# Patient Record
Sex: MALE | Race: WHITE | NOT HISPANIC OR LATINO
[De-identification: names, ages, dates, MRNs, and addresses within clinical notes are randomized per-mention and may not be internally consistent; named-entity substitution may affect disease eponyms.]

---

## 2017-05-11 ENCOUNTER — RX RENEWAL (OUTPATIENT)
Age: 82
End: 2017-05-11

## 2017-06-12 ENCOUNTER — APPOINTMENT (OUTPATIENT)
Dept: WOUND CARE | Facility: CLINIC | Age: 82
End: 2017-06-12

## 2017-06-12 VITALS
HEIGHT: 71 IN | HEART RATE: 71 BPM | SYSTOLIC BLOOD PRESSURE: 127 MMHG | DIASTOLIC BLOOD PRESSURE: 67 MMHG | TEMPERATURE: 97.6 F | WEIGHT: 315 LBS | BODY MASS INDEX: 44.1 KG/M2 | RESPIRATION RATE: 16 BRPM

## 2017-06-21 ENCOUNTER — APPOINTMENT (OUTPATIENT)
Dept: CARDIOLOGY | Facility: CLINIC | Age: 82
End: 2017-06-21

## 2017-06-21 ENCOUNTER — NON-APPOINTMENT (OUTPATIENT)
Age: 82
End: 2017-06-21

## 2017-06-21 VITALS
SYSTOLIC BLOOD PRESSURE: 126 MMHG | WEIGHT: 168 LBS | HEIGHT: 71 IN | BODY MASS INDEX: 23.52 KG/M2 | HEART RATE: 80 BPM | DIASTOLIC BLOOD PRESSURE: 64 MMHG

## 2017-06-21 DIAGNOSIS — Z79.01 LONG TERM (CURRENT) USE OF ANTICOAGULANTS: ICD-10-CM

## 2017-06-21 DIAGNOSIS — I47.2 VENTRICULAR TACHYCARDIA: ICD-10-CM

## 2017-06-21 DIAGNOSIS — E03.9 HYPOTHYROIDISM, UNSPECIFIED: ICD-10-CM

## 2017-06-21 RX ORDER — SEVELAMER CARBONATE 800 MG/1
800 TABLET, FILM COATED ORAL 3 TIMES DAILY
Refills: 0 | Status: ACTIVE | COMMUNITY
Start: 2017-06-21

## 2017-06-21 RX ORDER — LEVOTHYROXINE SODIUM 25 UG/1
25 TABLET ORAL DAILY
Refills: 0 | Status: ACTIVE | COMMUNITY
Start: 2017-06-21

## 2017-06-21 RX ORDER — FAMOTIDINE 10 MG/ML
10 VIAL (ML) INTRAVENOUS
Refills: 0 | Status: ACTIVE | COMMUNITY

## 2017-06-21 RX ORDER — ATORVASTATIN CALCIUM 40 MG/1
40 TABLET, FILM COATED ORAL
Refills: 0 | Status: DISCONTINUED | COMMUNITY

## 2017-06-21 RX ORDER — BACITRACIN ZINC 500 [USP'U]/G
500 OINTMENT TOPICAL
Refills: 0 | Status: ACTIVE | COMMUNITY

## 2017-06-23 ENCOUNTER — OUTPATIENT (OUTPATIENT)
Dept: OUTPATIENT SERVICES | Facility: HOSPITAL | Age: 82
LOS: 1 days | Discharge: HOME | End: 2017-06-23
Payer: MEDICARE

## 2017-06-23 DIAGNOSIS — C85.90 NON-HODGKIN LYMPHOMA, UNSPECIFIED, UNSPECIFIED SITE: ICD-10-CM

## 2017-06-26 ENCOUNTER — RESULT REVIEW (OUTPATIENT)
Age: 82
End: 2017-06-26

## 2017-06-26 LAB — NON-GYNECOLOGICAL CYTOLOGY STUDY: SIGNIFICANT CHANGE UP

## 2017-06-26 PROCEDURE — 88321 CONSLTJ&REPRT SLD PREP ELSWR: CPT

## 2017-06-29 ENCOUNTER — APPOINTMENT (OUTPATIENT)
Dept: HEMATOLOGY ONCOLOGY | Facility: CLINIC | Age: 82
End: 2017-06-29

## 2017-06-29 ENCOUNTER — APPOINTMENT (OUTPATIENT)
Dept: WOUND CARE | Facility: CLINIC | Age: 82
End: 2017-06-29

## 2017-06-29 ENCOUNTER — RESULT REVIEW (OUTPATIENT)
Age: 82
End: 2017-06-29

## 2017-06-29 VITALS
BODY MASS INDEX: 23.46 KG/M2 | HEART RATE: 69 BPM | WEIGHT: 162.04 LBS | SYSTOLIC BLOOD PRESSURE: 131 MMHG | RESPIRATION RATE: 16 BRPM | OXYGEN SATURATION: 100 % | DIASTOLIC BLOOD PRESSURE: 66 MMHG | HEIGHT: 69.49 IN | TEMPERATURE: 98 F

## 2017-06-29 VITALS
HEIGHT: 71 IN | BODY MASS INDEX: 23.52 KG/M2 | TEMPERATURE: 97.6 F | HEART RATE: 75 BPM | WEIGHT: 168 LBS | DIASTOLIC BLOOD PRESSURE: 78 MMHG | SYSTOLIC BLOOD PRESSURE: 126 MMHG

## 2017-06-29 DIAGNOSIS — Z95.0 PRESENCE OF CARDIAC PACEMAKER: ICD-10-CM

## 2017-06-29 DIAGNOSIS — I50.9 HEART FAILURE, UNSPECIFIED: ICD-10-CM

## 2017-06-29 DIAGNOSIS — Z99.2 DEPENDENCE ON RENAL DIALYSIS: ICD-10-CM

## 2017-06-29 DIAGNOSIS — Z51.81 ENCOUNTER FOR THERAPEUTIC DRUG LVL MONITORING: ICD-10-CM

## 2017-06-29 DIAGNOSIS — N18.9 CHRONIC KIDNEY DISEASE, UNSPECIFIED: ICD-10-CM

## 2017-06-29 DIAGNOSIS — C83.39 DIFFUSE LARGE B-CELL LYMPHOMA, EXTRANODAL AND SOLID ORGAN SITES: ICD-10-CM

## 2017-06-29 DIAGNOSIS — Z99.2 END STAGE RENAL DISEASE: ICD-10-CM

## 2017-06-29 DIAGNOSIS — D64.9 ANEMIA, UNSPECIFIED: ICD-10-CM

## 2017-06-29 DIAGNOSIS — Z79.899 ENCOUNTER FOR THERAPEUTIC DRUG LVL MONITORING: ICD-10-CM

## 2017-06-29 DIAGNOSIS — T14.8 OTHER INJURY OF UNSPECIFIED BODY REGION: ICD-10-CM

## 2017-06-29 DIAGNOSIS — N18.6 END STAGE RENAL DISEASE: ICD-10-CM

## 2017-06-29 DIAGNOSIS — I25.10 ATHEROSCLEROTIC HEART DISEASE OF NATIVE CORONARY ARTERY W/OUT ANGINA PECTORIS: ICD-10-CM

## 2017-06-29 LAB
BASOPHILS # BLD AUTO: 0.1 K/UL — SIGNIFICANT CHANGE UP (ref 0–0.2)
BASOPHILS NFR BLD AUTO: 0.5 % — SIGNIFICANT CHANGE UP (ref 0–2)
EOSINOPHIL # BLD AUTO: 0.3 K/UL — SIGNIFICANT CHANGE UP (ref 0–0.5)
EOSINOPHIL NFR BLD AUTO: 2.9 % — SIGNIFICANT CHANGE UP (ref 0–6)
HCT VFR BLD CALC: 33.7 % — LOW (ref 39–50)
HGB BLD-MCNC: 11.5 G/DL — LOW (ref 13–17)
LYMPHOCYTES # BLD AUTO: 1 K/UL — SIGNIFICANT CHANGE UP (ref 1–3.3)
LYMPHOCYTES # BLD AUTO: 11.2 % — LOW (ref 13–44)
MCHC RBC-ENTMCNC: 33.9 PG — SIGNIFICANT CHANGE UP (ref 27–34)
MCHC RBC-ENTMCNC: 34.2 G/DL — SIGNIFICANT CHANGE UP (ref 32–36)
MCV RBC AUTO: 98.9 FL — SIGNIFICANT CHANGE UP (ref 80–100)
MONOCYTES # BLD AUTO: 1.3 K/UL — HIGH (ref 0–0.9)
MONOCYTES NFR BLD AUTO: 14.1 % — HIGH (ref 2–14)
NEUTROPHILS # BLD AUTO: 6.7 K/UL — SIGNIFICANT CHANGE UP (ref 1.8–7.4)
NEUTROPHILS NFR BLD AUTO: 71.2 % — SIGNIFICANT CHANGE UP (ref 43–77)
PLATELET # BLD AUTO: 223 K/UL — SIGNIFICANT CHANGE UP (ref 150–400)
RBC # BLD: 3.41 M/UL — LOW (ref 4.2–5.8)
RBC # FLD: 14.8 % — HIGH (ref 10.3–14.5)
WBC # BLD: 9.4 K/UL — SIGNIFICANT CHANGE UP (ref 3.8–10.5)
WBC # FLD AUTO: 9.4 K/UL — SIGNIFICANT CHANGE UP (ref 3.8–10.5)

## 2017-07-02 PROBLEM — C83.39 DIFFUSE LARGE B-CELL LYMPHOMA OF EXTRANODAL SITE EXCLUDING SPLEEN AND OTHER SOLID ORGANS: Status: ACTIVE | Noted: 2017-07-02

## 2017-07-02 PROBLEM — Z51.81 VISIT FOR MONITORING RITUXAN THERAPY: Status: ACTIVE | Noted: 2017-07-02

## 2017-07-02 PROBLEM — Z95.0 PRESENCE OF PERMANENT CARDIAC PACEMAKER: Status: ACTIVE | Noted: 2017-07-02

## 2017-07-05 DIAGNOSIS — C83.39 DIFFUSE LARGE B-CELL LYMPHOMA, EXTRANODAL AND SOLID ORGAN SITES: ICD-10-CM

## 2017-07-11 ENCOUNTER — APPOINTMENT (OUTPATIENT)
Dept: PULMONOLOGY | Facility: CLINIC | Age: 82
End: 2017-07-11

## 2017-07-11 VITALS
RESPIRATION RATE: 16 BRPM | SYSTOLIC BLOOD PRESSURE: 125 MMHG | BODY MASS INDEX: 23.99 KG/M2 | DIASTOLIC BLOOD PRESSURE: 60 MMHG | WEIGHT: 162 LBS | HEIGHT: 69 IN | OXYGEN SATURATION: 96 % | HEART RATE: 76 BPM

## 2017-07-11 DIAGNOSIS — G47.19 OTHER HYPERSOMNIA: ICD-10-CM

## 2017-07-11 DIAGNOSIS — R05 COUGH: ICD-10-CM

## 2017-07-11 DIAGNOSIS — R06.02 SHORTNESS OF BREATH: ICD-10-CM

## 2017-07-11 DIAGNOSIS — R93.8 ABNORMAL FINDINGS ON DIAGNOSTIC IMAGING OF OTHER SPECIFIED BODY STRUCTURES: ICD-10-CM

## 2017-07-11 DIAGNOSIS — J20.9 ACUTE BRONCHITIS, UNSPECIFIED: ICD-10-CM

## 2017-07-11 DIAGNOSIS — Z87.09 PERSONAL HISTORY OF OTHER DISEASES OF THE RESPIRATORY SYSTEM: ICD-10-CM

## 2017-07-11 RX ORDER — SERTRALINE 25 MG/1
25 TABLET, FILM COATED ORAL
Qty: 30 | Refills: 0 | Status: ACTIVE | COMMUNITY
Start: 2016-12-08

## 2017-07-11 RX ORDER — OMEPRAZOLE 40 MG/1
40 CAPSULE, DELAYED RELEASE ORAL
Qty: 1 | Refills: 1 | Status: ACTIVE | COMMUNITY
Start: 2017-07-11 | End: 1900-01-01

## 2017-07-11 RX ORDER — CLARITHROMYCIN 500 MG/1
500 TABLET, FILM COATED ORAL
Qty: 20 | Refills: 0 | Status: ACTIVE | COMMUNITY
Start: 2017-07-11 | End: 1900-01-01

## 2017-07-11 RX ORDER — AMOXICILLIN 500 MG/1
500 CAPSULE ORAL
Qty: 28 | Refills: 0 | Status: COMPLETED | COMMUNITY
Start: 2017-06-22

## 2017-07-12 ENCOUNTER — FORM ENCOUNTER (OUTPATIENT)
Age: 82
End: 2017-07-12

## 2017-07-13 ENCOUNTER — APPOINTMENT (OUTPATIENT)
Dept: CT IMAGING | Facility: IMAGING CENTER | Age: 82
End: 2017-07-13

## 2017-07-13 ENCOUNTER — OUTPATIENT (OUTPATIENT)
Dept: OUTPATIENT SERVICES | Facility: HOSPITAL | Age: 82
LOS: 1 days | End: 2017-07-13
Payer: MEDICARE

## 2017-07-13 DIAGNOSIS — R93.8 ABNORMAL FINDINGS ON DIAGNOSTIC IMAGING OF OTHER SPECIFIED BODY STRUCTURES: ICD-10-CM

## 2017-07-13 PROCEDURE — 71250 CT THORAX DX C-: CPT

## 2017-07-19 ENCOUNTER — APPOINTMENT (OUTPATIENT)
Dept: THORACIC SURGERY | Facility: CLINIC | Age: 82
End: 2017-07-19

## 2017-07-20 ENCOUNTER — RESULT REVIEW (OUTPATIENT)
Age: 82
End: 2017-07-20

## 2017-07-20 ENCOUNTER — APPOINTMENT (OUTPATIENT)
Dept: INFUSION THERAPY | Facility: HOSPITAL | Age: 82
End: 2017-07-20

## 2017-07-20 ENCOUNTER — LABORATORY RESULT (OUTPATIENT)
Age: 82
End: 2017-07-20

## 2017-07-20 ENCOUNTER — OTHER (OUTPATIENT)
Age: 82
End: 2017-07-20

## 2017-07-20 LAB
BASOPHILS # BLD AUTO: 0 K/UL — SIGNIFICANT CHANGE UP (ref 0–0.2)
BASOPHILS NFR BLD AUTO: 0.3 % — SIGNIFICANT CHANGE UP (ref 0–2)
EOSINOPHIL # BLD AUTO: 0 K/UL — SIGNIFICANT CHANGE UP (ref 0–0.5)
EOSINOPHIL NFR BLD AUTO: 0 % — SIGNIFICANT CHANGE UP (ref 0–6)
HCT VFR BLD CALC: 34.4 % — LOW (ref 39–50)
HGB BLD-MCNC: 11.5 G/DL — LOW (ref 13–17)
LYMPHOCYTES # BLD AUTO: 1 K/UL — SIGNIFICANT CHANGE UP (ref 1–3.3)
LYMPHOCYTES # BLD AUTO: 10.5 % — LOW (ref 13–44)
MCHC RBC-ENTMCNC: 32.5 PG — SIGNIFICANT CHANGE UP (ref 27–34)
MCHC RBC-ENTMCNC: 33.5 G/DL — SIGNIFICANT CHANGE UP (ref 32–36)
MCV RBC AUTO: 97.1 FL — SIGNIFICANT CHANGE UP (ref 80–100)
MONOCYTES # BLD AUTO: 1.2 K/UL — HIGH (ref 0–0.9)
MONOCYTES NFR BLD AUTO: 12.5 % — SIGNIFICANT CHANGE UP (ref 2–14)
NEUTROPHILS # BLD AUTO: 7.4 K/UL — SIGNIFICANT CHANGE UP (ref 1.8–7.4)
NEUTROPHILS NFR BLD AUTO: 76.7 % — SIGNIFICANT CHANGE UP (ref 43–77)
PLATELET # BLD AUTO: 214 K/UL — SIGNIFICANT CHANGE UP (ref 150–400)
RBC # BLD: 3.54 M/UL — LOW (ref 4.2–5.8)
RBC # FLD: 15.8 % — HIGH (ref 10.3–14.5)
WBC # BLD: 9.6 K/UL — SIGNIFICANT CHANGE UP (ref 3.8–10.5)
WBC # FLD AUTO: 9.6 K/UL — SIGNIFICANT CHANGE UP (ref 3.8–10.5)

## 2017-07-21 DIAGNOSIS — R11.2 NAUSEA WITH VOMITING, UNSPECIFIED: ICD-10-CM

## 2017-07-21 DIAGNOSIS — Z51.11 ENCOUNTER FOR ANTINEOPLASTIC CHEMOTHERAPY: ICD-10-CM

## 2017-07-25 ENCOUNTER — APPOINTMENT (OUTPATIENT)
Dept: PULMONOLOGY | Facility: CLINIC | Age: 82
End: 2017-07-25

## 2017-07-25 VITALS
WEIGHT: 157 LBS | RESPIRATION RATE: 17 BRPM | BODY MASS INDEX: 21.98 KG/M2 | HEIGHT: 71 IN | OXYGEN SATURATION: 100 % | SYSTOLIC BLOOD PRESSURE: 116 MMHG | HEART RATE: 79 BPM | DIASTOLIC BLOOD PRESSURE: 70 MMHG

## 2017-07-25 DIAGNOSIS — J45.909 UNSPECIFIED ASTHMA, UNCOMPLICATED: ICD-10-CM

## 2017-07-25 DIAGNOSIS — Z72.820 SLEEP DEPRIVATION: ICD-10-CM

## 2017-07-25 DIAGNOSIS — E66.9 OBESITY, UNSPECIFIED: ICD-10-CM

## 2017-07-25 DIAGNOSIS — J98.6 DISORDERS OF DIAPHRAGM: ICD-10-CM

## 2017-07-31 ENCOUNTER — APPOINTMENT (OUTPATIENT)
Dept: THORACIC SURGERY | Facility: CLINIC | Age: 82
End: 2017-07-31
Payer: MEDICARE

## 2017-07-31 VITALS
DIASTOLIC BLOOD PRESSURE: 93 MMHG | BODY MASS INDEX: 21.98 KG/M2 | HEART RATE: 72 BPM | WEIGHT: 157 LBS | HEIGHT: 71 IN | OXYGEN SATURATION: 99 % | RESPIRATION RATE: 16 BRPM | SYSTOLIC BLOOD PRESSURE: 153 MMHG

## 2017-07-31 DIAGNOSIS — J90 PLEURAL EFFUSION, NOT ELSEWHERE CLASSIFIED: ICD-10-CM

## 2017-07-31 PROCEDURE — 99204 OFFICE O/P NEW MOD 45 MIN: CPT

## 2017-07-31 RX ORDER — QUETIAPINE 25 MG/1
25 TABLET, FILM COATED ORAL
Refills: 0 | Status: COMPLETED | COMMUNITY
End: 2017-07-31

## 2017-08-01 ENCOUNTER — OTHER (OUTPATIENT)
Age: 82
End: 2017-08-01

## 2017-08-02 ENCOUNTER — FORM ENCOUNTER (OUTPATIENT)
Age: 82
End: 2017-08-02

## 2017-08-03 ENCOUNTER — OUTPATIENT (OUTPATIENT)
Dept: OUTPATIENT SERVICES | Facility: HOSPITAL | Age: 82
LOS: 1 days | End: 2017-08-03
Payer: MEDICARE

## 2017-08-03 ENCOUNTER — APPOINTMENT (OUTPATIENT)
Dept: RADIOLOGY | Facility: HOSPITAL | Age: 82
End: 2017-08-03

## 2017-08-03 DIAGNOSIS — R05 COUGH: ICD-10-CM

## 2017-08-03 DIAGNOSIS — R93.8 ABNORMAL FINDINGS ON DIAGNOSTIC IMAGING OF OTHER SPECIFIED BODY STRUCTURES: ICD-10-CM

## 2017-08-03 DIAGNOSIS — G47.19 OTHER HYPERSOMNIA: ICD-10-CM

## 2017-08-03 DIAGNOSIS — R06.02 SHORTNESS OF BREATH: ICD-10-CM

## 2017-08-03 DIAGNOSIS — K21.9 GASTRO-ESOPHAGEAL REFLUX DISEASE WITHOUT ESOPHAGITIS: ICD-10-CM

## 2017-08-03 PROCEDURE — 76000 FLUOROSCOPY <1 HR PHYS/QHP: CPT | Mod: 26

## 2017-08-03 PROCEDURE — 76000 FLUOROSCOPY <1 HR PHYS/QHP: CPT

## 2017-08-03 PROCEDURE — 71046 X-RAY EXAM CHEST 2 VIEWS: CPT

## 2017-08-03 PROCEDURE — 71020: CPT | Mod: 26

## 2017-08-05 ENCOUNTER — RX RENEWAL (OUTPATIENT)
Age: 82
End: 2017-08-05

## 2017-08-07 ENCOUNTER — FORM ENCOUNTER (OUTPATIENT)
Age: 82
End: 2017-08-07

## 2017-08-07 ENCOUNTER — RESULT REVIEW (OUTPATIENT)
Age: 82
End: 2017-08-07

## 2017-08-08 ENCOUNTER — APPOINTMENT (OUTPATIENT)
Dept: ULTRASOUND IMAGING | Facility: IMAGING CENTER | Age: 82
End: 2017-08-08
Payer: MEDICARE

## 2017-08-08 ENCOUNTER — RESULT REVIEW (OUTPATIENT)
Age: 82
End: 2017-08-08

## 2017-08-08 ENCOUNTER — OUTPATIENT (OUTPATIENT)
Dept: OUTPATIENT SERVICES | Facility: HOSPITAL | Age: 82
LOS: 1 days | End: 2017-08-08
Payer: MEDICARE

## 2017-08-08 DIAGNOSIS — J90 PLEURAL EFFUSION, NOT ELSEWHERE CLASSIFIED: ICD-10-CM

## 2017-08-08 PROCEDURE — 88342 IMHCHEM/IMCYTCHM 1ST ANTB: CPT

## 2017-08-08 PROCEDURE — 88184 FLOWCYTOMETRY/ TC 1 MARKER: CPT

## 2017-08-08 PROCEDURE — 88341 IMHCHEM/IMCYTCHM EA ADD ANTB: CPT

## 2017-08-08 PROCEDURE — 88185 FLOWCYTOMETRY/TC ADD-ON: CPT

## 2017-08-08 PROCEDURE — 71020: CPT | Mod: 26

## 2017-08-08 PROCEDURE — 88341 IMHCHEM/IMCYTCHM EA ADD ANTB: CPT | Mod: 26

## 2017-08-08 PROCEDURE — 88112 CYTOPATH CELL ENHANCE TECH: CPT | Mod: 26

## 2017-08-08 PROCEDURE — 32555 ASPIRATE PLEURA W/ IMAGING: CPT | Mod: LT

## 2017-08-08 PROCEDURE — 88112 CYTOPATH CELL ENHANCE TECH: CPT

## 2017-08-08 PROCEDURE — 32555 ASPIRATE PLEURA W/ IMAGING: CPT

## 2017-08-08 PROCEDURE — 88189 FLOWCYTOMETRY/READ 16 & >: CPT

## 2017-08-08 PROCEDURE — 88342 IMHCHEM/IMCYTCHM 1ST ANTB: CPT | Mod: 26

## 2017-08-08 PROCEDURE — 88305 TISSUE EXAM BY PATHOLOGIST: CPT

## 2017-08-08 PROCEDURE — 88305 TISSUE EXAM BY PATHOLOGIST: CPT | Mod: 26

## 2017-08-08 PROCEDURE — 71046 X-RAY EXAM CHEST 2 VIEWS: CPT

## 2017-08-11 LAB — TM INTERPRETATION: SIGNIFICANT CHANGE UP

## 2017-08-14 LAB — NON-GYNECOLOGICAL CYTOLOGY STUDY: SIGNIFICANT CHANGE UP

## 2017-08-17 ENCOUNTER — FORM ENCOUNTER (OUTPATIENT)
Age: 82
End: 2017-08-17

## 2017-08-18 ENCOUNTER — OUTPATIENT (OUTPATIENT)
Dept: OUTPATIENT SERVICES | Facility: HOSPITAL | Age: 82
LOS: 1 days | End: 2017-08-18
Payer: MEDICARE

## 2017-08-18 ENCOUNTER — APPOINTMENT (OUTPATIENT)
Dept: ULTRASOUND IMAGING | Facility: IMAGING CENTER | Age: 82
End: 2017-08-18
Payer: MEDICARE

## 2017-08-18 DIAGNOSIS — J90 PLEURAL EFFUSION, NOT ELSEWHERE CLASSIFIED: ICD-10-CM

## 2017-08-18 DIAGNOSIS — Z00.8 ENCOUNTER FOR OTHER GENERAL EXAMINATION: ICD-10-CM

## 2017-08-18 PROCEDURE — 76604 US EXAM CHEST: CPT

## 2017-08-18 PROCEDURE — 76604 US EXAM CHEST: CPT | Mod: 26

## 2017-08-22 ENCOUNTER — APPOINTMENT (OUTPATIENT)
Dept: PULMONOLOGY | Facility: CLINIC | Age: 82
End: 2017-08-22
Payer: MEDICARE

## 2017-08-22 VITALS
WEIGHT: 157 LBS | RESPIRATION RATE: 16 BRPM | DIASTOLIC BLOOD PRESSURE: 87 MMHG | SYSTOLIC BLOOD PRESSURE: 107 MMHG | BODY MASS INDEX: 21.98 KG/M2 | HEIGHT: 71 IN | OXYGEN SATURATION: 98 % | HEART RATE: 89 BPM

## 2017-08-22 DIAGNOSIS — R26.89 OTHER ABNORMALITIES OF GAIT AND MOBILITY: ICD-10-CM

## 2017-08-22 DIAGNOSIS — G47.30 SLEEP APNEA, UNSPECIFIED: ICD-10-CM

## 2017-08-22 DIAGNOSIS — J90 PLEURAL EFFUSION, NOT ELSEWHERE CLASSIFIED: ICD-10-CM

## 2017-08-22 DIAGNOSIS — K21.9 GASTRO-ESOPHAGEAL REFLUX DISEASE W/OUT ESOPHAGITIS: ICD-10-CM

## 2017-08-22 DIAGNOSIS — R06.09 OTHER FORMS OF DYSPNEA: ICD-10-CM

## 2017-08-22 PROCEDURE — 94620 PULMONARY STRESS TESTING SIMPLE: CPT

## 2017-08-22 PROCEDURE — 99214 OFFICE O/P EST MOD 30 MIN: CPT | Mod: 25

## 2017-08-24 ENCOUNTER — CHART COPY (OUTPATIENT)
Age: 82
End: 2017-08-24

## 2017-08-29 ENCOUNTER — APPOINTMENT (OUTPATIENT)
Dept: CARDIOLOGY | Facility: CLINIC | Age: 82
End: 2017-08-29
Payer: MEDICARE

## 2017-08-29 ENCOUNTER — NON-APPOINTMENT (OUTPATIENT)
Age: 82
End: 2017-08-29

## 2017-08-29 VITALS
HEART RATE: 78 BPM | SYSTOLIC BLOOD PRESSURE: 139 MMHG | HEIGHT: 71 IN | DIASTOLIC BLOOD PRESSURE: 80 MMHG | BODY MASS INDEX: 23.1 KG/M2 | OXYGEN SATURATION: 97 % | WEIGHT: 165 LBS

## 2017-08-29 DIAGNOSIS — Z01.810 ENCOUNTER FOR PREPROCEDURAL CARDIOVASCULAR EXAMINATION: ICD-10-CM

## 2017-08-29 DIAGNOSIS — I34.0 NONRHEUMATIC MITRAL (VALVE) INSUFFICIENCY: ICD-10-CM

## 2017-08-29 DIAGNOSIS — I48.91 UNSPECIFIED ATRIAL FIBRILLATION: ICD-10-CM

## 2017-08-29 DIAGNOSIS — I95.1 ORTHOSTATIC HYPOTENSION: ICD-10-CM

## 2017-08-29 DIAGNOSIS — I25.10 ATHEROSCLEROTIC HEART DISEASE OF NATIVE CORONARY ARTERY W/OUT ANGINA PECTORIS: ICD-10-CM

## 2017-08-29 PROCEDURE — 93000 ELECTROCARDIOGRAM COMPLETE: CPT

## 2017-08-29 PROCEDURE — 99215 OFFICE O/P EST HI 40 MIN: CPT

## 2017-08-30 ENCOUNTER — OUTPATIENT (OUTPATIENT)
Dept: OUTPATIENT SERVICES | Facility: HOSPITAL | Age: 82
LOS: 1 days | End: 2017-08-30

## 2017-08-30 VITALS
SYSTOLIC BLOOD PRESSURE: 124 MMHG | RESPIRATION RATE: 16 BRPM | HEIGHT: 71 IN | WEIGHT: 160.94 LBS | DIASTOLIC BLOOD PRESSURE: 80 MMHG | OXYGEN SATURATION: 95 % | HEART RATE: 62 BPM | TEMPERATURE: 97 F

## 2017-08-30 DIAGNOSIS — I71.4 ABDOMINAL AORTIC ANEURYSM, WITHOUT RUPTURE: ICD-10-CM

## 2017-08-30 DIAGNOSIS — J90 PLEURAL EFFUSION, NOT ELSEWHERE CLASSIFIED: ICD-10-CM

## 2017-08-30 DIAGNOSIS — R06.83 SNORING: ICD-10-CM

## 2017-08-30 DIAGNOSIS — I48.91 UNSPECIFIED ATRIAL FIBRILLATION: ICD-10-CM

## 2017-08-30 DIAGNOSIS — I25.10 ATHEROSCLEROTIC HEART DISEASE OF NATIVE CORONARY ARTERY WITHOUT ANGINA PECTORIS: ICD-10-CM

## 2017-08-30 DIAGNOSIS — N18.9 CHRONIC KIDNEY DISEASE, UNSPECIFIED: ICD-10-CM

## 2017-08-30 LAB
APTT BLD: 30.8 SEC — SIGNIFICANT CHANGE UP (ref 27.5–37.4)
BLD GP AB SCN SERPL QL: NEGATIVE — SIGNIFICANT CHANGE UP
BUN SERPL-MCNC: 92 MG/DL — HIGH (ref 7–23)
CALCIUM SERPL-MCNC: 8.1 MG/DL — LOW (ref 8.4–10.5)
CHLORIDE SERPL-SCNC: 91 MMOL/L — LOW (ref 98–107)
CO2 SERPL-SCNC: 25 MMOL/L — SIGNIFICANT CHANGE UP (ref 22–31)
CREAT SERPL-MCNC: 7.01 MG/DL — HIGH (ref 0.5–1.3)
GLUCOSE SERPL-MCNC: 66 MG/DL — LOW (ref 70–99)
HCT VFR BLD CALC: 29 % — LOW (ref 39–50)
HGB BLD-MCNC: 9.5 G/DL — LOW (ref 13–17)
INR BLD: 1.22 — HIGH (ref 0.88–1.17)
MCHC RBC-ENTMCNC: 32.1 PG — SIGNIFICANT CHANGE UP (ref 27–34)
MCHC RBC-ENTMCNC: 32.8 % — SIGNIFICANT CHANGE UP (ref 32–36)
MCV RBC AUTO: 98 FL — SIGNIFICANT CHANGE UP (ref 80–100)
NRBC # FLD: 0 — SIGNIFICANT CHANGE UP
PLATELET # BLD AUTO: 186 K/UL — SIGNIFICANT CHANGE UP (ref 150–400)
PMV BLD: 11.1 FL — SIGNIFICANT CHANGE UP (ref 7–13)
POTASSIUM SERPL-MCNC: 4.7 MMOL/L — SIGNIFICANT CHANGE UP (ref 3.5–5.3)
POTASSIUM SERPL-SCNC: 4.7 MMOL/L — SIGNIFICANT CHANGE UP (ref 3.5–5.3)
PROTHROM AB SERPL-ACNC: 13.7 SEC — HIGH (ref 9.8–13.1)
RBC # BLD: 2.96 M/UL — LOW (ref 4.2–5.8)
RBC # FLD: 16.3 % — HIGH (ref 10.3–14.5)
RH IG SCN BLD-IMP: POSITIVE — SIGNIFICANT CHANGE UP
SODIUM SERPL-SCNC: 138 MMOL/L — SIGNIFICANT CHANGE UP (ref 135–145)
WBC # BLD: 8.55 K/UL — SIGNIFICANT CHANGE UP (ref 3.8–10.5)
WBC # FLD AUTO: 8.55 K/UL — SIGNIFICANT CHANGE UP (ref 3.8–10.5)

## 2017-08-30 RX ORDER — SODIUM CHLORIDE 9 MG/ML
1000 INJECTION INTRAMUSCULAR; INTRAVENOUS; SUBCUTANEOUS
Qty: 0 | Refills: 0 | Status: DISCONTINUED | OUTPATIENT
Start: 2017-09-05 | End: 2017-09-05

## 2017-08-30 NOTE — H&P PST ADULT - LYMPHATIC
anterior cervical L/supraclavicular R/posterior cervical L/posterior cervical R/supraclavicular L/anterior cervical R

## 2017-08-30 NOTE — H&P PST ADULT - PROBLEM SELECTOR PLAN 1
scheduled left VAT, drainage of effusion, pleurx catheter  placement on 9/5/2017.  preop instructions, surgical soap given  pt verbalized understanding  abo on admit

## 2017-08-30 NOTE — H&P PST ADULT - HISTORY OF PRESENT ILLNESS
88y/o male presents for preop eval for scheduled left VAT, drainage of effusion, pleurx catheter  placement on 9/5/2017.  Pt with c/o MARC, h/o recent thoracentesis on 8/8/17 drained 500cc.

## 2017-08-30 NOTE — H&P PST ADULT - CARDIOVASCULAR COMMENTS
hx afib/ CAD on medications left upper anterior chest - internal pacemaker, copy of card in chart hx afib/ CAD on medications, coronary stents, permanent internal pacemaker

## 2017-08-30 NOTE — H&P PST ADULT - PROBLEM SELECTOR PLAN 6
RAFAEL precaution recommended   OR booking notified via fax pt with 6 positives on stop   RAFAEL precaution recommended   OR booking notified via fax

## 2017-08-30 NOTE — H&P PST ADULT - SOURCE OF INFORMATION, PROFILE
patient/wife/significant other significant other/wife - Jade/patient significant other/Wife - Jade/patient

## 2017-08-30 NOTE — H&P PST ADULT - PROBLEM SELECTOR PLAN 2
pt with multiple coronary stents, on low dose aspirin  pt instructed to continue.  Instructed to take midodrine on morning of surgery

## 2017-08-30 NOTE — H&P PST ADULT - NS NEC GEN PE MLT EXAM PC
Pt requested refill of pain medication and requested pt   who is on hipaa, given to pt  AG.   Will require visit for next refill
detailed exam

## 2017-08-30 NOTE — H&P PST ADULT - PROBLEM SELECTOR PLAN 5
pending copy of most recent cardiology studies pending copy of most recent cardiology studies  copy of cardiology eval in chart

## 2017-08-30 NOTE — H&P PST ADULT - PMH
Atrial fibrillation  11/2014  CAD (Coronary Artery Disease)  8 stents placed 3/2014  Chronic kidney disease  on dialysis 3x x 6 months  Hepatitis    [place] (Hard of Hearing)    HTN (Hypertension)    Hypercholesteremia    Kidney Stones s/p ureteroscopy    Prostate cancer  s/p seeds AAA (abdominal aortic aneurysm)  per cardiology eval report  Asthma  h/o  Atrial fibrillation  11/2014  CAD (Coronary Artery Disease)  13 stents placed 3/2014  Chronic kidney disease  on dialysis MWF  Hemodialysis patient    Hepatitis    [place] (Hard of Hearing)    HTN (Hypertension)    Hypercholesteremia    Kidney Stones s/p ureteroscopy    Lymphoma, non-Hodgkin's  h/o diffuse B cell  Pleural effusion    Prostate cancer  s/p seeds  Stroke  mild AAA (abdominal aortic aneurysm)  per cardiology eval report  Asthma  h/o  Atrial fibrillation  11/2014  CAD (Coronary Artery Disease)  13 stents placed 3/2014  Chronic kidney disease  on dialysis MWF  GERD (gastroesophageal reflux disease)    Gout    Hemodialysis patient    Hepatitis    [place] (Hard of Hearing)    HTN (Hypertension)    Hypercholesteremia    Kidney Stones s/p ureteroscopy    Lymphoma, non-Hodgkin's  h/o diffuse B cell  Myocardial infarct  h/o X3  Pleural effusion    Prostate cancer  s/p seeds  Stroke  mild

## 2017-08-30 NOTE — H&P PST ADULT - NSANTHOSAYNRD_GEN_A_CORE
No. RAFAEL screening performed.  STOP BANG Legend: 0-2 = LOW Risk; 3-4 = INTERMEDIATE Risk; 5-8 = HIGH Risk

## 2017-08-30 NOTE — H&P PST ADULT - NS MD HP INPLANTS MED DEV
Artificial joint/[employer]  model # [id number] serial [id number], b/l hips "13 coronary stents"/Pacemaker/Vascular stents/Clips

## 2017-08-30 NOTE — H&P PST ADULT - PSH
Kidney Stones    S/P Cardiac Cath  8 stents placed 3/2014, per pt he thinks he had 3 stents placed 15 years prior also.  S/P Cataract Surgery    S/P Cystoscopy    S/P Hip Replacement bilat    S/P Inguinal Hernia  repair bilat    S/P Parathyroidectomy    S/P Radiation > 12 Weeks prostatic seeds placed 10 yrs ago

## 2017-09-05 ENCOUNTER — INPATIENT (INPATIENT)
Facility: HOSPITAL | Age: 82
LOS: 1 days | Discharge: HOME HEALTH | End: 2017-09-07
Attending: THORACIC SURGERY (CARDIOTHORACIC VASCULAR SURGERY) | Admitting: THORACIC SURGERY (CARDIOTHORACIC VASCULAR SURGERY)
Payer: MEDICARE

## 2017-09-05 ENCOUNTER — RESULT REVIEW (OUTPATIENT)
Age: 82
End: 2017-09-05

## 2017-09-05 ENCOUNTER — APPOINTMENT (OUTPATIENT)
Dept: THORACIC SURGERY | Facility: HOSPITAL | Age: 82
End: 2017-09-05
Payer: MEDICARE

## 2017-09-05 ENCOUNTER — TRANSCRIPTION ENCOUNTER (OUTPATIENT)
Age: 82
End: 2017-09-05

## 2017-09-05 VITALS
DIASTOLIC BLOOD PRESSURE: 79 MMHG | HEIGHT: 71 IN | OXYGEN SATURATION: 94 % | WEIGHT: 160.94 LBS | TEMPERATURE: 98 F | SYSTOLIC BLOOD PRESSURE: 137 MMHG | HEART RATE: 70 BPM | RESPIRATION RATE: 16 BRPM

## 2017-09-05 DIAGNOSIS — J90 PLEURAL EFFUSION, NOT ELSEWHERE CLASSIFIED: ICD-10-CM

## 2017-09-05 LAB
BLD GP AB SCN SERPL QL: NEGATIVE — SIGNIFICANT CHANGE UP
POTASSIUM BLDV-SCNC: 5.3 MMOL/L — HIGH (ref 3.4–4.5)
RH IG SCN BLD-IMP: POSITIVE — SIGNIFICANT CHANGE UP

## 2017-09-05 PROCEDURE — 88112 CYTOPATH CELL ENHANCE TECH: CPT | Mod: 26

## 2017-09-05 PROCEDURE — 88305 TISSUE EXAM BY PATHOLOGIST: CPT | Mod: 26

## 2017-09-05 PROCEDURE — 32609 THORACOSCOPY W/BX PLEURA: CPT

## 2017-09-05 PROCEDURE — 32550 INSERT PLEURAL CATH: CPT

## 2017-09-05 PROCEDURE — 71010: CPT | Mod: 26

## 2017-09-05 PROCEDURE — ZZZZZ: CPT

## 2017-09-05 RX ORDER — SERTRALINE 25 MG/1
25 TABLET, FILM COATED ORAL DAILY
Qty: 0 | Refills: 0 | Status: DISCONTINUED | OUTPATIENT
Start: 2017-09-05 | End: 2017-09-07

## 2017-09-05 RX ORDER — LACTOBACILLUS ACIDOPHILUS 100MM CELL
1 CAPSULE ORAL DAILY
Qty: 0 | Refills: 0 | Status: DISCONTINUED | OUTPATIENT
Start: 2017-09-05 | End: 2017-09-07

## 2017-09-05 RX ORDER — ONDANSETRON 8 MG/1
4 TABLET, FILM COATED ORAL ONCE
Qty: 0 | Refills: 0 | Status: DISCONTINUED | OUTPATIENT
Start: 2017-09-05 | End: 2017-09-05

## 2017-09-05 RX ORDER — LEVOTHYROXINE SODIUM 125 MCG
25 TABLET ORAL DAILY
Qty: 0 | Refills: 0 | Status: DISCONTINUED | OUTPATIENT
Start: 2017-09-05 | End: 2017-09-07

## 2017-09-05 RX ORDER — MIDODRINE HYDROCHLORIDE 2.5 MG/1
10 TABLET ORAL THREE TIMES A DAY
Qty: 0 | Refills: 0 | Status: DISCONTINUED | OUTPATIENT
Start: 2017-09-05 | End: 2017-09-07

## 2017-09-05 RX ORDER — ASPIRIN/CALCIUM CARB/MAGNESIUM 324 MG
81 TABLET ORAL DAILY
Qty: 0 | Refills: 0 | Status: DISCONTINUED | OUTPATIENT
Start: 2017-09-05 | End: 2017-09-07

## 2017-09-05 RX ORDER — ACETAMINOPHEN 500 MG
650 TABLET ORAL EVERY 6 HOURS
Qty: 0 | Refills: 0 | Status: DISCONTINUED | OUTPATIENT
Start: 2017-09-05 | End: 2017-09-07

## 2017-09-05 RX ORDER — ATORVASTATIN CALCIUM 80 MG/1
40 TABLET, FILM COATED ORAL AT BEDTIME
Qty: 0 | Refills: 0 | Status: DISCONTINUED | OUTPATIENT
Start: 2017-09-05 | End: 2017-09-07

## 2017-09-05 RX ORDER — ALBUTEROL 90 UG/1
2.5 AEROSOL, METERED ORAL EVERY 6 HOURS
Qty: 0 | Refills: 0 | Status: DISCONTINUED | OUTPATIENT
Start: 2017-09-05 | End: 2017-09-07

## 2017-09-05 RX ORDER — HEPARIN SODIUM 5000 [USP'U]/ML
5000 INJECTION INTRAVENOUS; SUBCUTANEOUS EVERY 8 HOURS
Qty: 0 | Refills: 0 | Status: DISCONTINUED | OUTPATIENT
Start: 2017-09-05 | End: 2017-09-07

## 2017-09-05 RX ORDER — HEPARIN SODIUM 5000 [USP'U]/ML
5000 INJECTION INTRAVENOUS; SUBCUTANEOUS ONCE
Qty: 0 | Refills: 0 | Status: COMPLETED | OUTPATIENT
Start: 2017-09-05 | End: 2017-09-05

## 2017-09-05 RX ORDER — LIDOCAINE 4 G/100G
1 CREAM TOPICAL DAILY
Qty: 0 | Refills: 0 | Status: DISCONTINUED | OUTPATIENT
Start: 2017-09-05 | End: 2017-09-07

## 2017-09-05 RX ORDER — IPRATROPIUM BROMIDE 0.2 MG/ML
500 SOLUTION, NON-ORAL INHALATION EVERY 6 HOURS
Qty: 0 | Refills: 0 | Status: DISCONTINUED | OUTPATIENT
Start: 2017-09-05 | End: 2017-09-07

## 2017-09-05 RX ORDER — ALLOPURINOL 300 MG
100 TABLET ORAL DAILY
Qty: 0 | Refills: 0 | Status: DISCONTINUED | OUTPATIENT
Start: 2017-09-05 | End: 2017-09-07

## 2017-09-05 RX ORDER — FENTANYL CITRATE 50 UG/ML
25 INJECTION INTRAVENOUS
Qty: 0 | Refills: 0 | Status: DISCONTINUED | OUTPATIENT
Start: 2017-09-05 | End: 2017-09-05

## 2017-09-05 RX ORDER — SEVELAMER CARBONATE 2400 MG/1
800 POWDER, FOR SUSPENSION ORAL
Qty: 0 | Refills: 0 | Status: DISCONTINUED | OUTPATIENT
Start: 2017-09-05 | End: 2017-09-07

## 2017-09-05 RX ADMIN — SODIUM CHLORIDE 30 MILLILITER(S): 9 INJECTION INTRAMUSCULAR; INTRAVENOUS; SUBCUTANEOUS at 17:14

## 2017-09-05 RX ADMIN — ALBUTEROL 2.5 MILLIGRAM(S): 90 AEROSOL, METERED ORAL at 21:07

## 2017-09-05 RX ADMIN — SODIUM CHLORIDE 30 MILLILITER(S): 9 INJECTION INTRAMUSCULAR; INTRAVENOUS; SUBCUTANEOUS at 09:34

## 2017-09-05 RX ADMIN — Medication 500 MICROGRAM(S): at 21:07

## 2017-09-05 RX ADMIN — HEPARIN SODIUM 5000 UNIT(S): 5000 INJECTION INTRAVENOUS; SUBCUTANEOUS at 22:00

## 2017-09-05 RX ADMIN — ATORVASTATIN CALCIUM 40 MILLIGRAM(S): 80 TABLET, FILM COATED ORAL at 22:00

## 2017-09-05 RX ADMIN — LIDOCAINE 1 PATCH: 4 CREAM TOPICAL at 21:13

## 2017-09-05 RX ADMIN — HEPARIN SODIUM 5000 UNIT(S): 5000 INJECTION INTRAVENOUS; SUBCUTANEOUS at 09:28

## 2017-09-05 RX ADMIN — SEVELAMER CARBONATE 800 MILLIGRAM(S): 2400 POWDER, FOR SUSPENSION ORAL at 18:06

## 2017-09-05 RX ADMIN — MIDODRINE HYDROCHLORIDE 10 MILLIGRAM(S): 2.5 TABLET ORAL at 22:00

## 2017-09-05 RX ADMIN — Medication 650 MILLIGRAM(S): at 23:32

## 2017-09-05 NOTE — DISCHARGE NOTE ADULT - HOME CARE AGENCY
[facility] [phone] initial visit will be day after discharge home. A nurse will call prior to the home visit.

## 2017-09-05 NOTE — DISCHARGE NOTE ADULT - MEDICATION SUMMARY - MEDICATIONS TO TAKE
I will START or STAY ON the medications listed below when I get home from the hospital:    renvella  -- 800 milligram(s) by mouth 3 times a day  -- Indication: For renal failure     Nupron 8 ozs daily  --     -- Indication: For renal failure    acetaminophen 325 mg oral tablet  -- 2 tab(s) by mouth every 6 hours, As needed, Mild Pain (1 - 3)  -- Indication: For Pain    aspirin 81 mg oral delayed release tablet  -- 1 tab(s) by mouth once a day  -- Indication: For Anti platelet    sertraline 25 mg oral tablet  -- 1 tab(s) by mouth once a day  -- Indication: For Anti depressant    allopurinol 100 mg oral tablet  -- 1 tab(s) by mouth once a day  -- Indication: For Gout    Lipitor 40 mg oral tablet  -- 1 tab(s) by mouth once a day (at bedtime)  -- Indication: For cholesterol    epoetin terry  --   intra dialysis  -- Indication: For in dialysis    Pepcid 40 mg oral tablet  -- 1 tab(s) by mouth once a day (at bedtime)  -- Indication: For Stomach    Colace 100 mg oral capsule  -- 1 cap(s) by mouth once a day (at bedtime)  -- Indication: For constipation    Zinc 50 mg Pink oral capsule  -- 1 cap(s) by mouth once a day (at bedtime)  -- Indication: For constipation    midodrine 10 mg oral tablet  -- 1 tab(s) by mouth 3 times a day  -- Indication: For blood pressure    Florastor 250 mg oral capsule  -- 1 cap(s) by mouth once a day  -- Indication: For Pro biotic    Synthroid 25 mcg (0.025 mg) oral tablet  -- 1 tab(s) by mouth once a day  -- Indication: For thyroid

## 2017-09-05 NOTE — ASU PATIENT PROFILE, ADULT - PMH
AAA (abdominal aortic aneurysm)  per cardiology eval report  Asthma  h/o  Atrial fibrillation  11/2014  CAD (Coronary Artery Disease)  13 stents placed 3/2014  Chronic kidney disease  on dialysis MWF  GERD (gastroesophageal reflux disease)    Gout    Hemodialysis patient    Hepatitis    [place] (Hard of Hearing)    HTN (Hypertension)    Hypercholesteremia    Kidney Stones s/p ureteroscopy    Lymphoma, non-Hodgkin's  h/o diffuse B cell  Myocardial infarct  h/o X3  Pleural effusion    Prostate cancer  s/p seeds  Stroke  mild

## 2017-09-05 NOTE — DISCHARGE NOTE ADULT - PATIENT PORTAL LINK FT
“You can access the Follow[facility] Patient Portal, offered by [facility], by registering with the following website: [email or web address]”

## 2017-09-05 NOTE — DISCHARGE NOTE ADULT - CARE PLAN
Principal Discharge DX:	Pleural effusion  Goal:	Visiting nurse will drain the left Pleurx catheter 3 times per week  Instructions for follow-up, activity and diet:	Follow up with Dr. Worley in 1 week Principal Discharge DX:	Pleural effusion  Goal:	s/p Pleurx drain placement. Goal is improvement in symptoms, drainage by visiting nurse.  Instructions for follow-up, activity and diet:	walk 4-5 x per day. Increase as tolerated. You may climb stairs. Use incentive spirometer.   Keep Pleurx site clean and dry. Coordinate showering with when visiting nurse comes. VNS to drain Pleurx 3x per week, up to 1 liter at a time.  See Dr. Worley in 7-10 days. Call for an apt. . [phone]. Have a chest xray prior to apt. and bring those images with you to apt.  Secondary Diagnosis:	Hemodialysis patient  Instructions for follow-up, activity and diet:	FU Dr. [facility]ic[facility], Continue dialysis as usual. Principal Discharge DX:	Pleural effusion  Goal:	s/p Pleurx drain placement. Goal is improvement in symptoms, drainage by visiting nurse.  Instructions for follow-up, activity and diet:	walk 4-5 x per day. Increase as tolerated. You may climb stairs. Use incentive spirometer.   Keep Pleurx site clean and dry. Coordinate showering with when visiting nurse comes. VNS to drain Pleurx 3x per week, up to 1 liter at a time.  See Dr. Worley in 7-10 days. Call for an apt. . [phone]. Have a chest xray prior to apt. and bring those images with you to apt.  Secondary Diagnosis:	Hemodialysis patient  Instructions for follow-up, activity and diet:	FU Dr. [facility]ic[facility], Continue dialysis as usual.

## 2017-09-05 NOTE — BRIEF OPERATIVE NOTE - PROCEDURE
Insertion of pleural drain  09/05/2017  LVATS, drainage of pleural effusion (1.3L), insertion L pleurX catheter  Active  JCASTEL

## 2017-09-05 NOTE — DISCHARGE NOTE ADULT - INSTRUCTIONS
Renal diet Follow up with MD as advised, get chest x-ray prior to appointment, take medication as advised, gradually increase activity, eat heart healthy diet, visiting RN will drain cath, monitor dressing, if wet can apply new dry gauze, call MD if any sign of infection, Follow up in 3-4 weeks with primary MD for flu shot

## 2017-09-05 NOTE — DISCHARGE NOTE ADULT - CARE PROVIDER_API CALL
Rafael Worley), Thoracic Surgery  [id number] [place] [address]  [place] [address]  Phone: [phone]  Fax: [phone]

## 2017-09-05 NOTE — DISCHARGE NOTE ADULT - HOSPITAL COURSE
9/5/17 Patient underwent a Left VATS, drainage of Left pleural effusion and insertion of left Pleurx catheter.   Postoperative course uncomplicated. 88y/o male presents for preop eval for scheduled left VAT, drainage of effusion, pleurx catheter  placement on 9/5/2017.  Pt with c/o MARC, h/o recent thoracentesis on 8/8/17 drained 500cc.  On 9/5 pt. had an awake left VATS, Pleurx placement, drainage of effusion. Post op with some exacerbated dementia. Had HD yesterday as scheduled. VNS arranged. Pt. with some restlessness, confusion. VNS arranged for discharge. Pleurx capped since 9/6. Multiple conversations had w pt's wife, wants to take him home with all services reinstated. Pleurx site c/d/i. No other complaint. Cleared for discharge by Dr. Worley.

## 2017-09-05 NOTE — DISCHARGE NOTE ADULT - PLAN OF CARE
Visiting nurse will drain the left Pleurx catheter 3 times per week Follow up with Dr. Worley in 1 week [facility], Continue dialysis as usual. s/p Pleurx drain placement. Goal is improvement in symptoms, drainage by visiting nurse. walk 4-5 x per day. Increase as tolerated. You may climb stairs. Use incentive spirometer.   Keep Pleurx site clean and dry. Coordinate showering with when visiting nurse comes. VNS to drain Pleurx 3x per week, up to 1 liter at a time.  See Dr. Worley in 7-10 days. Call for an apt. . [phone]. Have a chest xray prior to apt. and bring those images with you to apt.

## 2017-09-06 DIAGNOSIS — N18.6 END STAGE RENAL DISEASE: ICD-10-CM

## 2017-09-06 DIAGNOSIS — E83.39 OTHER DISORDERS OF PHOSPHORUS METABOLISM: ICD-10-CM

## 2017-09-06 LAB
ALBUMIN SERPL ELPH-MCNC: 3.2 G/DL — LOW (ref 3.3–5)
ALP SERPL-CCNC: 216 U/L — HIGH (ref 40–120)
ALT FLD-CCNC: 13 U/L — SIGNIFICANT CHANGE UP (ref 4–41)
AST SERPL-CCNC: 39 U/L — SIGNIFICANT CHANGE UP (ref 4–40)
BASOPHILS # BLD AUTO: 0.01 K/UL — SIGNIFICANT CHANGE UP (ref 0–0.2)
BASOPHILS NFR BLD AUTO: 0.1 % — SIGNIFICANT CHANGE UP (ref 0–2)
BILIRUB SERPL-MCNC: 0.4 MG/DL — SIGNIFICANT CHANGE UP (ref 0.2–1.2)
BUN SERPL-MCNC: 97 MG/DL — HIGH (ref 7–23)
BUN SERPL-MCNC: 98 MG/DL — HIGH (ref 7–23)
BUN SERPL-MCNC: 98 MG/DL — HIGH (ref 7–23)
CALCIUM SERPL-MCNC: 7.3 MG/DL — LOW (ref 8.4–10.5)
CALCIUM SERPL-MCNC: 7.8 MG/DL — LOW (ref 8.4–10.5)
CALCIUM SERPL-MCNC: 7.8 MG/DL — LOW (ref 8.4–10.5)
CHLORIDE SERPL-SCNC: 93 MMOL/L — LOW (ref 98–107)
CHLORIDE SERPL-SCNC: 93 MMOL/L — LOW (ref 98–107)
CHLORIDE SERPL-SCNC: 94 MMOL/L — LOW (ref 98–107)
CO2 SERPL-SCNC: 25 MMOL/L — SIGNIFICANT CHANGE UP (ref 22–31)
CREAT SERPL-MCNC: 6.96 MG/DL — HIGH (ref 0.5–1.3)
CREAT SERPL-MCNC: 6.96 MG/DL — HIGH (ref 0.5–1.3)
CREAT SERPL-MCNC: 7.24 MG/DL — HIGH (ref 0.5–1.3)
EOSINOPHIL # BLD AUTO: 0.12 K/UL — SIGNIFICANT CHANGE UP (ref 0–0.5)
EOSINOPHIL NFR BLD AUTO: 1.3 % — SIGNIFICANT CHANGE UP (ref 0–6)
GLUCOSE SERPL-MCNC: 123 MG/DL — HIGH (ref 70–99)
GLUCOSE SERPL-MCNC: 92 MG/DL — SIGNIFICANT CHANGE UP (ref 70–99)
GLUCOSE SERPL-MCNC: 92 MG/DL — SIGNIFICANT CHANGE UP (ref 70–99)
HBV SURFACE AG SER-ACNC: NEGATIVE — SIGNIFICANT CHANGE UP
HCT VFR BLD CALC: 28.9 % — LOW (ref 39–50)
HCT VFR BLD CALC: 28.9 % — LOW (ref 39–50)
HGB BLD-MCNC: 9.3 G/DL — LOW (ref 13–17)
HGB BLD-MCNC: 9.3 G/DL — LOW (ref 13–17)
IMM GRANULOCYTES # BLD AUTO: 0.15 # — SIGNIFICANT CHANGE UP
IMM GRANULOCYTES NFR BLD AUTO: 1.6 % — HIGH (ref 0–1.5)
LYMPHOCYTES # BLD AUTO: 0.78 K/UL — LOW (ref 1–3.3)
LYMPHOCYTES # BLD AUTO: 8.3 % — LOW (ref 13–44)
MAGNESIUM SERPL-MCNC: 2.4 MG/DL — SIGNIFICANT CHANGE UP (ref 1.6–2.6)
MCHC RBC-ENTMCNC: 31.5 PG — SIGNIFICANT CHANGE UP (ref 27–34)
MCHC RBC-ENTMCNC: 31.5 PG — SIGNIFICANT CHANGE UP (ref 27–34)
MCHC RBC-ENTMCNC: 32.2 % — SIGNIFICANT CHANGE UP (ref 32–36)
MCHC RBC-ENTMCNC: 32.2 % — SIGNIFICANT CHANGE UP (ref 32–36)
MCV RBC AUTO: 98 FL — SIGNIFICANT CHANGE UP (ref 80–100)
MCV RBC AUTO: 98 FL — SIGNIFICANT CHANGE UP (ref 80–100)
MONOCYTES # BLD AUTO: 1.23 K/UL — HIGH (ref 0–0.9)
MONOCYTES NFR BLD AUTO: 13.1 % — SIGNIFICANT CHANGE UP (ref 2–14)
NEUTROPHILS # BLD AUTO: 7.08 K/UL — SIGNIFICANT CHANGE UP (ref 1.8–7.4)
NEUTROPHILS NFR BLD AUTO: 75.6 % — SIGNIFICANT CHANGE UP (ref 43–77)
NRBC # FLD: 0 — SIGNIFICANT CHANGE UP
NRBC # FLD: 0 — SIGNIFICANT CHANGE UP
PHOSPHATE SERPL-MCNC: 6 MG/DL — HIGH (ref 2.5–4.5)
PLATELET # BLD AUTO: 176 K/UL — SIGNIFICANT CHANGE UP (ref 150–400)
PLATELET # BLD AUTO: 176 K/UL — SIGNIFICANT CHANGE UP (ref 150–400)
PMV BLD: 11 FL — SIGNIFICANT CHANGE UP (ref 7–13)
PMV BLD: 11 FL — SIGNIFICANT CHANGE UP (ref 7–13)
POTASSIUM SERPL-MCNC: 5.4 MMOL/L — HIGH (ref 3.5–5.3)
POTASSIUM SERPL-SCNC: 5.4 MMOL/L — HIGH (ref 3.5–5.3)
PROT SERPL-MCNC: 6.3 G/DL — SIGNIFICANT CHANGE UP (ref 6–8.3)
RBC # BLD: 2.95 M/UL — LOW (ref 4.2–5.8)
RBC # BLD: 2.95 M/UL — LOW (ref 4.2–5.8)
RBC # FLD: 16.6 % — HIGH (ref 10.3–14.5)
RBC # FLD: 16.6 % — HIGH (ref 10.3–14.5)
SODIUM SERPL-SCNC: 138 MMOL/L — SIGNIFICANT CHANGE UP (ref 135–145)
SODIUM SERPL-SCNC: 140 MMOL/L — SIGNIFICANT CHANGE UP (ref 135–145)
SODIUM SERPL-SCNC: 140 MMOL/L — SIGNIFICANT CHANGE UP (ref 135–145)
WBC # BLD: 9.37 K/UL — SIGNIFICANT CHANGE UP (ref 3.8–10.5)
WBC # BLD: 9.37 K/UL — SIGNIFICANT CHANGE UP (ref 3.8–10.5)
WBC # FLD AUTO: 9.37 K/UL — SIGNIFICANT CHANGE UP (ref 3.8–10.5)
WBC # FLD AUTO: 9.37 K/UL — SIGNIFICANT CHANGE UP (ref 3.8–10.5)

## 2017-09-06 PROCEDURE — 71010: CPT | Mod: 26

## 2017-09-06 RX ORDER — FAMOTIDINE 40 MG/1
40 TABLET, FILM COATED ORAL
Qty: 30 | Refills: 2 | Status: ACTIVE | COMMUNITY
Start: 2017-07-11 | End: 1900-01-01

## 2017-09-06 RX ADMIN — Medication 500 MICROGRAM(S): at 03:35

## 2017-09-06 RX ADMIN — MIDODRINE HYDROCHLORIDE 10 MILLIGRAM(S): 2.5 TABLET ORAL at 14:01

## 2017-09-06 RX ADMIN — ALBUTEROL 2.5 MILLIGRAM(S): 90 AEROSOL, METERED ORAL at 10:32

## 2017-09-06 RX ADMIN — LIDOCAINE 1 PATCH: 4 CREAM TOPICAL at 12:49

## 2017-09-06 RX ADMIN — ATORVASTATIN CALCIUM 40 MILLIGRAM(S): 80 TABLET, FILM COATED ORAL at 20:00

## 2017-09-06 RX ADMIN — Medication 500 MICROGRAM(S): at 10:29

## 2017-09-06 RX ADMIN — Medication 81 MILLIGRAM(S): at 12:48

## 2017-09-06 RX ADMIN — Medication 25 MICROGRAM(S): at 05:32

## 2017-09-06 RX ADMIN — Medication 1 TABLET(S): at 12:48

## 2017-09-06 RX ADMIN — SEVELAMER CARBONATE 800 MILLIGRAM(S): 2400 POWDER, FOR SUSPENSION ORAL at 19:58

## 2017-09-06 RX ADMIN — MIDODRINE HYDROCHLORIDE 10 MILLIGRAM(S): 2.5 TABLET ORAL at 20:00

## 2017-09-06 RX ADMIN — Medication 100 MILLIGRAM(S): at 12:49

## 2017-09-06 RX ADMIN — MIDODRINE HYDROCHLORIDE 10 MILLIGRAM(S): 2.5 TABLET ORAL at 05:32

## 2017-09-06 RX ADMIN — SEVELAMER CARBONATE 800 MILLIGRAM(S): 2400 POWDER, FOR SUSPENSION ORAL at 12:48

## 2017-09-06 RX ADMIN — SEVELAMER CARBONATE 800 MILLIGRAM(S): 2400 POWDER, FOR SUSPENSION ORAL at 08:24

## 2017-09-06 RX ADMIN — Medication 500 MICROGRAM(S): at 20:40

## 2017-09-06 RX ADMIN — ALBUTEROL 2.5 MILLIGRAM(S): 90 AEROSOL, METERED ORAL at 20:40

## 2017-09-06 RX ADMIN — LIDOCAINE 1 PATCH: 4 CREAM TOPICAL at 08:24

## 2017-09-06 RX ADMIN — HEPARIN SODIUM 5000 UNIT(S): 5000 INJECTION INTRAVENOUS; SUBCUTANEOUS at 14:01

## 2017-09-06 RX ADMIN — SERTRALINE 25 MILLIGRAM(S): 25 TABLET, FILM COATED ORAL at 12:48

## 2017-09-06 RX ADMIN — ALBUTEROL 2.5 MILLIGRAM(S): 90 AEROSOL, METERED ORAL at 03:35

## 2017-09-06 NOTE — CONSULT NOTE ADULT - SUBJECTIVE AND OBJECTIVE BOX
[facility] DIVISION OF KIDNEY DISEASES AND HYPERTENSION -- [phone]  -- INITIAL CONSULT NOTE  --------------------------------------------------------------------------------  HPI:        PAST HISTORY  --------------------------------------------------------------------------------  PAST MEDICAL & SURGICAL HISTORY:  Myocardial infarct: h/o X3  GERD (gastroesophageal reflux disease)  Gout  AAA (abdominal aortic aneurysm): per cardiology eval report  Hemodialysis patient  Lymphoma, non-Hodgkin's: h/o diffuse B cell  Stroke: mild  Asthma: h/o  Pleural effusion  Hepatitis  Atrial fibrillation: 11/2014  Chronic kidney disease: on dialysis MWF  Prostate cancer: s/p seeds  [place] (Hard of Hearing)  Kidney Stones s/p ureteroscopy  CAD (Coronary Artery Disease): 13 stents placed 3/2014  Hypercholesteremia  HTN (Hypertension)  S/P Radiation > 12 Weeks prostatic seeds placed 10 yrs ago  S/P Cataract Surgery  S/P Cystoscopy  S/P Inguinal Hernia  repair bilat  S/P Parathyroidectomy  S/P Hip Replacement bilat  S/P Cardiac Cath: 8 stents placed 3/2014, per pt he thinks he had 3 stents placed 15 years prior also.    FAMILY HISTORY:  No pertinent family history    PAST SOCIAL HISTORY:    ALLERGIES & MEDICATIONS  --------------------------------------------------------------------------------  Allergies    amlodipine (Rash)  Benadryl (Other)  Brilinta (Unknown)  Effient (Unknown)  Multaq (Other)    Intolerances      Standing Inpatient Medications  ALBUTerol    0.083% 2.5 milliGRAM(s) Nebulizer every 6 hours  ipratropium    for Nebulization 500 MICROGram(s) Nebulizer every 6 hours  aspirin enteric coated 81 milliGRAM(s) Oral daily  sertraline 25 milliGRAM(s) Oral daily  allopurinol 100 milliGRAM(s) Oral daily  atorvastatin 40 milliGRAM(s) Oral at bedtime  midodrine 10 milliGRAM(s) Oral three times a day  levothyroxine 25 MICROGram(s) Oral daily  sevelamer hydrochloride 800 milliGRAM(s) Oral three times a day with meals  lactobacillus acidophilus 1 Tablet(s) Oral daily  heparin  Injectable 5000 Unit(s) SubCutaneous every 8 hours  lidocaine   Patch 1 Patch Transdermal daily    PRN Inpatient Medications  acetaminophen   Tablet. 650 milliGRAM(s) Oral every 6 hours PRN      REVIEW OF SYSTEMS  --------------------------------------------------------------------------------  Gen: No fevers/chills  Skin: No rashes  Head/Eyes/Ears: Normal hearing,  Normal vision   Respiratory: No dyspnea, cough  CV: No chest pain  GI: No abdominal pain, diarrhea, constipation, nausea, vomiting  : No dysuria, hematuria  MSK: No  edema  Heme: No easy bruising or bleeding  Psych: No significant depression    All other systems were reviewed and are negative, except as noted.    VITALS/PHYSICAL EXAM  --------------------------------------------------------------------------------  T(C): 36.6 (09-06-17 @ 08:59), Max: 36.6 (09-06-17 @ 05:22)  HR: 67 (09-06-17 @ 10:32) (67 - 79)  BP: 93/48 (09-06-17 @ 08:59) (68/53 - 145/77)  RR: 18 (09-06-17 @ 08:59) (12 - 22)  SpO2: 97% (09-06-17 @ 08:59) (92% - 99%)  Wt(kg): --  Height (cm): 180.34 (09-05-17 @ 08:24)  Weight (kg): 73 (09-05-17 @ 08:24)  BMI (kg/m2): 22.4 (09-05-17 @ 08:24)  BSA (m2): 1.92 (09-05-17 @ 08:24)      09-05-17 @ 07:01  -  09-06-17 @ 07:00  --------------------------------------------------------  IN: 120 mL / OUT: 490 mL / NET: -370 mL    09-06-17 @ 07:01  -  09-06-17 @ 12:43  --------------------------------------------------------  IN: 0 mL / OUT: 100 mL / NET: -100 mL      Physical Exam:  	Gen: NAD  	HEENT: MMM  	Pulm: Decreased breath sounds B/L  	CV: S1S2  	Abd: Soft, +BS   	Ext: No LE edema B/L  	Neuro: Awake  	Skin: Warm and dry  	Vascular access: LUE Fistula +bruit +thrill skin intact     LABS/STUDIES  --------------------------------------------------------------------------------              9.3    9.37  >-----------<  176      [09-06-17 @ 05:30]              28.9     140  |  93  |  98  ----------------------------<  92      [09-06-17 @ 05:30]  5.4   |  25  |  6.96        Ca     7.8     [09-06-17 @ 05:30]      Mg     2.4     [09-06-17 @ 05:30]      Phos  6.0     [09-06-17 @ 05:30]    TPro  6.3  /  Alb  3.2  /  TBili  0.4  /  DBili  x   /  AST  39  /  ALT  13  /  AlkPhos  216  [09-06-17 @ 05:30]          Creatinine Trend:  SCr 6.96 [09-06 @ 05:30]  SCr 7.01 [08-30 @ 08:40]    Urinalysis - [11-09-15 @ 20:55]      Color Yellow / Appearance Clear / SG 1.020 / pH 6.0      Gluc Negative / Ketone Negative  / Bili Negative / Urobili Normal       Blood Small / Protein 150 / Leuk Est Moderate / Nitrite Negative      RBC 3-5 / WBC 26-50 / Hyaline 2-5 / Gran  / Sq Epi  / Non Sq Epi OCC / Bacteria Few      HbA1c 5.7      [11-10-15 @ 07:48]    HBsAb Nonreact      [08-16-15 @ 22:04]  HBsAg Nonreact      [08-16-15 @ 22:04]  HBcAb Nonreact      [08-16-15 @ 22:04]  HCV 0.09, --      [01-21-15 @ 18:41] [facility] DIVISION OF KIDNEY DISEASES AND HYPERTENSION -- [phone]  -- INITIAL CONSULT NOTE  --------------------------------------------------------------------------------  HPI:  88 y/o male admitted for  Left VATS, drainage of Left pleural effusion and insertion of left Pleurx catheter. Pt s/p procedure on 9/5/2017.  Postoperative course uncomplicated. Pt receives dialysis at [facility] , started on hd in January 2015. nephrologist is . Pt had left fistula created 1 year ago.       PAST HISTORY  --------------------------------------------------------------------------------  PAST MEDICAL & SURGICAL HISTORY:  Myocardial infarct: h/o X3  GERD (gastroesophageal reflux disease)  Gout  AAA (abdominal aortic aneurysm): per cardiology eval report  Hemodialysis patient  Lymphoma, non-Hodgkin's: h/o diffuse B cell  Stroke: mild  Asthma: h/o  Pleural effusion  Hepatitis  Atrial fibrillation: 11/2014  Chronic kidney disease: on dialysis MWF  Prostate cancer: s/p seeds  [place] (Hard of Hearing)  Kidney Stones s/p ureteroscopy  CAD (Coronary Artery Disease): 13 stents placed 3/2014  Hypercholesteremia  HTN (Hypertension)  S/P Radiation > 12 Weeks prostatic seeds placed 10 yrs ago  S/P Cataract Surgery  S/P Cystoscopy  S/P Inguinal Hernia  repair bilat  S/P Parathyroidectomy  S/P Hip Replacement bilat  S/P Cardiac Cath: 8 stents placed 3/2014, per pt he thinks he had 3 stents placed 15 years prior also.    FAMILY HISTORY:  No pertinent family history    PAST SOCIAL HISTORY:    ALLERGIES & MEDICATIONS  --------------------------------------------------------------------------------  Allergies    amlodipine (Rash)  Benadryl (Other)  Brilinta (Unknown)  Effient (Unknown)  Multaq (Other)    Intolerances      Standing Inpatient Medications  ALBUTerol    0.083% 2.5 milliGRAM(s) Nebulizer every 6 hours  ipratropium    for Nebulization 500 MICROGram(s) Nebulizer every 6 hours  aspirin enteric coated 81 milliGRAM(s) Oral daily  sertraline 25 milliGRAM(s) Oral daily  allopurinol 100 milliGRAM(s) Oral daily  atorvastatin 40 milliGRAM(s) Oral at bedtime  midodrine 10 milliGRAM(s) Oral three times a day  levothyroxine 25 MICROGram(s) Oral daily  sevelamer hydrochloride 800 milliGRAM(s) Oral three times a day with meals  lactobacillus acidophilus 1 Tablet(s) Oral daily  heparin  Injectable 5000 Unit(s) SubCutaneous every 8 hours  lidocaine   Patch 1 Patch Transdermal daily    PRN Inpatient Medications  acetaminophen   Tablet. 650 milliGRAM(s) Oral every 6 hours PRN      REVIEW OF SYSTEMS  --------------------------------------------------------------------------------  Gen: No fevers/chills  Skin: No rashes  Head/Eyes/Ears: Normal hearing,  Normal vision   Respiratory: No dyspnea, cough  CV: No chest pain  GI: No abdominal pain, diarrhea, constipation, nausea, vomiting  : No dysuria, hematuria  MSK: No  edema  Heme: No easy bruising or bleeding  Psych: No significant depression    All other systems were reviewed and are negative, except as noted.    VITALS/PHYSICAL EXAM  --------------------------------------------------------------------------------  T(C): 36.6 (09-06-17 @ 08:59), Max: 36.6 (09-06-17 @ 05:22)  HR: 67 (09-06-17 @ 10:32) (67 - 79)  BP: 93/48 (09-06-17 @ 08:59) (68/53 - 145/77)  RR: 18 (09-06-17 @ 08:59) (12 - 22)  SpO2: 97% (09-06-17 @ 08:59) (92% - 99%)  Wt(kg): --  Height (cm): 180.34 (09-05-17 @ 08:24)  Weight (kg): 73 (09-05-17 @ 08:24)  BMI (kg/m2): 22.4 (09-05-17 @ 08:24)  BSA (m2): 1.92 (09-05-17 @ 08:24)      09-05-17 @ 07:01  -  09-06-17 @ 07:00  --------------------------------------------------------  IN: 120 mL / OUT: 490 mL / NET: -370 mL    09-06-17 @ 07:01  -  09-06-17 @ 12:43  --------------------------------------------------------  IN: 0 mL / OUT: 100 mL / NET: -100 mL      Physical Exam:  	Gen: NAD  	HEENT: MMM  	Pulm: Decreased breath sounds B/L, +drain  	CV: S1S2  	Abd: Soft, +BS   	Ext: No LE edema B/L  	Neuro: Awake  	Skin: Warm and dry  	Vascular access: LUE Fistula +bruit +thrill skin intact     LABS/STUDIES  --------------------------------------------------------------------------------              9.3    9.37  >-----------<  176      [09-06-17 @ 05:30]              28.9     140  |  93  |  98  ----------------------------<  92      [09-06-17 @ 05:30]  5.4   |  25  |  6.96        Ca     7.8     [09-06-17 @ 05:30]      Mg     2.4     [09-06-17 @ 05:30]      Phos  6.0     [09-06-17 @ 05:30]    TPro  6.3  /  Alb  3.2  /  TBili  0.4  /  DBili  x   /  AST  39  /  ALT  13  /  AlkPhos  216  [09-06-17 @ 05:30]          Creatinine Trend:  SCr 6.96 [09-06 @ 05:30]  SCr 7.01 [08-30 @ 08:40]    Urinalysis - [11-09-15 @ 20:55]      Color Yellow / Appearance Clear / SG 1.020 / pH 6.0      Gluc Negative / Ketone Negative  / Bili Negative / Urobili Normal       Blood Small / Protein 150 / Leuk Est Moderate / Nitrite Negative      RBC 3-5 / WBC 26-50 / Hyaline 2-5 / Gran  / Sq Epi  / Non Sq Epi OCC / Bacteria Few      HbA1c 5.7      [11-10-15 @ 07:48]    HBsAb Nonreact      [08-16-15 @ 22:04]  HBsAg Nonreact      [08-16-15 @ 22:04]  HBcAb Nonreact      [08-16-15 @ 22:04]  HCV 0.09, --      [01-21-15 @ 18:41]

## 2017-09-06 NOTE — PHYSICAL THERAPY INITIAL EVALUATION ADULT - GAIT DISTANCE, PT EVAL
5 feet/at bedside (Distance limited 2/2 Activity Order to ambulate at bedside at less than 24 hours post-surgery)

## 2017-09-06 NOTE — PHYSICAL THERAPY INITIAL EVALUATION ADULT - DISCHARGE DISPOSITION, PT EVAL
at this point; Follow progress with PT after completion of ambulation assessment./rehabilitation facility

## 2017-09-06 NOTE — PROGRESS NOTE ADULT - SUBJECTIVE AND OBJECTIVE BOX
Subjective: "I'm wondering what's going on today, am I getting dialysis?" Pt. pleasantly confused, disoriented to place and time. Denies CP or SOB. Chair and bed alarm in place for safety.     Vital Signs:  Vital Signs Last 24 Hrs  T(C): 36.2 (09-06-17 @ 13:01), Max: 36.6 (09-06-17 @ 05:22)  T(F): 97.2 (09-06-17 @ 13:01), Max: 97.8 (09-06-17 @ 05:22)  HR: 70 (09-06-17 @ 13:01) (67 - 79)  BP: 104/60 (09-06-17 @ 13:01) (68/53 - 109/57)  RR: 18 (09-06-17 @ 13:01) (13 - 18)  SpO2: 99% (09-06-17 @ 13:01) (92% - 99%) on (O2)    Telemetry/Alarms:  General: WN/WD NAD  Neurology: Awake, nonfocal, FITZGERALD x 4  Eyes: Scleras clear, PERRLA/ EOMI, Gross vision intact  ENT:Gross hearing intact, grossly patent pharynx, no stridor  Neck: Neck supple, trachea midline, No JVD,   Respiratory: CTA B/L, decreased BS left LL, No wheezing, rales, rhonchi  CV: RRR, S1S2, no murmurs, rubs or gallops  Abdominal: Soft, NT, ND +BS, no BM  Extremities: No edema, + peripheral pulses. Left UE w fistula  Skin: No Rashes, Hematoma, Ecchymosis. Rt. UE w skin tear.   Lymphatic: No Neck, axilla, groin LAD  Psych: Oriented x 2, restless, normal affect  Incisions: left VATS C/d/i  Tubes: Left Pleurx to pleuravac, 400cc/24hrs on WS no AL  Relevant labs, radiology and Medications reviewed           CXR stable sml effusion, no obvious ptx.              9.3    9.37  )-----------( 176      ( 06 Sep 2017 05:30 )             28.9     09-06    140  |  93<L>  |  98<H>  ----------------------------<  92  5.4<H>   |  25  |  6.96<H>    Ca    7.8<L>      06 Sep 2017 05:30  Phos  6.0     09-06  Mg     2.4     09-06    TPro  6.3  /  Alb  3.2<L>  /  TBili  0.4  /  DBili  x   /  AST  39  /  ALT  13  /  AlkPhos  216<H>  09-06      MEDICATIONS  (STANDING):  ALBUTerol    0.083% 2.5 milliGRAM(s) Nebulizer every 6 hours  ipratropium    for Nebulization 500 MICROGram(s) Nebulizer every 6 hours  aspirin enteric coated 81 milliGRAM(s) Oral daily  sertraline 25 milliGRAM(s) Oral daily  allopurinol 100 milliGRAM(s) Oral daily  atorvastatin 40 milliGRAM(s) Oral at bedtime  midodrine 10 milliGRAM(s) Oral three times a day  levothyroxine 25 MICROGram(s) Oral daily  sevelamer hydrochloride 800 milliGRAM(s) Oral three times a day with meals  lactobacillus acidophilus 1 Tablet(s) Oral daily  heparin  Injectable 5000 Unit(s) SubCutaneous every 8 hours  lidocaine   Patch 1 Patch Transdermal daily    MEDICATIONS  (PRN):  acetaminophen   Tablet. 650 milliGRAM(s) Oral every 6 hours PRN Mild Pain (1 - 3)    Pertinent Physical Exam  I&O's Summary    05 Sep 2017 07:01  -  06 Sep 2017 07:00  --------------------------------------------------------  IN: 120 mL / OUT: 490 mL / NET: -370 mL    06 Sep 2017 07:01  -  06 Sep 2017 14:52  --------------------------------------------------------  IN: 0 mL / OUT: 100 mL / NET: -100 mL        Assessment  87y Male  w/ PAST MEDICAL & SURGICAL HISTORY:  Myocardial infarct: h/o X3  GERD (gastroesophageal reflux disease)  Gout  AAA (abdominal aortic aneurysm): per cardiology eval report  Hemodialysis patient  Lymphoma, non-Hodgkin's: h/o diffuse B cell  Stroke: mild  Asthma: h/o  Pleural effusion  Hepatitis  Atrial fibrillation: 11/2014  Chronic kidney disease: on dialysis MWF  Prostate cancer: s/p seeds  [place]  Kidney Stones s/p ureteroscopy  CAD (Coronary Artery Disease): 13 stents placed 3/2014  Hypercholesteremia  HTN (Hypertension)  S/P Radiation > 12 Weeks prostatic seeds placed 10 yrs ago  S/P Cataract Surgery  S/P Cystoscopy  S/P Inguinal Hernia  repair bilat  S/P Parathyroidectomy  S/P Hip Replacement bilat  S/P Cardiac Cath: 8 stents placed 3/2014, per pt he thinks he had 3 stents placed 15 years prior also.  admitted with complaints of Patient is a 87y old  Male who presents with a chief complaint of "pleural effusion" (05 Sep 2017 23:14)  .On 9/5/17 had an awake left VATS, Pleurx. Post op non complicated.   PLAN  Neuro: Pain management  Pulm: Encourage coughing, deep breathing and use of incentive spirometry. Wean off supplemental oxygen as able. Daily CXR.  Cardio: Monitor telemetry/alarms  GI: Tolerating diet. Continue stool softeners.  Renal: Will consult Renal to arrange HD for today, supplement electrolytes as needed  Vasc: Heparin SC/SCDs for DVT prophylaxis  Heme: Stable H/H. .   ID: Off antibiotics. Stable.  Therapy: OOB/ambulate, will order PT eval  Tubes: Will keep Pleurx capped.   Disposition: D/c to either home or Rehab in am  Discussed with Cardiothoracic Team at AM rounds.

## 2017-09-06 NOTE — PROGRESS NOTE ADULT - SUBJECTIVE AND OBJECTIVE BOX
ANESTHESIA POSTOP CHECK    87y Male POSTOP DAY 1 S/P VATS w. Bx & Pleurex cath insertion    Vital Signs Last 24 Hrs  T(C): 36.6 (06 Sep 2017 08:59), Max: 36.6 (06 Sep 2017 05:22)  T(F): 97.8 (06 Sep 2017 08:59), Max: 97.8 (06 Sep 2017 05:22)  HR: 71 (06 Sep 2017 08:59) (70 - 79)  BP: 93/48 (06 Sep 2017 08:59) (68/53 - 145/77)  BP(mean): --  RR: 18 (06 Sep 2017 08:59) (12 - 22)  SpO2: 97% (06 Sep 2017 08:59) (92% - 99%)  I&O's Summary    05 Sep 2017 07:01  -  06 Sep 2017 07:00  --------------------------------------------------------  IN: 120 mL / OUT: 490 mL / NET: -370 mL    06 Sep 2017 07:01  -  06 Sep 2017 10:07  --------------------------------------------------------  IN: 0 mL / OUT: 100 mL / NET: -100 mL        [x] NO APPARENT ANESTHESIA COMPLICATIONS

## 2017-09-06 NOTE — PHYSICAL THERAPY INITIAL EVALUATION ADULT - PATIENT PROFILE REVIEW, REHAB EVAL
yes/Ambulate at Bedside (12 hours post surgery); Ambulate in Hallway (24 hours post surgery) 9/5/17 Patient ok for PT Evaluation/Ambulation as Tolerated, as per RN Yvette.

## 2017-09-06 NOTE — CONSULT NOTE ADULT - ATTENDING COMMENTS
88 y/o male with history of ESRD on HD  admitted for  Left VATS, drainage of Left pleural effusion and insertion of left Pleurx catheter.    -seen on HD today, tolerating well, access functioning well.

## 2017-09-06 NOTE — CONSULT NOTE ADULT - ASSESSMENT
86 y/o male with history of ESRD on HD  admitted for  Left VATS, drainage of Left pleural effusion and insertion of left Pleurx catheter.

## 2017-09-06 NOTE — CONSULT NOTE ADULT - PROBLEM SELECTOR RECOMMENDATION 9
plan for maintenance dialysis today via left avf (MWF) with 500-1kg as tolerated. Plan for dialysis with 2K bath. Monitor serum potassium.

## 2017-09-07 VITALS
TEMPERATURE: 98 F | DIASTOLIC BLOOD PRESSURE: 73 MMHG | SYSTOLIC BLOOD PRESSURE: 118 MMHG | HEART RATE: 77 BPM | OXYGEN SATURATION: 95 % | RESPIRATION RATE: 18 BRPM

## 2017-09-07 LAB
HBV CORE AB SER-ACNC: NONREACTIVE — SIGNIFICANT CHANGE UP
HBV SURFACE AB SER-ACNC: <3 MLU/ML — LOW
NON-GYNECOLOGICAL CYTOLOGY STUDY: SIGNIFICANT CHANGE UP
SURGICAL PATHOLOGY STUDY: SIGNIFICANT CHANGE UP

## 2017-09-07 PROCEDURE — 99238 HOSP IP/OBS DSCHRG MGMT 30/<: CPT

## 2017-09-07 PROCEDURE — 71010: CPT | Mod: 26

## 2017-09-07 RX ORDER — ACETAMINOPHEN 500 MG
1 TABLET ORAL
Qty: 0 | Refills: 0 | COMMUNITY

## 2017-09-07 RX ORDER — BACITRACIN ZINC 500 UNIT/G
1 OINTMENT IN PACKET (EA) TOPICAL
Qty: 0 | Refills: 0 | Status: DISCONTINUED | OUTPATIENT
Start: 2017-09-07 | End: 2017-09-07

## 2017-09-07 RX ORDER — ASPIRIN/CALCIUM CARB/MAGNESIUM 324 MG
1 TABLET ORAL
Qty: 0 | Refills: 0 | COMMUNITY
Start: 2017-09-07

## 2017-09-07 RX ORDER — ACETAMINOPHEN 500 MG
2 TABLET ORAL
Qty: 0 | Refills: 0 | COMMUNITY
Start: 2017-09-07

## 2017-09-07 RX ADMIN — Medication 500 MICROGRAM(S): at 03:40

## 2017-09-07 RX ADMIN — HEPARIN SODIUM 5000 UNIT(S): 5000 INJECTION INTRAVENOUS; SUBCUTANEOUS at 05:13

## 2017-09-07 RX ADMIN — Medication 100 MILLIGRAM(S): at 13:29

## 2017-09-07 RX ADMIN — ALBUTEROL 2.5 MILLIGRAM(S): 90 AEROSOL, METERED ORAL at 03:40

## 2017-09-07 RX ADMIN — SEVELAMER CARBONATE 800 MILLIGRAM(S): 2400 POWDER, FOR SUSPENSION ORAL at 13:28

## 2017-09-07 RX ADMIN — Medication 1 TABLET(S): at 13:28

## 2017-09-07 RX ADMIN — Medication 81 MILLIGRAM(S): at 13:28

## 2017-09-07 RX ADMIN — SERTRALINE 25 MILLIGRAM(S): 25 TABLET, FILM COATED ORAL at 13:28

## 2017-09-07 RX ADMIN — ALBUTEROL 2.5 MILLIGRAM(S): 90 AEROSOL, METERED ORAL at 11:06

## 2017-09-07 RX ADMIN — HEPARIN SODIUM 5000 UNIT(S): 5000 INJECTION INTRAVENOUS; SUBCUTANEOUS at 13:28

## 2017-09-07 RX ADMIN — MIDODRINE HYDROCHLORIDE 10 MILLIGRAM(S): 2.5 TABLET ORAL at 13:31

## 2017-09-07 RX ADMIN — MIDODRINE HYDROCHLORIDE 10 MILLIGRAM(S): 2.5 TABLET ORAL at 05:13

## 2017-09-07 RX ADMIN — Medication 25 MICROGRAM(S): at 05:13

## 2017-09-07 RX ADMIN — Medication 500 MICROGRAM(S): at 11:06

## 2017-09-07 RX ADMIN — LIDOCAINE 1 PATCH: 4 CREAM TOPICAL at 13:28

## 2017-09-08 ENCOUNTER — OUTPATIENT (OUTPATIENT)
Dept: OUTPATIENT SERVICES | Facility: HOSPITAL | Age: 82
LOS: 1 days | Discharge: HOME | End: 2017-09-08

## 2017-09-08 DIAGNOSIS — C83.39 DIFFUSE LARGE B-CELL LYMPHOMA, EXTRANODAL AND SOLID ORGAN SITES: ICD-10-CM

## 2017-09-11 ENCOUNTER — EMERGENCY (EMERGENCY)
Facility: HOSPITAL | Age: 82
LOS: 1 days | Discharge: HOME | End: 2017-09-11
Attending: EMERGENCY MEDICINE | Admitting: EMERGENCY MEDICINE
Payer: MEDICARE

## 2017-09-11 VITALS
RESPIRATION RATE: 18 BRPM | SYSTOLIC BLOOD PRESSURE: 100 MMHG | HEART RATE: 86 BPM | OXYGEN SATURATION: 100 % | TEMPERATURE: 98 F | DIASTOLIC BLOOD PRESSURE: 57 MMHG

## 2017-09-11 LAB
ALBUMIN SERPL ELPH-MCNC: 3.1 G/DL — LOW (ref 3.3–5)
ALP SERPL-CCNC: 266 U/L — HIGH (ref 40–120)
ALT FLD-CCNC: 5 U/L RC — LOW (ref 10–45)
ANION GAP SERPL CALC-SCNC: 18 MMOL/L — HIGH (ref 5–17)
AST SERPL-CCNC: 53 U/L — HIGH (ref 10–40)
BASE EXCESS BLDV CALC-SCNC: 5.6 MMOL/L — HIGH (ref -2–2)
BILIRUB SERPL-MCNC: 0.4 MG/DL — SIGNIFICANT CHANGE UP (ref 0.2–1.2)
BUN SERPL-MCNC: 65 MG/DL — HIGH (ref 7–23)
CA-I SERPL-SCNC: 1.03 MMOL/L — LOW (ref 1.12–1.3)
CALCIUM SERPL-MCNC: 7.6 MG/DL — LOW (ref 8.4–10.5)
CHLORIDE BLDV-SCNC: 92 MMOL/L — LOW (ref 96–108)
CHLORIDE SERPL-SCNC: 92 MMOL/L — LOW (ref 96–108)
CO2 BLDV-SCNC: 33 MMOL/L — HIGH (ref 22–30)
CO2 SERPL-SCNC: 28 MMOL/L — SIGNIFICANT CHANGE UP (ref 22–31)
CREAT SERPL-MCNC: 5.33 MG/DL — HIGH (ref 0.5–1.3)
GAS PNL BLDV: 135 MMOL/L — LOW (ref 136–145)
GAS PNL BLDV: SIGNIFICANT CHANGE UP
GAS PNL BLDV: SIGNIFICANT CHANGE UP
GLUCOSE BLDV-MCNC: 130 MG/DL — HIGH (ref 70–99)
GLUCOSE SERPL-MCNC: 136 MG/DL — HIGH (ref 70–99)
HCO3 BLDV-SCNC: 32 MMOL/L — HIGH (ref 21–29)
HCT VFR BLD CALC: 28 % — LOW (ref 39–50)
HCT VFR BLDA CALC: 28 % — LOW (ref 39–50)
HGB BLD CALC-MCNC: 9.1 G/DL — LOW (ref 13–17)
HGB BLD-MCNC: 9.3 G/DL — LOW (ref 13–17)
LACTATE BLDV-MCNC: 2.4 MMOL/L — HIGH (ref 0.7–2)
MCHC RBC-ENTMCNC: 33.2 GM/DL — SIGNIFICANT CHANGE UP (ref 32–36)
MCHC RBC-ENTMCNC: 34.5 PG — HIGH (ref 27–34)
MCV RBC AUTO: 104 FL — HIGH (ref 80–100)
PCO2 BLDV: 58 MMHG — HIGH (ref 35–50)
PH BLDV: 7.36 — SIGNIFICANT CHANGE UP (ref 7.35–7.45)
PLATELET # BLD AUTO: 179 K/UL — SIGNIFICANT CHANGE UP (ref 150–400)
PO2 BLDV: 21 MMHG — LOW (ref 25–45)
POTASSIUM BLDV-SCNC: 4.6 MMOL/L — SIGNIFICANT CHANGE UP (ref 3.5–5)
POTASSIUM SERPL-MCNC: 4.8 MMOL/L — SIGNIFICANT CHANGE UP (ref 3.5–5.3)
POTASSIUM SERPL-SCNC: 4.8 MMOL/L — SIGNIFICANT CHANGE UP (ref 3.5–5.3)
PROT SERPL-MCNC: 6.5 G/DL — SIGNIFICANT CHANGE UP (ref 6–8.3)
RBC # BLD: 2.7 M/UL — LOW (ref 4.2–5.8)
RBC # FLD: 15.8 % — HIGH (ref 10.3–14.5)
SAO2 % BLDV: 22 % — LOW (ref 67–88)
SODIUM SERPL-SCNC: 138 MMOL/L — SIGNIFICANT CHANGE UP (ref 135–145)
WBC # BLD: 9.4 K/UL — SIGNIFICANT CHANGE UP (ref 3.8–10.5)
WBC # FLD AUTO: 9.4 K/UL — SIGNIFICANT CHANGE UP (ref 3.8–10.5)

## 2017-09-11 PROCEDURE — 72125 CT NECK SPINE W/O DYE: CPT

## 2017-09-11 PROCEDURE — 93010 ELECTROCARDIOGRAM REPORT: CPT

## 2017-09-11 PROCEDURE — 85014 HEMATOCRIT: CPT

## 2017-09-11 PROCEDURE — 99284 EMERGENCY DEPT VISIT MOD MDM: CPT | Mod: 25

## 2017-09-11 PROCEDURE — 71045 X-RAY EXAM CHEST 1 VIEW: CPT

## 2017-09-11 PROCEDURE — 99284 EMERGENCY DEPT VISIT MOD MDM: CPT | Mod: 25,GC

## 2017-09-11 PROCEDURE — 71010: CPT | Mod: 26

## 2017-09-11 PROCEDURE — 85027 COMPLETE CBC AUTOMATED: CPT

## 2017-09-11 PROCEDURE — 83605 ASSAY OF LACTIC ACID: CPT

## 2017-09-11 PROCEDURE — 84295 ASSAY OF SERUM SODIUM: CPT

## 2017-09-11 PROCEDURE — 70450 CT HEAD/BRAIN W/O DYE: CPT

## 2017-09-11 PROCEDURE — 70450 CT HEAD/BRAIN W/O DYE: CPT | Mod: 26

## 2017-09-11 PROCEDURE — 72170 X-RAY EXAM OF PELVIS: CPT

## 2017-09-11 PROCEDURE — 73562 X-RAY EXAM OF KNEE 3: CPT

## 2017-09-11 PROCEDURE — 82435 ASSAY OF BLOOD CHLORIDE: CPT

## 2017-09-11 PROCEDURE — 72170 X-RAY EXAM OF PELVIS: CPT | Mod: 26

## 2017-09-11 PROCEDURE — 82330 ASSAY OF CALCIUM: CPT

## 2017-09-11 PROCEDURE — 73562 X-RAY EXAM OF KNEE 3: CPT | Mod: 26,RT

## 2017-09-11 PROCEDURE — 72125 CT NECK SPINE W/O DYE: CPT | Mod: 26

## 2017-09-11 PROCEDURE — 80053 COMPREHEN METABOLIC PANEL: CPT

## 2017-09-11 PROCEDURE — 82803 BLOOD GASES ANY COMBINATION: CPT

## 2017-09-11 PROCEDURE — 84132 ASSAY OF SERUM POTASSIUM: CPT

## 2017-09-11 PROCEDURE — 93005 ELECTROCARDIOGRAM TRACING: CPT

## 2017-09-11 PROCEDURE — 82947 ASSAY GLUCOSE BLOOD QUANT: CPT

## 2017-09-11 RX ORDER — SODIUM CHLORIDE 9 MG/ML
250 INJECTION INTRAMUSCULAR; INTRAVENOUS; SUBCUTANEOUS ONCE
Qty: 0 | Refills: 0 | Status: COMPLETED | OUTPATIENT
Start: 2017-09-11 | End: 2017-09-11

## 2017-09-11 RX ADMIN — SODIUM CHLORIDE 250 MILLILITER(S): 9 INJECTION INTRAMUSCULAR; INTRAVENOUS; SUBCUTANEOUS at 13:56

## 2017-09-11 NOTE — ED PROVIDER NOTE - PSH
S/P Cardiac Cath  8 stents placed 3/2014, per pt he thinks he had 3 stents placed 15 years prior also.  S/P Cataract Surgery    S/P Cystoscopy    S/P Hip Replacement bilat    S/P Inguinal Hernia  repair bilat    S/P Parathyroidectomy    S/P Radiation > 12 Weeks prostatic seeds placed 10 yrs ago

## 2017-09-11 NOTE — ED PROVIDER NOTE - PMH
AAA (abdominal aortic aneurysm)  per cardiology eval report  Asthma  h/o  Atrial fibrillation  11/2014  CAD (Coronary Artery Disease)  13 stents placed 3/2014  Chronic kidney disease  on dialysis MWF  GERD (gastroesophageal reflux disease)    Gout    Hemodialysis patient    Hepatitis    [place] (Hard of Hearing)    HTN (Hypertension)    Hypercholesteremia    Kidney Stones s/p ureteroscopy    Lymphoma, non-Hodgkin's  h/o diffuse B cell  Myocardial infarct  h/o X3  Pleural effusion    Prostate cancer  s/p seeds  Stroke  mild AAA (abdominal aortic aneurysm)  per cardiology eval report  Asthma  h/o  Atrial fibrillation  11/2014  CAD (Coronary Artery Disease)  13 stents placed 3/2014  Chronic kidney disease  on dialysis MWF  GERD (gastroesophageal reflux disease)    Gout    Hemodialysis patient    Hepatitis    [place] (Hard of Hearing)    HTN (Hypertension)    Hypercholesteremia    Kidney Stones s/p ureteroscopy    Lymphoma, non-Hodgkin's  h/o diffuse B cell  Myocardial infarct  h/o X3  Pleural effusion    Prostate cancer  s/p seeds  Stroke  mild

## 2017-09-11 NOTE — ED ADULT NURSE REASSESSMENT NOTE - NS ED NURSE REASSESS COMMENT FT1
Pt d/c dressed Instructions for wound care . To have dialysis. Pt denies any discomfort Resp even and nonlab

## 2017-09-11 NOTE — ED PROVIDER NOTE - ATTENDING CONTRIBUTION TO CARE
pt is a 86 y/o male with h/o esrd, AAA repair, cad with 11 stents, esrd last hd sat, pt with pleurex drain placed last week with multiple drainage with fall this morning out of bed on ASA with no change in ms, non focal neuro exam. ct head and neck ordered, baseline dementia. pt for hd today. labs sent, low bp this past week, possible due to fluid restriction and multiple drainage from chest tube.

## 2017-09-11 NOTE — ED PROVIDER NOTE - SKIN NEGATIVE STATEMENT, MLM
bruising w/ skin flaps noted to forehead, right hand, left forearm. , no jaundice, no lesions, no pruritis, and no rashes.

## 2017-09-11 NOTE — ED ADULT NURSE NOTE - PMH
AAA (abdominal aortic aneurysm)  per cardiology eval report  Asthma  h/o  Atrial fibrillation  11/2014  CAD (Coronary Artery Disease)  13 stents placed 3/2014  Chronic kidney disease  on dialysis MWF  GERD (gastroesophageal reflux disease)    Gout    Hemodialysis patient    Hepatitis    [place] (Hard of Hearing)    HTN (Hypertension)    Hypercholesteremia    Kidney Stones s/p ureteroscopy    Lymphoma, non-Hodgkin's  h/o diffuse B cell  Myocardial infarct  h/o X3  Pleural effusion    Prostate cancer  s/p seeds  Stroke  mild

## 2017-09-11 NOTE — ED ADULT NURSE NOTE - OBJECTIVE STATEMENT
87 yr old male to ed s/o head injury after sliding from bed On coumadin Skin tear head Awake alert and orientedx3 Resp even and nonlab Responds appropriately to verbal and tactile stimuli Per home attendant No change from baseline behavior. Likes to sleep, but easily arousbale. Wife present as well as home attendant Unknown LOC denies chest pain denies sob Denies pain No slurred speech or facial drop Denies numbness or weakness

## 2017-09-11 NOTE — ED PROVIDER NOTE - PHYSICAL EXAMINATION
bruising with skin flaps noted to left forehead, right hand, left forearm  mental status at baseline per wife  neuro exam no focal deficits  PERRLA

## 2017-09-12 ENCOUNTER — RESULT REVIEW (OUTPATIENT)
Age: 82
End: 2017-09-12

## 2017-09-12 ENCOUNTER — LABORATORY RESULT (OUTPATIENT)
Age: 82
End: 2017-09-12

## 2017-09-12 ENCOUNTER — APPOINTMENT (OUTPATIENT)
Dept: INFUSION THERAPY | Facility: HOSPITAL | Age: 82
End: 2017-09-12

## 2017-09-12 LAB
EOSINOPHIL # BLD AUTO: 0 K/UL — SIGNIFICANT CHANGE UP (ref 0–0.5)
EOSINOPHIL NFR BLD AUTO: 3 % — SIGNIFICANT CHANGE UP (ref 0–6)
HCT VFR BLD CALC: 27.5 % — LOW (ref 39–50)
HGB BLD-MCNC: 9.8 G/DL — LOW (ref 13–17)
LYMPHOCYTES # BLD AUTO: 1.2 K/UL — SIGNIFICANT CHANGE UP (ref 1–3.3)
LYMPHOCYTES # BLD AUTO: 7 % — LOW (ref 13–44)
MCHC RBC-ENTMCNC: 35.5 G/DL — SIGNIFICANT CHANGE UP (ref 32–36)
MCHC RBC-ENTMCNC: 35.8 PG — HIGH (ref 27–34)
MCV RBC AUTO: 101 FL — HIGH (ref 80–100)
MONOCYTES # BLD AUTO: 0.9 K/UL — SIGNIFICANT CHANGE UP (ref 0–0.9)
MONOCYTES NFR BLD AUTO: 16 % — HIGH (ref 2–14)
NEUTROPHILS # BLD AUTO: 9.3 K/UL — HIGH (ref 1.8–7.4)
NEUTROPHILS NFR BLD AUTO: 74 % — SIGNIFICANT CHANGE UP (ref 43–77)
PLAT MORPH BLD: NORMAL — SIGNIFICANT CHANGE UP
PLATELET # BLD AUTO: 130 K/UL — LOW (ref 150–400)
RBC # BLD: 2.73 M/UL — LOW (ref 4.2–5.8)
RBC # FLD: 16.1 % — HIGH (ref 10.3–14.5)
RBC BLD AUTO: SIGNIFICANT CHANGE UP
WBC # BLD: 11.4 K/UL — HIGH (ref 3.8–10.5)
WBC # FLD AUTO: 11.4 K/UL — HIGH (ref 3.8–10.5)

## 2017-09-13 ENCOUNTER — APPOINTMENT (OUTPATIENT)
Dept: RADIOLOGY | Facility: IMAGING CENTER | Age: 82
End: 2017-09-13

## 2017-09-13 DIAGNOSIS — Z51.11 ENCOUNTER FOR ANTINEOPLASTIC CHEMOTHERAPY: ICD-10-CM

## 2017-09-13 DIAGNOSIS — R11.2 NAUSEA WITH VOMITING, UNSPECIFIED: ICD-10-CM

## 2017-09-14 ENCOUNTER — APPOINTMENT (OUTPATIENT)
Dept: THORACIC SURGERY | Facility: CLINIC | Age: 82
End: 2017-09-14
Payer: MEDICARE

## 2017-09-14 ENCOUNTER — TRANSCRIPTION ENCOUNTER (OUTPATIENT)
Age: 82
End: 2017-09-14

## 2017-09-14 VITALS — DIASTOLIC BLOOD PRESSURE: 50 MMHG | SYSTOLIC BLOOD PRESSURE: 100 MMHG | HEART RATE: 75 BPM | OXYGEN SATURATION: 93 %

## 2017-09-14 PROCEDURE — 99213 OFFICE O/P EST LOW 20 MIN: CPT

## 2017-09-16 ENCOUNTER — INPATIENT (INPATIENT)
Facility: HOSPITAL | Age: 82
LOS: 2 days | Discharge: HOME HEALTH | End: 2017-09-19
Attending: HOSPITALIST | Admitting: HOSPITALIST
Payer: MEDICARE

## 2017-09-16 VITALS
OXYGEN SATURATION: 100 % | SYSTOLIC BLOOD PRESSURE: 102 MMHG | DIASTOLIC BLOOD PRESSURE: 53 MMHG | RESPIRATION RATE: 17 BRPM | TEMPERATURE: 99 F | HEART RATE: 70 BPM

## 2017-09-16 DIAGNOSIS — Z97.8 PRESENCE OF OTHER SPECIFIED DEVICES: ICD-10-CM

## 2017-09-16 DIAGNOSIS — I95.9 HYPOTENSION, UNSPECIFIED: ICD-10-CM

## 2017-09-16 DIAGNOSIS — R74.8 ABNORMAL LEVELS OF OTHER SERUM ENZYMES: ICD-10-CM

## 2017-09-16 LAB
ALBUMIN SERPL ELPH-MCNC: 2.9 G/DL — LOW (ref 3.3–5)
ALP SERPL-CCNC: 252 U/L — HIGH (ref 40–120)
ALT FLD-CCNC: 19 U/L — SIGNIFICANT CHANGE UP (ref 4–41)
APTT BLD: 26.3 SEC — LOW (ref 27.5–37.4)
AST SERPL-CCNC: 75 U/L — HIGH (ref 4–40)
BASE EXCESS BLDV CALC-SCNC: 8.8 MMOL/L — SIGNIFICANT CHANGE UP
BASOPHILS # BLD AUTO: 0.03 K/UL — SIGNIFICANT CHANGE UP (ref 0–0.2)
BASOPHILS NFR BLD AUTO: 0.2 % — SIGNIFICANT CHANGE UP (ref 0–2)
BILIRUB SERPL-MCNC: 0.7 MG/DL — SIGNIFICANT CHANGE UP (ref 0.2–1.2)
BLOOD GAS VENOUS - CREATININE: 3.38 MG/DL — HIGH (ref 0.5–1.3)
BUN SERPL-MCNC: 45 MG/DL — HIGH (ref 7–23)
CALCIUM SERPL-MCNC: 7.7 MG/DL — LOW (ref 8.4–10.5)
CHLORIDE BLDV-SCNC: 94 MMOL/L — LOW (ref 96–108)
CHLORIDE SERPL-SCNC: 92 MMOL/L — LOW (ref 98–107)
CK MB BLD-MCNC: 3.66 NG/ML — SIGNIFICANT CHANGE UP (ref 1–6.6)
CK MB BLD-MCNC: 4.96 NG/ML — SIGNIFICANT CHANGE UP (ref 1–6.6)
CK MB BLD-MCNC: SIGNIFICANT CHANGE UP (ref 0–2.5)
CK MB BLD-MCNC: SIGNIFICANT CHANGE UP (ref 0–2.5)
CK SERPL-CCNC: 111 U/L — SIGNIFICANT CHANGE UP (ref 30–200)
CK SERPL-CCNC: 76 U/L — SIGNIFICANT CHANGE UP (ref 30–200)
CO2 SERPL-SCNC: 29 MMOL/L — SIGNIFICANT CHANGE UP (ref 22–31)
CREAT SERPL-MCNC: 3.4 MG/DL — HIGH (ref 0.5–1.3)
EOSINOPHIL # BLD AUTO: 0.39 K/UL — SIGNIFICANT CHANGE UP (ref 0–0.5)
EOSINOPHIL NFR BLD AUTO: 3.1 % — SIGNIFICANT CHANGE UP (ref 0–6)
GAS PNL BLDV: 133 MMOL/L — LOW (ref 136–146)
GLUCOSE BLDV-MCNC: 110 — HIGH (ref 70–99)
GLUCOSE SERPL-MCNC: 109 MG/DL — HIGH (ref 70–99)
HCO3 BLDV-SCNC: 31 MMOL/L — HIGH (ref 20–27)
HCT VFR BLD CALC: 28.5 % — LOW (ref 39–50)
HCT VFR BLDV CALC: 27.6 % — LOW (ref 39–51)
HGB BLD-MCNC: 8.8 G/DL — LOW (ref 13–17)
HGB BLDV-MCNC: 8.9 G/DL — LOW (ref 13–17)
IMM GRANULOCYTES # BLD AUTO: 0.1 # — SIGNIFICANT CHANGE UP
IMM GRANULOCYTES NFR BLD AUTO: 0.8 % — SIGNIFICANT CHANGE UP (ref 0–1.5)
INR BLD: 1.24 — HIGH (ref 0.88–1.17)
LACTATE BLDV-MCNC: 2.1 MMOL/L — HIGH (ref 0.5–2)
LYMPHOCYTES # BLD AUTO: 0.71 K/UL — LOW (ref 1–3.3)
LYMPHOCYTES # BLD AUTO: 5.7 % — LOW (ref 13–44)
MCHC RBC-ENTMCNC: 30.9 % — LOW (ref 32–36)
MCHC RBC-ENTMCNC: 32 PG — SIGNIFICANT CHANGE UP (ref 27–34)
MCV RBC AUTO: 103.6 FL — HIGH (ref 80–100)
MONOCYTES # BLD AUTO: 1.3 K/UL — HIGH (ref 0–0.9)
MONOCYTES NFR BLD AUTO: 10.4 % — SIGNIFICANT CHANGE UP (ref 2–14)
NEUTROPHILS # BLD AUTO: 10.01 K/UL — HIGH (ref 1.8–7.4)
NEUTROPHILS NFR BLD AUTO: 79.8 % — HIGH (ref 43–77)
NRBC # FLD: 0.05 — SIGNIFICANT CHANGE UP
OB PNL STL: NEGATIVE — SIGNIFICANT CHANGE UP
PCO2 BLDV: 55 MMHG — HIGH (ref 41–51)
PH BLDV: 7.41 PH — SIGNIFICANT CHANGE UP (ref 7.32–7.43)
PLATELET # BLD AUTO: 174 K/UL — SIGNIFICANT CHANGE UP (ref 150–400)
PMV BLD: 10.9 FL — SIGNIFICANT CHANGE UP (ref 7–13)
PO2 BLDV: 26 MMHG — LOW (ref 35–40)
POTASSIUM BLDV-SCNC: 4.3 MMOL/L — SIGNIFICANT CHANGE UP (ref 3.4–4.5)
POTASSIUM SERPL-MCNC: 4.5 MMOL/L — SIGNIFICANT CHANGE UP (ref 3.5–5.3)
POTASSIUM SERPL-SCNC: 4.5 MMOL/L — SIGNIFICANT CHANGE UP (ref 3.5–5.3)
PROT SERPL-MCNC: 6 G/DL — SIGNIFICANT CHANGE UP (ref 6–8.3)
PROTHROM AB SERPL-ACNC: 14 SEC — HIGH (ref 9.8–13.1)
RBC # BLD: 2.75 M/UL — LOW (ref 4.2–5.8)
RBC # FLD: 18.7 % — HIGH (ref 10.3–14.5)
SAO2 % BLDV: 40.2 % — LOW (ref 60–85)
SODIUM SERPL-SCNC: 137 MMOL/L — SIGNIFICANT CHANGE UP (ref 135–145)
TROPONIN T SERPL-MCNC: 0.31 NG/ML — HIGH (ref 0–0.06)
TROPONIN T SERPL-MCNC: 0.35 NG/ML — HIGH (ref 0–0.06)
TSH SERPL-MCNC: 4.73 UIU/ML — HIGH (ref 0.27–4.2)
WBC # BLD: 12.54 K/UL — HIGH (ref 3.8–10.5)
WBC # FLD AUTO: 12.54 K/UL — HIGH (ref 3.8–10.5)

## 2017-09-16 PROCEDURE — 99222 1ST HOSP IP/OBS MODERATE 55: CPT

## 2017-09-16 PROCEDURE — 71010: CPT | Mod: 26

## 2017-09-16 PROCEDURE — 70450 CT HEAD/BRAIN W/O DYE: CPT | Mod: 26

## 2017-09-16 RX ORDER — FAMOTIDINE 10 MG/ML
1 INJECTION INTRAVENOUS
Qty: 0 | Refills: 0 | COMMUNITY

## 2017-09-16 RX ORDER — SERTRALINE 25 MG/1
1 TABLET, FILM COATED ORAL
Qty: 0 | Refills: 0 | COMMUNITY

## 2017-09-16 RX ORDER — LEVOTHYROXINE SODIUM 125 MCG
1 TABLET ORAL
Qty: 0 | Refills: 0 | COMMUNITY

## 2017-09-16 RX ORDER — MIDODRINE HYDROCHLORIDE 2.5 MG/1
10 TABLET ORAL ONCE
Qty: 0 | Refills: 0 | Status: COMPLETED | OUTPATIENT
Start: 2017-09-16 | End: 2017-09-16

## 2017-09-16 RX ORDER — ACETAMINOPHEN 500 MG
325 TABLET ORAL ONCE
Qty: 0 | Refills: 0 | Status: COMPLETED | OUTPATIENT
Start: 2017-09-16 | End: 2017-09-16

## 2017-09-16 RX ADMIN — MIDODRINE HYDROCHLORIDE 10 MILLIGRAM(S): 2.5 TABLET ORAL at 20:57

## 2017-09-16 RX ADMIN — Medication 325 MILLIGRAM(S): at 23:05

## 2017-09-16 NOTE — ED PROVIDER NOTE - MEDICAL DECISION MAKING DETAILS
87M with hypotension, hx of hypotension, on midodrine. eval for infection, ptx, other acute etiology.

## 2017-09-16 NOTE — ED PROVIDER NOTE - NOTES
Hospitalist request ICU eval prior to accepting given hypotension Will eval patient for hypotension and elevated troponin

## 2017-09-16 NOTE — ED PROVIDER NOTE - PROGRESS NOTE DETAILS
Rejected by ICU, accepted to hospitalist. MAR textpage sent. Requesting CT abd/pelvis for r/o AAA pathology. Pt with stable hypotension, no tachycardia, no abdominal pain and ability to complain if something bothering him. Will sign out CT to MAR.

## 2017-09-16 NOTE — ED PROVIDER NOTE - PMH
AAA (abdominal aortic aneurysm)  per cardiology eval report  Asthma  h/o  Atrial fibrillation  11/2014  CAD (Coronary Artery Disease)  13 stents placed 3/2014  Chronic kidney disease  on dialysis MWF  GERD (gastroesophageal reflux disease)    Gout    Hemodialysis patient    Hepatitis    [place] (Hard of Hearing)    HTN (Hypertension)    Hypercholesteremia    Kidney Stones s/p ureteroscopy    Lymphoma, non-Hodgkin's  h/o diffuse B cell  Myocardial infarct  h/o X3  Pleural effusion    Prostate cancer  s/p seeds  Stroke  mild

## 2017-09-16 NOTE — ED ADULT NURSE NOTE - OBJECTIVE STATEMENT
Patient brought in by EMS for hypotension, patient on high dose Midodrine.  PMH: CAD, ESRD on HD M-W-F, last HD yesterday via left arm fistula,  AAA repair, MIs, with 13 cardiac, stents, HTN, recent pleurex drain placement.  Denies any CP or SOB.  Blood work drawn and sent to lab.  Urine specimen pending.  Report endorsed to primary nurse for f/u care and treatment. Caesar JOSE (facilitator)

## 2017-09-16 NOTE — CONSULT NOTE ADULT - ASSESSMENT
This is a 87yoM w/ PMHx ESRD on HD, AAA repair, CAD s/p 13 stents, HTN, pleurx catheter (s/p L vats in 09/2017) p/w fatigue and low blood pressure.  Cardiology consulted for elevated troponins 0.35

## 2017-09-16 NOTE — PROVIDER CONTACT NOTE (OTHER) - ASSESSMENT
Dr. Worley - thoracic surgery - [phone]  Dr. Katie Dickey - nephrologist - [phone]  [facility] dialysis center - [phone]  Dr. Morro Tran - pulmonologist - [address] [facility], [address] - [phone]  Dr. Frankie Gandhi - [phone] // [phone]

## 2017-09-16 NOTE — ED PROVIDER NOTE - ATTENDING CONTRIBUTION TO CARE
[place]  pt sent in by [facility] due to low blood pressure  Pt with ESRD  low BP requiring mitodrine  no reported fever vomiting  diarrhea or SOB  Pt has had decreased po intake and has also had AMS for 1-2 weeks  with visual hallucinations  Pt had normal HD yesterday  exam pt alert oriented to person and place  no distress lying flat  good airmovement b/l  sl decrease lt base  card RRR S1S2  MR murmur  abd nontender mild distension  moves both sides well   no asterixis  shunt in Lt arm  good thrill [place]  pt sent in by [facility] due to low blood pressure  Pt with ESRD  low BP requiring mitodrine  no reported fever vomiting  diarrhea or SOB  Pt has had decreased po intake and has also had AMS for 1-2 weeks  with visual hallucinations  Pt had normal HD yesterday  exam pt alert oriented to person and place  no distress lying flat  good airmovement b/l  sl decrease lt base  card RRR S1S2  MR murmur  abd nontender mild distension  moves both sides well   no asterixis  shunt in Lt arm  good thrill  EKG paced  no changes c/w prior  labs positive trop but CRI w/o prior for comp  CXR clear  Pt on mitodrin   reveiwed prior adm  LOw BP documented    but unsure what BPs had been trending as outpt  will keep for observation  and monitoring of BP

## 2017-09-16 NOTE — PROVIDER CONTACT NOTE (OTHER) - SITUATION
Dr. Worley - thoracic surgery - [phone]  Dr. Katie Dickey - nephrologist - [phone]  [facility] dialysis center - [phone]  Dr. Morro Tran -

## 2017-09-16 NOTE — CONSULT NOTE ADULT - ASSESSMENT
88 yo M w/ hx ESRD on HD (MWF, last HD Friday), AAA repair, multiple MIs, CAD s/p 13 stents, HTN, recent pleurex drain placement, BIBEMS for hypotension and increased serosanguinous driange from R pleurex drain; MICU consulted for hypotension, on midodrine and on fluid restriciton given renal disease. given evening dose of midodrine in ED (10 mg), but has room to increase if still hypotensive. Patient appears to be mentating near baseline for past few weeks, oriented x3, but hallucinating at home since discharge on 9/7/17 per wife.    #Neuro: mentating near baseline, though appears confused, ?hallcuinating, in setting of hx of dementia w/ exacerbation after discharge from hospital. Consider neurology or psyc 88 yo M w/ hx ESRD on HD (MWF, last HD Friday), AAA repair, multiple MIs, CAD s/p 13 stents, HTN, recent pleurex drain placement, BIBEMS for hypotension and increased serosanguinous driange from R pleurex drain; MICU consulted for hypotension, on midodrine and on fluid restriciton given renal disease. given evening dose of midodrine in ED (10 mg), but has room to increase if still hypotensive. Patient appears to be mentating near baseline for past few weeks, oriented x3, but hallucinating at home since discharge on 9/7/17 per wife.    #Neuro: mentating near baseline, though appears confused, ?hallcuinating, in setting of hx of dementia w/ exacerbation after discharge from hospital. Consider neurology or psychiatric consultation for hallucinations (?r/o lewy body dementia + autonomic dysfunction); check B12, TSH + T4; c/w home psych meds  #Cards: hypotensive, but has hx of being hypotensive on last discharge (SBP  on progress notes); ok to continue w/ midodrine (has room to increase as needed as HR allows, avoid in bradycardia)t; on fluid restriction; likely preload dependent given hx of severe AS, but would be careful if giving IV fluids, as already has pulmonary edema on CXR (satting well at this time). Trops elevated, but denying any symptoms of chest pain; suspect likely 2/2 ESRD + HF; CKMB w/in normal limits  #GI: no acute issues; hx of GERD, c/w home meds  #Pulm: satting well, f/u surgery recs regarding serosanguinous drainage from left pleurex  #Renal: HD per renal, no acute indications for emergent HD  #Heme: macrocytic anemia, consider checking B12, folate, TSH; fecal occult negative; check coags    Not a MICU candidate at this time; please call back with any questions or if status changes    Milo Daniel MD, PGY3  [phone]

## 2017-09-16 NOTE — ED ADULT NURSE REASSESSMENT NOTE - NS ED NURSE REASSESS COMMENT FT1
pt on bed aox3 NAD denies CP SOB palpitation SOB dizziness BP noted 98/48 HR70 paced rhythm on the monitor will monitor

## 2017-09-16 NOTE — ED ADULT NURSE NOTE - CHPI ED SYMPTOMS NEG
no chills/no nausea/no fever/no decreased eating/drinking/no weakness/no numbness/no pain/no dizziness/no vomiting/no tingling

## 2017-09-16 NOTE — ED PROVIDER NOTE - OBJECTIVE STATEMENT
87M PMH ESRD on HD (MWF, last HD yesterday), AAA repair, multiple MIs, CAD s/p 13 stents, HTN, recent pleurex drain placement BIBEMS for hypotension. Per wife at bedside, pt has been a little more agitated/confused than normal over the past few weeks and has had increased drainage from his drain (300cc daily over the past 2 days). Pt denies SOB or any acute complaints. Is on midodrine 30mg daily.

## 2017-09-17 DIAGNOSIS — N18.6 END STAGE RENAL DISEASE: ICD-10-CM

## 2017-09-17 DIAGNOSIS — I95.9 HYPOTENSION, UNSPECIFIED: ICD-10-CM

## 2017-09-17 DIAGNOSIS — K21.9 GASTRO-ESOPHAGEAL REFLUX DISEASE WITHOUT ESOPHAGITIS: ICD-10-CM

## 2017-09-17 DIAGNOSIS — I48.91 UNSPECIFIED ATRIAL FIBRILLATION: ICD-10-CM

## 2017-09-17 DIAGNOSIS — G47.33 OBSTRUCTIVE SLEEP APNEA (ADULT) (PEDIATRIC): ICD-10-CM

## 2017-09-17 DIAGNOSIS — C85.90 NON-HODGKIN LYMPHOMA, UNSPECIFIED, UNSPECIFIED SITE: ICD-10-CM

## 2017-09-17 DIAGNOSIS — Z71.89 OTHER SPECIFIED COUNSELING: ICD-10-CM

## 2017-09-17 DIAGNOSIS — I50.30 UNSPECIFIED DIASTOLIC (CONGESTIVE) HEART FAILURE: ICD-10-CM

## 2017-09-17 DIAGNOSIS — Z29.9 ENCOUNTER FOR PROPHYLACTIC MEASURES, UNSPECIFIED: ICD-10-CM

## 2017-09-17 DIAGNOSIS — M10.9 GOUT, UNSPECIFIED: ICD-10-CM

## 2017-09-17 DIAGNOSIS — J90 PLEURAL EFFUSION, NOT ELSEWHERE CLASSIFIED: ICD-10-CM

## 2017-09-17 LAB
ALBUMIN SERPL ELPH-MCNC: 2.9 G/DL — LOW (ref 3.3–5)
ALP SERPL-CCNC: 255 U/L — HIGH (ref 40–120)
ALT FLD-CCNC: 21 U/L — SIGNIFICANT CHANGE UP (ref 4–41)
AST SERPL-CCNC: 58 U/L — HIGH (ref 4–40)
BASOPHILS # BLD AUTO: 0.04 K/UL — SIGNIFICANT CHANGE UP (ref 0–0.2)
BASOPHILS NFR BLD AUTO: 0.4 % — SIGNIFICANT CHANGE UP (ref 0–2)
BILIRUB SERPL-MCNC: 0.7 MG/DL — SIGNIFICANT CHANGE UP (ref 0.2–1.2)
BUN SERPL-MCNC: 58 MG/DL — HIGH (ref 7–23)
CALCIUM SERPL-MCNC: 7.7 MG/DL — LOW (ref 8.4–10.5)
CHLORIDE SERPL-SCNC: 94 MMOL/L — LOW (ref 98–107)
CO2 SERPL-SCNC: 31 MMOL/L — SIGNIFICANT CHANGE UP (ref 22–31)
CORTIS SERPL-MCNC: 16 UG/DL — SIGNIFICANT CHANGE UP (ref 2.7–18.4)
CREAT SERPL-MCNC: 4.42 MG/DL — HIGH (ref 0.5–1.3)
EOSINOPHIL # BLD AUTO: 0.28 K/UL — SIGNIFICANT CHANGE UP (ref 0–0.5)
EOSINOPHIL NFR BLD AUTO: 2.5 % — SIGNIFICANT CHANGE UP (ref 0–6)
FERRITIN SERPL-MCNC: 1179 NG/ML — HIGH (ref 30–400)
FOLATE SERPL-MCNC: 12.4 NG/ML — SIGNIFICANT CHANGE UP (ref 4.7–20)
GLUCOSE SERPL-MCNC: 106 MG/DL — HIGH (ref 70–99)
HCT VFR BLD CALC: 28.5 % — LOW (ref 39–50)
HGB BLD-MCNC: 9.1 G/DL — LOW (ref 13–17)
IMM GRANULOCYTES # BLD AUTO: 0.15 # — SIGNIFICANT CHANGE UP
IMM GRANULOCYTES NFR BLD AUTO: 1.3 % — SIGNIFICANT CHANGE UP (ref 0–1.5)
IRON SATN MFR SERPL: 208 UG/DL — SIGNIFICANT CHANGE UP (ref 155–535)
IRON SATN MFR SERPL: 56 UG/DL — SIGNIFICANT CHANGE UP (ref 45–165)
LYMPHOCYTES # BLD AUTO: 0.68 K/UL — LOW (ref 1–3.3)
LYMPHOCYTES # BLD AUTO: 6 % — LOW (ref 13–44)
MAGNESIUM SERPL-MCNC: 1.8 MG/DL — SIGNIFICANT CHANGE UP (ref 1.6–2.6)
MCHC RBC-ENTMCNC: 31.9 % — LOW (ref 32–36)
MCHC RBC-ENTMCNC: 33.1 PG — SIGNIFICANT CHANGE UP (ref 27–34)
MCV RBC AUTO: 103.6 FL — HIGH (ref 80–100)
MONOCYTES # BLD AUTO: 0.99 K/UL — HIGH (ref 0–0.9)
MONOCYTES NFR BLD AUTO: 8.7 % — SIGNIFICANT CHANGE UP (ref 2–14)
NEUTROPHILS # BLD AUTO: 9.27 K/UL — HIGH (ref 1.8–7.4)
NEUTROPHILS NFR BLD AUTO: 81.1 % — HIGH (ref 43–77)
NRBC # FLD: 0.02 — SIGNIFICANT CHANGE UP
PHOSPHATE SERPL-MCNC: 2.1 MG/DL — LOW (ref 2.5–4.5)
PLATELET # BLD AUTO: 170 K/UL — SIGNIFICANT CHANGE UP (ref 150–400)
PMV BLD: 10.8 FL — SIGNIFICANT CHANGE UP (ref 7–13)
POTASSIUM SERPL-MCNC: 4.3 MMOL/L — SIGNIFICANT CHANGE UP (ref 3.5–5.3)
POTASSIUM SERPL-SCNC: 4.3 MMOL/L — SIGNIFICANT CHANGE UP (ref 3.5–5.3)
PROT SERPL-MCNC: 5.9 G/DL — LOW (ref 6–8.3)
RBC # BLD: 2.75 M/UL — LOW (ref 4.2–5.8)
RBC # FLD: 18.7 % — HIGH (ref 10.3–14.5)
SODIUM SERPL-SCNC: 138 MMOL/L — SIGNIFICANT CHANGE UP (ref 135–145)
SPECIMEN SOURCE: SIGNIFICANT CHANGE UP
SPECIMEN SOURCE: SIGNIFICANT CHANGE UP
UIBC SERPL-MCNC: 152 UG/DL — SIGNIFICANT CHANGE UP (ref 110–370)
VIT B12 SERPL-MCNC: 1083 PG/ML — HIGH (ref 200–900)
WBC # BLD: 11.41 K/UL — HIGH (ref 3.8–10.5)
WBC # FLD AUTO: 11.41 K/UL — HIGH (ref 3.8–10.5)

## 2017-09-17 PROCEDURE — 12345: CPT | Mod: GC,NC

## 2017-09-17 PROCEDURE — 99223 1ST HOSP IP/OBS HIGH 75: CPT | Mod: GC

## 2017-09-17 PROCEDURE — 99233 SBSQ HOSP IP/OBS HIGH 50: CPT

## 2017-09-17 PROCEDURE — 99221 1ST HOSP IP/OBS SF/LOW 40: CPT | Mod: GC

## 2017-09-17 RX ORDER — DOCUSATE SODIUM 100 MG
100 CAPSULE ORAL AT BEDTIME
Qty: 0 | Refills: 0 | Status: DISCONTINUED | OUTPATIENT
Start: 2017-09-17 | End: 2017-09-19

## 2017-09-17 RX ORDER — FAMOTIDINE 10 MG/ML
20 INJECTION INTRAVENOUS
Qty: 0 | Refills: 0 | Status: DISCONTINUED | OUTPATIENT
Start: 2017-09-17 | End: 2017-09-17

## 2017-09-17 RX ORDER — MIDODRINE HYDROCHLORIDE 2.5 MG/1
20 TABLET ORAL THREE TIMES A DAY
Qty: 0 | Refills: 0 | Status: DISCONTINUED | OUTPATIENT
Start: 2017-09-17 | End: 2017-09-18

## 2017-09-17 RX ORDER — ZINC SULFATE TAB 220 MG (50 MG ZINC EQUIVALENT) 220 (50 ZN) MG
220 TAB ORAL AT BEDTIME
Qty: 0 | Refills: 0 | Status: DISCONTINUED | OUTPATIENT
Start: 2017-09-17 | End: 2017-09-19

## 2017-09-17 RX ORDER — ATORVASTATIN CALCIUM 80 MG/1
40 TABLET, FILM COATED ORAL AT BEDTIME
Qty: 0 | Refills: 0 | Status: DISCONTINUED | OUTPATIENT
Start: 2017-09-17 | End: 2017-09-19

## 2017-09-17 RX ORDER — ERYTHROPOIETIN 10000 [IU]/ML
10000 INJECTION, SOLUTION INTRAVENOUS; SUBCUTANEOUS
Qty: 0 | Refills: 0 | Status: DISCONTINUED | OUTPATIENT
Start: 2017-09-17 | End: 2017-09-19

## 2017-09-17 RX ORDER — FAMOTIDINE 10 MG/ML
40 INJECTION INTRAVENOUS AT BEDTIME
Qty: 0 | Refills: 0 | Status: DISCONTINUED | OUTPATIENT
Start: 2017-09-17 | End: 2017-09-17

## 2017-09-17 RX ORDER — HALOPERIDOL DECANOATE 100 MG/ML
5 INJECTION INTRAMUSCULAR EVERY 6 HOURS
Qty: 0 | Refills: 0 | Status: DISCONTINUED | OUTPATIENT
Start: 2017-09-17 | End: 2017-09-17

## 2017-09-17 RX ORDER — ASPIRIN/CALCIUM CARB/MAGNESIUM 324 MG
81 TABLET ORAL DAILY
Qty: 0 | Refills: 0 | Status: DISCONTINUED | OUTPATIENT
Start: 2017-09-17 | End: 2017-09-19

## 2017-09-17 RX ORDER — SERTRALINE 25 MG/1
25 TABLET, FILM COATED ORAL DAILY
Qty: 0 | Refills: 0 | Status: DISCONTINUED | OUTPATIENT
Start: 2017-09-17 | End: 2017-09-19

## 2017-09-17 RX ORDER — FAMOTIDINE 10 MG/ML
20 INJECTION INTRAVENOUS
Qty: 0 | Refills: 0 | Status: DISCONTINUED | OUTPATIENT
Start: 2017-09-17 | End: 2017-09-19

## 2017-09-17 RX ORDER — POTASSIUM PHOSPHATE, MONOBASIC POTASSIUM PHOSPHATE, DIBASIC 236; 224 MG/ML; MG/ML
15 INJECTION, SOLUTION INTRAVENOUS ONCE
Qty: 0 | Refills: 0 | Status: COMPLETED | OUTPATIENT
Start: 2017-09-17 | End: 2017-09-17

## 2017-09-17 RX ORDER — ACETAMINOPHEN 500 MG
650 TABLET ORAL EVERY 6 HOURS
Qty: 0 | Refills: 0 | Status: DISCONTINUED | OUTPATIENT
Start: 2017-09-17 | End: 2017-09-19

## 2017-09-17 RX ORDER — MIDODRINE HYDROCHLORIDE 2.5 MG/1
10 TABLET ORAL THREE TIMES A DAY
Qty: 0 | Refills: 0 | Status: DISCONTINUED | OUTPATIENT
Start: 2017-09-17 | End: 2017-09-17

## 2017-09-17 RX ORDER — SEVELAMER CARBONATE 2400 MG/1
800 POWDER, FOR SUSPENSION ORAL
Qty: 0 | Refills: 0 | Status: DISCONTINUED | OUTPATIENT
Start: 2017-09-17 | End: 2017-09-19

## 2017-09-17 RX ORDER — BACITRACIN ZINC 500 UNIT/G
1 OINTMENT IN PACKET (EA) TOPICAL DAILY
Qty: 0 | Refills: 0 | Status: DISCONTINUED | OUTPATIENT
Start: 2017-09-17 | End: 2017-09-19

## 2017-09-17 RX ORDER — HEPARIN SODIUM 5000 [USP'U]/ML
5000 INJECTION INTRAVENOUS; SUBCUTANEOUS EVERY 8 HOURS
Qty: 0 | Refills: 0 | Status: DISCONTINUED | OUTPATIENT
Start: 2017-09-17 | End: 2017-09-19

## 2017-09-17 RX ORDER — LEVOTHYROXINE SODIUM 125 MCG
25 TABLET ORAL DAILY
Qty: 0 | Refills: 0 | Status: DISCONTINUED | OUTPATIENT
Start: 2017-09-17 | End: 2017-09-19

## 2017-09-17 RX ORDER — ALLOPURINOL 300 MG
100 TABLET ORAL DAILY
Qty: 0 | Refills: 0 | Status: DISCONTINUED | OUTPATIENT
Start: 2017-09-17 | End: 2017-09-19

## 2017-09-17 RX ORDER — ERYTHROPOIETIN 10000 [IU]/ML
10000 INJECTION, SOLUTION INTRAVENOUS; SUBCUTANEOUS
Qty: 0 | Refills: 0 | Status: DISCONTINUED | OUTPATIENT
Start: 2017-09-17 | End: 2017-09-17

## 2017-09-17 RX ADMIN — MIDODRINE HYDROCHLORIDE 10 MILLIGRAM(S): 2.5 TABLET ORAL at 05:49

## 2017-09-17 RX ADMIN — SEVELAMER CARBONATE 800 MILLIGRAM(S): 2400 POWDER, FOR SUSPENSION ORAL at 10:12

## 2017-09-17 RX ADMIN — Medication 325 MILLIGRAM(S): at 00:05

## 2017-09-17 RX ADMIN — Medication 81 MILLIGRAM(S): at 14:25

## 2017-09-17 RX ADMIN — MIDODRINE HYDROCHLORIDE 20 MILLIGRAM(S): 2.5 TABLET ORAL at 21:10

## 2017-09-17 RX ADMIN — Medication 1 APPLICATION(S): at 15:33

## 2017-09-17 RX ADMIN — HEPARIN SODIUM 5000 UNIT(S): 5000 INJECTION INTRAVENOUS; SUBCUTANEOUS at 14:25

## 2017-09-17 RX ADMIN — ATORVASTATIN CALCIUM 40 MILLIGRAM(S): 80 TABLET, FILM COATED ORAL at 21:10

## 2017-09-17 RX ADMIN — SERTRALINE 25 MILLIGRAM(S): 25 TABLET, FILM COATED ORAL at 14:25

## 2017-09-17 RX ADMIN — FAMOTIDINE 20 MILLIGRAM(S): 10 INJECTION INTRAVENOUS at 05:48

## 2017-09-17 RX ADMIN — Medication 100 MILLIGRAM(S): at 14:25

## 2017-09-17 RX ADMIN — HEPARIN SODIUM 5000 UNIT(S): 5000 INJECTION INTRAVENOUS; SUBCUTANEOUS at 21:10

## 2017-09-17 RX ADMIN — SEVELAMER CARBONATE 800 MILLIGRAM(S): 2400 POWDER, FOR SUSPENSION ORAL at 14:25

## 2017-09-17 RX ADMIN — Medication 25 MICROGRAM(S): at 05:49

## 2017-09-17 RX ADMIN — MIDODRINE HYDROCHLORIDE 10 MILLIGRAM(S): 2.5 TABLET ORAL at 14:25

## 2017-09-17 RX ADMIN — HEPARIN SODIUM 5000 UNIT(S): 5000 INJECTION INTRAVENOUS; SUBCUTANEOUS at 05:48

## 2017-09-17 RX ADMIN — POTASSIUM PHOSPHATE, MONOBASIC POTASSIUM PHOSPHATE, DIBASIC 62.5 MILLIMOLE(S): 236; 224 INJECTION, SOLUTION INTRAVENOUS at 10:20

## 2017-09-17 RX ADMIN — FAMOTIDINE 20 MILLIGRAM(S): 10 INJECTION INTRAVENOUS at 17:00

## 2017-09-17 RX ADMIN — ZINC SULFATE TAB 220 MG (50 MG ZINC EQUIVALENT) 220 MILLIGRAM(S): 220 (50 ZN) TAB at 21:10

## 2017-09-17 RX ADMIN — SEVELAMER CARBONATE 800 MILLIGRAM(S): 2400 POWDER, FOR SUSPENSION ORAL at 17:00

## 2017-09-17 NOTE — PROGRESS NOTE ADULT - PROBLEM SELECTOR PLAN 1
scheduled left VAT, drainage of effusion, pleurx catheter  placement on 9/5/2017.  preop instructions, surgical soap given  pt verbalized understanding  abo on admit pleurx catheter placement on 9/5/2017, increased drainage PTA  -daily drainage of pleurx cath TIW  -monitor strict I&Os  -no CT surgery intervention, cath appers to be working without issues  -monitor BP, on home midodrine 10mg TID and increased to 20mg TID today - stable BPs currently 100s/60s w/ large pulse pressure 2/2 severe AI, H/H stable now  - Increased drainage 300 cc/day the last 2 days PTA, especially if patient is on fluid restriction at home  -Will continue to monitor BP and H/H. No further intervention at this time per CT surgery  -FOBT neg, macrocytoic anemia (norm folate and B12)  -f/u Fe   -c/w EPO w/ dialysis (MWF)  - Source may be cardiogenic as well, despite being on a PPM. Patient with elevated cardiac enzymes now trending downward likely 2/2 demand ischemia. EKG largely unrevealing. No need to trend any more, appreciate cardiology recs. No palpitations noted. Would obtain TTE and continue to monitor on telemetry with PPM interrogation  - May be in the setting of sepsis as well, will f/u blood cultures. No obvious source of infection at this time, so will hold off on abx. Will obtain UA and urine cx.  - Will avoid giving IVFs for now in the setting of severe heart failure but will continue with Midodrine 10 TID. Will check cortisol level for any sign of adrenal insufficiency - stable BPs currently 100s/60s w/ large pulse pressure 2/2 severe AI, H/H stable now  - Increased drainage 300 cc/day the last 2 days PTA, especially if patient is on fluid restriction at home  -Will continue to monitor BP and H/H. No further intervention at this time per CT surgery  -FOBT neg, macrocytoic anemia (norm folate and B12)  -f/u Fe   -c/w EPO w/ dialysis (MWF)  - Cardiogenic source despite being on a PPM. Elevated cardiac enzymes but trending downward likely 2/2 demand ischemia. EKG wide QRS, prolonged QTc 552. No need to trend, cardio recs appreciated: NTD   -f/u TTE and continue to monitor on telemetry with PPM interrogation  -f/u BC/UC/UA no obvious source of infection at this time, so will hold off on abx.   -Avoid giving IVFs w/ severe HF increased Midodrine 10 to 20 TID  f/u cortisol level for any sign of adrenal insufficiency - stable BPs currently 100s/60s w/ large pulse pressure 2/2 severe AI, H/H stable now  - Increased drainage 300 cc/day the last 2 days PTA, especially if patient is on fluid restriction at home  -Will continue to monitor BP and H/H. No further intervention at this time per CT surgery  -FOBT neg, macrocytoic anemia (norm folate and B12)  -f/u Fe   -c/w EPO w/ dialysis (MWF)  - Cardiogenic source despite being on a PPM. Elevated cardiac enzymes but trending downward likely 2/2 demand ischemia. EKG wide QRS, prolonged QTc 552. No need to trend, cardio recs appreciated: NTD   -f/u TTE and continue to monitor on telemetry with PPM interrogation  -f/u BC/UC/UA no obvious source of infection at this time, so will hold off on abx.   -Avoid giving IVFs w/ severe HF increased Midodrine 10 to 20 TID  -f/u cortisol level (16) wnl, less likely AI

## 2017-09-17 NOTE — PROGRESS NOTE ADULT - PROBLEM SELECTOR PLAN 7
- C/w Famotidine  - HOB elevation - No acute issues presently  - C/w allopurinol - R leg pain most likely due to gout  - C/w allopurinol

## 2017-09-17 NOTE — PROGRESS NOTE ADULT - PROBLEM SELECTOR PLAN 9
- DVT PPX: SQH  - Diet: DASH/renal  - Fall precautions - Patient's wife states he has a living will that states DNR but does not have it with her at this time. She will look for it. He has been stating that "I want to live" today but does not have capacity to make medical decisions at this time. Patient's wife will find living will and bring it in but understands that as of right now, he is full code. Will have the wife re-sign the DNR/DNI paperwork unclear whether wife or son is the HCP, both are claiming to be it, DNR and DNI can't be done without it. Pt has no capacity to sign the DNR or DNI, would need the HCP to verify it  -invalid DNR order in the chart

## 2017-09-17 NOTE — H&P ADULT - HISTORY OF PRESENT ILLNESS
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 He had a pleurx catheter placed on 9/5 after having a thoracentesis on 8/8 removing 1.5 L loculated fluid. Since the catheter was placed, per the patient's wife, he has been having increasing drainage of "cherry-red fluid" and increasing from 125 cc/day initially to 300 cc/day over the past few days, requiring daily exchanges. He was seen by CT surgery on 9/14, 1 day prior to admission, where he had more fluid drained. After the catheter was exchanged again today, his blood pressure was noted to be 80/40 and the decision was made to bring him into the ED. The patient and wife deny dizziness, nausea/vomiting, abdominal pain, chest pain, cough, SOB, fevers, chills, or dysuria. Has noted loose stools the past 3 days without any mention of blood in his stool. He is on Midodrine and has been taking it three times per day.    Of note, he does use a walker to walk, and does shower with assistance. He has a 24 hour home aid. Per wife, he has been sundowning more during the past few weeks that improves somewhat on redirecting. He has also had periods where his mental status has fluctuated. At first, the wife believed it may have something to do with his poor hearing that required a hearing aid but even when the patient had his hearing aid on he was still having the same symptoms. She stated a few nights ago he was sitting on the edge of his bed and swaying, then fell forward onto the floor and hit his knee and scratched his head. He was sent to the ED, where a head CT and Xrays were negative. He has noted R knee pain since even prior to the fall. The wife believes it may be related to the time that he had the catheter drained since that was when his complaint started. His mental status changes have been occurring the past 1-2 months, even before the catheter placement. Wife also notes more agitation and aggressive behavior lately, blaming her for many of his problems.    Of note, he is receiving treatment for DLBCL, started earlier this summer. He has two oncologists, Dr. Sinha at [facility] and an Oncologist in [place] who have been communicating. He is receiving Rituximab every 2-3 months, last dose on Tuesday. He is also being evaluate for sleep apnea by pulmonology.    In the ED, he was given 325 PO Tylenol and 10 PO Midodrine. 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 He had a pleurx catheter placed on 9/5 after having a thoracentesis on 8/8 removing 1.5 L loculated fluid. Since the catheter was placed, per the patient's wife, he has been having increasing drainage of "cherry-red fluid" and increasing from 125 cc/day initially to 300 cc/day over the past few days, requiring daily exchanges. He was seen by CT surgery on 9/14, 1 day prior to admission, where he had more fluid drained. After the catheter was exchanged again today, his blood pressure was noted to be 80/40 and the decision was made to bring him into the ED. The patient and wife deny dizziness, nausea/vomiting, abdominal pain, chest pain, cough, SOB, fevers, chills, or dysuria. Has noted loose stools the past 3 days without any mention of blood in his stool. He is on Midodrine and has been taking it three times per day.    Of note, he does use a walker to walk, and does shower with assistance. He has a 24 hour home aid. Per wife, he has been sundowning more during the past few weeks that improves somewhat on redirecting. He has also had periods where his mental status has fluctuated. At first, the wife believed it may have something to do with his poor hearing that required a hearing aid but even when the patient had his hearing aid on he was still having the same symptoms. She stated a few nights ago he was sitting on the edge of his bed and swaying, then fell forward onto the floor and hit his knee and scratched his head. He was sent to the ED, where a head CT and Xrays were negative. He has noted R knee pain since even prior to the fall. The wife believes it may be related to the time that he had the catheter drained since that was when his complaint started. His mental status changes have been occurring the past 1-2 months, even before the catheter placement. Wife also notes more agitation and aggressive behavior lately, blaming her for many of his problems.    Of note, he is receiving treatment for DLBCL, started earlier this summer. He has two oncologists, Dr. Sinha at [facility] and an Oncologist in [place] who have been communicating. He is receiving Rituximab every 2-3 months, last dose on Tuesday. He is also being evaluate for sleep apnea by pulmonology.    In the ED, he was given 325 PO Tylenol and 10 PO Midodrine. He was evaluated by Cardiology, CT surgery, and MICU.  ED VS: Temp 98.6F, HR 70, /53 --> 94/45, RR 17, O2 100% on RA. 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 He had a pleurx catheter placed on 9/5 after having a thoracentesis on 8/8 removing 1.5 L loculated fluid. Since the catheter was placed, per the patient's wife, he has been having increasing drainage of "cherry-red fluid" and increasing from 125 cc/day initially to 300 cc/day over the past few days, requiring daily exchanges. He was seen by CT surgery on 9/14, 1 day prior to admission, where he had more fluid drained. After the catheter was exchanged again today, his blood pressure was noted to be 80/40 and the decision was made to bring him into the ED. The patient and wife deny dizziness, nausea/vomiting, abdominal pain, chest pain, cough, SOB, fevers, chills, or dysuria. Has noted loose stools the past 3 days without any mention of blood in his stool. He is on Midodrine and has been taking it three times per day.    Of note, he does use a walker to walk, and does shower with assistance. He has a 24 hour home aid. Per wife, he has been sundowning more during the past few weeks that improves somewhat on redirecting. He has also had periods where his mental status has fluctuated. At first, the wife believed it may have something to do with his poor hearing that required a hearing aid but even when the patient had his hearing aid on he was still having the same symptoms. She stated a few nights ago he was sitting on the edge of his bed and swaying, then fell forward onto the floor and hit his knee and scratched his head. He was sent to the ED, where a head CT and Xrays were negative. He has noted R knee pain since even prior to the fall. The wife believes it may be related to the time that he had the catheter drained since that was when his complaint started. His mental status changes have been occurring the past 1-2 months, even before the catheter placement. Wife also notes more agitation and aggressive behavior lately, blaming her for many of his problems.    Of note, he is receiving treatment for DLBCL, started earlier this summer. He has two oncologists, Dr. Sinha at [facility] and an Oncologist in [place] who have been communicating. He is receiving Rituximab every 2-3 months, last dose on Tuesday. He is also being evaluate for sleep apnea by pulmonology.    In the ED, he was given 325 PO Tylenol and 10 PO Midodrine. He was evaluated by Cardiology, CT surgery, and MICU.  ED VS: Temp 98.6F, HR 70, /53 --> 94/45, RR 17, O2 100% on RA.

## 2017-09-17 NOTE — PROGRESS NOTE ADULT - PROBLEM SELECTOR PLAN 8
- No acute issues presently  - C/w allopurinol pt with 6 positives on stop   RAFAEL precaution recommended   OR booking notified via fax pt with 6 positives on stop   RAFAEL precaution recommended

## 2017-09-17 NOTE — CONSULT NOTE ADULT - ASSESSMENT
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

## 2017-09-17 NOTE — H&P ADULT - NSHPPHYSICALEXAM_GEN_ALL_CORE
GENERAL: No acute distress, slightly confused  HEENT: PERRL, EOMI, MMM, no oropharyngeal lesions  NECK: supple, no stiffness, mild JVD, no thyromegaly  PULM: respirations non-labored. L upper lobe mild rales. LLL decreased breath sounds. RLL minimal rales. No wheezes or rhonchi.  CV: regular rate and rhythm, III/VI systolic murmur LSB and II/VI systolic mumur apical. No gallops, or rubs  GI: abdomen firm, nontender, nondistended, no masses felt, normal bowel sounds  : no genital lesions or ulcers  MSK: limited R knee flexion and extension. No TTP or swelling at that site. L leg full ROM.  LYMPH: no anterior cervical, posterior cervical, supraclavicular, or inguinal lymphadenopathy  NEURO: A&Ox1-2, no tremors  SKIN: numerous ecchymoses across LUE and some on RUE. Purpura just above umbilicus. Small head laceration.

## 2017-09-17 NOTE — PROGRESS NOTE ADULT - PROBLEM SELECTOR PLAN 2
pt with multiple coronary stents, on low dose aspirin  pt instructed to continue.  Instructed to take midodrine on morning of surgery pt with multiple coronary stents, on low dose aspirin  -elevated troponins but downtrending  -most likley due to demand ischemia, asymptomatic w/ no CP  -Cardio following, NTD for now  -f/u TTE - Exudative, but negative for malignant cells based on cytology  - No evidence of infectious process at this time  - Increased drainage to 300cc/day, from 125-150cc previously. Per CT surgery, catheter is working appropriately, will continue to monitor I/Os. Would benefit from lung ultrasound to assess effusion and/or hemothorax - Exudative, but negative for malignant cells based on cytology  - No evidence of infectious process at this time  - Increased drainage to 300cc/day, from 125-150cc previously. Per CT surgery, catheter is working appropriately, will continue to monitor strict I/Os.   -f/u Lung US to assess effusion and/or hemothorax - Exudative, but negative for malignant cells based on cytology  - No evidence of infectious process at this time  - Increased drainage to 300cc/day, from 125-150cc previously. Per CT surgery, catheter is working appropriately, will continue to monitor strict I/Os.

## 2017-09-17 NOTE — H&P ADULT - PROBLEM SELECTOR PLAN 1
- Exudative but negative for malignant cells based on cytology  - No evidence of infectious process at this time  - Increased drainage. Per CT surgery, catheter is working appropriately, will continue to monitor I/Os. Would benefit from lung ultrasound to assess effusion and/or hemothorax - Patient appears to have BPs 100s/60s based on previous admission  - With increased drainage to 300 cc/day and may be contributing. Will continue to monitor. No further intervention at this time per CT surgery  - Fecal occult negative  - Hb decreased from 9.8 to 8.8 over 4 days, will continue to follow up. No evidence of RP or abdominal bleed based on exam and no evidence of hemothorax based on CXR. However, patient may need CT C/A/P to better r/o source of bleed  - Pt does have a macrocytic anemia, will obtain folate and b12 in addition to iron studies. Pt is on EPO.  - Source may be cardiogenic as well, despite being on a PPM. Patient with elevated cardiac enzymes now trending downward likely 2/2 demand ischemia. EKG largely unrevealing. No need to trend any more, appreciate cardiology recs. No palpitations noted. Would obtain TTE and continue to monitor on telemetry with PPM interrogation  - May be in the setting of sepsis as well, will f/u blood cultures. No obvious source of infection at this time, so will hold off on abx. Will obtain UA and urine cx.  - Will avoid giving IVFs for now in the setting of severe heart failure but will continue with Midodrine 10 TID. Will check cortisol level. - Patient appears to have BPs 100s/60s based on previous admission (decreased to  SBP of 92 and DBP of 42 in the ED)  - With increased drainage to 300 cc/day (previously 125 to 150 cc/day)and may be contributing, especially if patient is on fluid restriction at home. Will continue to monitor. No further intervention at this time per CT surgery  - Fecal occult negative  - Hb decreased from 9.8 to 8.8 over 4 days, will continue to follow up. No evidence of RP or abdominal bleed based on exam and no evidence of hemothorax based on CXR. However, patient may need CT C/A/P to better r/o source of bleed  - Pt does have a macrocytic anemia, will obtain folate and b12 in addition to iron studies. Pt is on EPO.  - Source may be cardiogenic as well, despite being on a PPM. Patient with elevated cardiac enzymes now trending downward likely 2/2 demand ischemia. EKG largely unrevealing. No need to trend any more, appreciate cardiology recs. No palpitations noted. Would obtain TTE and continue to monitor on telemetry with PPM interrogation  - May be in the setting of sepsis as well, will f/u blood cultures. No obvious source of infection at this time, so will hold off on abx. Will obtain UA and urine cx.  - Will avoid giving IVFs for now in the setting of severe heart failure but will continue with Midodrine 10 TID. Will check cortisol level for any sign of adrenal insufficiency

## 2017-09-17 NOTE — H&P ADULT - PROBLEM SELECTOR PLAN 10
- Patient's wife states he has a living will that states DNR but does not have it with her at this time. She will look for it. He has been stating that "I want to live" today but does not have capacity to make medical decisions at this time. Patient's wife will find living will and bring it in but understands that as of right now, he is full code.

## 2017-09-17 NOTE — CONSULT NOTE ADULT - PROBLEM SELECTOR RECOMMENDATION 9
L PleurX catheter/ chest tube  Drainage TIW  F/U repeat labs and CXR  No thoracic surgery intervention
ESRD on HD. Last HD was friday as an outpatient. No need for urgent HD. Patient planned for regularly scheduled HD tmrw.
elevated troponins in the setting of ESRD most likley 2/2 demand ischemia; patient asymptomatic denies chest pain  -continue to monitor patient

## 2017-09-17 NOTE — PROGRESS NOTE ADULT - PROBLEM SELECTOR PLAN 10
- Patient's wife states he has a living will that states DNR but does not have it with her at this time. She will look for it. He has been stating that "I want to live" today but does not have capacity to make medical decisions at this time. Patient's wife will find living will and bring it in but understands that as of right now, he is full code. - DVT PPX: SQH  - Diet: DASH/renal  - Fall precautions, bactrim for falls/cuts - DVT PPX: SQH  - Diet: DASH/renal  - Fall precautions, bacitracin for falls/cuts and enhanced supervision - DVT PPX: SQH  - Diet: DASH/renal  - Fall precautions, bacitracin and enhanced supervision - DVT PPX: SQH  - Diet: DASH/renal  - Fall precautions, bacitracin and enhanced supervision  - Hold off Haldol or prolonging QTc drugs - DVT PPX: SQH  - Diet: DASH/renal  - Fall precautions, bacitracin and enhanced supervision  - Hold off Haldol or prolonging QTc drugs  - DNR/DNI (as per wife and patient)

## 2017-09-17 NOTE — H&P ADULT - PROBLEM SELECTOR PLAN 3
- Will call nephro consult and continue with MWF HD through LUE fistula - No acute issues presently; potassium = 4.5  - Will call nephro consult and continue with MWF HD through LUE fistula  - f/u repeat lab-work

## 2017-09-17 NOTE — H&P ADULT - PROBLEM SELECTOR PLAN 2
- Patient appears to have BPs 100s/60s based on previous admission  - Fecal occult negative  - Hb decreased from 9.8 to 8.8 over 4 days, will continue to follow up. No evidence of RP or abdominal bleed based on exam and no evidence of hemothorax based on CXR. However, patient may need CT C/A/P to better r/o source of bleed  - Source may be cardiogenic as well, despite being on a PPM. No palpitations noted. Would obtain TTE and continue to monitor on telemetry with PPM interrogation  - Will avoid giving IVFs for now in the setting of severe heart failure but will continue with Midodrine 10 TID - Patient appears to have BPs 100s/60s based on previous admission  - Fecal occult negative  - Hb decreased from 9.8 to 8.8 over 4 days, will continue to follow up. No evidence of RP or abdominal bleed based on exam and no evidence of hemothorax based on CXR. However, patient may need CT C/A/P to better r/o source of bleed  - Source may be cardiogenic as well, despite being on a PPM. Patient with elevated cardiac enzymes now trending downward likely 2/2 demand ischemia. EKG largely unrevealing. No need to trend any more, appreciate cardiology recs. No palpitations noted. Would obtain TTE and continue to monitor on telemetry with PPM interrogation  - Will avoid giving IVFs for now in the setting of severe heart failure but will continue with Midodrine 10 TID - Patient appears to have BPs 100s/60s based on previous admission  - Fecal occult negative  - Hb decreased from 9.8 to 8.8 over 4 days, will continue to follow up. No evidence of RP or abdominal bleed based on exam and no evidence of hemothorax based on CXR. However, patient may need CT C/A/P to better r/o source of bleed  - Source may be cardiogenic as well, despite being on a PPM. Patient with elevated cardiac enzymes now trending downward likely 2/2 demand ischemia. EKG largely unrevealing. No need to trend any more, appreciate cardiology recs. No palpitations noted. Would obtain TTE and continue to monitor on telemetry with PPM interrogation  - May be in the setting of sepsis as well, will f/u blood cultures. No obvious source of infection at this time, so will hold off on abx. Will obtain UA and urine cx.  - Will avoid giving IVFs for now in the setting of severe heart failure but will continue with Midodrine 10 TID - Exudative but negative for malignant cells based on cytology  - No evidence of infectious process at this time  - Increased drainage to 300cc/day, from 125-150cc previously. Per CT surgery, catheter is working appropriately, will continue to monitor I/Os. Would benefit from lung ultrasound to assess effusion and/or hemothorax - Exudative, but negative for malignant cells based on cytology  - No evidence of infectious process at this time  - Increased drainage to 300cc/day, from 125-150cc previously. Per CT surgery, catheter is working appropriately, will continue to monitor I/Os. Would benefit from lung ultrasound to assess effusion and/or hemothorax

## 2017-09-17 NOTE — PROGRESS NOTE ADULT - SUBJECTIVE AND OBJECTIVE BOX
Patient is a 87y old  Male who presents with a chief complaint of low BP (17 Sep 2017 12:09)      SUBJECTIVE / OVERNIGHT EVENTS:  Subjective: No overnight events and patient seen and examined this morning. Patient wanted to leave AMA but has no capacity, was confused on why he is here but states he has no pain, SOB, CP, dizziness, abd pain, dysuria, diarrhea, sick contacts or recent illnesses.     MEDICATIONS  (STANDING):  famotidine    Tablet 20 milliGRAM(s) Oral two times a day  atorvastatin 40 milliGRAM(s) Oral at bedtime  allopurinol 100 milliGRAM(s) Oral daily  aspirin enteric coated 81 milliGRAM(s) Oral daily  sertraline 25 milliGRAM(s) Oral daily  zinc sulfate 220 milliGRAM(s) Oral at bedtime  midodrine 10 milliGRAM(s) Oral three times a day  sevelamer hydrochloride 800 milliGRAM(s) Oral three times a day with meals  levothyroxine 25 MICROGram(s) Oral daily  heparin  Injectable 5000 Unit(s) SubCutaneous every 8 hours  epoetin terry Injectable 71475 Unit(s) IV Push <User Schedule>    MEDICATIONS  (PRN):  docusate sodium 100 milliGRAM(s) Oral at bedtime PRN Constipation      T(C): 36.3 (09-17-17 @ 10:34), Max: 37 (09-16-17 @ 17:02)  HR: 69 (09-17-17 @ 10:34) (68 - 73)  BP: 100/39 (09-17-17 @ 10:34) (92/79 - 103/50)  RR: 16 (09-17-17 @ 10:34) (15 - 22)  SpO2: 98% (09-17-17 @ 10:34) (97% - 100%)  CAPILLARY BLOOD GLUCOSE        I&O's Summary      PHYSICAL EXAM:  GENERAL: anxious, confused, AOx0-1  HEAD:  Atraumatic, Normocephalic  EYES: EOMI, PERRLA, conjunctiva and sclera clear  NECK: Supple, No JVD  CHEST/LUNG: Clear to auscultation bilaterally; No wheeze, L pleurx cath in place, not currently draining  HEART: Regular rate and rhythm; No murmurs, rubs, or gallops  ABDOMEN: Soft, Nontender, Nondistended; Bowel sounds present  EXTREMITIES:  2+ Peripheral Pulses, No clubbing, cyanosis, or edema  PSYCH: AAOx3  NEUROLOGY: non-focal  SKIN: No rashes or lesions    LABS:                        9.1    11.41 )-----------( 170      ( 17 Sep 2017 05:40 )             28.5     09-17    138  |  94<L>  |  58<H>  ----------------------------<  106<H>  4.3   |  31  |  4.42<H>    Ca    7.7<L>      17 Sep 2017 05:40  Phos  2.1     09-17  Mg     1.8     09-17    TPro  5.9<L>  /  Alb  2.9<L>  /  TBili  0.7  /  DBili  x   /  AST  58<H>  /  ALT  21  /  AlkPhos  255<H>  09-17    PT/INR - ( 16 Sep 2017 16:20 )   PT: 14.0 SEC;   INR: 1.24          PTT - ( 16 Sep 2017 16:20 )  PTT:26.3 SEC  CARDIAC MARKERS ( 16 Sep 2017 22:15 )  x     / 0.31 ng/mL / 76 u/L / 3.66 ng/mL / x      CARDIAC MARKERS ( 16 Sep 2017 16:20 )  x     / 0.35 ng/mL / 111 u/L / 4.96 ng/mL / x              RADIOLOGY & ADDITIONAL TESTS:    Imaging Personally Reviewed:    Consultant(s) Notes Reviewed:      Care Discussed with Consultants/Other Providers: Patient is a 87y old  Male who presents with a chief complaint of low BP (17 Sep 2017 12:09)      SUBJECTIVE / OVERNIGHT EVENTS:  Subjective: No overnight events and patient seen and examined this morning. Patient wanted to leave AMA but has no capacity, was confused on why he is here but states he has no pain, SOB, CP, dizziness, abd pain, dysuria, diarrhea, sick contacts or recent illnesses. He has been getting acutely agitated but due to delirium and underlying dementia and eventually calms down.     MEDICATIONS  (STANDING):  famotidine    Tablet 20 milliGRAM(s) Oral two times a day  atorvastatin 40 milliGRAM(s) Oral at bedtime  allopurinol 100 milliGRAM(s) Oral daily  aspirin enteric coated 81 milliGRAM(s) Oral daily  sertraline 25 milliGRAM(s) Oral daily  zinc sulfate 220 milliGRAM(s) Oral at bedtime  midodrine 10 milliGRAM(s) Oral three times a day  sevelamer hydrochloride 800 milliGRAM(s) Oral three times a day with meals  levothyroxine 25 MICROGram(s) Oral daily  heparin  Injectable 5000 Unit(s) SubCutaneous every 8 hours  epoetin terry Injectable 70705 Unit(s) IV Push <User Schedule>    MEDICATIONS  (PRN):  docusate sodium 100 milliGRAM(s) Oral at bedtime PRN Constipation      T(C): 36.3 (09-17-17 @ 10:34), Max: 37 (09-16-17 @ 17:02)  HR: 69 (09-17-17 @ 10:34) (68 - 73)  BP: 100/39 (09-17-17 @ 10:34) (92/79 - 103/50)  RR: 16 (09-17-17 @ 10:34) (15 - 22)  SpO2: 98% (09-17-17 @ 10:34) (97% - 100%)  CAPILLARY BLOOD GLUCOSE        I&O's Summary      PHYSICAL EXAM:  GENERAL: anxious, confused, AOx0-1  HEAD:  Atraumatic, Normocephalic  EYES: EOMI, PERRLA, conjunctiva and sclera clear  NECK: Supple, No JVD  CHEST/LUNG: Clear to auscultation bilaterally; No wheeze, L pleurx cath in place, not currently draining  HEART: Regular rate and rhythm; No murmurs, rubs, or gallops  ABDOMEN: Soft, Nontender, Nondistended; Bowel sounds present  EXTREMITIES:  2+ Peripheral Pulses, No clubbing, cyanosis, or edema  PSYCH: AAOx3  NEUROLOGY: non-focal  SKIN: No rashes or lesions    LABS:                        9.1    11.41 )-----------( 170      ( 17 Sep 2017 05:40 )             28.5     09-17    138  |  94<L>  |  58<H>  ----------------------------<  106<H>  4.3   |  31  |  4.42<H>    Ca    7.7<L>      17 Sep 2017 05:40  Phos  2.1     09-17  Mg     1.8     09-17    TPro  5.9<L>  /  Alb  2.9<L>  /  TBili  0.7  /  DBili  x   /  AST  58<H>  /  ALT  21  /  AlkPhos  255<H>  09-17    PT/INR - ( 16 Sep 2017 16:20 )   PT: 14.0 SEC;   INR: 1.24          PTT - ( 16 Sep 2017 16:20 )  PTT:26.3 SEC  CARDIAC MARKERS ( 16 Sep 2017 22:15 )  x     / 0.31 ng/mL / 76 u/L / 3.66 ng/mL / x      CARDIAC MARKERS ( 16 Sep 2017 16:20 )  x     / 0.35 ng/mL / 111 u/L / 4.96 ng/mL / x              RADIOLOGY & ADDITIONAL TESTS:    Imaging Personally Reviewed:    Consultant(s) Notes Reviewed:      Care Discussed with Consultants/Other Providers:

## 2017-09-17 NOTE — H&P ADULT - PROBLEM SELECTOR PLAN 4
- would obtain repeat TTE and place on telemetry - would obtain repeat TTE and place on telemetry  - No need to trend cardiac enzymes at this point, likely 2/2 demand ischemia. Presumed history of 13 stents - would obtain repeat TTE and place on telemetry  - Seen by cardiology, appreciate recs. No need to trend cardiac enzymes at this point, likely 2/2 demand ischemia. Presumed history of 13 stents - last TTE in system was on 11/2014 (EF = moderate concentric LVH, mild to moderate tricuspid regurg  - would obtain repeat TTE and place on telemetry  - Seen by cardiology, appreciate recs. No need to trend cardiac enzymes at this point, likely 2/2 demand ischemia. Presumed history of 13 stents

## 2017-09-17 NOTE — H&P ADULT - NSHPREVIEWOFSYSTEMS_GEN_ALL_CORE
Unable to completely assess ROS 2/2 mental status    Gen: negative for fevers, chills  Eyes: no blurred vision  ENT: + difficulty hearing  Resp: no wheezing, dyspnea, pleuritic chest pain, hemoptysis  CV: no chest pain, dyspnea on exertion, or palpitations  GI: + diarrhea. No nausea, vomiting, abdominal pain, diarrhea  : no dysuria  MSK: + arthralgias  Neuro: no focal deficits, confusion, weakness, dizziness, tremors, or seizures  Skin: no rash, lesions, or edema

## 2017-09-17 NOTE — PROGRESS NOTE ADULT - ATTENDING COMMENTS
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 Pt BP improved with midodrine, on 20 mg TID, will attempt to taper once stabilize. CT surgery consult appreciated, continue monitor Pluerx drainage. Clinically no signs of acute infection, f/u Bx, hold off Abx. ESRD, renal consulted, HD as scheduled. Pt hypotension with multiple comorbidities, overall prognosis guarded. For wife to resign DNR/DNI.

## 2017-09-17 NOTE — PROGRESS NOTE ADULT - PROBLEM SELECTOR PLAN 6
pt with 6 positives on stop   RAFAEL precaution recommended   OR booking notified via fax - C/w Famotidine  - HOB elevation - C/w Famotidine 20mg BID  - HOB elevation - Cytology of pleural effusion negative for malignancy  - Will continue to monitor for TLS and no need to call oncology, as patient just had last dose of Rituximab 5 days ago

## 2017-09-17 NOTE — PROGRESS NOTE ADULT - PROBLEM SELECTOR PLAN 3
K+ on admit ESRD w/ HD (MWD)  Cr/BUN at baseline, will be getting dialysis tomorrow (9/18)  -monitor BMP qd  -EPO 10K U w/ dialysis - No acute issues presently; potassium = 4.5  - Will call nephro consult and continue with MWF HD through LUE fistula  - f/u repeat lab-work - No acute issues presently  - C/w MWF HD through LUE fistula  - renal recs appreciated

## 2017-09-17 NOTE — H&P ADULT - PMH
AAA (abdominal aortic aneurysm)  per cardiology eval report  Asthma  h/o  Atrial fibrillation  11/2014  CAD (Coronary Artery Disease)  13 stents placed 3/2014  Chronic kidney disease  on dialysis MWF  GERD (gastroesophageal reflux disease)    Gout    Hemodialysis patient    Hepatitis    [place] ([place])    HTN (Hypertension)    Hypercholesteremia    Kidney Stones s/p ureteroscopy    Lymphoma, non-Hodgkin's  h/o diffuse B cell  Myocardial infarct  h/o X3  Pleural effusion    Prostate cancer  s/p seeds  Stroke  mild

## 2017-09-17 NOTE — PROGRESS NOTE ADULT - PROBLEM SELECTOR PLAN 5
pending copy of most recent cardiology studies  copy of cardiology eval in chart - [id number]  - Not on anticoagulation 2/2 falls  - Hold off on Metoprolol in the setting of hypotension, will monitor HR for goal < 110

## 2017-09-17 NOTE — H&P ADULT - ASSESSMENT
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 86 yo M with a PMH ESRD on HD MWF, AAA s/p repair, ?MIs in past (?13 stents placed, last 8 stents placed in 03/2014), atrial fibrillation (not on A/C 2/2 falls), HFpEF (last EF 75% in 2015) s/p PPM and ablation 2016, severe aortic stenosis, severe mitral regurgitation, gout, GERD, depression, hypothyroidism, HTN, and DLBCL currently on Rituximab presents with increasing fluid leakage from his left pleurx catheter and hypotension.

## 2017-09-17 NOTE — H&P ADULT - NSHPLABSRESULTS_GEN_ALL_CORE
Labs personally reviewed and interpreted. Notable for mild leukocytosis 12.5 with 80% segs. Hb 8.8, stable. K 4.5 with moderate hemolysis. BUN/creatinine 45/3.4 (previous creatinine 5). FOBT negative. Lactate 2.1. Troponin 0.35 --> 0.31,  --> 76, MB 4.96 --> 3.66. Albumin 2.9.    CXR personally reviewed and interpreted. Notable for pleurx catheter in place in L lung. No focal consolidations, mild interstitial pattern with minimal L pleural effusion. Unchanged from previous CXR 9/11/17.    EKG personally reviewed. Normal rate, paced. QTc 553. LBBB with LAD. Mild ST elevation isolated V3.

## 2017-09-17 NOTE — PROGRESS NOTE ADULT - PROBLEM SELECTOR PLAN 4
Pt with internal pacemaker  OR booking notified via fax -CHADVASc: 5  -Pt with internal pacemaker, on no home AC?  -monitor EKGs, on admission QTc 552, wide QRS w/ LVH - last TTE in system was on 11/2014 (EF = moderate concentric LVH, mild to moderate tricuspid regurg  - would obtain repeat TTE and place on telemetry  - Seen by cardiology, appreciate recs. No need to trend cardiac enzymes at this point, likely 2/2 demand ischemia. Presumed history of 13 stents - last TTE in system was on 11/2014 (EF = moderate concentric LVH, mild to moderate tricuspid regurg  - f/u repeat TTE and place on telemetry  - Seen by cardiology, appreciate recs. No need to trend cardiac enzymes at this point, likely 2/2 demand ischemia. Presumed history of 13 stents - last TTE in system was on 11/2014 (EF= mod concentric LVH, mild to mod TR  - f/u repeat TTE and place on telemetry  - Seen by cardiology, appreciate recs. No need to trend cardiac enzymes at this point, likely 2/2 demand ischemia. Presumed history of 13 stents 2015 TTE: Severe MR, Severe AR, Mild aortic root dilatation  (Ao: 4.1 cm at the sinuses of Valsalva), Severely dilated LA and RA LV remodeling, hyperdynamic LV, norm RV, mod pul HTN, mild-mod TR

## 2017-09-17 NOTE — H&P ADULT - PROBLEM SELECTOR PLAN 5
- Not on anticoagulation 2/2 falls  - Hold off on Metoprolol in the setting of hypotension, will monitor HR for goal < 110 - [id number]  - Not on anticoagulation 2/2 falls  - Hold off on Metoprolol in the setting of hypotension, will monitor HR for goal < 110

## 2017-09-17 NOTE — ED ADULT NURSE REASSESSMENT NOTE - NS ED NURSE REASSESS COMMENT FT1
Pt was asked to give a urine sample when he can, Pt reports "I barely make urine,[place] on Dialysis and CKD" Pt was asked to give a urine sample when he can, Pt reports "I barely make urine,[place] on Dialysis and CKD" Pt refused to be catheterized.

## 2017-09-17 NOTE — H&P ADULT - PROBLEM SELECTOR PLAN 6
- Cytology of pleural effusion negative for malignancy  - Will continue to monitor for TLS and no need to call oncology, as patient just had last dose of Rituximab 5 days ago

## 2017-09-17 NOTE — H&P ADULT - NSHPOUTPATIENTPROVIDERS_GEN_ALL_CORE
Dr. Frankie Gandhi (Cards)  Dr. Morro Tran (Pulmonology)  Dr. Jalil Sinha (Oncology)  Dr. Goins (Nephrology)

## 2017-09-18 ENCOUNTER — TRANSCRIPTION ENCOUNTER (OUTPATIENT)
Age: 82
End: 2017-09-18

## 2017-09-18 DIAGNOSIS — Z78.9 OTHER SPECIFIED HEALTH STATUS: ICD-10-CM

## 2017-09-18 DIAGNOSIS — Z73.89 OTHER PROBLEMS RELATED TO LIFE MANAGEMENT DIFFICULTY: ICD-10-CM

## 2017-09-18 DIAGNOSIS — I95.89 OTHER HYPOTENSION: ICD-10-CM

## 2017-09-18 LAB
ALBUMIN SERPL ELPH-MCNC: 3 G/DL — LOW (ref 3.3–5)
ALP SERPL-CCNC: 228 U/L — HIGH (ref 40–120)
ALT FLD-CCNC: 22 U/L — SIGNIFICANT CHANGE UP (ref 4–41)
AST SERPL-CCNC: 56 U/L — HIGH (ref 4–40)
BASOPHILS # BLD AUTO: 0.03 K/UL — SIGNIFICANT CHANGE UP (ref 0–0.2)
BASOPHILS NFR BLD AUTO: 0.2 % — SIGNIFICANT CHANGE UP (ref 0–2)
BILIRUB SERPL-MCNC: 0.8 MG/DL — SIGNIFICANT CHANGE UP (ref 0.2–1.2)
BUN SERPL-MCNC: 76 MG/DL — HIGH (ref 7–23)
CALCIUM SERPL-MCNC: 7.4 MG/DL — LOW (ref 8.4–10.5)
CHLORIDE SERPL-SCNC: 91 MMOL/L — LOW (ref 98–107)
CO2 SERPL-SCNC: 24 MMOL/L — SIGNIFICANT CHANGE UP (ref 22–31)
CREAT SERPL-MCNC: 5.89 MG/DL — HIGH (ref 0.5–1.3)
EOSINOPHIL # BLD AUTO: 0.14 K/UL — SIGNIFICANT CHANGE UP (ref 0–0.5)
EOSINOPHIL NFR BLD AUTO: 1.1 % — SIGNIFICANT CHANGE UP (ref 0–6)
GLUCOSE SERPL-MCNC: 114 MG/DL — HIGH (ref 70–99)
HCT VFR BLD CALC: 28.3 % — LOW (ref 39–50)
HGB BLD-MCNC: 9.1 G/DL — LOW (ref 13–17)
IMM GRANULOCYTES # BLD AUTO: 0.16 # — SIGNIFICANT CHANGE UP
IMM GRANULOCYTES NFR BLD AUTO: 1.3 % — SIGNIFICANT CHANGE UP (ref 0–1.5)
LYMPHOCYTES # BLD AUTO: 0.57 K/UL — LOW (ref 1–3.3)
LYMPHOCYTES # BLD AUTO: 4.6 % — LOW (ref 13–44)
MAGNESIUM SERPL-MCNC: 2.2 MG/DL — SIGNIFICANT CHANGE UP (ref 1.6–2.6)
MCHC RBC-ENTMCNC: 32.2 % — SIGNIFICANT CHANGE UP (ref 32–36)
MCHC RBC-ENTMCNC: 33.3 PG — SIGNIFICANT CHANGE UP (ref 27–34)
MCV RBC AUTO: 103.7 FL — HIGH (ref 80–100)
MONOCYTES # BLD AUTO: 0.84 K/UL — SIGNIFICANT CHANGE UP (ref 0–0.9)
MONOCYTES NFR BLD AUTO: 6.8 % — SIGNIFICANT CHANGE UP (ref 2–14)
NEUTROPHILS # BLD AUTO: 10.56 K/UL — HIGH (ref 1.8–7.4)
NEUTROPHILS NFR BLD AUTO: 86 % — HIGH (ref 43–77)
NRBC # FLD: 0.03 — SIGNIFICANT CHANGE UP
PHOSPHATE SERPL-MCNC: 4.2 MG/DL — SIGNIFICANT CHANGE UP (ref 2.5–4.5)
PLATELET # BLD AUTO: 198 K/UL — SIGNIFICANT CHANGE UP (ref 150–400)
PMV BLD: 11 FL — SIGNIFICANT CHANGE UP (ref 7–13)
POTASSIUM SERPL-MCNC: 5.1 MMOL/L — SIGNIFICANT CHANGE UP (ref 3.5–5.3)
POTASSIUM SERPL-SCNC: 5.1 MMOL/L — SIGNIFICANT CHANGE UP (ref 3.5–5.3)
PROT SERPL-MCNC: 6.3 G/DL — SIGNIFICANT CHANGE UP (ref 6–8.3)
RBC # BLD: 2.73 M/UL — LOW (ref 4.2–5.8)
RBC # FLD: 18.9 % — HIGH (ref 10.3–14.5)
SODIUM SERPL-SCNC: 136 MMOL/L — SIGNIFICANT CHANGE UP (ref 135–145)
WBC # BLD: 12.3 K/UL — HIGH (ref 3.8–10.5)
WBC # FLD AUTO: 12.3 K/UL — HIGH (ref 3.8–10.5)

## 2017-09-18 PROCEDURE — 99232 SBSQ HOSP IP/OBS MODERATE 35: CPT | Mod: GC

## 2017-09-18 PROCEDURE — 99497 ADVNCD CARE PLAN 30 MIN: CPT | Mod: GC

## 2017-09-18 PROCEDURE — 99233 SBSQ HOSP IP/OBS HIGH 50: CPT | Mod: GC

## 2017-09-18 PROCEDURE — 93280 PM DEVICE PROGR EVAL DUAL: CPT | Mod: 26

## 2017-09-18 RX ORDER — MIDODRINE HYDROCHLORIDE 2.5 MG/1
10 TABLET ORAL THREE TIMES A DAY
Qty: 0 | Refills: 0 | Status: DISCONTINUED | OUTPATIENT
Start: 2017-09-18 | End: 2017-09-19

## 2017-09-18 RX ADMIN — HEPARIN SODIUM 5000 UNIT(S): 5000 INJECTION INTRAVENOUS; SUBCUTANEOUS at 05:40

## 2017-09-18 RX ADMIN — Medication 25 MICROGRAM(S): at 05:40

## 2017-09-18 RX ADMIN — MIDODRINE HYDROCHLORIDE 10 MILLIGRAM(S): 2.5 TABLET ORAL at 23:28

## 2017-09-18 RX ADMIN — FAMOTIDINE 20 MILLIGRAM(S): 10 INJECTION INTRAVENOUS at 09:03

## 2017-09-18 RX ADMIN — MIDODRINE HYDROCHLORIDE 10 MILLIGRAM(S): 2.5 TABLET ORAL at 16:15

## 2017-09-18 RX ADMIN — ZINC SULFATE TAB 220 MG (50 MG ZINC EQUIVALENT) 220 MILLIGRAM(S): 220 (50 ZN) TAB at 23:28

## 2017-09-18 RX ADMIN — FAMOTIDINE 20 MILLIGRAM(S): 10 INJECTION INTRAVENOUS at 16:15

## 2017-09-18 RX ADMIN — Medication 81 MILLIGRAM(S): at 16:13

## 2017-09-18 RX ADMIN — SEVELAMER CARBONATE 800 MILLIGRAM(S): 2400 POWDER, FOR SUSPENSION ORAL at 09:01

## 2017-09-18 RX ADMIN — HEPARIN SODIUM 5000 UNIT(S): 5000 INJECTION INTRAVENOUS; SUBCUTANEOUS at 23:28

## 2017-09-18 RX ADMIN — Medication 100 MILLIGRAM(S): at 16:12

## 2017-09-18 RX ADMIN — SEVELAMER CARBONATE 800 MILLIGRAM(S): 2400 POWDER, FOR SUSPENSION ORAL at 18:15

## 2017-09-18 RX ADMIN — ERYTHROPOIETIN 10000 UNIT(S): 10000 INJECTION, SOLUTION INTRAVENOUS; SUBCUTANEOUS at 12:19

## 2017-09-18 RX ADMIN — SERTRALINE 25 MILLIGRAM(S): 25 TABLET, FILM COATED ORAL at 16:15

## 2017-09-18 RX ADMIN — Medication 1 APPLICATION(S): at 16:13

## 2017-09-18 RX ADMIN — ATORVASTATIN CALCIUM 40 MILLIGRAM(S): 80 TABLET, FILM COATED ORAL at 23:28

## 2017-09-18 RX ADMIN — MIDODRINE HYDROCHLORIDE 20 MILLIGRAM(S): 2.5 TABLET ORAL at 06:44

## 2017-09-18 RX ADMIN — HEPARIN SODIUM 5000 UNIT(S): 5000 INJECTION INTRAVENOUS; SUBCUTANEOUS at 16:15

## 2017-09-18 NOTE — CONSULT NOTE ADULT - CONSULT REASON
ESRD on HD
Elevated Troponin
Hypotension
To assist in the ethical dilemma of an 87 year old man who lacks capacity and has two relative claiming to be health care proxy who differ on advanced planning and goals of care.
Evaluate PleurX catheter

## 2017-09-18 NOTE — CONSULT NOTE ADULT - PROBLEM SELECTOR PROBLEM 1
Chest tube in place
Decisional conflict
ESRD (end stage renal disease) on dialysis
Elevated troponin

## 2017-09-18 NOTE — DISCHARGE NOTE ADULT - ADDITIONAL INSTRUCTIONS
Medically and with CT surgery recommendations he would need daily drainage through the L pleurx cath for symptom control Medically and with CT surgery recommendations he would need daily drainage through the L pleurx cath for symptom control  Please make a followup appointment with Dr. Gandhi (cardiologist), Dr. Sinha (hematologist/oncologist) and Dr. Worley (CT surgery) within 1 week upon discharge.

## 2017-09-18 NOTE — PROGRESS NOTE ADULT - SUBJECTIVE AND OBJECTIVE BOX
Patient is a 87y old  Male who presents with a chief complaint of low BP (17 Sep 2017 12:09)    SUBJECTIVE / OVERNIGHT EVENTS:  Subjective: No overnight events and patient seen and examined this morning    MEDICATIONS  (STANDING):  atorvastatin 40 milliGRAM(s) Oral at bedtime  allopurinol 100 milliGRAM(s) Oral daily  aspirin enteric coated 81 milliGRAM(s) Oral daily  sertraline 25 milliGRAM(s) Oral daily  zinc sulfate 220 milliGRAM(s) Oral at bedtime  sevelamer hydrochloride 800 milliGRAM(s) Oral three times a day with meals  levothyroxine 25 MICROGram(s) Oral daily  heparin  Injectable 5000 Unit(s) SubCutaneous every 8 hours  epoetin terry Injectable 70343 Unit(s) IV Push <User Schedule>  midodrine 20 milliGRAM(s) Oral three times a day  BACItracin   Ointment 1 Application(s) Topical daily  famotidine    Tablet 20 milliGRAM(s) Oral two times a day before meals    MEDICATIONS  (PRN):  docusate sodium 100 milliGRAM(s) Oral at bedtime PRN Constipation  acetaminophen   Tablet. 650 milliGRAM(s) Oral every 6 hours PRN Moderate Pain (4 - 6)  acetaminophen   Tablet 650 milliGRAM(s) Oral every 6 hours PRN For Temp greater than 38 C (100.4 F)      T(C): 36.7 (09-18-17 @ 06:05), Max: 36.8 (09-17-17 @ 15:07)  HR: 70 (09-18-17 @ 06:05) (69 - 71)  BP: 120/64 (09-18-17 @ 06:05) (100/39 - 123/49)  RR: 19 (09-18-17 @ 06:05) (16 - 19)  SpO2: 98% (09-18-17 @ 06:05) (98% - 100%)  CAPILLARY BLOOD GLUCOSE        I&O's Summary    PHYSICAL EXAM:  GENERAL: anxious, confused, AOx0-1  HEAD:  Atraumatic, Normocephalic  EYES: EOMI, PERRLA, conjunctiva and sclera clear  NECK: Supple, No JVD  CHEST/LUNG: Clear to auscultation bilaterally; No wheeze, L pleurx cath in place, not currently draining  HEART: Regular rate and rhythm; No murmurs, rubs, or gallops  ABDOMEN: Soft, Nontender, Nondistended; Bowel sounds present  EXTREMITIES:  2+ Peripheral Pulses, No clubbing, cyanosis, or edema  PSYCH: AAOx3  NEUROLOGY: non-focal  SKIN: No rashes or lesions      LABS:                        9.1    11.41 )-----------( 170      ( 17 Sep 2017 05:40 )             28.5     09-17    138  |  94<L>  |  58<H>  ----------------------------<  106<H>  4.3   |  31  |  4.42<H>    Ca    7.7<L>      17 Sep 2017 05:40  Phos  2.1     09-17  Mg     1.8     09-17    TPro  5.9<L>  /  Alb  2.9<L>  /  TBili  0.7  /  DBili  x   /  AST  58<H>  /  ALT  21  /  AlkPhos  255<H>  09-17    PT/INR - ( 16 Sep 2017 16:20 )   PT: 14.0 SEC;   INR: 1.24          PTT - ( 16 Sep 2017 16:20 )  PTT:26.3 SEC  CARDIAC MARKERS ( 16 Sep 2017 22:15 )  x     / 0.31 ng/mL / 76 u/L / 3.66 ng/mL / x      CARDIAC MARKERS ( 16 Sep 2017 16:20 )  x     / 0.35 ng/mL / 111 u/L / 4.96 ng/mL / x              RADIOLOGY & ADDITIONAL TESTS:    Imaging Personally Reviewed:    Consultant(s) Notes Reviewed:      Care Discussed with Consultants/Other Providers: Patient is a 87y old  Male who presents with a chief complaint of low BP (17 Sep 2017 12:09)    SUBJECTIVE / OVERNIGHT EVENTS:  Subjective: No overnight events and patient seen and examined this morning. No complaints of pain and no nausea, vomiting, diarrhea. Overnight tele was with ventricular pacing at 75 BPM and no other events.     MEDICATIONS  (STANDING):  atorvastatin 40 milliGRAM(s) Oral at bedtime  allopurinol 100 milliGRAM(s) Oral daily  aspirin enteric coated 81 milliGRAM(s) Oral daily  sertraline 25 milliGRAM(s) Oral daily  zinc sulfate 220 milliGRAM(s) Oral at bedtime  sevelamer hydrochloride 800 milliGRAM(s) Oral three times a day with meals  levothyroxine 25 MICROGram(s) Oral daily  heparin  Injectable 5000 Unit(s) SubCutaneous every 8 hours  epoetin terry Injectable 62493 Unit(s) IV Push <User Schedule>  midodrine 20 milliGRAM(s) Oral three times a day  BACItracin   Ointment 1 Application(s) Topical daily  famotidine    Tablet 20 milliGRAM(s) Oral two times a day before meals    MEDICATIONS  (PRN):  docusate sodium 100 milliGRAM(s) Oral at bedtime PRN Constipation  acetaminophen   Tablet. 650 milliGRAM(s) Oral every 6 hours PRN Moderate Pain (4 - 6)  acetaminophen   Tablet 650 milliGRAM(s) Oral every 6 hours PRN For Temp greater than 38 C (100.4 F)      T(C): 36.7 (09-18-17 @ 06:05), Max: 36.8 (09-17-17 @ 15:07)  HR: 70 (09-18-17 @ 06:05) (69 - 71)  BP: 120/64 (09-18-17 @ 06:05) (100/39 - 123/49)  RR: 19 (09-18-17 @ 06:05) (16 - 19)  SpO2: 98% (09-18-17 @ 06:05) (98% - 100%)  CAPILLARY BLOOD GLUCOSE        I&O's Summary    PHYSICAL EXAM:  GENERAL: anxious, confused, AOx0-1  HEAD:  Atraumatic, Normocephalic  EYES: EOMI, PERRLA, conjunctiva and sclera clear  NECK: Supple, No JVD  CHEST/LUNG: Clear to auscultation bilaterally; No wheeze, L pleurx cath in place, not currently draining  HEART: Regular rate and rhythm; No murmurs, rubs, or gallops  ABDOMEN: Soft, Nontender, Nondistended; Bowel sounds present  EXTREMITIES:  2+ Peripheral Pulses, No clubbing, cyanosis, or edema  PSYCH: AAOx3  NEUROLOGY: non-focal  SKIN: No rashes or lesions      LABS:                        9.1    11.41 )-----------( 170      ( 17 Sep 2017 05:40 )             28.5     09-17    138  |  94<L>  |  58<H>  ----------------------------<  106<H>  4.3   |  31  |  4.42<H>    Ca    7.7<L>      17 Sep 2017 05:40  Phos  2.1     09-17  Mg     1.8     09-17    TPro  5.9<L>  /  Alb  2.9<L>  /  TBili  0.7  /  DBili  x   /  AST  58<H>  /  ALT  21  /  AlkPhos  255<H>  09-17    PT/INR - ( 16 Sep 2017 16:20 )   PT: 14.0 SEC;   INR: 1.24          PTT - ( 16 Sep 2017 16:20 )  PTT:26.3 SEC  CARDIAC MARKERS ( 16 Sep 2017 22:15 )  x     / 0.31 ng/mL / 76 u/L / 3.66 ng/mL / x      CARDIAC MARKERS ( 16 Sep 2017 16:20 )  x     / 0.35 ng/mL / 111 u/L / 4.96 ng/mL / x              RADIOLOGY & ADDITIONAL TESTS:    Imaging Personally Reviewed:    Consultant(s) Notes Reviewed:      Care Discussed with Consultants/Other Providers:

## 2017-09-18 NOTE — PROGRESS NOTE ADULT - SUBJECTIVE AND OBJECTIVE BOX
ELECTROPHYSIOLOGY  Device Interrogation Performed                                  Date/Time: 17 4 pm  : Sourcebits dual chamber PM                        Model:   Accolade MRI L311              Mode:     VVIR 70 bpm                        Rate:                                                                                                                                                  Total V pacin %, AP 41 %                                                                                                                                          Atrial Lead:  P wave amplitude:  N/A                        mv          Impedence:      719             Ohms      Threshold: N/A     V@        ms      Ventricular Lead(s):  RV Lead: R wave amplitude:  N/A                        mv          Impedence: 698                  Ohms      Threshold:  0.7  V@    0.40         ms   LV Lead:  R wave amplitude:                          mv          Impedence:                   Ohms      Threshold:                V@             ms     Battery Status:         X            Good                NICHOLAS                     EOL    Underlying Rhythm:   Atrial flutter with VR 30s    Events/Observation: No events in the last 6 months     Impression/Plan:  Normal PPM  function.   Normal sensing and pacing via iterative testing. Good battery status. Excellent threshold capture.  No reprogramming. No events corresponding to this admission ELECTROPHYSIOLOGY  Device Interrogation Performed                                  Date/Time: 17 4 pm  : RemitPro dual chamber PM                        Model:   Accolade MRI L311              Mode:     VVIR 70 bpm                        Rate:                                                                                               Total V pacin %, AP 41 %; AT/AF: 10%                                                                                                                                          Atrial Lead:  P wave amplitude:  N/A                        mv          Impedence:      719             Ohms      Threshold: N/A     V@        ms      Ventricular Lead(s):  RV Lead: R wave amplitude:  N/A                        mv          Impedence: 698                  Ohms      Threshold:  0.7  V@    0.40         ms   LV Lead:  R wave amplitude:                          mv          Impedence:                   Ohms      Threshold:                V@             ms     Battery Status:         X            Good                NICHOLAS                     EOL    Underlying Rhythm:   Atrial flutter with VR 30s    Events/Observation: No events in the last 6 months     Impression/Plan:  Normal PPM  function.   Normal sensing and pacing via iterative testing. Good battery status. Excellent threshold capture.  No reprogramming. No events corresponding to this admission

## 2017-09-18 NOTE — CONSULT NOTE ADULT - ATTENDING COMMENTS
patient with hx esrd on hd presents s/p d/c with hypotension  pt on midodrine. No source of infection presents to the ed  with noted sbp 80/40 by vns. pt alert and oriented x 3.   bp 96/40. nad, lungs clear, heart s1s2 no abd tenderness  likely from orthostatic hypotension . Pt does not require   icu level care. please reconsult if condition changes.
Patient with last tte showing severe AS and MR with preserved LV function. Elevated troponins may be in the setting of demand given severe valvular disease  -repeat tte  -continue to monitor
I have reviewed the case

## 2017-09-18 NOTE — DISCHARGE NOTE ADULT - PLAN OF CARE
Continue with the home care nurse and the daily drainage of the pleurx cath Please follow up with Dr. Worley and monitor the output from it. If the leakage from the pleurx cath gets to the point where the patient can't tolerate it and that the output is decreasing since it is leaking out then contact first Dr. Worley and the CT surgery clinic. Continue with the midodrine 10mg three times a day Continue with the midodrine 10mg three times a day  Monitor blood pressure if you feel he gets more confused or dizziness or lethargic  Inform the home care nurse, your primary care doctor and if keeps on going low then please come to the ED Followup with Dr. Sinha No malignancy was noted in the pleural effusion tapped  Please followup with Dr. 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 Canales and continue with F dialysis Followup with Dr. Canales and continue with MWF dialysis  Continue to take Renvella 800mg three times a day and Epoetin Baltazar 10,000 U with dialysis Please followup with Dr. Gandhi your cardiologist Continue with the midodrine 10mg for the blood pressure, would need to followup with him in regard of adding any more medications   Continue taking the atorvastatin 40mg at bedtime and Aspirin 81mg once a day Continue with the  Aspirin 81mg once a day  On no further anticoagulation as per the primary doctor Continue with home medications Continue with Pepcid 40mg twice a day

## 2017-09-18 NOTE — DISCHARGE NOTE ADULT - MEDICATION SUMMARY - MEDICATIONS TO TAKE
I will START or STAY ON the medications listed below when I get home from the hospital:    Nupron 8 ozs daily  --     -- Indication: For Prophylactic measure    acetaminophen 325 mg oral tablet  -- 2 tab(s) by mouth every 6 hours, As needed, Mild Pain (1 - 3)  -- Indication: For Pain    aspirin 81 mg oral delayed release tablet  -- 1 tab(s) by mouth once a day  -- Indication: For Prophylactic measure    sertraline 25 mg oral tablet  -- 1 tab(s) by mouth once a day  -- Indication: For Prophylactic measure    allopurinol 100 mg oral tablet  -- 1 tab(s) by mouth once a day  -- Indication: For Gout    Lipitor 40 mg oral tablet  -- 1 tab(s) by mouth once a day (at bedtime)  -- Indication: For HLD    bacitracin 500 units/g topical ointment  -- 1 application on skin once a day  -- Indication: For Skin tears    epoetin terry  --   intra dialysis  -- Indication: For CKD and Anemia    famotidine 20 mg oral tablet  -- 1 tab(s) by mouth 2 times a day (before meals)  -- Indication: For GERD (gastroesophageal reflux disease)    Colace 100 mg oral capsule  -- 1 cap(s) by mouth once a day (at bedtime), As Needed  -- Indication: For Constipation    Zinc 50 mg Pink oral capsule  -- 1 cap(s) by mouth once a day (at bedtime)  -- Indication: For Prophylactic measure    midodrine 10 mg oral tablet  -- 1 tab(s) by mouth 3 times a day  -- Indication: For Heart failure with preserved ejection fraction    Renvela 800 mg oral tablet  -- 1 tab(s) by mouth 3 times a day (with meals)  -- Indication: For Dialysis    Florastor 250 mg oral capsule  -- 1 cap(s) by mouth once a day (in the morning)  -- Indication: For Probiotic    Synthroid 25 mcg (0.025 mg) oral tablet  -- 1 tab(s) by mouth once a day  (in the morning 20 minutes before breakfast)  -- Indication: For Hypothyroidism

## 2017-09-18 NOTE — PROGRESS NOTE ADULT - PROBLEM SELECTOR PLAN 5
- [id number]  - Not on anticoagulation 2/2 falls  - Hold off on Metoprolol in the setting of hypotension, will monitor HR for goal < 110

## 2017-09-18 NOTE — DISCHARGE NOTE ADULT - INSTRUCTIONS
Renal diet, restriction of high potassium foods and high salt content foods. Follow up with all doctor appointments as explained above. If you are having feelings of chest pain or shortness of breath, call [phone] immediately.  Continue to keep tear site clean and dry.

## 2017-09-18 NOTE — DISCHARGE NOTE ADULT - SECONDARY DIAGNOSIS.
Hypotension Lymphoma, non-Hodgkin's ESRD (end stage renal disease) on dialysis Heart failure with preserved ejection fraction Atrial fibrillation GERD (gastroesophageal reflux disease)

## 2017-09-18 NOTE — DISCHARGE NOTE ADULT - HOME CARE AGENCY
[facility] [phone] initial visit will be day after discharge home. A nurse will call prior to the home visit. Resumption of Home care services.

## 2017-09-18 NOTE — PROGRESS NOTE ADULT - PROBLEM SELECTOR PLAN 2
- Exudative, but negative for malignant cells based on cytology  - No evidence of infectious process at this time  - Increased drainage to 300cc/day, from 125-150cc previously. Per CT surgery, catheter is working appropriately, will continue to monitor strict I/Os.

## 2017-09-18 NOTE — CONSULT NOTE ADULT - PROBLEM SELECTOR PROBLEM 2
Patient incapable of making informed decisions
Anemia in chronic kidney disease, on chronic dialysis

## 2017-09-18 NOTE — PROGRESS NOTE ADULT - PROBLEM SELECTOR PLAN 1
- stable BPs currently 100s/60s w/ large pulse pressure 2/2 severe AI, H/H stable now  - Increased drainage 300 cc/day the last 2 days PTA, especially if patient is on fluid restriction at home  -Will continue to monitor BP and H/H. No further intervention at this time per CT surgery  -FOBT neg, macrocytoic anemia (norm folate and B12)  -f/u Fe   -c/w EPO w/ dialysis (MWF)  - Cardiogenic source despite being on a PPM. Elevated cardiac enzymes but trending downward likely 2/2 demand ischemia. EKG wide QRS, prolonged QTc 552. No need to trend, cardio recs appreciated: NTD   -f/u TTE and continue to monitor on telemetry with PPM interrogation  -f/u BC/UC/UA no obvious source of infection at this time, so will hold off on abx.   -Avoid giving IVFs w/ severe HF increased Midodrine 10 to 20 TID  -f/u cortisol level (16) wnl, less likely AI - stable BPs currently 100s/60s w/ large pulse pressure 2/2 severe AI, H/H stable now  - Increased drainage 300 cc/day the last 2 days PTA, especially if patient is on fluid restriction at home  -Will continue to monitor BP and H/H. No further intervention at this time per CT surgery  -FOBT neg, macrocytoic anemia (norm folate and B12)  -f/u Fe   -c/w EPO w/ dialysis (MWF)  - Cardiogenic source despite being on a PPM. Elevated cardiac enzymes but trending downward likely 2/2 demand ischemia. EKG wide QRS, prolonged QTc 552. No need to trend, cardio recs appreciated: NTD   -f/u TTE and continue to monitor on telemetry with PPM interrogation  -f/u BC/UC/UA no obvious source of infection at this time, so will hold off on abx.   -Avoid giving IVFs w/ severe HF attempting to taper Midodrine back to 10 TID as tolerated  -f/u cortisol level (16) wnl, less likely AI

## 2017-09-18 NOTE — DISCHARGE NOTE ADULT - CARE PROVIDERS DIRECT ADDRESSES
,[email or web address]

## 2017-09-18 NOTE — CONSULT NOTE ADULT - SUBJECTIVE AND OBJECTIVE BOX
87 year old male s/p L VATS, drainage of effusion, and placement of a PleurX catheter on 95/17 was sent to the ER by the visiting nurse because of hypotension and "cherry colored" drainage from the PleurX today.  The pt has haf the PleurX drained regularly since discharge home on 9/7/17.  Drainage has been recorded as: 9/9 175 mL serosanguinous, 9/10 125 mL serosanguinous, 9/11 150 mL serosanguinous, 9/12 200 mL serosanguinous, 9/13 150 mL serosanguinous, 9/15 300 mL serosanguinous, 9/16 300 mL "cherry-colored".  On 9/12, the pt was seen at the thoracic surgery office and a stitch was placed for drainage around the tube.  The pt's wife states that there is still drainage from around the tube.      On exam:  /50 HR 70 RR 17  O2 100% on RA  Pt is agitated  Chest: Left Pleurx in situ.  Serous drainage noted on the dressing.  The insertion site is clean and dry.  The tube was drained and initially about 5 mL of serosanguinous fluid drained and then 45 mL of serous fluid drained out, for a total of 50 mL of serosanguinous drainage.  A new dressing was placed.  Heart: S1S2  Abdomen: Soft  Ext: Motion x 4 extremities    CXR: trace L pleural effusion, mild pulmonary edema    CT head: White matter ischemia.  No change since 9/11/17    Labs 9/12/17 ->  9/14/17  WBC: 11.4 -> 12.54  Hgb: 9.8-> 8.8  Hct: 27.5-> 28.5  -> 174  BMP consistent with ESRD on HD  Lact 2.1
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 Patient's last outpatient HD was on Friday at [facility]. Patient's outpatient nephrologist is Dr. Dickey. Patient currently seen on medical floor. Patient complaints of LE pain. Patient denies CP, SOB and LE edema.         PAST HISTORY  --------------------------------------------------------------------------------  PAST MEDICAL & SURGICAL HISTORY:  Myocardial infarct: h/o X3  GERD (gastroesophageal reflux disease)  Gout  AAA (abdominal aortic aneurysm): per cardiology eval report  Hemodialysis patient  Lymphoma, non-Hodgkin's: h/o diffuse B cell  Stroke: mild  Asthma: h/o  Pleural effusion  Hepatitis  Atrial fibrillation: 11/2014  Chronic kidney disease: on dialysis MWF  Prostate cancer: s/p seeds  [place] (Hard of Hearing)  Kidney Stones s/p ureteroscopy  CAD (Coronary Artery Disease): 13 stents placed 3/2014  Hypercholesteremia  HTN (Hypertension)  S/P Radiation > 12 Weeks prostatic seeds placed 10 yrs ago  S/P Cataract Surgery  S/P Cystoscopy  S/P Inguinal Hernia  repair bilat  S/P Parathyroidectomy  S/P Hip Replacement bilat  S/P Cardiac Cath: 8 stents placed 3/2014, per pt he thinks he had 3 stents placed 15 years prior also.    FAMILY HISTORY:  No pertinent family history    PAST SOCIAL HISTORY:    ALLERGIES & MEDICATIONS  --------------------------------------------------------------------------------  Allergies    amlodipine (Rash)  Benadryl (Other)  Brilinta (Unknown)  Effient (Unknown)  Multaq (Other)    Intolerances      Standing Inpatient Medications  famotidine    Tablet 20 milliGRAM(s) Oral two times a day  atorvastatin 40 milliGRAM(s) Oral at bedtime  allopurinol 100 milliGRAM(s) Oral daily  aspirin enteric coated 81 milliGRAM(s) Oral daily  sertraline 25 milliGRAM(s) Oral daily  zinc sulfate 220 milliGRAM(s) Oral at bedtime  midodrine 10 milliGRAM(s) Oral three times a day  sevelamer hydrochloride 800 milliGRAM(s) Oral three times a day with meals  levothyroxine 25 MICROGram(s) Oral daily  heparin  Injectable 5000 Unit(s) SubCutaneous every 8 hours  epoetin terry Injectable 19731 Unit(s) IV Push <User Schedule>    PRN Inpatient Medications  docusate sodium 100 milliGRAM(s) Oral at bedtime PRN      REVIEW OF SYSTEMS  --------------------------------------------------------------------------------  Gen: No weakness  Skin: No rashes  Head/Eyes/Ears/Mouth: No headache  Respiratory: No dyspnea  CV: No chest pain  GI: No abdominal pain  : No increased frequency  MSK: No edema  Neuro: No dizziness/lightheadedness  Heme: No bleeding    All other systems were reviewed and are negative, except as noted.    VITALS/PHYSICAL EXAM  --------------------------------------------------------------------------------  T(C): 36.3 (09-17-17 @ 10:34), Max: 37 (09-16-17 @ 17:02)  HR: 69 (09-17-17 @ 10:34) (68 - 73)  BP: 100/39 (09-17-17 @ 10:34) (92/79 - 103/50)  RR: 16 (09-17-17 @ 10:34) (15 - 22)  SpO2: 98% (09-17-17 @ 10:34) (97% - 100%)  Wt(kg): --        Physical Exam:  	Gen: NAD, well-appearing  	HEENT: NCAT supple neck,   	Pulm: CTA B/L  	CV: RRR, S1S2  	Abd: +BS, soft, nontender/nondistended  	UE: Warm  	LE: Warm, trace edema  	Neuro: Awake, alert  	Psych: Normal affect and mood  	Skin: Warm, without rashes  	Vascular access: LUE AVF, Skin intact + thrill    LABS/STUDIES  --------------------------------------------------------------------------------              9.1    11.41 >-----------<  170      [09-17-17 @ 05:40]              28.5     138  |  94  |  58  ----------------------------<  106      [09-17-17 @ 05:40]  4.3   |  31  |  4.42        Ca     7.7     [09-17-17 @ 05:40]      Mg     1.8     [09-17-17 @ 05:40]      Phos  2.1     [09-17-17 @ 05:40]    TPro  5.9  /  Alb  2.9  /  TBili  0.7  /  DBili  x   /  AST  58  /  ALT  21  /  AlkPhos  255  [09-17-17 @ 05:40]    PT/INR: PT 14.0 , INR 1.24       [09-16-17 @ 16:20]  PTT: 26.3       [09-16-17 @ 16:20]    Troponin 0.31      [09-16-17 @ 22:15]  CK 76      [09-16-17 @ 22:15]    Creatinine Trend:  SCr 4.42 [09-17 @ 05:40]  SCr 3.40 [09-16 @ 16:20]  SCr 5.33 [09-11 @ 10:51]  SCr 7.24 [09-06 @ 16:18]  SCr 6.96 [09-06 @ 05:30]    Urinalysis - [11-09-15 @ 20:55]      Color Yellow / Appearance Clear / SG 1.020 / pH 6.0      Gluc Negative / Ketone Negative  / Bili Negative / Urobili Normal       Blood Small / Protein 150 / Leuk Est Moderate / Nitrite Negative      RBC 3-5 / WBC 26-50 / Hyaline 2-5 / Gran  / Sq Epi  / Non Sq Epi OCC / Bacteria Few      Iron 56, TIBC 208, %sat --      [09-17-17 @ 05:40]  Ferritin 1179      [09-17-17 @ 05:40]  HbA1c 5.7      [11-10-15 @ 07:48]  TSH 4.73      [09-16-17 @ 16:20]    HBsAb <3.0      [09-06-17 @ 16:18]  HBsAb Nonreact      [08-16-15 @ 22:04]  HBsAg NEGATIVE      [09-06-17 @ 16:18]  HBcAb Nonreact      [08-16-15 @ 22:04]  HCV 0.09, --      [01-21-15 @ 18:41]
CHIEF COMPLAINT: hypotension    HPI: 88 yo M w/ hx ESRD on HD (MWF, last HD Friday), AAA repair, multiple MIs, CAD s/p 13 stents, HTN, recent pleurex drain placement, BIBEMS for hypotension. Per wife (at beside) patient was at usual state of health when his home visiting nurse noticed he was hypotensive (SBP to 80s), noted to have serosanguinous "cherry red" liquid coming from his R pleurex drain (300 cc's daily over past 2 days). Patient denies any acute complaints except for right leg cramp in right hamstring that first began after most recent surgery. Patient denies any SOB, chest pain, dizziness; per wife, patient has been hallucinating, "seeing hairs on the palms of his hands and picking at them." Patient takes 10 mg midodrine three times per day, took his doses today. Denies any hematochezia or melena; denies any fevers or chills. given 10 mg PO midodrine int he ED.     Most recently discharged from [facility] on [facility]/7/17. Briefly, had been admitted for pre-op evaluation for scheduled left VAT, drainage of effusion, pleurex catheter placement on 9/5/17; initial complaint of MARC, h/o recent thoracentesis on 8/8/17, drained 500 cc. On 9/5, had an awake left VATS, pleurex placement, drainage of effusion; post-op w/ some exacerbated dementia; per wife, also had difficulty w/ right hamstring tightness. Services were reinstated and discharged home w/ family.    PAST MEDICAL & SURGICAL HISTORY:  Myocardial infarct: h/o X3  GERD (gastroesophageal reflux disease), on Pepcid  Gout, on allopurinol  AAA (abdominal aortic aneurysm): per cardiology eval report  ESRD on Hemodialysis (MWF, last HD on 9/15; HD center is [facility] [address] [facility])  Lymphoma, non-Hodgkin's: h/o diffuse B cell  Stroke: mild  Asthma: h/o  Pleural effusion as above  Hepatitis  Atrial fibrillation: 11/2014  Prostate cancer: S/P Radiation > 12 Weeks prostatic seeds placed 10 yrs ago  [place] (Hard of Hearing), has hearing aid  Kidney Stones s/p ureteroscopy  CAD (Coronary Artery Disease): 13 stents placed 3/2014  Hypercholesteremia  HTN (Hypertension), no w/ hypotension requiring midodrine 10 mg TID  Dementia, exacerbated post op on 9/5/17  On TTE from 2015: severe mitral regurgitation, calcified AV valve w/ severe aortic stenosis, mild aortic root dilatation, concentric left ventricular remodeling, hyperdynamic left ventricle, , normal TV, mild moderate tricuspid regurgitation, moderate pericardial effusion    S/P Cataract Surgery  S/P Cystoscopy  S/P Inguinal Hernia  repair bilat  S/P Parathyroidectomy  S/P Hip Replacement bilat  S/P Cardiac Cath: 8 stents placed 3/2014, per pt he thinks he had 3 stents placed 15 years prior also.  S/P drainage of effusion and pleurex catheter placement on 9/5/17; had had MARC w/ recent thoracentesis on 8/8/17  S/P left VATS on 9/5/17 drainage of effusion and pleurex placement    FAMILY HISTORY:  No pertinent family history    Allergies  amlodipine (Rash)  Benadryl (Other)  Brilinta (Unknown)  Effient (Unknown)  Multaq (Other)    HOME MEDICATIONS:  Allopurinol 100 mg QD  Aspirin 81 mg QD  Renvella 800 mg, 2 tablets daily  Florastore 250 mg QD  Lipitor 40 mg QD  Midodrine 10 mg TID  Synthroid 25 mg QAM  Sertraline 25 mg QD  Zing 50 mg QD  Colace 100 mg QHS PRN  Tylenol 325 mg PRN  Nupro 8 oz daily, supplement    Fluid restriction to 32 oz Daily    REVIEW OF SYSTEMS:  Constitutional: denies pain, fevers, chills, fatigue  Eyes: denies any vision changes  ENT: denies any dry mouth  CV: denies any chest pain  Resp: denies any shortness of breath; pleurex drainage as above in HPI  GI: denies any diarrhea, constipation, abdominal pain  : deferred  MSK: Right hamstring "cramping tightness", now has difficulty walking, gettting PT; no joint stiffness  Integumentary: denies any rashes, hairloss; per wife, hallucinating that he has hair on his palms  Neurological: confusion, dementia, hallucinations; hearing loss  Psychiatric: confusion, dementia, hallucinations  Endocrine: denies weight changes  Hematologic/Lymphatic: bleeding from pleurex catheter, "cherry colored fluid"; easy bruising, no mucosal bleeding  Allergic/Immunologic: allergies as above  [ ] All other systems negative  [X] Limited ability to assess ROS, patient alert, but has tangential thought process    OBJECTIVE:  ICU Vital Signs Last 24 Hrs  T(C): 37 (16 Sep 2017 17:02), Max: 37 (16 Sep 2017 17:02)  T(F): 98.6 (16 Sep 2017 17:02), Max: 98.6 (16 Sep 2017 17:02)  HR: 70 (16 Sep 2017 19:49) (70 - 73)  BP: 98/48 (16 Sep 2017 19:49) (98/48 - 103/50)  RR: 20 (16 Sep 2017 19:49) (17 - 22)  SpO2: 100% (16 Sep 2017 19:49) (100% - 100%)        CAPILLARY BLOOD GLUCOSE          PHYSICAL EXAM:  General: NAD, sleeping on the side; well groomed but with multiple scabs on his hands, healing, +bruising on his upper extremities; able to attain upright position without assistance, though appeared a bit labored when doing so  HEENT: MMM, EOMI, PERRL; has hearing aid on left ea r  Lymph Nodes: no lymphadenopathy appreciated  Neck: supple neck  Respiratory: left lower crackles; otherwise grossly clear  Cardiovascular: FELIPE III/VI,     Abdomen:   Extremities:   Skin:   Neurological:  Psychiatry:    HOSPITAL MEDICATIONS:  MEDICATIONS  (STANDING):    MEDICATIONS  (PRN):      LABS:                        8.8    12.54 )-----------( 174      ( 16 Sep 2017 16:20 )             28.5     09-16    137  |  92<L>  |  45<H>  ----------------------------<  109<H>  4.5   |  29  |  3.40<H>    Ca    7.7<L>      16 Sep 2017 16:20    TPro  6.0  /  Alb  2.9<L>  /  TBili  0.7  /  DBili  x   /  AST  75<H>  /  ALT  19  /  AlkPhos  252<H>  09-16    PT/INR - ( 16 Sep 2017 16:20 )   PT: 14.0 SEC;   INR: 1.24          PTT - ( 16 Sep 2017 16:20 )  PTT:26.3 SEC      Venous Blood Gas:  09-16 @ 16:20  7.41/55/26/31/40.2  VBG Lactate: 2.1      MICROBIOLOGY:     RADIOLOGY:  [X] Reviewed and interpreted by me. Bilateral pulmonary edema at the bases w/ trace pleural effusion
CHIEF COMPLAINT: low blood pressure     HISTORY OF PRESENT ILLNESS:  This is a 87yoM w/ PMHx ESRD on HD, AAA repair, CAD s/p 13 stents, HTN, pleurx catheter (s/p L vats in 09/2017) p/w fatigue and low blood pressure.  Patient, a poor historian and pleasantly confused, denies chest pain, SOB, lightheadedness, dizziness.  As per wife, patient has been lethargic x 2 days before admission.  This past week patient was leaking heavily from pleurx catheter site and went to thoracic surgeon who placed a stitch in Monday.  It was noted today after HD that there was more leaking around the pleurx site and the drainage was "dark cherry red." Home aide took the blood pressure and found the SBP 80's and did not improve with oral fluids.  In ED, given 10mg PO midodrine.    On examination, patient alert, awake and followed commands.      Cardiology consulted for elevated troponins 0.35    Allergies  amlodipine (Rash)  Benadryl (Other)  Brilinta (Unknown)  Effient (Unknown)  Multaq (Other)    MEDICATIONS: copied from allscripts  CurrentMeds_4_twCiteListControlStart Allopurinol 100 MG Oral Tablet; Take 1 tablet daily  Aspirin 81 MG TABS; TAKE 1 TABLET DAILY  Atorvastatin Calcium 40 MG Oral Tablet; TAKE 1 TABLET EVERY DAY  Bacitracin Zinc 500 UNIT/GM External Ointment  Clarithromycin 500 MG Oral Tablet; TAKE 1 TABLET Every twelve hours Hold statin for  duration of antibiotic  Famotidine 40 MG Oral Tablet; take 1 tab at bedtime  Florastor 250 MG Oral Capsule; TAKE 1 CAPSULE Daily  Lidocaine-Prilocaine 2.5-2.5 % External Cream; APPLY 1 HOUR BEFORE PROCEDURE  AS DIRECTED  Metoprolol Succinate ER 25 MG Oral Tablet Extended Release 24 Hour; TAKE 1 TABLET  BY MOUTH EVERY 12 HOURS  Midodrine HCl - 10 MG Oral Tablet; take 1 tablet three times a day  Omeprazole 40 MG Oral Capsule Delayed Release; TAKE 1 CAPSULE DAILY 30  MINUTES BEFORE BREAKFAST  Pepcid 10 MG/ML SOLN  Renvela 800 MG Oral Tablet; TAKE 1 TABLET 3 times daily  Sertraline HCl - 25 MG Oral Tablet  Synthroid 25 MCG Oral Tablet; TAKE 1 TABLET DAILY    PAST MEDICAL & SURGICAL HISTORY:  Myocardial infarct: h/o X3  GERD (gastroesophageal reflux disease)  Gout  AAA (abdominal aortic aneurysm): per cardiology eval report  Hemodialysis patient  Lymphoma, non-Hodgkin's: h/o diffuse B cell  Stroke: mild  Asthma: h/o  Pleural effusion  Hepatitis  Atrial fibrillation: 11/2014  Chronic kidney disease: on dialysis MWF  Prostate cancer: s/p seeds  [place] (Hard of Hearing)  Kidney Stones s/p ureteroscopy  CAD (Coronary Artery Disease): 13 stents placed 3/2014  Hypercholesteremia  HTN (Hypertension)  S/P Radiation > 12 Weeks prostatic seeds placed 10 yrs ago  S/P Cataract Surgery  S/P Cystoscopy  S/P Inguinal Hernia  repair bilat  S/P Parathyroidectomy  S/P Hip Replacement bilat  S/P Cardiac Cath: 8 stents placed 3/2014, per pt he thinks he had 3 stents placed 15 years prior also.    FAMILY HISTORY:  No pertinent family history    SOCIAL HISTORY:    Denies alcohol, cigarette or illicit drug use    REVIEW OF SYSTEMS:  See HPI. Otherwise, 10 point ROS done and otherwise negative.    PHYSICAL EXAM:  T(C): 37 (09-16-17 @ 17:02), Max: 37 (09-16-17 @ 17:02)  HR: 70 (09-16-17 @ 22:18) (70 - 73)  BP: 94/45 (09-16-17 @ 22:18) (94/45 - 103/50)  RR: 17 (09-16-17 @ 22:18) (17 - 22)  SpO2: 97% (09-16-17 @ 22:18) (97% - 100%)  Wt(kg): --  I&O's Summary    Appearance: Normal	  HEENT:   Normal oral mucosa, PERRL, EOMI	  Lymphatic: No lymphadenopathy  Cardiovascular: Normal S1 S2, (+) JVD, No murmurs, trace LE edema  Respiratory: diminished at bases, crackles throughout  Psychiatry: A & O x 3, Mood & affect appropriate  Gastrointestinal:  Soft, Non-tender, + BS	  Skin: No rashes, No ecchymoses, No cyanosis	  Neurologic: Non-focal  Extremities: Normal range of motion, No clubbing, cyanosis  Vascular: Peripheral pulses palpable 2+ bilaterally    LABS:	 	  CBC Full  -  ( 16 Sep 2017 16:20 )  WBC Count : 12.54 K/uL  Hemoglobin : 8.8 g/dL  Hematocrit : 28.5 %  Platelet Count - Automated : 174 K/uL  Mean Cell Volume : 103.6 fL  Mean Cell Hemoglobin : 32.0 pg  Mean Cell Hemoglobin Concentration : 30.9 %  Auto Neutrophil # : 10.01 K/uL  Auto Lymphocyte # : 0.71 K/uL  Auto Monocyte # : 1.30 K/uL  Auto Eosinophil # : 0.39 K/uL  Auto Basophil # : 0.03 K/uL  Auto Neutrophil % : 79.8 %  Auto Lymphocyte % : 5.7 %  Auto Monocyte % : 10.4 %  Auto Eosinophil % : 3.1 %  Auto Basophil % : 0.2 %    09-16    137  |  92<L>  |  45<H>  ----------------------------<  109<H>  4.5   |  29  |  3.40<H>    Ca    7.7<L>      16 Sep 2017 16:20    TPro  6.0  /  Alb  2.9<L>  /  TBili  0.7  /  DBili  x   /  AST  75<H>  /  ALT  19  /  AlkPhos  252<H>  09-16    EKG: V paced; does not meet sgarbossa criteria     TTE: 2015  Conclusions:  1. Mitral annular calcification and calcified mitral  leaflets  Severe mitral regurgitation.  2. Calcified aortic valve with decreased opening. Peak  transaortic valve gradient equals 57 mm Hg, mean  transaortic valve gradient equals 30 mm Hg, estimated  aortic valve area equals 0.8 sqcm (by continuity equation),  aortic valve velocity time integral equals 78 cm,  consistent with severe aortic stenosis.  3. Mild aortic root dilatation  (Ao: 4.1 cm at the sinuses  of Valsalva).  4. Severely dilated left atrium.  LA volume index = 56  cc/m2.  5. Increased relative wall thickness with normal left  ventricular mass index, consistent with concentric left  ventricular remodeling.  6. Hyperdynamic left ventricle.  7. Severe right atrial enlargement.  8. Normal right ventricular size and function.  9. Estimated right ventricular systolic pressure equals 56  mm Hg, assuming right atrial pressure equals 12 mm Hg,  consistent with moderate pulmonary hypertension.  10. Normal tricuspid valve.  Mild-moderate tricuspid  regurgitation.  11. Moderate pericardial effusion adjacent to right and  left atria.
Consult requested by: MARY Simmons MD              Role: Resident             Service: Medicine                   Contact#(s):[phone]                                     Attending: SYLVIA Vang MD   Consultant: SYLVIA Stacy MD Ethics Fellow &  RAS Mulligan Ethics Attending                      Contact #s: [phone]   							  Clinical summary: Patient is an 87 year old male recently admitted with September 5, 2017  left video assisted thorascopic procedure (VAT), drainage of effusion, pleurx catheter  placement with a past medical history of end stage renal disease on hemodialysis, coronary artery disease with 13 stents March 2014, atrial fibrillation, AAA repair, cardiac ablation followed by pacemaker placement, severe aortic stenosis, severe mitral regurgitation, congestive heart failure, past myocardial infractions, one past stroke, prostate cancer in remission, bilateral hip replacements, gout, GERD, depression, hypothyroidism, hypertension, and Diffuse Large B-Cell, 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 Patient was seen by CT surgery on 9/14, 1 day prior to admission, where he had more fluid drained. After the catheter was exchanged, his blood pressure was noted to be 80/40 and the decision was made to bring him into the ED. Patient was found to have elevated cardiac enzymes, likely due to demand ischemia, as well as a pleural effusion and was admitted to the hospital. Pleural effusion was found to be negative for malignant cells, and is currently being drained by a catheter. Patient was already on midodrine prior to hospitalization and midodrine is at its maximum dosage. CT Scan of the head this admission revealed no acute changes. Patient’s wife and son are both stating that they are health care proxy, yet neither have brought in paper work to prove this status. Currently, son and wife differ on matters such a cardiopulmonary resuscitation and other goals of care. Ethics consult called in setting of unclear health care proxy status.    Prognosis Estimate (survival in days, wks, mos, yrs):	days-months							  Patient Decision-Making Capacity:  Has capacity    Lacks capacity because					   Patient Aware of:  Diagnosis:   Yes    No   Unknown    Prognosis:   Yes    No   Unknown         Name of medical decision-maker should patient lack capacity:  Jade Guzman                   Relationship: Wife  		     Role:   Health Care Agent      Legal Surrogate   Contact #(s):[phone]              				     Other ‘stakeholders’: Trey Zarate, [phone] and [phone]							    Other Decision-Maker (i.e., HCA or Surrogate) Aware of:  Diagnosis: Yes   No      Prognosis:   Yes     No   Evidence of Patient’s Preference of Life-Sustaining Treatment (Written or Oral): None				                  Resuscitation status:   DNR:  Yes   No      DNI:  Yes   No      Discussion: 9/18/2017: 11:30 am Ethics Fellow called trey Zarate no answer. Wife Jade at hospital by bedside.  9/18/2017: 1400- 1415 Attending RAS Cody visited patient. Patient seen was lethargic, able to state his name and his wife’s name but was not orientated to place and time.  9/18/2017- Chart review  [id number]  DUTCH Mcdonald Director of Ethics. Chart review. Wife Jade Guzman decision maker from prior admissions. No current health care proxy form found in outrpatient record. Patient last seen in outpatient 9/14/2017  Bioethics analysis: The ethical issue at hand is that of autonomy. The ability to protect one’s autonomy is dependent on the capacity to make informed decisions. Capacity can be evaluated utilizing the pneumonic (C-U-A-R): Communication- Qrhpnxcjcasdp-Zklgvmutwnpy-Rsnpkthej. Patient lethargic and not orientated to place and time.     Despite lethargy, Mr. Guzman has moral status and thus maintains an autonomous right to self-determination and bodily integrity which is best respected by an appropriate surrogate. If Mr. Guzman had elected a Health Care Proxy and signed legal documentation stating one, whoever he chose would be an agent to represent him. In this case no current health care proxy form is present.    When Health Care Proxy documentation is lacking the 2010 [facility] Family Health Care Decision Act (CDA) addresses the situation of a sick individual who lacks capacity, but does not have a health care proxy. Under the FHCDA, the surrogate has the exclusive right to make decisions about whether to use or forgo life sustaining treatments (LSTs) when it appears that the patient will die without them.i The ethical standard the surrogate is supposed to apply to such decision-making is first, "substituted judgment" – based on the patient’s prior articulation of preferences for such a situation or second, "best interests". The FHCDA establishes a hierarchy of surrogacy, in order of priority decision maker for the incapable patient:   – an L Article 81 court-appointed guardian;  – the spouse or domestic partner as defined in the [facility] who is either ([facility] § 2994-a);   formally registered as a domestic partner in any state, city, or foreign jurisdiction, or by either partner's domestic employer, or a beneficiary of or covered by the other person’s health insurance or employee benefits, or dependent or mutually interdependent on the other person for support, as evidenced on the totality of circumstances indicating a mutual intent to be domestic partners, including jointly owned or leased property, shared income or expenses, children in common, signs of intent to  or become domestic partners, or the length of the two parties’ relationship.  – an adult child;   – a parent;   – a brother or sister;   – a close friend	  Given that [facility] lists spouse above adult child, the patient’s spouse takes precedence over his son to make decisions regarding his care unless documentation can be brought in showing that the son is the legally documented Health Care Proxy. 												  Conclusion: Until a health care proxy is produced, the surrogate is determined by the [facility]. Presently his wife is his hierarchal decision maker. As to the secondary issue, cardiopulmonary resuscitation and advanced care planning should be addressed by the patient’s wife given the decline in mental and functional status. Ethics will initiate discussion with wife regarding advanced care planning and MOLST discussions										   Case discussed with Medical Ethicist Heather Cody	  More than 50% of the time of this consultation was spent in coordination of Care of Patient

## 2017-09-18 NOTE — CONSULT NOTE ADULT - ASSESSMENT
Patient is without health care proxy. Wife is appropriate surrogate for MOLST and advanced care planning as patient with decline in cognitive and functional abilities secondary to nonHodgkins Lymphoma nad metastatic disease

## 2017-09-18 NOTE — PROGRESS NOTE ADULT - SUBJECTIVE AND OBJECTIVE BOX
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 admitted due to low BP at home       PAST HISTORY  --------------------------------------------------------------------------------  No significant changes to PMH, PSH, FHx, SHx, unless otherwise noted    ALLERGIES & MEDICATIONS  --------------------------------------------------------------------------------  Allergies    amlodipine (Rash)  Benadryl (Other)  Brilinta (Unknown)  Effient (Unknown)  Multaq (Other)    Intolerances      Standing Inpatient Medications  atorvastatin 40 milliGRAM(s) Oral at bedtime  allopurinol 100 milliGRAM(s) Oral daily  aspirin enteric coated 81 milliGRAM(s) Oral daily  sertraline 25 milliGRAM(s) Oral daily  zinc sulfate 220 milliGRAM(s) Oral at bedtime  sevelamer hydrochloride 800 milliGRAM(s) Oral three times a day with meals  levothyroxine 25 MICROGram(s) Oral daily  heparin  Injectable 5000 Unit(s) SubCutaneous every 8 hours  epoetin terry Injectable 64618 Unit(s) IV Push <User Schedule>  BACItracin   Ointment 1 Application(s) Topical daily  famotidine    Tablet 20 milliGRAM(s) Oral two times a day before meals  midodrine 10 milliGRAM(s) Oral three times a day    PRN Inpatient Medications  docusate sodium 100 milliGRAM(s) Oral at bedtime PRN  acetaminophen   Tablet. 650 milliGRAM(s) Oral every 6 hours PRN  acetaminophen   Tablet 650 milliGRAM(s) Oral every 6 hours PRN      REVIEW OF SYSTEMS  --------------------------------------------------------------------------------  General: no fever  CVS: no chest pain  RESP: no sob, no cough  ABD: no abdominal pain  MSK: no edema     VITALS/PHYSICAL EXAM  --------------------------------------------------------------------------------  T(C): 36.4 (09-18-17 @ 08:08), Max: 36.8 (09-17-17 @ 15:07)  HR: 70 (09-18-17 @ 08:08) (69 - 71)  BP: 116/63 (09-18-17 @ 08:08) (109/54 - 123/49)  RR: 20 (09-18-17 @ 08:08) (18 - 20)  SpO2: 95% (09-18-17 @ 08:08) (95% - 100%)  Wt(kg): --        Physical Exam:  	Gen: NAD  	HEENT: MMM  	Pulm: CTA B/L  	CV: S1S2  	Abd: Soft, +BS  	Ext: No LE edema B/L                      Neuro: Awake   	Skin: Warm and Dry   	Vascular access: LUE AVF +thrill, +bruit, skin intact    LABS/STUDIES  --------------------------------------------------------------------------------              9.1    12.30 >-----------<  198      [09-18-17 @ 09:44]              28.3     136  |  91  |  76  ----------------------------<  114      [09-18-17 @ 09:44]  5.1   |  24  |  5.89        Ca     7.4     [09-18-17 @ 09:44]      Mg     2.2     [09-18-17 @ 09:44]      Phos  4.2     [09-18-17 @ 09:44]    TPro  6.3  /  Alb  3.0  /  TBili  0.8  /  DBili  x   /  AST  56  /  ALT  22  /  AlkPhos  228  [09-18-17 @ 09:44]    PT/INR: PT 14.0 , INR 1.24       [09-16-17 @ 16:20]  PTT: 26.3       [09-16-17 @ 16:20]    Troponin 0.31      [09-16-17 @ 22:15]  CK 76      [09-16-17 @ 22:15]    Creatinine Trend:  SCr 5.89 [09-18 @ 09:44]  SCr 4.42 [09-17 @ 05:40]  SCr 3.40 [09-16 @ 16:20]  SCr 5.33 [09-11 @ 10:51]  SCr 7.24 [09-06 @ 16:18]        Iron 56, TIBC 208, %sat --      [09-17-17 @ 05:40]  Ferritin 1179      [09-17-17 @ 05:40]  HbA1c 5.7      [11-10-15 @ 07:48]  TSH 4.73      [09-16-17 @ 16:20]    HBsAb <3.0      [09-06-17 @ 16:18]  HBsAg NEGATIVE      [09-06-17 @ 16:18]

## 2017-09-18 NOTE — PROGRESS NOTE ADULT - PROBLEM SELECTOR PLAN 4
- last TTE in system was on 11/2014 (EF= mod concentric LVH, mild to mod TR  - f/u repeat TTE and place on telemetry  - Seen by cardiology, appreciate recs. No need to trend cardiac enzymes at this point, likely 2/2 demand ischemia. Presumed history of 13 stents 2015 TTE: Severe MR, Severe AR, Mild aortic root dilatation  (Ao: 4.1 cm at the sinuses of Valsalva), Severely dilated LA and RA LV remodeling, hyperdynamic LV, norm RV, mod pul HTN, mild-mod TR

## 2017-09-18 NOTE — DISCHARGE NOTE ADULT - NSFTFHOME1RD_GEN_ALL_CORE
Shortness of breath with minimal ambulation/Requires supervison due to deteriorating mental status/Fall risk

## 2017-09-18 NOTE — DISCHARGE NOTE ADULT - CARE PROVIDER_API CALL
Frankie Gandhi (MD), Cardiovascular Disease; Internal Medicine  [address] [address]  Phone: [phone]  Fax: [phone] Frankie Gandhi (MD), Cardiovascular Disease; Internal Medicine  [address] [address]  Phone: [phone]  Fax: [phone]    Rafael Worley), Thoracic Surgery  [address] [place] [address]  [place] [address]  Phone: [phone]  Fax: [phone] Frankie Gandhi), Cardiovascular Disease; Internal Medicine  [address] [facility] [address]  [place] [address]  Phone: [phone]  Fax: [phone]    Rafael Worley), Thoracic Surgery  [address] [place] [address]  [place] [address]  Phone: [phone]  Fax: [phone]    Jalil Sinha), Hematology; Internal Medicine; Medical Oncology  [address] [place] [address]  Phone: [phone]  Fax: [phone]    Morro Tran), Internal Medicine; Pulmonary Disease  [address] [facility] [address]  [place] [address]  Phone: [phone]  Fax: [phone]

## 2017-09-18 NOTE — DISCHARGE NOTE ADULT - HOSPITAL COURSE
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 He had a pleurx catheter placed on 9/5 after having a thoracentesis on 8/8 removing 1.5 L loculated fluid. Since the catheter was placed, per the patient's wife, he has been having increasing drainage of "cherry-red fluid" and increasing from 125 cc/day initially to 300 cc/day over the past few days, requiring daily exchanges. He was seen by CT surgery on 9/14, 1 day prior to admission, where he had more fluid drained. After the catheter was exchanged again today, his blood pressure was noted to be 80/40 and the decision was made to bring him into the ED. The patient and wife deny dizziness, nausea/vomiting, abdominal pain, chest pain, cough, SOB, fevers, chills, or dysuria. Has noted loose stools the past 3 days without any mention of blood in his stool. He is on Midodrine and has been taking it three times per day. Of note, he does use a walker to walk, and does shower with assistance. He has a 24 hour home aid. Per wife, he has been sundowning more during the past few weeks that improves somewhat on redirecting. He has also had periods where his mental status has fluctuated. At first, the wife believed it may have something to do with his poor hearing that required a hearing aid but even when the patient had his hearing aid on he was still having the same symptoms. She stated a few nights ago he was sitting on the edge of his bed and swaying, then fell forward onto the floor and hit his knee and scratched his head. He was sent to the ED, where a head CT and Xrays were negative. He has noted R knee pain since even prior to the fall. The wife believes it may be related to the time that he had the catheter drained since that was when his complaint started. His mental status changes have been occurring the past 1-2 months, even before the catheter placement. Wife also notes more agitation and aggressive behavior lately, blaming her for many of his problems. Of note, he is receiving treatment for DLBCL, started earlier this summer. He has two oncologists, Dr. Sinha at [facility] and an Oncologist in [place] who have been communicating. He is receiving Rituximab every 2-3 months, last dose on Tuesday. He is also being evaluate for sleep apnea by pulmonology. In the ED, he was given 325 PO Tylenol and 10 PO Midodrine. He was evaluated by Cardiology, CT surgery, and MICU. ED VS: Temp 98.6F, HR 70, /53 --> 94/45, RR 17, O2 100% on RA. 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 He had a pleurx catheter placed on 9/5 after having a thoracentesis on 8/8 removing 1.5 L loculated fluid. Since the catheter was placed, per the patient's wife, he has been having increasing drainage of "cherry-red fluid" and increasing from 125 cc/day initially to 300 cc/day over the past few days, requiring daily exchanges. He was seen by CT surgery on 9/14, 1 day prior to admission, where he had more fluid drained. After the catheter was exchanged again today, his blood pressure was noted to be 80/40 and the decision was made to bring him into the ED. The patient and wife deny dizziness, nausea/vomiting, abdominal pain, chest pain, cough, SOB, fevers, chills, or dysuria. Has noted loose stools the past 3 days without any mention of blood in his stool. He is on Midodrine and has been taking it three times per day. Of note, he does use a walker to walk, and does shower with assistance. He has a 24 hour home aid. Per wife, he has been sundowning more during the past few weeks that improves somewhat on redirecting. He has also had periods where his mental status has fluctuated. At first, the wife believed it may have something to do with his poor hearing that required a hearing aid but even when the patient had his hearing aid on he was still having the same symptoms. She stated a few nights ago he was sitting on the edge of his bed and swaying, then fell forward onto the floor and hit his knee and scratched his head. He was sent to the ED, where a head CT and Xrays were negative (small Left pleural effusion on CXR). He has noted R knee pain since even prior to the fall. The wife believes it may be related to the time that he had the catheter drained since that was when his complaint started. His mental status changes have been occurring the past 1-2 months, even before the catheter placement. Wife also notes more agitation and aggressive behavior lately, blaming her for many of his problems. Of note, he is receiving treatment for DLBCL, started earlier this summer. He has two oncologists, Dr. Sinha at [facility] and an Oncologist in [place] who have been communicating. He is receiving Rituximab every 2-3 months, last dose on Tuesday. He is also being evaluate for sleep apnea by pulmonology. In the ED, he was given 325 PO Tylenol and 10 PO Midodrine. He was evaluated by Cardiology, CT surgery, and MICU. ED VS: Temp 98.6F, HR 70, /53 --> 94/45, RR 17, O2 100% on RA. On 9/17, patient is admitted to Medicine for increased pleurx cath drainage and drainage around the pleurx cath site. Midirone was increased from his home dose of 10mg TID to 20mg TID, blood pressure improve to 110s/40-50 and persistently had a wide pulse pressure from the severe Aortic insufficiency noted. 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 However, the son on the same day claimed to be the health care proxy and there was an issue of conflict of opinions on whether to keep the patient DNR/DNI and without the documentations from either side, Ethics was called in to sort out the issues. It was seen by Ethics that the wife is the health care proxy for now  and that all decisions should be related to what she would want. The next day though, the DNR/DNI was rescinded as per both the wife and son's decision and the patient is again full code but consented for the blood transfusion and his daily MWF dialysis. His pacemaker was interrogated and noted to be working well, CT surgery    9/17: admitted for increased pleurx cath drainage and drainage around the pleurx cath around the site, hypotension controlled w/ midirone increased from home dose 10mg TID to 20mg TID, wide pulse pressure from severe AI, patient has limited capacity due to dementia and wife claimed to be the HCP and to be able to consent the DNR/DNI but the son claimed he is the HCP, need to reclarify. CTH and CXR neg (small L pleural effusion), no CT surgery interventions but they are following, having PPM interrogated, monitoring BP, renal consult to resume dialysis tomorrow  9/18: Pt's getting dialysis today, VSS, midirone decreased to 10mg TID his home dose to see how he tolerates it. 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 He had a pleurx catheter placed on 9/5 after having a thoracentesis on 8/8 removing 1.5 L loculated fluid. Since the catheter was placed, per the patient's wife, he has been having increasing drainage of "cherry-red fluid" and increasing from 125 cc/day initially to 300 cc/day over the past few days, requiring daily exchanges. He was seen by CT surgery on 9/14, 1 day prior to admission, where he had more fluid drained. After the catheter was exchanged again today, his blood pressure was noted to be 80/40 and the decision was made to bring him into the ED. The patient and wife deny dizziness, nausea/vomiting, abdominal pain, chest pain, cough, SOB, fevers, chills, or dysuria. Has noted loose stools the past 3 days without any mention of blood in his stool. He is on Midodrine and has been taking it three times per day. Of note, he does use a walker to walk, and does shower with assistance. He has a 24 hour home aid. Per wife, he has been sundowning more during the past few weeks that improves somewhat on redirecting. He has also had periods where his mental status has fluctuated. At first, the wife believed it may have something to do with his poor hearing that required a hearing aid but even when the patient had his hearing aid on he was still having the same symptoms. She stated a few nights ago he was sitting on the edge of his bed and swaying, then fell forward onto the floor and hit his knee and scratched his head. He was sent to the ED, where a head CT and Xrays were negative (small Left pleural effusion on CXR). He has noted R knee pain since even prior to the fall. The wife believes it may be related to the time that he had the catheter drained since that was when his complaint started. His mental status changes have been occurring the past 1-2 months, even before the catheter placement. Wife also notes more agitation and aggressive behavior lately, blaming her for many of his problems. Of note, he is receiving treatment for DLBCL, started earlier this summer. He has two oncologists, Dr. Sinha at [facility] and an Oncologist in [place] who have been communicating. He is receiving Rituximab every 2-3 months, last dose on Tuesday. He is also being evaluate for sleep apnea by pulmonology. In the ED, he was given 325 PO Tylenol and 10 PO Midodrine. He was evaluated by Cardiology, CT surgery, and MICU. ED VS: Temp 98.6F, HR 70, /53 --> 94/45, RR 17, O2 100% on RA. On 9/17, patient is admitted to Medicine for increased pleurx cath drainage and drainage around the pleurx cath site. Midirone was increased from his home dose of 10mg TID to 20mg TID, blood pressure improve to 110s/40-50 and persistently had a wide pulse pressure from the severe Aortic insufficiency noted. 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 However, the son on the same day claimed to be the health care proxy and there was an issue of conflict of opinions on whether to keep the patient DNR/DNI and without the documentations from either side, Ethics was called in to sort out the issues. It was seen by Ethics that the wife is the health care proxy for now  and that all decisions should be related to what she would want. The next day though, the DNR/DNI was rescinded as per both the wife and son's decision and the patient is again full code but consented for the blood transfusion and his daily MWF dialysis. His pacemaker was interrogated and noted to be working well, CT surgery with Dr. Lewis evaluated the pleurx cath at the ER and during his hospital course and said it is working and draining and no need for any surgical interventions. It was noted that the drainage from the pleurx cath was daily around 200-300cc since admission. Dialysis done on 9/18 at bedside was tolerated well by the patients and his blood pressure held to 100s sBP and upon discharge on 9/19 the patient is stable with his vital signs (/58, HR82, 98 F, RR19 94%, no fever, cough, diarrhea, shortness of breathe still had discomfort though moving around and still pleurx cath draining around 300 cc. 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 He had a pleurx catheter placed on 9/5 after having a thoracentesis on 8/8 removing 1.5 L loculated fluid. Since the catheter was placed, per the patient's wife, he has been having increasing drainage of "cherry-red fluid" and increasing from 125 cc/day initially to 300 cc/day over the past few days, requiring daily exchanges. He was seen by CT surgery on 9/14, 1 day prior to admission, where he had more fluid drained. After the catheter was exchanged again today, his blood pressure was noted to be 80/40 and the decision was made to bring him into the ED. The patient and wife deny dizziness, nausea/vomiting, abdominal pain, chest pain, cough, SOB, fevers, chills, or dysuria. Has noted loose stools the past 3 days without any mention of blood in his stool. He is on Midodrine and has been taking it three times per day. Of note, he does use a walker to walk, and does shower with assistance. He has a 24 hour home aid. Per wife, he has been sundowning more during the past few weeks that improves somewhat on redirecting. He has also had periods where his mental status has fluctuated. At first, the wife believed it may have something to do with his poor hearing that required a hearing aid but even when the patient had his hearing aid on he was still having the same symptoms. She stated a few nights ago he was sitting on the edge of his bed and swaying, then fell forward onto the floor and hit his knee and scratched his head. He was sent to the ED, where a head CT and Xrays were negative (small Left pleural effusion on CXR). He has noted R knee pain since even prior to the fall. The wife believes it may be related to the time that he had the catheter drained since that was when his complaint started. His mental status changes have been occurring the past 1-2 months, even before the catheter placement. Wife also notes more agitation and aggressive behavior lately, blaming her for many of his problems. Of note, he is receiving treatment for DLBCL, started earlier this summer. He has two oncologists, Dr. Sinha at [facility] and an Oncologist in [place] who have been communicating. He is receiving Rituximab every 2-3 months, last dose on Tuesday. He is also being evaluate for sleep apnea by pulmonology. In the ED, he was given 325 PO Tylenol and 10 PO Midodrine. He was evaluated by Cardiology, CT surgery, and MICU. ED VS: Temp 98.6F, HR 70, /53 --> 94/45, RR 17, O2 100% on RA. On 9/17, patient is admitted to Medicine for increased pleurx cath drainage and drainage around the pleurx cath site. Midirone was increased from his home dose of 10mg TID to 20mg TID, blood pressure improve to 110s/40-50 and persistently had a wide pulse pressure from the severe Aortic insufficiency noted. 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 However, the son on the same day claimed to be the health care proxy and there was an issue of conflict of opinions on whether to keep the patient DNR/DNI and without the documentations from either side, Ethics was called in to sort out the issues. It was seen by Ethics that the wife is the health care proxy for now  and that all decisions should be related to what she would want. The next day though, the DNR/DNI was rescinded as per both the wife and son's decision and the patient is again full code but consented for the blood transfusion and his daily MWF dialysis. His pacemaker was interrogated and noted to be working well, CT surgery with Dr. Lewis evaluated the pleurx cath at the ER and during his hospital course and said it is working and draining and no need for any surgical interventions. It was noted that the drainage from the pleurx cath was daily around 200-300cc since admission. Dialysis done on 9/18 at bedside was tolerated well by the patients and his blood pressure held to 100s sBP and upon discharge on 9/19 the patient is stable with his vital signs (/58, HR82, 98 F, RR19 94%, no fever, cough, diarrhea, shortness of breathe still had discomfort though moving around and still pleurx cath draining around 300 cc. Upon discharge, able to arrange for home care nurse to do daily drainage of the pleurx cath for the patient. 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 He had a pleurx catheter placed on 9/5 after having a thoracentesis on 8/8 removing 1.5 L loculated fluid. Since the catheter was placed, per the patient's wife, he has been having increasing drainage of "cherry-red fluid" and increasing from 125 cc/day initially to 300 cc/day over the past few days, requiring daily exchanges. He was seen by CT surgery on 9/14, 1 day prior to admission, where he had more fluid drained. After the catheter was exchanged again today, his blood pressure was noted to be 80/40 and the decision was made to bring him into the ED. The patient and wife deny dizziness, nausea/vomiting, abdominal pain, chest pain, cough, SOB, fevers, chills, or dysuria. Has noted loose stools the past 3 days without any mention of blood in his stool. He is on Midodrine and has been taking it three times per day. Of note, he does use a walker to walk, and does shower with assistance. He has a 24 hour home aid. Per wife, he has been sundowning more during the past few weeks that improves somewhat on redirecting. He has also had periods where his mental status has fluctuated. At first, the wife believed it may have something to do with his poor hearing that required a hearing aid but even when the patient had his hearing aid on he was still having the same symptoms. She stated a few nights ago he was sitting on the edge of his bed and swaying, then fell forward onto the floor and hit his knee and scratched his head. He was sent to the ED, where a head CT and Xrays were negative (small Left pleural effusion on CXR). He has noted R knee pain since even prior to the fall. The wife believes it may be related to the time that he had the catheter drained since that was when his complaint started. His mental status changes have been occurring the past 1-2 months, even before the catheter placement. Wife also notes more agitation and aggressive behavior lately, blaming her for many of his problems. Of note, he is receiving treatment for DLBCL, started earlier this summer. He has two oncologists, Dr. Sinha at [facility] and an Oncologist in [place] who have been communicating. He is receiving Rituximab every 2-3 months, last dose on Tuesday. He is also being evaluate for sleep apnea by pulmonology. In the ED, he was given 325 PO Tylenol and 10 PO Midodrine. He was evaluated by Cardiology, CT surgery, and MICU. ED VS: Temp 98.6F, HR 70, /53 --> 94/45, RR 17, O2 100% on RA. On 9/17, patient is admitted to Medicine for increased pleurx cath drainage and drainage around the pleurx cath site. Midirone was increased from his home dose of 10mg TID to 20mg TID, blood pressure improve to 110s/40-50 and persistently had a wide pulse pressure from the severe Aortic insufficiency noted. 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 However, the son on the same day claimed to be the health care proxy and there was an issue of conflict of opinions on whether to keep the patient DNR/DNI and without the documentations from either side, Ethics was called in to sort out the issues. It was seen by Ethics that the wife is the health care proxy for now  and that all decisions should be related to what she would want. The next day though, the DNR/DNI was rescinded as per both the wife and son's decision and the patient is again full code but consented for the blood transfusion and his daily MWF dialysis. His pacemaker was interrogated and noted to be working well, CT surgery with Dr. Lewis evaluated the pleurx cath at the ER and during his hospital course and said it is working and draining and no need for any surgical interventions. It was noted that the drainage from the pleurx cath was daily around 200-300cc since admission. Dialysis done on 9/18 at bedside was tolerated well by the patients and his blood pressure held to 100s sBP and upon discharge on 9/19 the patient is stable with his vital signs (/58, HR82, 98 F, RR19 94%, no fever, cough, diarrhea, shortness of breathe still had discomfort though moving around and still pleurx cath draining around 300 cc. Upon discharge, able to arrange for home care nurse to do daily drainage of the pleurx cath for the patient. PT recommended Home with PT.

## 2017-09-18 NOTE — DISCHARGE NOTE ADULT - CARE PLAN
Principal Discharge DX:	Pleural effusion, left Principal Discharge DX:	Pleural effusion, left  Goal:	Continue with the home care nurse and the daily drainage of the pleurx cath  Instructions for follow-up, activity and diet:	Please follow up with Dr. Worley and monitor the output from it. If the leakage from the pleurx cath gets to the point where the patient can't tolerate it and that the output is decreasing since it is leaking out then contact first Dr. Worley and the CT surgery clinic.  Secondary Diagnosis:	Hypotension  Goal:	Continue with the midodrine 10mg three times a day  Instructions for follow-up, activity and diet:	Continue with the midodrine 10mg three times a day  Monitor blood pressure if you feel he gets more confused or dizziness or lethargic  Inform the home care nurse, your primary care doctor and if keeps on going low then please come to the ED  Secondary Diagnosis:	Lymphoma, non-Hodgkin's  Goal:	Followup with Dr. Sinha  Instructions for follow-up, activity and diet:	No malignancy was noted in the pleural effusion tapped  Please followup with Dr. Bustillo for management of the diffuse B cell lymphoma in terms of Rituximab  Continue with the allopurinol 100mg once  aday  Secondary Diagnosis:	ESRD (end stage renal disease) on dialysis  Goal:	Followup with Dr. Canales and continue with MWF dialysis  Instructions for follow-up, activity and diet:	Followup with Dr. Canales and continue with MWF dialysis  Continue to take Renvella 800mg three times a day and Epoetin Baltazar 10,000 U with dialysis  Secondary Diagnosis:	Heart failure with preserved ejection fraction  Goal:	Please followup with Dr. Gandhi your cardiologist  Instructions for follow-up, activity and diet:	Continue with the midodrine 10mg for the blood pressure, would need to followup with him in regard of adding any more medications   Continue taking the atorvastatin 40mg at bedtime and Aspirin 81mg once a day  Secondary Diagnosis:	Atrial fibrillation  Goal:	Please followup with Dr. Gandhi your cardiologist  Instructions for follow-up, activity and diet:	Continue with the  Aspirin 81mg once a day  On no further anticoagulation as per the primary doctor  Secondary Diagnosis:	GERD (gastroesophageal reflux disease)  Goal:	Continue with home medications  Instructions for follow-up, activity and diet:	Continue with Pepcid 40mg twice a day

## 2017-09-18 NOTE — DISCHARGE NOTE ADULT - PATIENT PORTAL LINK FT
“You can access the [facility] Patient Portal, offered by [facility], by registering with the following website: [email or web address]”

## 2017-09-18 NOTE — DISCHARGE NOTE ADULT - MEDICATION SUMMARY - MEDICATIONS TO CHANGE
I will SWITCH the dose or number of times a day I take the medications listed below when I get home from the hospital:    Pepcid 20 mg oral tablet  -- 1 tab(s) (20mg) by mouth with breakfast, lunch, and dinner; and then 2 tab(s) (40mg) at bedtime

## 2017-09-18 NOTE — DISCHARGE NOTE ADULT - OTHER SIGNIFICANT FINDINGS
< from: CT Head No Cont (09.16.17 @ 17:03) >  FINDINGS:    Motion limited examination.    No definite intracranial hemorrhage or mass effect. Extensive chronic   white matter microvascular ischemic changes. Age-related cerebral volume   loss. No hydrocephalus.     Extensive calcification of the [place] vasculature, unchanged.   Visualized paranasal sinuses and mastoid air cells are clear. No   depressed calvarial fracture. Visualized soft tissues are unremarkable.    IMPRESSION:   Motion limited examination. No definite intracranial hemorrhage or mass   effect. Extensive chronic white matter microvascular ischemic changes. No   significant change since 9/11/2017.

## 2017-09-18 NOTE — DISCHARGE NOTE ADULT - PROVIDER TOKENS
TOKEN:'[id number]' TOKEN:'[id number]',TOKEN:'[id number]' TOKEN:'[id number]',TOKEN:'[id number]',TOKEN:'[id number]',TOKEN:'[id number]'

## 2017-09-18 NOTE — PROGRESS NOTE ADULT - PROBLEM SELECTOR PLAN 10
- DVT PPX: SQH  - Diet: DASH/renal  - Fall precautions, bacitracin and enhanced supervision  - Hold off Haldol or prolonging QTc drugs  - DNR/DNI (as per wife and patient) - DVT PPX: SQH  - Diet: DASH/renal  - Fall precautions, bacitracin and enhanced supervision  - Hold off Haldol or prolonging QTc drugs  -Dispo: PT: home with home PT    Quincy Simmons, PGY-1  Medicine Team 3  [phone]

## 2017-09-18 NOTE — PROGRESS NOTE ADULT - PROBLEM SELECTOR PLAN 9
unclear whether wife or son is the HCP, both are claiming to be it, DNR and DNI can't be done without it. Pt has no capacity to sign the DNR or DNI, would need the HCP to verify it  -invalid DNR order in the chart -GOC discussion lead to son and wife agreeing for now to not have the DNR/DNI and to undergo any blood transfusion and dialysis intervention also  -Wife is the HCP as per the ethics

## 2017-09-18 NOTE — PROGRESS NOTE ADULT - PROBLEM SELECTOR PLAN 3
BP remains WNL today. Continue patient on midodrine. Monitor BP closely. If patient drops BP during HD, will decrease temp and hold UF

## 2017-09-19 VITALS
OXYGEN SATURATION: 93 % | TEMPERATURE: 98 F | DIASTOLIC BLOOD PRESSURE: 44 MMHG | SYSTOLIC BLOOD PRESSURE: 103 MMHG | HEART RATE: 73 BPM | RESPIRATION RATE: 19 BRPM

## 2017-09-19 PROCEDURE — 93306 TTE W/DOPPLER COMPLETE: CPT | Mod: 26

## 2017-09-19 PROCEDURE — 99239 HOSP IP/OBS DSCHRG MGMT >30: CPT

## 2017-09-19 RX ORDER — FAMOTIDINE 10 MG/ML
1 INJECTION INTRAVENOUS
Qty: 0 | Refills: 0 | COMMUNITY

## 2017-09-19 RX ORDER — FAMOTIDINE 10 MG/ML
1 INJECTION INTRAVENOUS
Qty: 0 | Refills: 0 | COMMUNITY
Start: 2017-09-19

## 2017-09-19 RX ORDER — BACITRACIN ZINC 500 UNIT/G
1 OINTMENT IN PACKET (EA) TOPICAL
Qty: 2 | Refills: 2 | OUTPATIENT
Start: 2017-09-19 | End: 2018-03-17

## 2017-09-19 RX ADMIN — MIDODRINE HYDROCHLORIDE 10 MILLIGRAM(S): 2.5 TABLET ORAL at 13:00

## 2017-09-19 RX ADMIN — SERTRALINE 25 MILLIGRAM(S): 25 TABLET, FILM COATED ORAL at 12:38

## 2017-09-19 RX ADMIN — SEVELAMER CARBONATE 800 MILLIGRAM(S): 2400 POWDER, FOR SUSPENSION ORAL at 10:46

## 2017-09-19 RX ADMIN — Medication 25 MICROGRAM(S): at 05:28

## 2017-09-19 RX ADMIN — HEPARIN SODIUM 5000 UNIT(S): 5000 INJECTION INTRAVENOUS; SUBCUTANEOUS at 13:00

## 2017-09-19 RX ADMIN — Medication 100 MILLIGRAM(S): at 12:39

## 2017-09-19 RX ADMIN — SEVELAMER CARBONATE 800 MILLIGRAM(S): 2400 POWDER, FOR SUSPENSION ORAL at 12:38

## 2017-09-19 RX ADMIN — Medication 1 APPLICATION(S): at 12:39

## 2017-09-19 RX ADMIN — HEPARIN SODIUM 5000 UNIT(S): 5000 INJECTION INTRAVENOUS; SUBCUTANEOUS at 05:28

## 2017-09-19 RX ADMIN — Medication 81 MILLIGRAM(S): at 12:38

## 2017-09-19 RX ADMIN — FAMOTIDINE 20 MILLIGRAM(S): 10 INJECTION INTRAVENOUS at 05:28

## 2017-09-19 RX ADMIN — MIDODRINE HYDROCHLORIDE 10 MILLIGRAM(S): 2.5 TABLET ORAL at 05:29

## 2017-09-19 NOTE — PROGRESS NOTE ADULT - ATTENDING COMMENTS
Pt was seen and examed at bed side with resident  86 yo M PMH ESRD on HD MWF, AAA s/p repair, MI, atrial fibrillation (not on A/C 2/2 falls), HFpEF (last EF 75% in 2015) s/p PPM and ablation 2016, severe aortic stenosis, severe mitral regurgitation, gout, GERD, depression, hypothyroidism, HTN, and DLBCL currently on Rituximab p/w increasing fluid leakage from his left pleurx catheter and hypotension. Pt BP improved with midodrine, on 20 mg TID, tapered to 10mg TID, Pt tolerated. Echo done, pt known to have severe aotic stenosis, PPM integration done, hold off metoprolol. CT surgery consult appreciated, continue monitor Pluerx drainage. Clinically no signs of acute infection, Bx no growth, hold off Abx. ESRD, renal consulted, HD as scheduled.  Pt hypotension with multiple comorbidities, overall prognosis guarded. After extensive face to face discussion with both son and wife, Pt currently full code. reviewed previous admission, HCP paper in sunrise with wife name on.    D/c planning home with continue out patient HD and pluerx care resume. D/c with SW. Family were informed above plan. High risk of readmission. Wife expressed understanding above situation.

## 2017-09-19 NOTE — PROGRESS NOTE ADULT - PROBLEM SELECTOR PLAN 5
- [id number] 5  - Not on anticoagulation 2/2 falls  - Hold off on Metoprolol in the setting of hypotension, will monitor HR for goal < 110

## 2017-09-19 NOTE — CHART NOTE - NSCHARTNOTEFT_GEN_A_CORE
Identified with attending past admission Health Care Proxy forms listing wife, Jade, as Health Care Proxy.

## 2017-09-19 NOTE — PROGRESS NOTE ADULT - PROBLEM SELECTOR PLAN 9
-GOC discussion lead to son and wife agreeing for now to not have the DNR/DNI and to undergo any blood transfusion and dialysis intervention also  -Wife is the HCP as per the ethics

## 2017-09-19 NOTE — DIETITIAN INITIAL EVALUATION ADULT. - PROBLEM SELECTOR PLAN 5
- [id number] 5  - Not on anticoagulation 2/2 falls  - Hold off on Metoprolol in the setting of hypotension, will monitor HR for goal < 110

## 2017-09-19 NOTE — PROGRESS NOTE ADULT - ASSESSMENT
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

## 2017-09-19 NOTE — DIETITIAN INITIAL EVALUATION ADULT. - PROBLEM SELECTOR PLAN 2
- Exudative, but negative for malignant cells based on cytology  - No evidence of infectious process at this time  - Increased drainage to 300cc/day, from 125-150cc previously. Per CT surgery, catheter is working appropriately, will continue to monitor I/Os. Would benefit from lung ultrasound to assess effusion and/or hemothorax

## 2017-09-19 NOTE — DIETITIAN INITIAL EVALUATION ADULT. - PROBLEM SELECTOR PLAN 1
- Patient appears to have BPs 100s/60s based on previous admission (decreased to  SBP of 92 and DBP of 42 in the ED)  - With increased drainage to 300 cc/day (previously 125 to 150 cc/day)and may be contributing, especially if patient is on fluid restriction at home. Will continue to monitor. No further intervention at this time per CT surgery  - Fecal occult negative  - Hb decreased from 9.8 to 8.8 over 4 days, will continue to follow up. No evidence of RP or abdominal bleed based on exam and no evidence of hemothorax based on CXR. However, patient may need CT C/A/P to better r/o source of bleed  - Pt does have a macrocytic anemia, will obtain folate and b12 in addition to iron studies. Pt is on EPO.  - Source may be cardiogenic as well, despite being on a PPM. Patient with elevated cardiac enzymes now trending downward likely 2/2 demand ischemia. EKG largely unrevealing. No need to trend any more, appreciate cardiology recs. No palpitations noted. Would obtain TTE and continue to monitor on telemetry with PPM interrogation  - May be in the setting of sepsis as well, will f/u blood cultures. No obvious source of infection at this time, so will hold off on abx. Will obtain UA and urine cx.  - Will avoid giving IVFs for now in the setting of severe heart failure but will continue with Midodrine 10 TID. Will check cortisol level for any sign of adrenal insufficiency

## 2017-09-19 NOTE — PROGRESS NOTE ADULT - PROBLEM SELECTOR PLAN 1
- stable BPs currently 100s/60s w/ large pulse pressure 2/2 severe AI, H/H stable now  - Increased drainage 300 cc/day the last 2 days PTA, especially if patient is on fluid restriction at home  -Will continue to monitor BP and H/H. No further intervention at this time per CT surgery  -FOBT neg, macrocytoic anemia (norm folate and B12)  -f/u Fe   -c/w EPO w/ dialysis (MWF)  - Cardiogenic source despite being on a PPM. Elevated cardiac enzymes but trending downward likely 2/2 demand ischemia. EKG wide QRS, prolonged QTc 552. No need to trend, cardio recs appreciated: NTD   -f/u TTE and continue to monitor on telemetry with PPM interrogation  -f/u BC/UC/UA no obvious source of infection at this time, so will hold off on abx.   -Avoid giving IVFs w/ severe HF attempting to taper Midodrine back to 10 TID as tolerated  -f/u cortisol level (16) wnl, less likely AI - stable BPs currently 100s/60s w/ large pulse pressure 2/2 severe AI, H/H stable now and stable BP after dialysis  - Increased drainage 300 cc/day the last 2 days PTA, especially if patient is on fluid restriction at home  -Will continue to monitor BP and H/H. No further intervention at this time per CT surgery  -FOBT neg, macrocytoic anemia (norm folate and B12)  -f/u Fe   -c/w EPO w/ dialysis (MWF)  - Cardiogenic source despite being on a PPM. Elevated cardiac enzymes but trending downward likely 2/2 demand ischemia. EKG wide QRS, prolonged QTc 552. No need to trend, cardio recs appreciated: NTD   -f/u TTE and continue to monitor on telemetry with PPM interrogation  -f/u BC/UC/UA no obvious source of infection at this time, so will hold off on abx.   -Avoid giving IVFs w/ severe HF attempting to taper Midodrine back to 10 TID as tolerated  -f/u cortisol level (16) wnl, less likely AI

## 2017-09-19 NOTE — PROGRESS NOTE ADULT - SUBJECTIVE AND OBJECTIVE BOX
Patient is a 87y old  Male who presents with a chief complaint of low BP (18 Sep 2017 15:43)      SUBJECTIVE / OVERNIGHT EVENTS:  Subjective: No overnight events and patient seen and examined this morning    MEDICATIONS  (STANDING):  atorvastatin 40 milliGRAM(s) Oral at bedtime  allopurinol 100 milliGRAM(s) Oral daily  aspirin enteric coated 81 milliGRAM(s) Oral daily  sertraline 25 milliGRAM(s) Oral daily  zinc sulfate 220 milliGRAM(s) Oral at bedtime  sevelamer hydrochloride 800 milliGRAM(s) Oral three times a day with meals  levothyroxine 25 MICROGram(s) Oral daily  heparin  Injectable 5000 Unit(s) SubCutaneous every 8 hours  epoetin terry Injectable 34132 Unit(s) IV Push <User Schedule>  BACItracin   Ointment 1 Application(s) Topical daily  famotidine    Tablet 20 milliGRAM(s) Oral two times a day before meals  midodrine 10 milliGRAM(s) Oral three times a day    MEDICATIONS  (PRN):  docusate sodium 100 milliGRAM(s) Oral at bedtime PRN Constipation  acetaminophen   Tablet. 650 milliGRAM(s) Oral every 6 hours PRN Moderate Pain (4 - 6)  acetaminophen   Tablet 650 milliGRAM(s) Oral every 6 hours PRN For Temp greater than 38 C (100.4 F)      T(C): 36.7 (09-19-17 @ 04:54), Max: 36.7 (09-18-17 @ 22:49)  HR: 71 (09-19-17 @ 04:54) (69 - 75)  BP: 111/58 (09-19-17 @ 04:54) (100/45 - 116/63)  RR: 19 (09-19-17 @ 04:54) (18 - 20)  SpO2: 100% (09-19-17 @ 04:54) (95% - 100%)  CAPILLARY BLOOD GLUCOSE        I&O's Summary    18 Sep 2017 07:01  -  19 Sep 2017 06:28  --------------------------------------------------------  IN: 400 mL / OUT: 1775 mL / NET: -1375 mL    PHYSICAL EXAM:  GENERAL: anxious, confused, AOx0-1  HEAD:  Atraumatic, Normocephalic  EYES: EOMI, PERRLA, conjunctiva and sclera clear  NECK: Supple, No JVD  CHEST/LUNG: Clear to auscultation bilaterally; No wheeze, L pleurx cath in place, not currently draining  HEART: Regular rate and rhythm; No murmurs, rubs, or gallops  ABDOMEN: Soft, Nontender, Nondistended; Bowel sounds present  EXTREMITIES:  2+ Peripheral Pulses, No clubbing, cyanosis, or edema  PSYCH: AAOx3  NEUROLOGY: non-focal  SKIN: No rashes or lesions        LABS:                        9.1    12.30 )-----------( 198      ( 18 Sep 2017 09:44 )             28.3     09-18    136  |  91<L>  |  76<H>  ----------------------------<  114<H>  5.1   |  24  |  5.89<H>    Ca    7.4<L>      18 Sep 2017 09:44  Phos  4.2     09-18  Mg     2.2     09-18    TPro  6.3  /  Alb  3.0<L>  /  TBili  0.8  /  DBili  x   /  AST  56<H>  /  ALT  22  /  AlkPhos  228<H>  09-18              RADIOLOGY & ADDITIONAL TESTS:    Imaging Personally Reviewed:    Consultant(s) Notes Reviewed:      Care Discussed with Consultants/Other Providers: Patient is a 87y old  Male who presents with a chief complaint of low BP (18 Sep 2017 15:43)      SUBJECTIVE / OVERNIGHT EVENTS:  Subjective: No overnight events and patient seen and examined this morning. Pt responsive to name but not answering questions. He indicated no pain anywhere but did seem tired and sleepy. No fevers, diarrhea as per nursing staff and no agitation or anxiety.     MEDICATIONS  (STANDING):  atorvastatin 40 milliGRAM(s) Oral at bedtime  allopurinol 100 milliGRAM(s) Oral daily  aspirin enteric coated 81 milliGRAM(s) Oral daily  sertraline 25 milliGRAM(s) Oral daily  zinc sulfate 220 milliGRAM(s) Oral at bedtime  sevelamer hydrochloride 800 milliGRAM(s) Oral three times a day with meals  levothyroxine 25 MICROGram(s) Oral daily  heparin  Injectable 5000 Unit(s) SubCutaneous every 8 hours  epoetin terry Injectable 84588 Unit(s) IV Push <User Schedule>  BACItracin   Ointment 1 Application(s) Topical daily  famotidine    Tablet 20 milliGRAM(s) Oral two times a day before meals  midodrine 10 milliGRAM(s) Oral three times a day    MEDICATIONS  (PRN):  docusate sodium 100 milliGRAM(s) Oral at bedtime PRN Constipation  acetaminophen   Tablet. 650 milliGRAM(s) Oral every 6 hours PRN Moderate Pain (4 - 6)  acetaminophen   Tablet 650 milliGRAM(s) Oral every 6 hours PRN For Temp greater than 38 C (100.4 F)      T(C): 36.7 (09-19-17 @ 04:54), Max: 36.7 (09-18-17 @ 22:49)  HR: 71 (09-19-17 @ 04:54) (69 - 75)  BP: 111/58 (09-19-17 @ 04:54) (100/45 - 116/63)  RR: 19 (09-19-17 @ 04:54) (18 - 20)  SpO2: 100% (09-19-17 @ 04:54) (95% - 100%)  CAPILLARY BLOOD GLUCOSE        I&O's Summary    18 Sep 2017 07:01  -  19 Sep 2017 06:28  --------------------------------------------------------  IN: 400 mL / OUT: 1775 mL / NET: -1375 mL    PHYSICAL EXAM:  GENERAL: anxious, confused, AOx0-1  HEAD:  Atraumatic, Normocephalic  EYES: EOMI, PERRLA, conjunctiva and sclera clear  NECK: Supple, No JVD  CHEST/LUNG: Clear to auscultation bilaterally; No wheeze, L pleurx cath in place, not currently draining  HEART: Regular rate and rhythm; No murmurs, rubs, or gallops  ABDOMEN: Soft, Nontender, Nondistended; Bowel sounds present  EXTREMITIES:  2+ Peripheral Pulses, No clubbing, cyanosis, or edema  PSYCH: AAOx3  NEUROLOGY: non-focal  SKIN: No rashes or lesions        LABS:                        9.1    12.30 )-----------( 198      ( 18 Sep 2017 09:44 )             28.3     09-18    136  |  91<L>  |  76<H>  ----------------------------<  114<H>  5.1   |  24  |  5.89<H>    Ca    7.4<L>      18 Sep 2017 09:44  Phos  4.2     09-18  Mg     2.2     09-18    TPro  6.3  /  Alb  3.0<L>  /  TBili  0.8  /  DBili  x   /  AST  56<H>  /  ALT  22  /  AlkPhos  228<H>  09-18              RADIOLOGY & ADDITIONAL TESTS:    Imaging Personally Reviewed:    Consultant(s) Notes Reviewed:      Care Discussed with Consultants/Other Providers: Patient is a 87y old  Male who presents with a chief complaint of low BP (18 Sep 2017 15:43)      SUBJECTIVE / OVERNIGHT EVENTS:  Subjective: No overnight events and patient seen and examined this morning. Pt responsive to name but not answering questions. He indicated no pain anywhere but did seem tired and sleepy. No fevers, diarrhea as per nursing staff and no agitation or anxiety. Pleurx cath output 375cc yesterday and able to tolerate the dialysis yesterday and stable BPs.     MEDICATIONS  (STANDING):  atorvastatin 40 milliGRAM(s) Oral at bedtime  allopurinol 100 milliGRAM(s) Oral daily  aspirin enteric coated 81 milliGRAM(s) Oral daily  sertraline 25 milliGRAM(s) Oral daily  zinc sulfate 220 milliGRAM(s) Oral at bedtime  sevelamer hydrochloride 800 milliGRAM(s) Oral three times a day with meals  levothyroxine 25 MICROGram(s) Oral daily  heparin  Injectable 5000 Unit(s) SubCutaneous every 8 hours  epoetin terry Injectable 91275 Unit(s) IV Push <User Schedule>  BACItracin   Ointment 1 Application(s) Topical daily  famotidine    Tablet 20 milliGRAM(s) Oral two times a day before meals  midodrine 10 milliGRAM(s) Oral three times a day    MEDICATIONS  (PRN):  docusate sodium 100 milliGRAM(s) Oral at bedtime PRN Constipation  acetaminophen   Tablet. 650 milliGRAM(s) Oral every 6 hours PRN Moderate Pain (4 - 6)  acetaminophen   Tablet 650 milliGRAM(s) Oral every 6 hours PRN For Temp greater than 38 C (100.4 F)      T(C): 36.7 (09-19-17 @ 04:54), Max: 36.7 (09-18-17 @ 22:49)  HR: 71 (09-19-17 @ 04:54) (69 - 75)  BP: 111/58 (09-19-17 @ 04:54) (100/45 - 116/63)  RR: 19 (09-19-17 @ 04:54) (18 - 20)  SpO2: 100% (09-19-17 @ 04:54) (95% - 100%)  CAPILLARY BLOOD GLUCOSE        I&O's Summary    18 Sep 2017 07:01  -  19 Sep 2017 06:28  --------------------------------------------------------  IN: 400 mL / OUT: 1775 mL / NET: -1375 mL    PHYSICAL EXAM:  GENERAL: anxious, confused, AOx0-1  HEAD:  Atraumatic, Normocephalic  EYES: EOMI, PERRLA, conjunctiva and sclera clear  NECK: Supple, No JVD  CHEST/LUNG: Clear to auscultation bilaterally; No wheeze, L pleurx cath in place, not currently draining  HEART: Regular rate and rhythm; No murmurs, rubs, or gallops  ABDOMEN: Soft, Nontender, Nondistended; Bowel sounds present  EXTREMITIES:  2+ Peripheral Pulses, No clubbing, cyanosis, or edema  PSYCH: AAOx3  NEUROLOGY: non-focal  SKIN: No rashes or lesions        LABS:                        9.1    12.30 )-----------( 198      ( 18 Sep 2017 09:44 )             28.3     09-18    136  |  91<L>  |  76<H>  ----------------------------<  114<H>  5.1   |  24  |  5.89<H>    Ca    7.4<L>      18 Sep 2017 09:44  Phos  4.2     09-18  Mg     2.2     09-18    TPro  6.3  /  Alb  3.0<L>  /  TBili  0.8  /  DBili  x   /  AST  56<H>  /  ALT  22  /  AlkPhos  228<H>  09-18              RADIOLOGY & ADDITIONAL TESTS:    Imaging Personally Reviewed:    Consultant(s) Notes Reviewed:      Care Discussed with Consultants/Other Providers:

## 2017-09-19 NOTE — PROGRESS NOTE ADULT - PROBLEM SELECTOR PLAN 10
- DVT PPX: SQH  - Diet: DASH/renal  - Fall precautions, bacitracin and enhanced supervision  - Hold off Haldol or prolonging QTc drugs  -Dispo: PT: home with home PT    Quincy Simmons, PGY-1  Medicine Team 3  [phone]

## 2017-09-19 NOTE — DIETITIAN INITIAL EVALUATION ADULT. - PROBLEM SELECTOR PLAN 4
- last TTE in system was on 11/2014 (EF = moderate concentric LVH, mild to moderate tricuspid regurg  - would obtain repeat TTE and place on telemetry  - Seen by cardiology, appreciate recs. No need to trend cardiac enzymes at this point, likely 2/2 demand ischemia. Presumed history of 13 stents

## 2017-09-19 NOTE — DIETITIAN INITIAL EVALUATION ADULT. - OTHER INFO
Pt was unable to provide much information. Spoke with Pt's wife briefly. Wife was very upset with the diet the Pt was receiving since the original diet order was incorrect. Arranged to meet with Pt's wife to discuss diet and provide menus for her to fill out. Pt was unable to provide much information. Spoke with Pt's wife by phone. Wife was very upset with the diet the Pt was receiving since the original diet order was incorrect. Arranged to meet with Pt's wife to discuss diet and provide menus for her to fill out. Pt's wife was unable to meet but again spoke with her by phone. Discussed diet and food choices and tolerances with wife. Menus left for wife to fill out.

## 2017-09-19 NOTE — DIETITIAN INITIAL EVALUATION ADULT. - PROBLEM SELECTOR PLAN 3
- No acute issues presently; potassium = 4.5  - Will call nephro consult and continue with MWF HD through LUE fistula  - f/u repeat lab-work

## 2017-09-21 LAB
BACTERIA BLD CULT: SIGNIFICANT CHANGE UP
BACTERIA BLD CULT: SIGNIFICANT CHANGE UP

## 2017-09-25 ENCOUNTER — INPATIENT (INPATIENT)
Facility: HOSPITAL | Age: 82
LOS: 21 days | DRG: 919 | End: 2017-10-17
Attending: HOSPITALIST | Admitting: INTERNAL MEDICINE
Payer: MEDICARE

## 2017-09-25 VITALS
OXYGEN SATURATION: 96 % | SYSTOLIC BLOOD PRESSURE: 80 MMHG | RESPIRATION RATE: 24 BRPM | TEMPERATURE: 98 F | DIASTOLIC BLOOD PRESSURE: 49 MMHG | HEART RATE: 99 BPM

## 2017-09-25 DIAGNOSIS — C85.90 NON-HODGKIN LYMPHOMA, UNSPECIFIED, UNSPECIFIED SITE: ICD-10-CM

## 2017-09-25 DIAGNOSIS — A41.9 SEPSIS, UNSPECIFIED ORGANISM: ICD-10-CM

## 2017-09-25 DIAGNOSIS — N25.0 RENAL OSTEODYSTROPHY: ICD-10-CM

## 2017-09-25 DIAGNOSIS — I35.0 NONRHEUMATIC AORTIC (VALVE) STENOSIS: ICD-10-CM

## 2017-09-25 DIAGNOSIS — N18.9 CHRONIC KIDNEY DISEASE, UNSPECIFIED: ICD-10-CM

## 2017-09-25 DIAGNOSIS — I95.9 HYPOTENSION, UNSPECIFIED: ICD-10-CM

## 2017-09-25 DIAGNOSIS — J90 PLEURAL EFFUSION, NOT ELSEWHERE CLASSIFIED: ICD-10-CM

## 2017-09-25 DIAGNOSIS — G93.41 METABOLIC ENCEPHALOPATHY: ICD-10-CM

## 2017-09-25 DIAGNOSIS — I48.91 UNSPECIFIED ATRIAL FIBRILLATION: ICD-10-CM

## 2017-09-25 DIAGNOSIS — Z29.9 ENCOUNTER FOR PROPHYLACTIC MEASURES, UNSPECIFIED: ICD-10-CM

## 2017-09-25 DIAGNOSIS — N18.6 END STAGE RENAL DISEASE: ICD-10-CM

## 2017-09-25 DIAGNOSIS — J45.909 UNSPECIFIED ASTHMA, UNCOMPLICATED: ICD-10-CM

## 2017-09-25 LAB
ALBUMIN SERPL ELPH-MCNC: 2.5 G/DL — LOW (ref 3.3–5)
ALP SERPL-CCNC: 222 U/L — HIGH (ref 40–120)
ALT FLD-CCNC: 25 U/L RC — SIGNIFICANT CHANGE UP (ref 10–45)
ANION GAP SERPL CALC-SCNC: 19 MMOL/L — HIGH (ref 5–17)
ANION GAP SERPL CALC-SCNC: 23 MMOL/L — HIGH (ref 5–17)
APTT BLD: 30.2 SEC — SIGNIFICANT CHANGE UP (ref 27.5–37.4)
APTT BLD: 32.5 SEC — SIGNIFICANT CHANGE UP (ref 27.5–37.4)
AST SERPL-CCNC: 53 U/L — HIGH (ref 10–40)
BASE EXCESS BLDV CALC-SCNC: 2.4 MMOL/L — HIGH (ref -2–2)
BASE EXCESS BLDV CALC-SCNC: 2.4 MMOL/L — HIGH (ref -2–2)
BASE EXCESS BLDV CALC-SCNC: 3.3 MMOL/L — HIGH (ref -2–2)
BASOPHILS # BLD AUTO: 0 K/UL — SIGNIFICANT CHANGE UP (ref 0–0.2)
BILIRUB SERPL-MCNC: 1 MG/DL — SIGNIFICANT CHANGE UP (ref 0.2–1.2)
BLD GP AB SCN SERPL QL: NEGATIVE — SIGNIFICANT CHANGE UP
BUN SERPL-MCNC: 82 MG/DL — HIGH (ref 7–23)
BUN SERPL-MCNC: 86 MG/DL — HIGH (ref 7–23)
CA-I SERPL-SCNC: 0.99 MMOL/L — LOW (ref 1.12–1.3)
CA-I SERPL-SCNC: 1.01 MMOL/L — LOW (ref 1.12–1.3)
CA-I SERPL-SCNC: 1.02 MMOL/L — LOW (ref 1.12–1.3)
CALCIUM SERPL-MCNC: 7.9 MG/DL — LOW (ref 8.4–10.5)
CALCIUM SERPL-MCNC: 8.1 MG/DL — LOW (ref 8.4–10.5)
CHLORIDE BLDV-SCNC: 91 MMOL/L — LOW (ref 96–108)
CHLORIDE BLDV-SCNC: 93 MMOL/L — LOW (ref 96–108)
CHLORIDE BLDV-SCNC: 94 MMOL/L — LOW (ref 96–108)
CHLORIDE SERPL-SCNC: 89 MMOL/L — LOW (ref 96–108)
CHLORIDE SERPL-SCNC: 91 MMOL/L — LOW (ref 96–108)
CK MB CFR SERPL CALC: 12.2 NG/ML — HIGH (ref 0–6.7)
CK SERPL-CCNC: 189 U/L — SIGNIFICANT CHANGE UP (ref 30–200)
CK SERPL-CCNC: 209 U/L — HIGH (ref 30–200)
CO2 BLDV-SCNC: 28 MMOL/L — SIGNIFICANT CHANGE UP (ref 22–30)
CO2 BLDV-SCNC: 28 MMOL/L — SIGNIFICANT CHANGE UP (ref 22–30)
CO2 BLDV-SCNC: 29 MMOL/L — SIGNIFICANT CHANGE UP (ref 22–30)
CO2 SERPL-SCNC: 24 MMOL/L — SIGNIFICANT CHANGE UP (ref 22–31)
CO2 SERPL-SCNC: 25 MMOL/L — SIGNIFICANT CHANGE UP (ref 22–31)
CREAT SERPL-MCNC: 5.72 MG/DL — HIGH (ref 0.5–1.3)
CREAT SERPL-MCNC: 5.78 MG/DL — HIGH (ref 0.5–1.3)
ELLIPTOCYTES BLD QL SMEAR: SLIGHT — SIGNIFICANT CHANGE UP
EOSINOPHIL # BLD AUTO: 0 K/UL — SIGNIFICANT CHANGE UP (ref 0–0.5)
EOSINOPHIL NFR BLD AUTO: 1 % — SIGNIFICANT CHANGE UP (ref 0–6)
GAS PNL BLDV: 130 MMOL/L — LOW (ref 136–145)
GAS PNL BLDV: 131 MMOL/L — LOW (ref 136–145)
GAS PNL BLDV: 132 MMOL/L — LOW (ref 136–145)
GAS PNL BLDV: SIGNIFICANT CHANGE UP
GLUCOSE BLDV-MCNC: 85 MG/DL — SIGNIFICANT CHANGE UP (ref 70–99)
GLUCOSE BLDV-MCNC: 90 MG/DL — SIGNIFICANT CHANGE UP (ref 70–99)
GLUCOSE BLDV-MCNC: 91 MG/DL — SIGNIFICANT CHANGE UP (ref 70–99)
GLUCOSE SERPL-MCNC: 85 MG/DL — SIGNIFICANT CHANGE UP (ref 70–99)
GLUCOSE SERPL-MCNC: 91 MG/DL — SIGNIFICANT CHANGE UP (ref 70–99)
HCO3 BLDV-SCNC: 26 MMOL/L — SIGNIFICANT CHANGE UP (ref 21–29)
HCO3 BLDV-SCNC: 27 MMOL/L — SIGNIFICANT CHANGE UP (ref 21–29)
HCO3 BLDV-SCNC: 28 MMOL/L — SIGNIFICANT CHANGE UP (ref 21–29)
HCT VFR BLD CALC: 28.4 % — LOW (ref 39–50)
HCT VFR BLD CALC: 30.2 % — LOW (ref 39–50)
HCT VFR BLDA CALC: 28 % — LOW (ref 39–50)
HCT VFR BLDA CALC: 28 % — LOW (ref 39–50)
HCT VFR BLDA CALC: 30 % — LOW (ref 39–50)
HGB BLD CALC-MCNC: 8.9 G/DL — LOW (ref 13–17)
HGB BLD CALC-MCNC: 9 G/DL — LOW (ref 13–17)
HGB BLD CALC-MCNC: 9.6 G/DL — LOW (ref 13–17)
HGB BLD-MCNC: 9.4 G/DL — LOW (ref 13–17)
HGB BLD-MCNC: 9.6 G/DL — LOW (ref 13–17)
INR BLD: 1.44 RATIO — HIGH (ref 0.88–1.16)
INR BLD: 1.68 RATIO — HIGH (ref 0.88–1.16)
LACTATE BLDV-MCNC: 1.6 MMOL/L — SIGNIFICANT CHANGE UP (ref 0.7–2)
LACTATE BLDV-MCNC: 3.6 MMOL/L — HIGH (ref 0.7–2)
LACTATE BLDV-MCNC: 4.9 MMOL/L — CRITICAL HIGH (ref 0.7–2)
LDH SERPL L TO P-CCNC: 458 U/L — HIGH (ref 50–242)
LYMPHOCYTES # BLD AUTO: 0.3 K/UL — LOW (ref 1–3.3)
MAGNESIUM SERPL-MCNC: 2.2 MG/DL — SIGNIFICANT CHANGE UP (ref 1.6–2.6)
MCHC RBC-ENTMCNC: 31.8 GM/DL — LOW (ref 32–36)
MCHC RBC-ENTMCNC: 33 GM/DL — SIGNIFICANT CHANGE UP (ref 32–36)
MCHC RBC-ENTMCNC: 33.8 PG — SIGNIFICANT CHANGE UP (ref 27–34)
MCHC RBC-ENTMCNC: 35.2 PG — HIGH (ref 27–34)
MCV RBC AUTO: 106 FL — HIGH (ref 80–100)
MCV RBC AUTO: 107 FL — HIGH (ref 80–100)
MONOCYTES # BLD AUTO: 1 K/UL — HIGH (ref 0–0.9)
MONOCYTES NFR BLD AUTO: 11 % — SIGNIFICANT CHANGE UP (ref 2–14)
NEUTROPHILS # BLD AUTO: 12.8 K/UL — HIGH (ref 1.8–7.4)
NEUTROPHILS NFR BLD AUTO: 70 % — SIGNIFICANT CHANGE UP (ref 43–77)
NEUTS BAND # BLD: 16 % — HIGH (ref 0–8)
OTHER CELLS CSF MANUAL: 9 ML/DL — LOW (ref 18–22)
PCO2 BLDV: 40 MMHG — SIGNIFICANT CHANGE UP (ref 35–50)
PCO2 BLDV: 44 MMHG — SIGNIFICANT CHANGE UP (ref 35–50)
PCO2 BLDV: 44 MMHG — SIGNIFICANT CHANGE UP (ref 35–50)
PH BLDV: 7.4 — SIGNIFICANT CHANGE UP (ref 7.35–7.45)
PH BLDV: 7.41 — SIGNIFICANT CHANGE UP (ref 7.35–7.45)
PH BLDV: 7.43 — SIGNIFICANT CHANGE UP (ref 7.35–7.45)
PHOSPHATE SERPL-MCNC: 4.2 MG/DL — SIGNIFICANT CHANGE UP (ref 2.5–4.5)
PLAT MORPH BLD: NORMAL — SIGNIFICANT CHANGE UP
PLATELET # BLD AUTO: 128 K/UL — LOW (ref 150–400)
PLATELET # BLD AUTO: 145 K/UL — LOW (ref 150–400)
PO2 BLDV: 26 MMHG — SIGNIFICANT CHANGE UP (ref 25–45)
PO2 BLDV: 28 MMHG — SIGNIFICANT CHANGE UP (ref 25–45)
PO2 BLDV: 44 MMHG — SIGNIFICANT CHANGE UP (ref 25–45)
POIKILOCYTOSIS BLD QL AUTO: SLIGHT — SIGNIFICANT CHANGE UP
POLYCHROMASIA BLD QL SMEAR: SLIGHT — SIGNIFICANT CHANGE UP
POTASSIUM BLDV-SCNC: 4.6 MMOL/L — SIGNIFICANT CHANGE UP (ref 3.5–5)
POTASSIUM BLDV-SCNC: 4.7 MMOL/L — SIGNIFICANT CHANGE UP (ref 3.5–5)
POTASSIUM BLDV-SCNC: 4.9 MMOL/L — SIGNIFICANT CHANGE UP (ref 3.5–5)
POTASSIUM SERPL-MCNC: 5 MMOL/L — SIGNIFICANT CHANGE UP (ref 3.5–5.3)
POTASSIUM SERPL-MCNC: 5.3 MMOL/L — SIGNIFICANT CHANGE UP (ref 3.5–5.3)
POTASSIUM SERPL-SCNC: 5 MMOL/L — SIGNIFICANT CHANGE UP (ref 3.5–5.3)
POTASSIUM SERPL-SCNC: 5.3 MMOL/L — SIGNIFICANT CHANGE UP (ref 3.5–5.3)
PROT SERPL-MCNC: 5.8 G/DL — LOW (ref 6–8.3)
PROTHROM AB SERPL-ACNC: 15.7 SEC — HIGH (ref 9.8–12.7)
PROTHROM AB SERPL-ACNC: 18.3 SEC — HIGH (ref 9.8–12.7)
RAPID RVP RESULT: SIGNIFICANT CHANGE UP
RBC # BLD: 2.66 M/UL — LOW (ref 4.2–5.8)
RBC # BLD: 2.84 M/UL — LOW (ref 4.2–5.8)
RBC # FLD: 16.9 % — HIGH (ref 10.3–14.5)
RBC # FLD: 17.1 % — HIGH (ref 10.3–14.5)
RBC BLD AUTO: ABNORMAL
RH IG SCN BLD-IMP: POSITIVE — SIGNIFICANT CHANGE UP
SAO2 % BLDV: 38 % — LOW (ref 67–88)
SAO2 % BLDV: 43 % — LOW (ref 67–88)
SAO2 % BLDV: 74 % — SIGNIFICANT CHANGE UP (ref 67–88)
SODIUM SERPL-SCNC: 134 MMOL/L — LOW (ref 135–145)
SODIUM SERPL-SCNC: 137 MMOL/L — SIGNIFICANT CHANGE UP (ref 135–145)
TROPONIN T SERPL-MCNC: 0.12 NG/ML — HIGH (ref 0–0.06)
TROPONIN T SERPL-MCNC: 0.15 NG/ML — HIGH (ref 0–0.06)
VARIANT LYMPHS # BLD: 2 % — SIGNIFICANT CHANGE UP (ref 0–6)
WBC # BLD: 10.6 K/UL — HIGH (ref 3.8–10.5)
WBC # BLD: 14.1 K/UL — HIGH (ref 3.8–10.5)
WBC # FLD AUTO: 10.6 K/UL — HIGH (ref 3.8–10.5)
WBC # FLD AUTO: 14.1 K/UL — HIGH (ref 3.8–10.5)

## 2017-09-25 PROCEDURE — 71250 CT THORAX DX C-: CPT | Mod: 26

## 2017-09-25 PROCEDURE — 99291 CRITICAL CARE FIRST HOUR: CPT

## 2017-09-25 PROCEDURE — 99223 1ST HOSP IP/OBS HIGH 75: CPT

## 2017-09-25 PROCEDURE — 93308 TTE F-UP OR LMTD: CPT | Mod: 26

## 2017-09-25 PROCEDURE — 72125 CT NECK SPINE W/O DYE: CPT | Mod: 26

## 2017-09-25 PROCEDURE — 99291 CRITICAL CARE FIRST HOUR: CPT | Mod: GC

## 2017-09-25 PROCEDURE — 71010: CPT | Mod: 26

## 2017-09-25 PROCEDURE — 70450 CT HEAD/BRAIN W/O DYE: CPT | Mod: 26

## 2017-09-25 PROCEDURE — 99223 1ST HOSP IP/OBS HIGH 75: CPT | Mod: GC

## 2017-09-25 RX ORDER — LEVOTHYROXINE SODIUM 125 MCG
1 TABLET ORAL
Qty: 0 | Refills: 0 | COMMUNITY

## 2017-09-25 RX ORDER — MIDODRINE HYDROCHLORIDE 2.5 MG/1
10 TABLET ORAL THREE TIMES A DAY
Qty: 0 | Refills: 0 | Status: DISCONTINUED | OUTPATIENT
Start: 2017-09-25 | End: 2017-09-27

## 2017-09-25 RX ORDER — LEVOTHYROXINE SODIUM 125 MCG
12.5 TABLET ORAL DAILY
Qty: 0 | Refills: 0 | Status: DISCONTINUED | OUTPATIENT
Start: 2017-09-25 | End: 2017-10-10

## 2017-09-25 RX ORDER — VANCOMYCIN HCL 1 G
1000 VIAL (EA) INTRAVENOUS ONCE
Qty: 0 | Refills: 0 | Status: COMPLETED | OUTPATIENT
Start: 2017-09-25 | End: 2017-09-25

## 2017-09-25 RX ORDER — PIPERACILLIN AND TAZOBACTAM 4; .5 G/20ML; G/20ML
3.38 INJECTION, POWDER, LYOPHILIZED, FOR SOLUTION INTRAVENOUS ONCE
Qty: 0 | Refills: 0 | Status: COMPLETED | OUTPATIENT
Start: 2017-09-25 | End: 2017-09-25

## 2017-09-25 RX ORDER — ZINC SULFATE TAB 220 MG (50 MG ZINC EQUIVALENT) 220 (50 ZN) MG
1 TAB ORAL
Qty: 0 | Refills: 0 | COMMUNITY

## 2017-09-25 RX ORDER — ACETAMINOPHEN 500 MG
1000 TABLET ORAL ONCE
Qty: 0 | Refills: 0 | Status: COMPLETED | OUTPATIENT
Start: 2017-09-25 | End: 2017-09-25

## 2017-09-25 RX ORDER — SERTRALINE 25 MG/1
25 TABLET, FILM COATED ORAL DAILY
Qty: 0 | Refills: 0 | Status: DISCONTINUED | OUTPATIENT
Start: 2017-09-25 | End: 2017-09-25

## 2017-09-25 RX ORDER — TETANUS TOXOID, REDUCED DIPHTHERIA TOXOID AND ACELLULAR PERTUSSIS VACCINE, ADSORBED 5; 2.5; 8; 8; 2.5 [IU]/.5ML; [IU]/.5ML; UG/.5ML; UG/.5ML; UG/.5ML
0.5 SUSPENSION INTRAMUSCULAR ONCE
Qty: 0 | Refills: 0 | Status: COMPLETED | OUTPATIENT
Start: 2017-09-25 | End: 2017-09-25

## 2017-09-25 RX ORDER — SODIUM CHLORIDE 9 MG/ML
3 INJECTION INTRAMUSCULAR; INTRAVENOUS; SUBCUTANEOUS ONCE
Qty: 0 | Refills: 0 | Status: COMPLETED | OUTPATIENT
Start: 2017-09-25 | End: 2017-09-25

## 2017-09-25 RX ORDER — CALCIUM GLUCONATE 100 MG/ML
2 VIAL (ML) INTRAVENOUS ONCE
Qty: 0 | Refills: 0 | Status: COMPLETED | OUTPATIENT
Start: 2017-09-25 | End: 2017-09-25

## 2017-09-25 RX ORDER — LACTOBACILLUS ACIDOPHILUS 100MM CELL
1 CAPSULE ORAL DAILY
Qty: 0 | Refills: 0 | Status: DISCONTINUED | OUTPATIENT
Start: 2017-09-25 | End: 2017-10-17

## 2017-09-25 RX ORDER — PIPERACILLIN AND TAZOBACTAM 4; .5 G/20ML; G/20ML
3.38 INJECTION, POWDER, LYOPHILIZED, FOR SOLUTION INTRAVENOUS EVERY 12 HOURS
Qty: 0 | Refills: 0 | Status: DISCONTINUED | OUTPATIENT
Start: 2017-09-25 | End: 2017-10-01

## 2017-09-25 RX ORDER — ASPIRIN/CALCIUM CARB/MAGNESIUM 324 MG
81 TABLET ORAL DAILY
Qty: 0 | Refills: 0 | Status: DISCONTINUED | OUTPATIENT
Start: 2017-09-25 | End: 2017-10-17

## 2017-09-25 RX ORDER — HEPARIN SODIUM 5000 [USP'U]/ML
5000 INJECTION INTRAVENOUS; SUBCUTANEOUS EVERY 8 HOURS
Qty: 0 | Refills: 0 | Status: DISCONTINUED | OUTPATIENT
Start: 2017-09-25 | End: 2017-09-29

## 2017-09-25 RX ORDER — SACCHAROMYCES BOULARDII 250 MG
1 POWDER IN PACKET (EA) ORAL
Qty: 0 | Refills: 0 | COMMUNITY

## 2017-09-25 RX ORDER — MIDODRINE HYDROCHLORIDE 2.5 MG/1
10 TABLET ORAL ONCE
Qty: 0 | Refills: 0 | Status: COMPLETED | OUTPATIENT
Start: 2017-09-25 | End: 2017-09-25

## 2017-09-25 RX ORDER — SEVELAMER CARBONATE 2400 MG/1
1 POWDER, FOR SUSPENSION ORAL
Qty: 0 | Refills: 0 | COMMUNITY

## 2017-09-25 RX ORDER — NOREPINEPHRINE BITARTRATE/D5W 8 MG/250ML
0.01 PLASTIC BAG, INJECTION (ML) INTRAVENOUS
Qty: 8 | Refills: 0 | Status: DISCONTINUED | OUTPATIENT
Start: 2017-09-25 | End: 2017-09-25

## 2017-09-25 RX ORDER — DOCUSATE SODIUM 100 MG
1 CAPSULE ORAL
Qty: 0 | Refills: 0 | COMMUNITY

## 2017-09-25 RX ORDER — SODIUM CHLORIDE 9 MG/ML
500 INJECTION INTRAMUSCULAR; INTRAVENOUS; SUBCUTANEOUS ONCE
Qty: 0 | Refills: 0 | Status: COMPLETED | OUTPATIENT
Start: 2017-09-25 | End: 2017-09-25

## 2017-09-25 RX ORDER — SODIUM CHLORIDE 9 MG/ML
250 INJECTION INTRAMUSCULAR; INTRAVENOUS; SUBCUTANEOUS ONCE
Qty: 0 | Refills: 0 | Status: COMPLETED | OUTPATIENT
Start: 2017-09-25 | End: 2017-09-25

## 2017-09-25 RX ORDER — SERTRALINE 25 MG/1
1 TABLET, FILM COATED ORAL
Qty: 0 | Refills: 0 | COMMUNITY

## 2017-09-25 RX ORDER — NOREPINEPHRINE BITARTRATE/D5W 8 MG/250ML
0.01 PLASTIC BAG, INJECTION (ML) INTRAVENOUS
Qty: 16 | Refills: 0 | Status: DISCONTINUED | OUTPATIENT
Start: 2017-09-25 | End: 2017-09-28

## 2017-09-25 RX ADMIN — TETANUS TOXOID, REDUCED DIPHTHERIA TOXOID AND ACELLULAR PERTUSSIS VACCINE, ADSORBED 0.5 MILLILITER(S): 5; 2.5; 8; 8; 2.5 SUSPENSION INTRAMUSCULAR at 11:29

## 2017-09-25 RX ADMIN — PIPERACILLIN AND TAZOBACTAM 200 GRAM(S): 4; .5 INJECTION, POWDER, LYOPHILIZED, FOR SOLUTION INTRAVENOUS at 12:08

## 2017-09-25 RX ADMIN — SODIUM CHLORIDE 8.33 MILLILITER(S): 9 INJECTION INTRAMUSCULAR; INTRAVENOUS; SUBCUTANEOUS at 11:28

## 2017-09-25 RX ADMIN — Medication 400 GRAM(S): at 21:41

## 2017-09-25 RX ADMIN — SODIUM CHLORIDE 3 MILLILITER(S): 9 INJECTION INTRAMUSCULAR; INTRAVENOUS; SUBCUTANEOUS at 11:27

## 2017-09-25 RX ADMIN — Medication 400 MILLIGRAM(S): at 12:28

## 2017-09-25 RX ADMIN — Medication 1.41 MICROGRAM(S)/KG/MIN: at 20:01

## 2017-09-25 RX ADMIN — Medication 0.7 MICROGRAM(S)/KG/MIN: at 22:53

## 2017-09-25 RX ADMIN — SODIUM CHLORIDE 1000 MILLILITER(S): 9 INJECTION INTRAMUSCULAR; INTRAVENOUS; SUBCUTANEOUS at 12:29

## 2017-09-25 RX ADMIN — Medication 1000 MILLIGRAM(S): at 12:42

## 2017-09-25 RX ADMIN — PIPERACILLIN AND TAZOBACTAM 25 GRAM(S): 4; .5 INJECTION, POWDER, LYOPHILIZED, FOR SOLUTION INTRAVENOUS at 23:07

## 2017-09-25 RX ADMIN — HEPARIN SODIUM 5000 UNIT(S): 5000 INJECTION INTRAVENOUS; SUBCUTANEOUS at 21:43

## 2017-09-25 RX ADMIN — Medication 250 MILLIGRAM(S): at 12:33

## 2017-09-25 RX ADMIN — Medication 200 GRAM(S): at 20:21

## 2017-09-25 NOTE — CONSULT NOTE ADULT - SUBJECTIVE AND OBJECTIVE BOX
[facility] DIVISION OF KIDNEY DISEASES AND HYPERTENSION -- INITIAL CONSULT NOTE  --------------------------------------------------------------------------------  HPI:  Patient is a 88 y/o M w/ ESRD on HD M[facility], managed by Dr. 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  Patient is nonverbal at this time.  History provided by wife at bedside.  Wife reports that mental status is waxing and waning and has been worse over the past 2 days.  Usually wife drains fluid from patients pleurax three times weekly.  Today, pleurax fluid drained purulent, foul smelling fluid and was brought to the hospital.          PAST HISTORY  --------------------------------------------------------------------------------  PAST MEDICAL & SURGICAL HISTORY:  Myocardial infarct: h/o X3  GERD (gastroesophageal reflux disease)  Gout  AAA (abdominal aortic aneurysm): per cardiology eval report  Hemodialysis patient  Lymphoma, non-Hodgkin's: h/o diffuse B cell  Stroke: mild  Asthma: h/o  Pleural effusion  Hepatitis  Atrial fibrillation: 11/2014  Chronic kidney disease: on dialysis [facility]  Prostate cancer: s/p seeds  [place] ([place])  Kidney Stones s/p ureteroscopy  CAD (Coronary Artery Disease): 13 stents placed 3/2014  Hypercholesteremia  HTN (Hypertension)  S/P Radiation > 12 Weeks prostatic seeds placed 10 yrs ago  S/P Cataract Surgery  S/P Cystoscopy  S/P Inguinal Hernia  repair bilat  S/P Parathyroidectomy  S/P Hip Replacement bilat  S/P Cardiac Cath: 8 stents placed 3/2014, per pt he thinks he had 3 stents placed 15 years prior also.    FAMILY HISTORY:  No pertinent family history    PAST SOCIAL HISTORY:    ALLERGIES & MEDICATIONS  --------------------------------------------------------------------------------  Allergies    amlodipine (Rash)  Benadryl (Other)  Brilinta (Unknown)  Effient (Unknown)  Multaq (Other)    Intolerances      Standing Inpatient Medications    PRN Inpatient Medications      REVIEW OF SYSTEMS  --------------------------------------------------------------------------------  Patient is nonverbal and unable to provide history    All other systems were reviewed and are negative, except as noted.    VITALS/PHYSICAL EXAM  --------------------------------------------------------------------------------  T(C): 36.4 (09-25-17 @ 10:52), Max: 36.4 (09-25-17 @ 10:49)  HR: 70 (09-25-17 @ 15:15) (70 - 99)  BP: 93/53 (09-25-17 @ 15:15) (78/47 - 99/45)  RR: 24 (09-25-17 @ 15:15) (24 - 26)  SpO2: 100% (09-25-17 @ 15:15) (95% - 100%)  Wt(kg): --        Physical Exam:  	Gen: NAD  	HEENT: dry MM  	Pulm: decreased breath sounds at bases  	CV: RRR, S1S2; no rub  	Back: No spinal or CVA tenderness; no sacral edema  	Abd: +BS, soft, nontender/nondistended  	: No suprapubic tenderness  	UE: Warm, no edema  	LE: Warm, mild edema  	Neuro: nonverbal  	Skin: Warm, without rashes    LABS/STUDIES  --------------------------------------------------------------------------------              9.6    14.1  >-----------<  145      [09-25-17 @ 11:37]              30.2     137  |  89  |  82  ----------------------------<  85      [09-25-17 @ 11:37]  5.3   |  25  |  5.78        Ca     8.1     [09-25-17 @ 11:37]    TPro  5.8  /  Alb  2.5  /  TBili  1.0  /  DBili  x   /  AST  53  /  ALT  25  /  AlkPhos  222  [09-25-17 @ 11:37]    PT/INR: PT 15.7 , INR 1.44       [09-25-17 @ 11:37]  PTT: 30.2       [09-25-17 @ 11:37]    Troponin 0.15      [09-25-17 @ 11:37]        [09-25-17 @ 11:37]    Creatinine Trend:  SCr 5.78 [09-25 @ 11:37]  SCr 5.89 [09-18 @ 09:44]  SCr 4.42 [09-17 @ 05:40]  SCr 3.40 [09-16 @ 16:20]  SCr 5.33 [09-11 @ 10:51]

## 2017-09-25 NOTE — ED ADULT NURSE REASSESSMENT NOTE - NS ED NURSE REASSESS COMMENT FT1
patient is waiting for micu decision. md is aware of vs. patient continues to be aggravated. will continue to monitor.

## 2017-09-25 NOTE — CONSULT NOTE ADULT - SUBJECTIVE AND OBJECTIVE BOX
CHIEF COMPLAINT: fall, syncope     HPI:  88 yo M with a PMH ESRD on HD (MWF), AAA s/p repair, CAd s/p stent  (last 8 stents placed in 03/2014), Afib (not on A/C 2/2 falls), HFpEF (last EF 75% in 2015) s/p PPM and ablation 2016, severe aortic stenosis, severe mitral regurgitation, hypothyroidism, HTN, and DLBCL currently on Rituximab, recent hospitalization for increased leakage from pleurex presents for fall/?syncope at home.     PAST MEDICAL & SURGICAL HISTORY:  Myocardial infarct: h/o X3  GERD (gastroesophageal reflux disease)  Gout  AAA (abdominal aortic aneurysm): per cardiology eval report  Hemodialysis patient  Lymphoma, non-Hodgkin's: h/o diffuse B cell  Stroke: mild  Asthma: h/o  Pleural effusion  Hepatitis  Atrial fibrillation: 11/2014  Chronic kidney disease: on dialysis [facility]  Prostate cancer: s/p seeds  [place] (Hard of Hearing)  Kidney Stones s/p ureteroscopy  CAD (Coronary Artery Disease): 13 stents placed 3/2014  Hypercholesteremia  HTN (Hypertension)  S/P Radiation > 12 Weeks prostatic seeds placed 10 yrs ago  S/P Cataract Surgery  S/P Cystoscopy  S/P Inguinal Hernia  repair bilat  S/P Parathyroidectomy  S/P Hip Replacement bilat  S/P Cardiac Cath: 8 stents placed 3/2014, per pt he thinks he had 3 stents placed 15 years prior also.      FAMILY HISTORY:  No pertinent family history      SOCIAL HISTORY:  Smoking: [ ] Never Smoked [ ] Former Smoker (__ packs x ___ years) [ ] Current Smoker  (__ packs x ___ years)  Substance Use: [ ] Never Used [ ] Used ____  EtOH Use:  Marital Status: [ ] Single [ ]  [ ]  [ ]   Sexual History:   Occupation:  Recent Travel:  Country of Birth:  Advance Directives:    Allergies  amlodipine (Rash)  Benadryl (Other)  Brilinta (Unknown)  Effient (Unknown)  Multaq (Other)    HOME MEDICATIONS:    REVIEW OF SYSTEMS:  Constitutional: [ ] negative [ ] fevers [ ] chills [ ] weight loss [ ] weight gain  HEENT: [ ] negative [ ] dry eyes [ ] eye irritation [ ] postnasal drip [ ] nasal congestion  CV: [ ] negative  [ ] chest pain [ ] orthopnea [ ] palpitations [ ] murmur  Resp: [ ] negative [ ] cough [ ] shortness of breath [ ] dyspnea [ ] wheezing [ ] sputum [ ] hemoptysis  GI: [ ] negative [ ] nausea [ ] vomiting [ ] diarrhea [ ] constipation [ ] abd pain [ ] dysphagia   : [ ] negative [ ] dysuria [ ] nocturia [ ] hematuria [ ] increased urinary frequency  Musculoskeletal: [ ] negative [ ] back pain [ ] myalgias [ ] arthralgias [ ] fracture  Skin: [ ] negative [ ] rash [ ] itch  Neurological: [ ] negative [ ] headache [ ] dizziness [ ] syncope [ ] weakness [ ] numbness  Psychiatric: [ ] negative [ ] anxiety [ ] depression  Endocrine: [ ] negative [ ] diabetes [ ] thyroid problem  Hematologic/Lymphatic: [ ] negative [ ] anemia [ ] bleeding problem  Allergic/Immunologic: [ ] negative [ ] itchy eyes [ ] nasal discharge [ ] hives [ ] angioedema  [ ] All other systems negative  [ ] Unable to assess ROS because ________    OBJECTIVE:  ICU Vital Signs Last 24 Hrs  T(C): 36.4 (25 Sep 2017 10:52), Max: 36.4 (25 Sep 2017 10:49)  T(F): 97.6 (25 Sep 2017 10:52), Max: 97.6 (25 Sep 2017 10:52)  HR: 71 (25 Sep 2017 13:20) (70 - 99)  BP: 83/68 (25 Sep 2017 13:20) (78/47 - 99/45)  BP(mean): --  ABP: --  ABP(mean): --  RR: 26 (25 Sep 2017 13:20) (24 - 26)  SpO2: 97% (25 Sep 2017 13:20) (95% - 100%)      PHYSICAL EXAM:  General:   HEENT:   Lymph Nodes:  Neck:   Respiratory:   Cardiovascular:   Abdomen:   Extremities:   Skin:   Neurological:  Psychiatry:    LINES:     HOSPITAL MEDICATIONS:  Standing Meds:  midodrine 10 milliGRAM(s) Oral Once        LABS:                        9.6    14.1  )-----------( 145      ( 25 Sep 2017 11:37 )             30.2     Hgb Trend: 9.6<--  09-25    137  |  89<L>  |  82<H>  ----------------------------<  85  5.3   |  25  |  5.78<H>    Ca    8.1<L>      25 Sep 2017 11:37    TPro  5.8<L>  /  Alb  2.5<L>  /  TBili  1.0  /  DBili  x   /  AST  53<H>  /  ALT  25  /  AlkPhos  222<H>  09-25    Creatinine Trend: 5.78<--, 5.89<--, 4.42<--, 3.40<--, 5.33<--, 7.24<--  PT/INR - ( 25 Sep 2017 11:37 )   PT: 15.7 sec;   INR: 1.44 ratio         PTT - ( 25 Sep 2017 11:37 )  PTT:30.2 sec      Venous Blood Gas:  09-25 @ 11:37  7.41/44/26/28/38  VBG Lactate: 4.9      MICROBIOLOGY:     RADIOLOGY:  [ ] Reviewed and interpreted by me    EKG: CHIEF COMPLAINT: fall, syncope     HPI:  86 yo M with a PMH ESRD on HD (MWF), AAA s/p repair, CAd s/p stent  (last 8 stents placed in 03/2014), Afib (not on A/C 2/2 falls), HFpEF (last EF 75% in 2015) s/p PPM and ablation 2016, severe aortic stenosis, severe mitral regurgitation, hypothyroidism, HTN, and DLBCL currently on Rituximab, recent hospitalization for increased leakage from pleurex presents for fall/?syncope at home.   History obtained from pt's wife and son at bedside; pt unable to contribute to his history. Per wife, pt was sitting at table and then slumped over - unclear per wife, if he fell asleep or lost consciousness; he then fell off the table onto his side, with skin tears and bruises. The VNS nurse helped patient up and she took a BP, which was 70/40 per report. VNS also drained pleurex today, and per wife, stated that it was cloudy in appearance and malodorous. Per wife, noted decreased PO intake the past 2 days, has been a little more confused/delirious than usual, and also endorsing diarrhea/soft stool, up to 6 time per day with some blood on wiping/cleaning (known hemorrhoids) but without active bleeding. Per wife, did not notice any fevers, chills, no cough, vomiting; has not complained about chest pain, SOB, headache.   Per wife, pt is baseline ambulatory, washes/uses toilet by himself and able to fed himself. Baseline has waxing/waning mental status, but currently much more altered than usual.         PAST MEDICAL & SURGICAL HISTORY:  Myocardial infarct: h/o X3  GERD (gastroesophageal reflux disease)  Gout  AAA (abdominal aortic aneurysm): per cardiology eval report  Hemodialysis patient  Lymphoma, non-Hodgkin's: h/o diffuse B cell  Stroke: mild  Asthma: h/o  Pleural effusion  Hepatitis  Atrial fibrillation: 11/2014  Chronic kidney disease: on dialysis MWF  Prostate cancer: s/p seeds  [place] ([place])  Kidney Stones s/p ureteroscopy  CAD (Coronary Artery Disease): 13 stents placed 3/2014  Hypercholesteremia  HTN (Hypertension)  S/P Radiation > 12 Weeks prostatic seeds placed 10 yrs ago  S/P Cataract Surgery  S/P Cystoscopy  S/P Inguinal Hernia  repair bilat  S/P Parathyroidectomy  S/P Hip Replacement bilat  S/P Cardiac Cath: 8 stents placed 3/2014, per pt he thinks he had 3 stents placed 15 years prior also.      FAMILY HISTORY:  No pertinent family history      SOCIAL HISTORY:  Smoking: [ X Never Smoked [ ] Former Smoker (__ packs x ___ years) [ ] Current Smoker  (__ packs x ___ years)  Substance Use: [ ] Never Used [ ] Used ____  EtOH Use: no eoth use   Marital Status: [ ] Single [X]  [ ]  [ ]   Advance Directives: wife his HCP    Allergies  amlodipine (Rash)  Benadryl (Other)  Brilinta (Unknown)  Effient (Unknown)  Multaq (Other)    HOME MEDICATIONS:    REVIEW OF SYSTEMS:  Constitutional: [ ] negative [ ] fevers [ ] chills [ ] weight loss [ ] weight gain  HEENT: [ ] negative [ ] dry eyes [ ] eye irritation [ ] postnasal drip [ ] nasal congestion  CV: [ ] negative  [ ] chest pain [ ] orthopnea [ ] palpitations [ ] murmur  Resp: [ ] negative [ ] cough [ ] shortness of breath [ ] dyspnea [ ] wheezing [ ] sputum [ ] hemoptysis  GI: [ ] negative [ ] nausea [ ] vomiting [ ] diarrhea [ ] constipation [ ] abd pain [ ] dysphagia   : [ ] negative [ ] dysuria [ ] nocturia [ ] hematuria [ ] increased urinary frequency  Musculoskeletal: [ ] negative [ ] back pain [ ] myalgias [ ] arthralgias [ ] fracture  Skin: [ ] negative [ ] rash [ ] itch  Neurological: [ ] negative [ ] headache [ ] dizziness [ ] syncope [ ] weakness [ ] numbness  Psychiatric: [ ] negative [ ] anxiety [ ] depression  Endocrine: [ ] negative [ ] diabetes [ ] thyroid problem  Hematologic/Lymphatic: [ ] negative [ ] anemia [ ] bleeding problem  Allergic/Immunologic: [ ] negative [ ] itchy eyes [ ] nasal discharge [ ] hives [ ] angioedema  [ ] All other systems negative  [X] Unable to assess ROS because pt altered     OBJECTIVE:  ICU Vital Signs Last 24 Hrs  T(C): 36.4 (25 Sep 2017 10:52), Max: 36.4 (25 Sep 2017 10:49)  T(F): 97.6 (25 Sep 2017 10:52), Max: 97.6 (25 Sep 2017 10:52)  HR: 71 (25 Sep 2017 13:20) (70 - 99)  BP: 83/68 (25 Sep 2017 13:20) (78/47 - 99/45)  BP(mean): --  ABP: --  ABP(mean): --  RR: 26 (25 Sep 2017 13:20) (24 - 26)  SpO2: 97% (25 Sep 2017 13:20) (95% - 100%)      PHYSICAL EXAM:  General: somnolent, but able to be aroused   HEENT: AARON, does not follow commands   Neck: supple, no JVD  Respiratory:   Cardiovascular: S1 S2 RRR +holosystolic murmur    Abdomen: soft, NT, ND +BS  Extremities: trace edema   Skin: pale, scattered ecchymoses   Neurological: alerts to name, does not answer questions or follow commands   Psychiatry: unable to assess     LINES:     HOSPITAL MEDICATIONS:  Standing Meds:  midodrine 10 milliGRAM(s) Oral Once        LABS:                        9.6    14.1  )-----------( 145      ( 25 Sep 2017 11:37 )             30.2     Hgb Trend: 9.6<--  09-25    137  |  89<L>  |  82<H>  ----------------------------<  85  5.3   |  25  |  5.78<H>    Ca    8.1<L>      25 Sep 2017 11:37    TPro  5.8<L>  /  Alb  2.5<L>  /  TBili  1.0  /  DBili  x   /  AST  53<H>  /  ALT  25  /  AlkPhos  222<H>  09-25    Creatinine Trend: 5.78<--, 5.89<--, 4.42<--, 3.40<--, 5.33<--, 7.24<--  PT/INR - ( 25 Sep 2017 11:37 )   PT: 15.7 sec;   INR: 1.44 ratio         PTT - ( 25 Sep 2017 11:37 )  PTT:30.2 sec      Venous Blood Gas:  09-25 @ 11:37  7.41/44/26/28/38  VBG Lactate: 4.9      MICROBIOLOGY:   Blood cultures, pleural cultures pending     RADIOLOGY:  [x] Reviewed and interpreted by me    EKG: paced

## 2017-09-25 NOTE — H&P ADULT - NSHPLABSRESULTS_GEN_ALL_CORE
Complete Blood Count + Automated Diff (09.25.17 @ 11:37)    WBC Count: 14.1 K/uL    RBC Count: 2.84 M/uL    Hemoglobin: 9.6 g/dL    Hematocrit: 30.2 %    Mean Cell Volume: 106.0 fl    Mean Cell Hemoglobin: 33.8 pg    Mean Cell Hemoglobin Conc: 31.8 gm/dL    Red Cell Distrib Width: 16.9 %    Platelet Count - Automated: 145 K/uL    Auto Neutrophil #: 12.8 K/uL    Auto Lymphocyte #: 0.3 K/uL    Auto Monocyte #: 1.0 K/uL    Auto Eosinophil #: 0.0 K/uL    Auto Basophil #: 0.0 K/uL    Auto Neutrophil %: 70.0: Differential percentages must be correlated with absolute numbers for  clinical significance. %    Auto Monocyte %: 11.0 %    Auto Eosinophil %: 1.0 % Complete Blood Count + Automated Diff (09.25.17 @ 11:37)    WBC Count: 14.1 K/uL    RBC Count: 2.84 M/uL    Hemoglobin: 9.6 g/dL    Hematocrit: 30.2 %    Mean Cell Volume: 106.0 fl    Mean Cell Hemoglobin: 33.8 pg    Mean Cell Hemoglobin Conc: 31.8 gm/dL    Red Cell Distrib Width: 16.9 %    Platelet Count - Automated: 145 K/uL    Auto Neutrophil #: 12.8 K/uL    Auto Lymphocyte #: 0.3 K/uL    Auto Monocyte #: 1.0 K/uL    Auto Eosinophil #: 0.0 K/uL    Auto Basophil #: 0.0 K/uL    Auto Neutrophil %: 70.0: Differential percentages must be correlated with absolute numbers for  clinical significance. %    Auto Monocyte %: 11.0 %    Auto Eosinophil %: 1.0 %    09-25    137  |  89<L>  |  82<H>  ----------------------------<  85  5.3   |  25  |  5.78<H>    Ca    8.1<L>      25 Sep 2017 11:37    TPro  5.8<L>  /  Alb  2.5<L>  /  TBili  1.0  /  DBili  x   /  AST  53<H>  /  ALT  25  /  AlkPhos  222<H>  09-25    CARDIAC MARKERS ( 25 Sep 2017 11:37 )  x     / 0.15 ng/mL / 189 U/L / x     / x

## 2017-09-25 NOTE — H&P ADULT - PROBLEM SELECTOR PLAN 1
pt with tachycardia, elevated WBC count with bandemia of 16% and tachypnea with hypotension to 70/40s (with baseline BP ~95/45); given turbid pleural fluid, concern for possible empyema, infected pleural effusion  - will admit to MICU for shock  - start pressors (levophed) if MAP<65  - will c/w vanco by level and zosyn (renally dosed) for now   - blood cultures and pleural cultures pending   - will US pt at bedside when arrives on unit   - f/u CT chest pt with tachycardia, elevated WBC count with bandemia of 16% and tachypnea with hypotension to 70/40s (with baseline BP ~95/45); given turbid pleural fluid, concern for possible empyema, infected pleural effusion  - will admit to MICU for shock  - start pressors (levophed) if MAP<65  - will c/w vanco by level and zosyn (renally dosed) for now   - blood cultures and pleural cultures pending   - will US pt at bedside when arrives on unit   - f/u CT chest  - remove pleurex catheter   - thoracici surgery following  - UA and urine cultures

## 2017-09-25 NOTE — CONSULT NOTE ADULT - ASSESSMENT
88 yo M with multiple medical problems including DLBCL on Rituxan, malignant pleural effusion s/p left pleurx, ESRD on HD, chronic hypotension on Midodrine presents with lethargy, hypotension and cloudy pleural fluid drainage. History as per chart and family.   Received broad spectrum antibiotics and 2L NS for presumed sepsis 2/2 empyema. Evaluated by MICU and admitted for septic shock.     A/P: Septic shock - likely from empyema in the setting of DLBCL on Rituxan, indwelling pleural catheter and cloudy, malodorous pleural fluid. CT chest with moderate sized pleural effusion and small loculated left pleural effusion.  Agree with MICU team - broad spectrum antibiotics, awaiting cultures from pleural fluid and blood. Would send UA as well.  BP support with pressors.   Thoracic surgery service following - if pleural space is infected, will need to remove pleurx.  DVT prophylaxis.    Care as per MICU team.  Dr. Tran to follow up. 88 yo M with multiple medical problems including DLBCL on Rituxan, malignant pleural effusion s/p left pleurx, ESRD on HD, chronic hypotension on Midodrine presents with lethargy, hypotension and cloudy pleural fluid drainage. History as per chart and family.   Received broad spectrum antibiotics and 2L NS for presumed sepsis 2/2 empyema. Evaluated by MICU and admitted for severe sepsis.     A/P: Septic shock - likely from empyema in the setting of DLBCL on Rituxan, indwelling pleural catheter and cloudy, malodorous pleural fluid. CT chest with moderate sized pleural effusion and small loculated left pleural effusion.  Agree with MICU team - broad spectrum antibiotics, awaiting cultures from pleural fluid and blood. Would send UA as well.  BP support - stabilized s/p IVFs and antibiotics.  Thoracic surgery service following - if pleural space is infected, will need to remove pleurx.  DVT prophylaxis.    Care as per MICU team.  Dr. Tran to follow up.

## 2017-09-25 NOTE — ED ADULT NURSE REASSESSMENT NOTE - NS ED NURSE REASSESS COMMENT FT1
patient was seen by nephrology team. patient remains on cm. md is updated on vs. family is at the bedside. waiting for icu consult. will continue to monitor.

## 2017-09-25 NOTE — ED PROVIDER NOTE - PHYSICAL EXAMINATION
Arleen: A & O x 1, NAD, HEENT with poor dentition and dry tongue and no facial asymmetry; lungs with decreased sounds at left base CTAB, heart with reg rhythm; abdomen soft NTND; extremities with no edema; skin with no rashes, neuro exam non focal with no motor or sensory deficits

## 2017-09-25 NOTE — ED ADULT NURSE NOTE - OBJECTIVE STATEMENT
87 y m came to the ed by ems after an unwitnessed fall. ems reports patient fell from a chair. patient states he did not hit his head or lose consciousness. son states patient is more agitated than usual and has recently been more confused than usual. ems reports home nurse took 350cc of fluid from patients pleura vac located on the patients left chest. patient is a/ox2 aware of his name and location. patient denies any pain. abdomen is soft and nontender. patient is able to move all extremities. patient follows some commands. skin is cool and dry. patient has ecchymosis on lower back. patient has excoriation to his buttocks and scrotum.

## 2017-09-25 NOTE — CONSULT NOTE ADULT - SUBJECTIVE AND OBJECTIVE BOX
CHIEF COMPLAINT: Lethargy, cloudy pleural fluid drainage from left pleurx    HPI: 88 yo M with multiple medical problems including DLBCL on Rituxan, malignant pleural effusion s/p left pleurx, ESRD on HD, chronic hypotension on Midodrine presents with lethargy, hypotension and cloudy pleural fluid drainage. History as per chart and family.   Received broad spectrum antibiotics and 2L NS for presumed sepsis 2/2 empyema. Evaluated by MICU and admitted for septic shock.     PAST MEDICAL & SURGICAL HISTORY:  Myocardial infarct: h/o X3  GERD (gastroesophageal reflux disease)  Gout  AAA (abdominal aortic aneurysm): per cardiology eval report  Hemodialysis patient  Lymphoma, non-Hodgkin's: h/o diffuse B cell  Stroke: mild  Asthma: h/o  Pleural effusion  Hepatitis  Atrial fibrillation: 11/2014  Chronic kidney disease: on dialysis MWF  Prostate cancer: s/p seeds  [place] ([place]  Kidney Stones s/p ureteroscopy  CAD (Coronary Artery Disease): 13 stents placed 3/2014  Hypercholesteremia  HTN (Hypertension)  S/P Radiation > 12 Weeks prostatic seeds placed 10 yrs ago  S/P Cataract Surgery  S/P Cystoscopy  S/P Inguinal Hernia  repair bilat  S/P Parathyroidectomy  S/P Hip Replacement bilat  S/P Cardiac Cath: 8 stents placed 3/2014, per pt he thinks he had 3 stents placed 15 years prior also.      FAMILY HISTORY:  No pertinent family history      SOCIAL HISTORY:  Smoking: [ ] Never Smoked [ ] Former Smoker (__ packs x ___ years) [ ] Current Smoker  (__ packs x ___ years)  Substance Use: [ ] Never Used [ ] Used ____  EtOH Use:  Marital Status: [ ] Single [x ]  [ ]  [ ]   Occupation:  Recent Travel:  Country of Birth:  Advance Directives:    Allergies    amlodipine (Rash)  Benadryl (Other)  Brilinta (Unknown)  Effient (Unknown)  Multaq (Other)    Intolerances        HOME MEDICATIONS: reviewed in H&P    REVIEW OF SYSTEMS:  Constitutional: [ ] fevers [ ] chills [ ] weight loss [ ] weight gain  HEENT: [ ] dry eyes [ ] eye irritation [ ] postnasal drip [ ] nasal congestion  CV: [ ] chest pain [ ] orthopnea [ ] palpitations [ ] murmur  Resp: [ ] cough [ ] shortness of breath [ ] wheezing [ ] sputum [ ] hemoptysis  GI: [ ] nausea [ ] vomiting [ ] diarrhea [ ] constipation [ ] abd pain [ ] dysphagia   : [ ] dysuria [ ] nocturia [ ] hematuria [ ] increased urinary frequency  Musculoskeletal: [ ] myalgias [ ] arthralgias   Skin: [ ] rash [ ] itch  Neurological: [ ] headache [ ] dizziness [ ] syncope [ ] weakness [ ] numbness  Psychiatric: [ ] anxiety [ ] depression  Endocrine: [ ] diabetes [ ] thyroid problem  Hematologic/Lymphatic: [ ] anemia [ ] bleeding problem  Allergic/Immunologic: [ ] itchy eyes [ ] nasal discharge [ ] hives [ ] angioedema  [ ] All other systems negative  [ x] Unable to assess ROS because ____AMS____    OBJECTIVE:  ICU Vital Signs Last 24 Hrs  T(C): 35.8 (25 Sep 2017 17:23), Max: 36.4 (25 Sep 2017 10:49)  T(F): 96.4 (25 Sep 2017 17:23), Max: 97.6 (25 Sep 2017 10:52)  HR: 70 (25 Sep 2017 17:46) (70 - 99)  BP: 101/50 (25 Sep 2017 17:46) (78/47 - 101/50)  BP(mean): 69 (25 Sep 2017 17:46) (69 - 72)  ABP: --  ABP(mean): --  RR: 35 (25 Sep 2017 17:46) (24 - 41)  SpO2: 97% (25 Sep 2017 16:46) (95% - 100%)        CAPILLARY BLOOD GLUCOSE          PHYSICAL EXAM:  General:  lethargic but arousable to tactile stimulus  HEENT:  NC/AT  Lymph Nodes: No cervical or supraclavicular lymphadenopathy   Neck: Supple  Respiratory:  CTA B/L, no wheezes, crackles or rhonchi  Cardiovascular:  RRR, no m/r/g  Abdomen: soft, NT/ND, +BS  Extremities: no clubbing, cyanosis or edema, warm  Skin: no rash  Neurological:  lethargic but arousable to tactile stimulus    HOSPITAL MEDICATIONS:  MEDICATIONS  (STANDING):  heparin  Injectable 5000 Unit(s) SubCutaneous every 8 hours  piperacillin/tazobactam IVPB. 3.375 Gram(s) IV Intermittent every 12 hours  levothyroxine Injectable 12.5 MICROGram(s) IV Push daily  midodrine 10 milliGRAM(s) Oral three times a day  lactobacillus acidophilus 1 Tablet(s) Oral daily  sertraline 25 milliGRAM(s) Oral daily  aspirin enteric coated 81 milliGRAM(s) Oral daily    MEDICATIONS  (PRN):      LABS:                        9.6    14.1  )-----------( 145      ( 25 Sep 2017 11:37 )             30.2     Hgb Trend: 9.6<--  09-25    137  |  89<L>  |  82<H>  ----------------------------<  85  5.3   |  25  |  5.78<H>    Ca    8.1<L>      25 Sep 2017 11:37    TPro  5.8<L>  /  Alb  2.5<L>  /  TBili  1.0  /  DBili  x   /  AST  53<H>  /  ALT  25  /  AlkPhos  222<H>  09-25    Creatinine Trend: 5.78<--, 5.89<--, 4.42<--, 3.40<--, 5.33<--, 7.24<--  PT/INR - ( 25 Sep 2017 11:37 )   PT: 15.7 sec;   INR: 1.44 ratio         PTT - ( 25 Sep 2017 11:37 )  PTT:30.2 sec      Venous Blood Gas:  09-25 @ 14:53  7.43/40/28/26/43  VBG Lactate: 3.6  Venous Blood Gas:  09-25 @ 11:37  7.41/44/26/28/38  VBG Lactate: 4.9      MICROBIOLOGY:     RADIOLOGY:  [x ] Reviewed and interpreted by me  < from: CT Chest No Cont (09.25.17 @ 16:31) >  EXAM:  CT CHEST                            PROCEDURE DATE:  09/25/2017            INTERPRETATION:  CLINICAL INFORMATION: Hypotension with leukocytosis.   History of non-Hodgkin's lymphoma.    COMPARISON: Chest CT on 7/13/2017.    PROCEDURE:   CT ofthe Chest was performed without intravenous contrast.  Sagittal and coronal reformats were performed.      FINDINGS:    CHEST:     LUNGS AND LARGE AIRWAYS: Patent central airways. Bilateral interlobular   septal thickening with ground glass opacities.  mostly in the right upper   lobe. 6 mm right lower lobe nodule stable since 2014.  PLEURA: Interval placement of a left Pleurx catheter with decrease in   size of the effusion, now loculated. Moderate right pleural effusion, new.  VESSELS: Extensive atherosclerotic calcifications of the thoracic aorta.  HEART: Heart size is enlarged with left atrial enlargement. No   pericardial effusion. Coronary artery calcifications/stents. Aortic   valvular calcification.  MEDIASTINUM AND LUIS: A 2.0 cm lowerparatracheal lymph node with   multiple additional subcentimeter mediastinal adenopathy, likely   reactive. Multiple nonenlarged supraclavicular lymph nodes.  CHEST WALL AND LOWER NECK: Left chest wall pacemaker. Asymmetric   gynecomastia, more   pronounced on the left than on the right, similar to the prior studies   dating back to 2014.   VISUALIZED UPPER ABDOMEN: Status post repair of the abdominal aorta,   partially imaged. Atrophic kidneys with multiple calcifications and   incompletely evaluated hypodensities. 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   Hepatic hypodensities, similar to the prior study, likely cysts.   BONES: Degenerative changes of the spine. Renal osteodystrophy.       IMPRESSION: Pulmonary edema with bilateral pleural effusions, moderate on   right and small but loculated on the left.    < end of copied text >      Please call [phone] between 8am-pm weekdays, [phone] after hours and weekends. CHIEF COMPLAINT: Lethargy, cloudy pleural fluid drainage from left pleurx    HPI: 88 yo M with multiple medical problems including DLBCL on Rituxan, malignant pleural effusion s/p left pleurx, ESRD on HD, chronic hypotension on Midodrine presents with lethargy, hypotension and cloudy pleural fluid drainage. History as per chart and family.   Received broad spectrum antibiotics and 2L NS for presumed sepsis 2/2 empyema. Evaluated by MICU and admitted for severe sepsis.     PAST MEDICAL & SURGICAL HISTORY:  Myocardial infarct: h/o X3  GERD (gastroesophageal reflux disease)  Gout  AAA (abdominal aortic aneurysm): per cardiology eval report  Hemodialysis patient  Lymphoma, non-Hodgkin's: h/o diffuse B cell  Stroke: mild  Asthma: h/o  Pleural effusion  Hepatitis  Atrial fibrillation: 11/2014  Chronic kidney disease: on dialysis MWF  Prostate cancer: s/p seeds  [place] (Hard of Hearing)  Kidney Stones s/p ureteroscopy  CAD (Coronary Artery Disease): 13 stents placed 3/2014  Hypercholesteremia  HTN (Hypertension)  S/P Radiation > 12 Weeks prostatic seeds placed 10 yrs ago  S/P Cataract Surgery  S/P Cystoscopy  S/P Inguinal Hernia  repair bilat  S/P Parathyroidectomy  S/P Hip Replacement bilat  S/P Cardiac Cath: 8 stents placed 3/2014, per pt he thinks he had 3 stents placed 15 years prior also.      FAMILY HISTORY:  No pertinent family history      SOCIAL HISTORY:  Smoking: [ ] Never Smoked [ ] Former Smoker (__ packs x ___ years) [ ] Current Smoker  (__ packs x ___ years)  Substance Use: [ ] Never Used [ ] Used ____  EtOH Use:  Marital Status: [ ] Single [x ]  [ ]  [ ]   Occupation:  Recent Travel:  Country of Birth:  Advance Directives:    Allergies    amlodipine (Rash)  Benadryl (Other)  Brilinta (Unknown)  Effient (Unknown)  Multaq (Other)    Intolerances        HOME MEDICATIONS: reviewed in H&P    REVIEW OF SYSTEMS:  Constitutional: [ ] fevers [ ] chills [ ] weight loss [ ] weight gain  HEENT: [ ] dry eyes [ ] eye irritation [ ] postnasal drip [ ] nasal congestion  CV: [ ] chest pain [ ] orthopnea [ ] palpitations [ ] murmur  Resp: [ ] cough [ ] shortness of breath [ ] wheezing [ ] sputum [ ] hemoptysis  GI: [ ] nausea [ ] vomiting [ ] diarrhea [ ] constipation [ ] abd pain [ ] dysphagia   : [ ] dysuria [ ] nocturia [ ] hematuria [ ] increased urinary frequency  Musculoskeletal: [ ] myalgias [ ] arthralgias   Skin: [ ] rash [ ] itch  Neurological: [ ] headache [ ] dizziness [ ] syncope [ ] weakness [ ] numbness  Psychiatric: [ ] anxiety [ ] depression  Endocrine: [ ] diabetes [ ] thyroid problem  Hematologic/Lymphatic: [ ] anemia [ ] bleeding problem  Allergic/Immunologic: [ ] itchy eyes [ ] nasal discharge [ ] hives [ ] angioedema  [ ] All other systems negative  [ x] Unable to assess ROS because ____AMS____    OBJECTIVE:  ICU Vital Signs Last 24 Hrs  T(C): 35.8 (25 Sep 2017 17:23), Max: 36.4 (25 Sep 2017 10:49)  T(F): 96.4 (25 Sep 2017 17:23), Max: 97.6 (25 Sep 2017 10:52)  HR: 70 (25 Sep 2017 17:46) (70 - 99)  BP: 101/50 (25 Sep 2017 17:46) (78/47 - 101/50)  BP(mean): 69 (25 Sep 2017 17:46) (69 - 72)  ABP: --  ABP(mean): --  RR: 35 (25 Sep 2017 17:46) (24 - 41)  SpO2: 97% (25 Sep 2017 16:46) (95% - 100%)        CAPILLARY BLOOD GLUCOSE          PHYSICAL EXAM:  General:  lethargic but arousable to tactile stimulus  HEENT:  NC/AT  Lymph Nodes: No cervical or supraclavicular lymphadenopathy   Neck: Supple  Respiratory:  CTA B/L, no wheezes, crackles or rhonchi  Cardiovascular:  RRR, no m/r/g  Abdomen: soft, NT/ND, +BS  Extremities: no clubbing, cyanosis or edema, warm  Skin: no rash  Neurological:  lethargic but arousable to tactile stimulus    HOSPITAL MEDICATIONS:  MEDICATIONS  (STANDING):  heparin  Injectable 5000 Unit(s) SubCutaneous every 8 hours  piperacillin/tazobactam IVPB. 3.375 Gram(s) IV Intermittent every 12 hours  levothyroxine Injectable 12.5 MICROGram(s) IV Push daily  midodrine 10 milliGRAM(s) Oral three times a day  lactobacillus acidophilus 1 Tablet(s) Oral daily  sertraline 25 milliGRAM(s) Oral daily  aspirin enteric coated 81 milliGRAM(s) Oral daily    MEDICATIONS  (PRN):      LABS:                        9.6    14.1  )-----------( 145      ( 25 Sep 2017 11:37 )             30.2     Hgb Trend: 9.6<--  09-25    137  |  89<L>  |  82<H>  ----------------------------<  85  5.3   |  25  |  5.78<H>    Ca    8.1<L>      25 Sep 2017 11:37    TPro  5.8<L>  /  Alb  2.5<L>  /  TBili  1.0  /  DBili  x   /  AST  53<H>  /  ALT  25  /  AlkPhos  222<H>  09-25    Creatinine Trend: 5.78<--, 5.89<--, 4.42<--, 3.40<--, 5.33<--, 7.24<--  PT/INR - ( 25 Sep 2017 11:37 )   PT: 15.7 sec;   INR: 1.44 ratio         PTT - ( 25 Sep 2017 11:37 )  PTT:30.2 sec      Venous Blood Gas:  09-25 @ 14:53  7.43/40/28/26/43  VBG Lactate: 3.6  Venous Blood Gas:  09-25 @ 11:37  7.41/44/26/28/38  VBG Lactate: 4.9      MICROBIOLOGY:     RADIOLOGY:  [x ] Reviewed and interpreted by me  < from: CT Chest No Cont (09.25.17 @ 16:31) >  EXAM:  CT CHEST                            PROCEDURE DATE:  09/25/2017            INTERPRETATION:  CLINICAL INFORMATION: Hypotension with leukocytosis.   History of non-Hodgkin's lymphoma.    COMPARISON: Chest CT on 7/13/2017.    PROCEDURE:   CT ofthe Chest was performed without intravenous contrast.  Sagittal and coronal reformats were performed.      FINDINGS:    CHEST:     LUNGS AND LARGE AIRWAYS: Patent central airways. Bilateral interlobular   septal thickening with ground glass opacities.  mostly in the right upper   lobe. 6 mm right lower lobe nodule stable since 2014.  PLEURA: Interval placement of a left Pleurx catheter with decrease in   size of the effusion, now loculated. Moderate right pleural effusion, new.  VESSELS: Extensive atherosclerotic calcifications of the thoracic aorta.  HEART: Heart size is enlarged with left atrial enlargement. No   pericardial effusion. Coronary artery calcifications/stents. Aortic   valvular calcification.  MEDIASTINUM AND LUIS: A 2.0 cm lowerparatracheal lymph node with   multiple additional subcentimeter mediastinal adenopathy, likely   reactive. Multiple nonenlarged supraclavicular lymph nodes.  CHEST WALL AND LOWER NECK: Left chest wall pacemaker. Asymmetric   gynecomastia, more   pronounced on the left than on the right, similar to the prior studies   dating back to 2014.   VISUALIZED UPPER ABDOMEN: Status post repair of the abdominal aorta,   partially imaged. Atrophic kidneys with multiple calcifications and   incompletely evaluated hypodensities. 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   Hepatic hypodensities, similar to the prior study, likely cysts.   BONES: Degenerative changes of the spine. Renal osteodystrophy.       IMPRESSION: Pulmonary edema with bilateral pleural effusions, moderate on   right and small but loculated on the left.    < end of copied text >      Please call [phone] between 8am-pm weekdays, [phone] after hours and weekends.

## 2017-09-25 NOTE — H&P ADULT - PROBLEM SELECTOR PLAN 4
persistent pleural effusion nephrology following  holding HD today (due MWF); may dialyze tomorrow   if tolerating PO, midodrine prior to HD

## 2017-09-25 NOTE — H&P ADULT - PMH
AAA (abdominal aortic aneurysm)  per cardiology eval report  Asthma  h/o  Atrial fibrillation  11/2014  CAD (Coronary Artery Disease)  13 stents placed 3/2014  Chronic kidney disease  on dialysis MWF  GERD (gastroesophageal reflux disease)    Gout    Hemodialysis patient    Hepatitis    [place] ([place])    HTN (Hypertension)    Hypercholesteremia    Kidney Stones s/p ureteroscopy    Lymphoma, non-Hodgkin's  h/o diffuse B cell  Myocardial infarct  h/o X3  Pleural effusion    Prostate cancer  s/p seeds  Stroke  mild

## 2017-09-25 NOTE — H&P ADULT - PROBLEM SELECTOR PLAN 2
pt with AMS, not following commands and only intermittently answering questions   likely septic encephalopathy, possibly uremic but pt on HD; no reason to suspect hepatic encephalopathy   - treat underlying infection  - monitor neuro status

## 2017-09-25 NOTE — H&P ADULT - HISTORY OF PRESENT ILLNESS
88 yo M with a PMH ESRD on HD MWF, AAA s/p repair, ?MIs in past (?13 stents placed, last 8 stents placed in 03/2014), atrial fibrillation (not on A/C 2/2 falls), HFpEF (last EF 75% in 2015) s/p PPM and ablation 2016, severe aortic stenosis, severe mitral regurgitation, gout, GERD, depression, hypothyroidism, HTN, and DLBCL currently on Rituximab, recently admitted for increased fluid leakage from pleurx catheter p/w witnessed mechanical fall by home VNS nurse. Per Pt's family patient had become increasingly lethargic x 2 days. This morning, family stated that he was slumped over the table at breakfast. Family reported that he fell of table. Pt family reported that  the fall was witness by the VNS nurse whom subsequently measure a BP of 70/40. Pt family reported that the VNS nurse proceeded to drain cloudy, foul smelling fluid from pleurx catheter prompting them to being them into the hospital.     In the ED:     T(C): 36.4 (25 Sep 2017 10:52), Max: 36.4 (25 Sep 2017 10:49)  T(F): 97.6 (25 Sep 2017 10:52), Max: 97.6 (25 Sep 2017 10:52)  HR: 70 (25 Sep 2017 15:15) (70 - 99)  BP: 93/53 (25 Sep 2017 15:15) (78/47 - 99/45)  BP(mean): --  RR: 24 (25 Sep 2017 15:15) (24 - 26)  SpO2: 100% (25 Sep 2017 15:15) (95% - 100%)    Pt was given acetaminopehn IVPG 1g , Zosyn 3.375g in dextrose 5% IV, Vanc 1000mg IVPG, Adacel 0.5mg IM, 2 boluses of normal saline totaling 750 mL.

## 2017-09-25 NOTE — CONSULT NOTE ADULT - ASSESSMENT
87 year old male pmh of ESRD on HD (MWF) from LUE AVF AAA s/p repair, CAD s/p multiple stents in the past, atrial fibrillation (not on A/C 2/2 falls), HFpEF (last EF 75% in 2015) s/p PPM and ablation 2016, severe aortic stenosis, severe mitral regurgitation, gout, GERD, depression, hypothyroidism, HTN, and DLBCL currently on Rituximab admitted for purulent drainage from pleurax catheter with concern for empyema.

## 2017-09-25 NOTE — ED PROVIDER NOTE - PROGRESS NOTE DETAILS
MICU paged at around 12:15pm for consult. Spoke with Neprhology for evaluation for HD and further recommendations given ESRD.

## 2017-09-25 NOTE — H&P ADULT - ATTENDING COMMENTS
88 yo M with a PMH ESRD on HD MWF, AAA s/p repair, CAD (?13 stents placed, last 8 stents placed in 03/2014), atrial fibrillation (not on A/C 2/2 falls), HFpEF (last EF 75% in 2015) s/p PPM and ablation 2016, severe aortic stenosis, severe mitral regurgitation and DLBCL currently on Rituximab,  admitted with hypotension and AMS. Appears to be in severe sepsis with likely source pleural infection. Already had fluid drained from pleurex by CTS. F?u with CTS regarding removal. Empiric broad spectrum abx. PLan for HD tomorrow. Fluid resuscitaiton but cautiously given HF, severe AS and ESRD.

## 2017-09-25 NOTE — ED ADULT NURSE REASSESSMENT NOTE - NS ED NURSE REASSESS COMMENT FT1
patient had ej IV placed by md. patient is agitated but is receiving IV medications. patient remains on cm. will continue to monitor. md is aware of vs.

## 2017-09-25 NOTE — H&P ADULT - PROBLEM SELECTOR PLAN 5
persistent pleural effusion with pleurex in place  if infected, will need removal of plurex catheter   thoracic surgery to c/t follow

## 2017-09-25 NOTE — ED PROVIDER NOTE - CRITICAL CARE PROVIDED
direct patient care (not related to procedure)/consult w/ pt's family directly relating to pts condition/documentation/additional history taking/interpretation of diagnostic studies/consultation with other physicians

## 2017-09-25 NOTE — ED PROVIDER NOTE - MEDICAL DECISION MAKING DETAILS
LIV Roman MD: Pt is an 86 y/o male with ESRD on HD (M/W/F), last dialysis Friday (due for dialysis today), L pleural effusion s/p L sided pleurex who presents s/p unwitnessed fall at home while seated at breakfast today. unwitnessed but was helped up quickly by aid. Nurse today drained 350cc of cloudy fluid from pleurex today. Son states patient mental status is less than normal today. LIV Roman MD: Pt is an 86 y/o male with ESRD on HD (M/W/F), last dialysis Friday (due for dialysis today), L pleural effusion s/p L sided pleurex who presents s/p unwitnessed fall at home while seated at breakfast today. unwitnessed but was helped up quickly by aid. Nurse today drained 350cc of cloudy fluid from pleurex today. Son states patient mental status is less than normal today. Pt due for dialysis today.  DDx: infectious process, metabolic process, electrolyte abnormality  Plan: basic labs, bcx, ucx, pleurex fluid cx, cxr, gentle IV hydration, empiric abx and admit for further eval and mgt. Will contact nephrology for pt to get dialyzed today.

## 2017-09-25 NOTE — ED PROVIDER NOTE - OBJECTIVE STATEMENT
87 year old, esrd due for dialysis with pleurex catheter today. presenting after syncope or fall at home while seated at breakfast today. unwitnessed but was helped up quickly by aid. Nurse today drained 350cc of cloudy fluid from pleurex today. Son states patient mental status is less than normal today.     renal: [facility]  PMD: manasa somers  Cardiology:anita Rothr: [place]

## 2017-09-25 NOTE — H&P ADULT - NSHPPHYSICALEXAM_GEN_ALL_CORE
PHYSICAL EXAM:  GENERAL: Laying in bed, mildly agitated, no acute distress  HEAD:  3 cm abrasion over L forehead; Normocephalic  EYES: Limited exam due to AMS; PERRL, Left eye sluggish on constriction.   NECK: Supple, No JVD, IV placed on R sided  CHEST/LUNG: Clear to auscultation bilaterally; No wheeze  HEART: Regular rate and rhythm; normal S1 and S2, II/VI systolic blowing murmur. No rubs or gallops  ABDOMEN: Soft, Nontender, Nondistended; Bowel sounds present  EXTREMITIES: Fistula present over LUE. 2+ PT Pulses, Trace edema over R ankle. No clubbing, cyanosis  PSYCH: AAOx1  NEUROLOGY: non-focal  SKIN: Diffuse ecchymotic lesions over upper and lower extremities. PHYSICAL EXAM:  GENERAL: Laying in bed, mildly agitated, no acute distress  HEAD:  3 cm abrasion over L forehead; Normocephalic  EYES: Limited exam due to AMS; PERRL, Left eye sluggish on constriction.   NECK: Supple, No JVD, IV placed on R sided  CHEST/LUNG: Clear to auscultation bilaterally; No wheeze, L sided Pleurx catheter present, no erythema or fluid leakage.  HEART: Regular rate and rhythm; normal S1 and S2, II/VI systolic blowing murmur. No rubs or gallops  ABDOMEN: Soft, Nontender, Nondistended; Bowel sounds present  EXTREMITIES: Fistula present over LUE. 2+ PT Pulses, Trace edema over R ankle. No clubbing, cyanosis  PSYCH: AAOx1  NEUROLOGY: non-focal  SKIN: Diffuse ecchymotic lesions over upper and lower extremities.

## 2017-09-25 NOTE — ED PROVIDER NOTE - PMH
AAA (abdominal aortic aneurysm)  per cardiology eval report  Asthma  h/o  Atrial fibrillation  11/2014  CAD (Coronary Artery Disease)  13 stents placed 3/2014  Chronic kidney disease  on dialysis MWF  GERD (gastroesophageal reflux disease)    Gout    Hemodialysis patient    Hepatitis    [place] (Hard of Hearing)    HTN (Hypertension)    Hypercholesteremia    Kidney Stones s/p ureteroscopy    Lymphoma, non-Hodgkin's  h/o diffuse B cell  Myocardial infarct  h/o X3  Pleural effusion    Prostate cancer  s/p seeds  Stroke  mild

## 2017-09-25 NOTE — H&P ADULT - ASSESSMENT
86 yo M with a PMH ESRD on HD MWF, AAA s/p repair, ?MIs in past (?13 stents placed, last 8 stents placed in 03/2014), atrial fibrillation (not on A/C 2/2 falls), HFpEF (last EF 75% in 2015) s/p PPM and ablation 2016, severe aortic stenosis, severe mitral regurgitation, gout, GERD, depression, hypothyroidism, HTN, and DLBCL currently on Rituximab, recently admitted (9/17) for increased fluid leakage from pleurx catheter p/w witnessed mechanical fall by home VNS nurse in the setting septic shock. 88 yo M with a PMH ESRD on HD (MWF), AAA s/p repair, CAD s/p stents (lastplaced in 03/2014), atrial fibrillation (not on A/C 2/2 falls), HFpEF (last EF 68%) s/p PPM and ablation 2016, severe aortic stenosis, severe mitral regurgitation, depression, hypothyroidism, HTN, and DLBCL currently on Rituximab, recently admitted (9/17) for increased fluid leakage from pleurx catheter p/w fall/?syncope at home with hypotension, a/w likely septic shock 2/2 empyema.

## 2017-09-25 NOTE — H&P ADULT - PROBLEM SELECTOR PLAN 3
nephrology following  holding HD today (due MWF); may dialyze tomorrow   if tolerating PO, midodrine prior to HD severe AS and MR on previous TTE with severe pulm HTN, which is likely cause of pt's low baseline BP   will need to be gentle with IVF to prevent pulmonary edema   pressors as needed to maintain MAP>65

## 2017-09-26 DIAGNOSIS — Z29.9 ENCOUNTER FOR PROPHYLACTIC MEASURES, UNSPECIFIED: ICD-10-CM

## 2017-09-26 DIAGNOSIS — K21.9 GASTRO-ESOPHAGEAL REFLUX DISEASE WITHOUT ESOPHAGITIS: ICD-10-CM

## 2017-09-26 DIAGNOSIS — J86.9 PYOTHORAX WITHOUT FISTULA: ICD-10-CM

## 2017-09-26 DIAGNOSIS — R06.02 SHORTNESS OF BREATH: ICD-10-CM

## 2017-09-26 LAB
ALBUMIN SERPL ELPH-MCNC: 2.2 G/DL — LOW (ref 3.3–5)
ALP SERPL-CCNC: 199 U/L — HIGH (ref 40–120)
ALT FLD-CCNC: 23 U/L RC — SIGNIFICANT CHANGE UP (ref 10–45)
ANION GAP SERPL CALC-SCNC: 19 MMOL/L — HIGH (ref 5–17)
AST SERPL-CCNC: 58 U/L — HIGH (ref 10–40)
BILIRUB SERPL-MCNC: 0.9 MG/DL — SIGNIFICANT CHANGE UP (ref 0.2–1.2)
BUN SERPL-MCNC: 88 MG/DL — HIGH (ref 7–23)
CALCIUM SERPL-MCNC: 8.1 MG/DL — LOW (ref 8.4–10.5)
CHLORIDE SERPL-SCNC: 91 MMOL/L — LOW (ref 96–108)
CO2 SERPL-SCNC: 24 MMOL/L — SIGNIFICANT CHANGE UP (ref 22–31)
CREAT SERPL-MCNC: 6.06 MG/DL — HIGH (ref 0.5–1.3)
GLUCOSE SERPL-MCNC: 85 MG/DL — SIGNIFICANT CHANGE UP (ref 70–99)
GRAM STN FLD: SIGNIFICANT CHANGE UP
HCT VFR BLD CALC: 30.3 % — LOW (ref 39–50)
HGB BLD-MCNC: 9.8 G/DL — LOW (ref 13–17)
MAGNESIUM SERPL-MCNC: 2.3 MG/DL — SIGNIFICANT CHANGE UP (ref 1.6–2.6)
MCHC RBC-ENTMCNC: 32.2 GM/DL — SIGNIFICANT CHANGE UP (ref 32–36)
MCHC RBC-ENTMCNC: 34.2 PG — HIGH (ref 27–34)
MCV RBC AUTO: 106 FL — HIGH (ref 80–100)
METHOD TYPE: SIGNIFICANT CHANGE UP
MRSA SPEC QL CULT: SIGNIFICANT CHANGE UP
PHOSPHATE SERPL-MCNC: 4.6 MG/DL — HIGH (ref 2.5–4.5)
PLATELET # BLD AUTO: 142 K/UL — LOW (ref 150–400)
POTASSIUM SERPL-MCNC: 5.3 MMOL/L — SIGNIFICANT CHANGE UP (ref 3.5–5.3)
POTASSIUM SERPL-SCNC: 5.3 MMOL/L — SIGNIFICANT CHANGE UP (ref 3.5–5.3)
PROT SERPL-MCNC: 5.2 G/DL — LOW (ref 6–8.3)
RBC # BLD: 2.85 M/UL — LOW (ref 4.2–5.8)
RBC # FLD: 16.8 % — HIGH (ref 10.3–14.5)
SODIUM SERPL-SCNC: 134 MMOL/L — LOW (ref 135–145)
SPECIMEN SOURCE: SIGNIFICANT CHANGE UP
VANCOMYCIN FLD-MCNC: 6.9 UG/ML — SIGNIFICANT CHANGE UP
VANCOMYCIN TROUGH SERPL-MCNC: 12.1 UG/ML — SIGNIFICANT CHANGE UP (ref 10–20)
WBC # BLD: 11.1 K/UL — HIGH (ref 3.8–10.5)
WBC # FLD AUTO: 11.1 K/UL — HIGH (ref 3.8–10.5)

## 2017-09-26 PROCEDURE — 99233 SBSQ HOSP IP/OBS HIGH 50: CPT

## 2017-09-26 PROCEDURE — 99233 SBSQ HOSP IP/OBS HIGH 50: CPT | Mod: GC

## 2017-09-26 PROCEDURE — 99291 CRITICAL CARE FIRST HOUR: CPT

## 2017-09-26 PROCEDURE — 71010: CPT | Mod: 26

## 2017-09-26 PROCEDURE — 73070 X-RAY EXAM OF ELBOW: CPT | Mod: 26,RT

## 2017-09-26 RX ORDER — NYSTATIN CREAM 100000 [USP'U]/G
1 CREAM TOPICAL EVERY 8 HOURS
Qty: 0 | Refills: 0 | Status: DISCONTINUED | OUTPATIENT
Start: 2017-09-26 | End: 2017-10-17

## 2017-09-26 RX ORDER — VANCOMYCIN HCL 1 G
1250 VIAL (EA) INTRAVENOUS ONCE
Qty: 0 | Refills: 0 | Status: COMPLETED | OUTPATIENT
Start: 2017-09-27 | End: 2017-09-27

## 2017-09-26 RX ORDER — BUDESONIDE AND FORMOTEROL FUMARATE DIHYDRATE 160; 4.5 UG/1; UG/1
2 AEROSOL RESPIRATORY (INHALATION)
Qty: 0 | Refills: 0 | Status: DISCONTINUED | OUTPATIENT
Start: 2017-09-26 | End: 2017-09-28

## 2017-09-26 RX ORDER — VANCOMYCIN HCL 1 G
1000 VIAL (EA) INTRAVENOUS ONCE
Qty: 0 | Refills: 0 | Status: COMPLETED | OUTPATIENT
Start: 2017-09-26 | End: 2017-09-26

## 2017-09-26 RX ORDER — HYDROCORTISONE 1 %
1 OINTMENT (GRAM) TOPICAL ONCE
Qty: 0 | Refills: 0 | Status: COMPLETED | OUTPATIENT
Start: 2017-09-26 | End: 2017-09-26

## 2017-09-26 RX ORDER — TIOTROPIUM BROMIDE 18 UG/1
1 CAPSULE ORAL; RESPIRATORY (INHALATION) DAILY
Qty: 0 | Refills: 0 | Status: DISCONTINUED | OUTPATIENT
Start: 2017-09-26 | End: 2017-09-28

## 2017-09-26 RX ADMIN — NYSTATIN CREAM 1 APPLICATION(S): 100000 CREAM TOPICAL at 13:24

## 2017-09-26 RX ADMIN — MIDODRINE HYDROCHLORIDE 10 MILLIGRAM(S): 2.5 TABLET ORAL at 05:07

## 2017-09-26 RX ADMIN — NYSTATIN CREAM 1 APPLICATION(S): 100000 CREAM TOPICAL at 05:07

## 2017-09-26 RX ADMIN — HEPARIN SODIUM 5000 UNIT(S): 5000 INJECTION INTRAVENOUS; SUBCUTANEOUS at 13:24

## 2017-09-26 RX ADMIN — HEPARIN SODIUM 5000 UNIT(S): 5000 INJECTION INTRAVENOUS; SUBCUTANEOUS at 05:06

## 2017-09-26 RX ADMIN — TIOTROPIUM BROMIDE 1 CAPSULE(S): 18 CAPSULE ORAL; RESPIRATORY (INHALATION) at 13:39

## 2017-09-26 RX ADMIN — Medication 1 TABLET(S): at 13:24

## 2017-09-26 RX ADMIN — Medication 250 MILLIGRAM(S): at 18:02

## 2017-09-26 RX ADMIN — PIPERACILLIN AND TAZOBACTAM 25 GRAM(S): 4; .5 INJECTION, POWDER, LYOPHILIZED, FOR SOLUTION INTRAVENOUS at 13:24

## 2017-09-26 RX ADMIN — Medication 12.5 MICROGRAM(S): at 05:06

## 2017-09-26 RX ADMIN — Medication 81 MILLIGRAM(S): at 13:24

## 2017-09-26 RX ADMIN — MIDODRINE HYDROCHLORIDE 10 MILLIGRAM(S): 2.5 TABLET ORAL at 13:24

## 2017-09-26 RX ADMIN — Medication 1 SUPPOSITORY(S): at 18:37

## 2017-09-26 RX ADMIN — NYSTATIN CREAM 1 APPLICATION(S): 100000 CREAM TOPICAL at 21:56

## 2017-09-26 RX ADMIN — HEPARIN SODIUM 5000 UNIT(S): 5000 INJECTION INTRAVENOUS; SUBCUTANEOUS at 21:56

## 2017-09-26 RX ADMIN — MIDODRINE HYDROCHLORIDE 10 MILLIGRAM(S): 2.5 TABLET ORAL at 21:55

## 2017-09-26 NOTE — PROGRESS NOTE ADULT - SUBJECTIVE AND OBJECTIVE BOX
CHIEF COMPLAINT:    Interval Events:    REVIEW OF SYSTEMS:  Constitutional: No fevers or chills. No weight loss. No fatigue or generalized malaise.  Eyes: No itching or discharge from the eyes  ENT: No ear pain. No ear discharge. No nasal congestion. No post nasal drip. No epistaxis. No throat pain. No sore throat. No difficulty swallowing.   CV: No chest pain. No palpitations. No lightheadedness or dizziness.   Resp: No dyspnea at rest. No dyspnea on exertion. No orthopnea. No wheezing. No cough. No stridor. No sputum production. No chest pain with respiration.  GI: No nausea. No vomiting. No diarrhea.  MSK: No joint pain or pain in any extremities  Integumentary: No skin lesions. No pedal edema.  Neurological: No gross motor weakness. No sensory changes.  [ ] All other systems negative  [ ] Unable to assess ROS because ________    OBJECTIVE:  ICU Vital Signs Last 24 Hrs  T(C): 36.5 (26 Sep 2017 00:00), Max: 36.5 (26 Sep 2017 00:00)  T(F): 97.7 (26 Sep 2017 00:00), Max: 97.7 (26 Sep 2017 00:00)  HR: 70 (26 Sep 2017 04:45) (70 - 99)  BP: 99/50 (26 Sep 2017 04:45) (77/39 - 118/51)  BP(mean): 71 (26 Sep 2017 04:45) (52 - 79)  ABP: --  ABP(mean): --  RR: 16 (26 Sep 2017 04:45) (16 - 41)  SpO2: 100% (26 Sep 2017 04:45) (94% - 100%)        09-25 @ 07:01  -  09-26 @ 05:11  --------------------------------------------------------  IN: 399.6 mL / OUT: 0 mL / NET: 399.6 mL      CAPILLARY BLOOD GLUCOSE  85 (26 Sep 2017 02:00)          PHYSICAL EXAM:  General: Awake, alert, oriented X 3.   HEENT: Atraumatic, normocephalic.                 Mallampatti Grade                 No nasal congestion.                No tonsillar or pharyngeal exudates.  Lymph Nodes: No palpable lymphadenopathy  Neck: No JVD. No carotid bruit.   Respiratory: Normal chest expansion                         Normal percussion                         Normal and equal air entry                         No wheeze, rhonchi or rales.  Cardiovascular: S1 S2 normal. No murmurs, rubs or gallops.   Abdomen: Soft, non-tender, non-distended. No organomegaly.  Extremities: Warm to touch. Peripheral pulse palpable. No pedal edema.   Skin: No rashes or skin lesions  Neurological: Motor and sensory examination equal and normal in all four extremities.  Psychiatry: Appropriate mood and affect.    HOSPITAL MEDICATIONS:  MEDICATIONS  (STANDING):  heparin  Injectable 5000 Unit(s) SubCutaneous every 8 hours  piperacillin/tazobactam IVPB. 3.375 Gram(s) IV Intermittent every 12 hours  levothyroxine Injectable 12.5 MICROGram(s) IV Push daily  midodrine 10 milliGRAM(s) Oral three times a day  lactobacillus acidophilus 1 Tablet(s) Oral daily  aspirin enteric coated 81 milliGRAM(s) Oral daily  norepinephrine Infusion 0.01 MICROgram(s)/kG/Min (0.705 mL/Hr) IV Continuous <Continuous>  nystatin Powder 1 Application(s) Topical every 8 hours    MEDICATIONS  (PRN):      LABS:                        9.8    11.1  )-----------( 142      ( 26 Sep 2017 02:14 )             30.3     09-26    134<L>  |  91<L>  |  88<H>  ----------------------------<  85  5.3   |  24  |  6.06<H>    Ca    8.1<L>      26 Sep 2017 02:14  Phos  4.6     09-26  Mg     2.3     09-26    TPro  5.2<L>  /  Alb  2.2<L>  /  TBili  0.9  /  DBili  x   /  AST  58<H>  /  ALT  23  /  AlkPhos  199<H>  09-26    PT/INR - ( 25 Sep 2017 21:31 )   PT: 18.3 sec;   INR: 1.68 ratio         PTT - ( 25 Sep 2017 21:31 )  PTT:32.5 sec      Venous Blood Gas:  09-25 @ 21:17  7.40/44/44/27/74  VBG Lactate: 1.6  Venous Blood Gas:  09-25 @ 14:53  7.43/40/28/26/43  VBG Lactate: 3.6  Venous Blood Gas:  09-25 @ 11:37  7.41/44/26/28/38  VBG Lactate: 4.9      MICROBIOLOGY:     RADIOLOGY:  [ ] Reviewed and interpreted by me    Point of Care Ultrasound Findings:    PFT:    EKG: CHIEF COMPLAINT:no pain or sob    Interval Events:pleurx to suction    REVIEW OF SYSTEMS:  Constitutional: No fevers or chills. No weight loss. No fatigue or generalized malaise.  Eyes: No itching or discharge from the eyes  ENT: No ear pain. No ear discharge. No nasal congestion. No post nasal drip. No epistaxis. No throat pain. No sore throat. No difficulty swallowing.   CV: No chest pain. No palpitations. No lightheadedness or dizziness.   Resp: No dyspnea at rest. No dyspnea on exertion. No orthopnea. No wheezing. No cough. No stridor. No sputum production. No chest pain with respiration.  GI: No nausea. No vomiting. No diarrhea.  MSK: No joint pain or pain in any extremities  Integumentary: No skin lesions. No pedal edema.  Neurological: No gross motor weakness. No sensory changes.  [+ ] All other systems negative  [ ] Unable to assess ROS because ________    OBJECTIVE:  ICU Vital Signs Last 24 Hrs  T(C): 36.5 (26 Sep 2017 00:00), Max: 36.5 (26 Sep 2017 00:00)  T(F): 97.7 (26 Sep 2017 00:00), Max: 97.7 (26 Sep 2017 00:00)  HR: 70 (26 Sep 2017 04:45) (70 - 99)  BP: 99/50 (26 Sep 2017 04:45) (77/39 - 118/51)  BP(mean): 71 (26 Sep 2017 04:45) (52 - 79)  ABP: --  ABP(mean): --  RR: 16 (26 Sep 2017 04:45) (16 - 41)  SpO2: 100% (26 Sep 2017 04:45) (94% - 100%)        09-25 @ 07:01  -  09-26 @ 05:11  --------------------------------------------------------  IN: 399.6 mL / OUT: 0 mL / NET: 399.6 mL      CAPILLARY BLOOD GLUCOSE  85 (26 Sep 2017 02:00)          PHYSICAL EXAM:NAD on NC  General: Awake, alert, oriented X 3.   HEENT: Atraumatic, normocephalic.                 Mallampatti Grade 2                No nasal congestion.                No tonsillar or pharyngeal exudates.  Lymph Nodes: No palpable lymphadenopathy  Neck: No JVD. No carotid bruit.   Respiratory: Normal chest expansion                         Normal percussion                         Normal and equal air entry                         No wheeze, rhonchi but rales at left base  Cardiovascular: S1 S2 normal. No murmurs, rubs or gallops.   Abdomen: Soft, non-tender, non-distended. No organomegaly.  Extremities: Warm to touch. Peripheral pulse palpable. No pedal edema.   Skin: No rashes or skin lesions  Neurological: Motor and sensory examination equal and normal in all four extremities.  Psychiatry: Appropriate mood and affect.    HOSPITAL MEDICATIONS:  MEDICATIONS  (STANDING):  heparin  Injectable 5000 Unit(s) SubCutaneous every 8 hours  piperacillin/tazobactam IVPB. 3.375 Gram(s) IV Intermittent every 12 hours  levothyroxine Injectable 12.5 MICROGram(s) IV Push daily  midodrine 10 milliGRAM(s) Oral three times a day  lactobacillus acidophilus 1 Tablet(s) Oral daily  aspirin enteric coated 81 milliGRAM(s) Oral daily  norepinephrine Infusion 0.01 MICROgram(s)/kG/Min (0.705 mL/Hr) IV Continuous <Continuous>  nystatin Powder 1 Application(s) Topical every 8 hours    MEDICATIONS  (PRN):      LABS:                        9.8    11.1  )-----------( 142      ( 26 Sep 2017 02:14 )             30.3     09-26    134<L>  |  91<L>  |  88<H>  ----------------------------<  85  5.3   |  24  |  6.06<H>    Ca    8.1<L>      26 Sep 2017 02:14  Phos  4.6     09-26  Mg     2.3     09-26    TPro  5.2<L>  /  Alb  2.2<L>  /  TBili  0.9  /  DBili  x   /  AST  58<H>  /  ALT  23  /  AlkPhos  199<H>  09-26    PT/INR - ( 25 Sep 2017 21:31 )   PT: 18.3 sec;   INR: 1.68 ratio         PTT - ( 25 Sep 2017 21:31 )  PTT:32.5 sec      Venous Blood Gas:  09-25 @ 21:17  7.40/44/44/27/74  VBG Lactate: 1.6  Venous Blood Gas:  09-25 @ 14:53  7.43/40/28/26/43  VBG Lactate: 3.6  Venous Blood Gas:  09-25 @ 11:37  7.41/44/26/28/38  VBG Lactate: 4.9      MICROBIOLOGY:     RADIOLOGY:  [ ] Reviewed and interpreted by me    Point of Care Ultrasound Findings:    PFT:    EKG:

## 2017-09-26 NOTE — PROGRESS NOTE ADULT - SUBJECTIVE AND OBJECTIVE BOX
CHIEF COMPLAINT:    Interval Events: No acute overnight events.     REVIEW OF SYSTEMS:  Constitutional: [ ] negative [ ] fevers [ ] chills [ ] weight loss [ ] weight gain  HEENT: [ ] negative [ ] dry eyes [ ] eye irritation [ ] postnasal drip [ ] nasal congestion  CV: [ ] negative  [ ] chest pain [ ] orthopnea [ ] palpitations [ ] murmur  Resp: [ ] negative [ ] cough [ ] shortness of breath [ ] dyspnea [ ] wheezing [ ] sputum [ ] hemoptysis  GI: [ ] negative [ ] nausea [ ] vomiting [ ] diarrhea [ ] constipation [ ] abd pain [ ] dysphagia   : [ ] negative [ ] dysuria [ ] nocturia [ ] hematuria [ ] increased urinary frequency  Musculoskeletal: [ ] negative [ ] back pain [ ] myalgias [ ] arthralgias [ ] fracture  Skin: [ ] negative [ ] rash [ ] itch  Neurological: [ ] negative [ ] headache [ ] dizziness [ ] syncope [ ] weakness [ ] numbness  Psychiatric: [ ] negative [ ] anxiety [ ] depression  Endocrine: [ ] negative [ ] diabetes [ ] thyroid problem  Hematologic/Lymphatic: [ ] negative [ ] anemia [ ] bleeding problem  Allergic/Immunologic: [ ] negative [ ] itchy eyes [ ] nasal discharge [ ] hives [ ] angioedema  [ ] All other systems negative  [ ] Unable to assess ROS because ________    OBJECTIVE:  ICU Vital Signs Last 24 Hrs  T(C): 36.6 (26 Sep 2017 04:00), Max: 36.6 (26 Sep 2017 04:00)  T(F): 97.9 (26 Sep 2017 04:00), Max: 97.9 (26 Sep 2017 04:00)  HR: 70 (26 Sep 2017 06:45) (70 - 99)  BP: 96/55 (26 Sep 2017 06:45) (77/39 - 118/51)  BP(mean): 72 (26 Sep 2017 06:45) (52 - 79)  ABP: --  ABP(mean): --  RR: 19 (26 Sep 2017 06:45) (16 - 41)  SpO2: 100% (26 Sep 2017 06:45) (94% - 100%)        09-25 @ 07:01  -  09-26 @ 07:00  --------------------------------------------------------  IN: 442.2 mL / OUT: 112 mL / NET: 330.2 mL      CAPILLARY BLOOD GLUCOSE  87 (26 Sep 2017 05:00)          PHYSICAL EXAM:  General:   HEENT:  3 cm abrasion over L forehead; Normocephalic;  Limited exam due to AMS; PERRL, Left eye sluggish on constriction.  Lymph Nodes:  Neck:  Supple, No JVD, IV placed on R sided  Respiratory:  Clear to auscultation bilaterally; No wheeze, L sided Pleurx catheter present, no erythema or fluid leakage.  Cardiovascular: Regular rate and rhythm; normal S1 and S2, II/VI systolic blowing murmur. No rubs or gallops  Abdomen: Soft, Nontender, Nondistended; Bowel sounds present  Extremities:  Fistula present over LUE. 2+ PT Pulses, Trace edema over R ankle. No clubbing, cyanosis  Skin: Diffuse ecchymotic lesions over upper and lower extremities.  Neurological: non-focal  Psychiatry:  AAOx1    LINES:    HOSPITAL MEDICATIONS:  Standing Meds:  heparin  Injectable 5000 Unit(s) SubCutaneous every 8 hours  piperacillin/tazobactam IVPB. 3.375 Gram(s) IV Intermittent every 12 hours  levothyroxine Injectable 12.5 MICROGram(s) IV Push daily  midodrine 10 milliGRAM(s) Oral three times a day  lactobacillus acidophilus 1 Tablet(s) Oral daily  aspirin enteric coated 81 milliGRAM(s) Oral daily  norepinephrine Infusion 0.01 MICROgram(s)/kG/Min IV Continuous <Continuous>  nystatin Powder 1 Application(s) Topical every 8 hours      PRN Meds:      LABS:                        9.8    11.1  )-----------( 142      ( 26 Sep 2017 02:14 )             30.3     Hgb Trend: 9.8<--, 9.4<--, 9.6<--  09-26    134<L>  |  91<L>  |  88<H>  ----------------------------<  85  5.3   |  24  |  6.06<H>    Ca    8.1<L>      26 Sep 2017 02:14  Phos  4.6     09-26  Mg     2.3     09-26    TPro  5.2<L>  /  Alb  2.2<L>  /  TBili  0.9  /  DBili  x   /  AST  58<H>  /  ALT  23  /  AlkPhos  199<H>  09-26    Creatinine Trend: 6.06<--, 5.72<--, 5.78<--, 5.89<--, 4.42<--, 3.40<--  PT/INR - ( 25 Sep 2017 21:31 )   PT: 18.3 sec;   INR: 1.68 ratio         PTT - ( 25 Sep 2017 21:31 )  PTT:32.5 sec      Venous Blood Gas:  09-25 @ 21:17  7.40/44/44/27/74  VBG Lactate: 1.6  Venous Blood Gas:  09-25 @ 14:53  7.43/40/28/26/43  VBG Lactate: 3.6  Venous Blood Gas:  09-25 @ 11:37  7.41/44/26/28/38  VBG Lactate: 4.9      MICROBIOLOGY:  Culture - Blood (09.25.17 @ 15:08)    Gram Stain:   Growth in aerobic bottle: Gram Positive Cocci in Clusters    Specimen Source: .Blood Blood    Culture Results:   Growth in aerobic bottle: Gram Positive Cocci in Clusters         RADIOLOGY:   < from: CT Chest No Cont (09.25.17 @ 16:31) >  IMPRESSION: Pulmonary edema with bilateral pleural effusions, moderate on   right and small but loculated on the left.    < end of copied text >    < from: CT Cervical Spine No Cont (09.25.17 @ 16:30) >  CT BRAIN: Limited by streak artifact from metallic wire overlying the   patient'sforehead, motion, and out of field artifact.    No mass effect, gross evidence for intracranial hemorrhage, or evidence   for acute territorial infarct.    Extensive white matter microvascular ischemic disease    No displaced calvarial fracture.    < end of copied text >    < from: US TTE 2D F/U, Limited w/o Contrast (ED) (09.25.17 @ 12:37) >  INTERPRETATION:  A focused transthoracic cardiac ultrasound examination   was performed.   No pericardial effusion was present.  No globalwall motion abnormality was identified  The inferior vena cava was partially collapsable.   The ejection fraction was greater than 40%.    < end of copied text >        EKG: CHIEF COMPLAINT:    Interval Events: No acute overnight events. Pt remains somnolent upon examination, yet still arousable when via verbal stimulation.     REVIEW OF SYSTEMS:  Constitutional: [ ] negative [ ] fevers [ ] chills [ ] weight loss [ ] weight gain  HEENT: [ ] negative [ ] dry eyes [ ] eye irritation [ ] postnasal drip [ ] nasal congestion  CV: [ ] negative  [ ] chest pain [ ] orthopnea [ ] palpitations [ ] murmur  Resp: [ ] negative [ ] cough [ ] shortness of breath [ ] dyspnea [ ] wheezing [ ] sputum [ ] hemoptysis  GI: [ ] negative [ ] nausea [ ] vomiting [ ] diarrhea [ ] constipation [ ] abd pain [ ] dysphagia   : [ ] negative [ ] dysuria [ ] nocturia [ ] hematuria [ ] increased urinary frequency  Musculoskeletal: [ ] negative [ ] back pain [ ] myalgias [ ] arthralgias [ ] fracture  Skin: [ ] negative [ ] rash [ ] itch  Neurological: [ ] negative [ ] headache [ ] dizziness [ ] syncope [ ] weakness [ ] numbness  Psychiatric: [ ] negative [ ] anxiety [ ] depression  Endocrine: [ ] negative [ ] diabetes [ ] thyroid problem  Hematologic/Lymphatic: [ ] negative [ ] anemia [ ] bleeding problem  Allergic/Immunologic: [ ] negative [ ] itchy eyes [ ] nasal discharge [ ] hives [ ] angioedema  [ ] All other systems negative  [x] Unable to assess ROS because of AMS    OBJECTIVE:  ICU Vital Signs Last 24 Hrs  T(C): 36.6 (26 Sep 2017 04:00), Max: 36.6 (26 Sep 2017 04:00)  T(F): 97.9 (26 Sep 2017 04:00), Max: 97.9 (26 Sep 2017 04:00)  HR: 70 (26 Sep 2017 06:45) (70 - 99)  BP: 96/55 (26 Sep 2017 06:45) (77/39 - 118/51)  BP(mean): 72 (26 Sep 2017 06:45) (52 - 79)  ABP: --  ABP(mean): --  RR: 19 (26 Sep 2017 06:45) (16 - 41)  SpO2: 100% (26 Sep 2017 06:45) (94% - 100%)        09-25 @ 07:01  -  09-26 @ 07:00  --------------------------------------------------------  IN: 442.2 mL / OUT: 112 mL / NET: 330.2 mL      CAPILLARY BLOOD GLUCOSE  87 (26 Sep 2017 05:00)    PHYSICAL EXAM:  General: Somnolent, No acute distress   HEENT:  3 cm abrasion over L forehead; Normocephalic;  Limited exam due to AMS; PERRL  Lymph Nodes: no lymphadenopathy  Neck:  Supple, No JVD, IV placed on R sided  Respiratory:  Mild crackles over left lower lobe; No wheeze, L sided Pleurx catheter present, no erythema or fluid leakage.  Cardiovascular: Regular rate and rhythm; normal S1 and S2, II/VI systolic blowing murmur. No rubs or gallops  Abdomen: Soft, Nontender, Nondistended; Bowel sounds present  Extremities:  Fistula present over LUE. 2+ PT Pulses, Trace edema over R ankle. No clubbing, cyanosis  Skin: Diffuse ecchymotic lesions over upper and lower extremities.  Neurological: non-focal  Psychiatry:  AAOx0.    LINES:    HOSPITAL MEDICATIONS:  Standing Meds:  heparin  Injectable 5000 Unit(s) SubCutaneous every 8 hours  piperacillin/tazobactam IVPB. 3.375 Gram(s) IV Intermittent every 12 hours  levothyroxine Injectable 12.5 MICROGram(s) IV Push daily  midodrine 10 milliGRAM(s) Oral three times a day  lactobacillus acidophilus 1 Tablet(s) Oral daily  aspirin enteric coated 81 milliGRAM(s) Oral daily  norepinephrine Infusion 0.01 MICROgram(s)/kG/Min IV Continuous <Continuous>  nystatin Powder 1 Application(s) Topical every 8 hours      PRN Meds:      LABS:                        9.8    11.1  )-----------( 142      ( 26 Sep 2017 02:14 )             30.3     Hgb Trend: 9.8<--, 9.4<--, 9.6<--  09-26    134<L>  |  91<L>  |  88<H>  ----------------------------<  85  5.3   |  24  |  6.06<H>    Ca    8.1<L>      26 Sep 2017 02:14  Phos  4.6     09-26  Mg     2.3     09-26    TPro  5.2<L>  /  Alb  2.2<L>  /  TBili  0.9  /  DBili  x   /  AST  58<H>  /  ALT  23  /  AlkPhos  199<H>  09-26    Creatinine Trend: 6.06<--, 5.72<--, 5.78<--, 5.89<--, 4.42<--, 3.40<--  PT/INR - ( 25 Sep 2017 21:31 )   PT: 18.3 sec;   INR: 1.68 ratio         PTT - ( 25 Sep 2017 21:31 )  PTT:32.5 sec      Venous Blood Gas:  09-25 @ 21:17  7.40/44/44/27/74  VBG Lactate: 1.6  Venous Blood Gas:  09-25 @ 14:53  7.43/40/28/26/43  VBG Lactate: 3.6  Venous Blood Gas:  09-25 @ 11:37  7.41/44/26/28/38  VBG Lactate: 4.9      MICROBIOLOGY:  Culture - Blood (09.25.17 @ 15:08)    Gram Stain:   Growth in aerobic bottle: Gram Positive Cocci in Clusters    Specimen Source: .Blood Blood    Culture Results:   Growth in aerobic bottle: Gram Positive Cocci in Clusters         RADIOLOGY:   < from: CT Chest No Cont (09.25.17 @ 16:31) >  IMPRESSION: Pulmonary edema with bilateral pleural effusions, moderate on   right and small but loculated on the left.    < end of copied text >    < from: CT Cervical Spine No Cont (09.25.17 @ 16:30) >  CT BRAIN: Limited by streak artifact from metallic wire overlying the   patient'sforehead, motion, and out of field artifact.    No mass effect, gross evidence for intracranial hemorrhage, or evidence   for acute territorial infarct.    Extensive white matter microvascular ischemic disease    No displaced calvarial fracture.    < end of copied text >    < from: US TTE 2D F/U, Limited w/o Contrast (ED) (09.25.17 @ 12:37) >  INTERPRETATION:  A focused transthoracic cardiac ultrasound examination   was performed.   No pericardial effusion was present.  No globalwall motion abnormality was identified  The inferior vena cava was partially collapsable.   The ejection fraction was greater than 40%.    < end of copied text >        EKG: CHIEF COMPLAINT:    Interval Events: No acute overnight events. Pt remains somnolent upon examination, yet still arousable via verbal stimulation.     REVIEW OF SYSTEMS:  Constitutional: [ ] negative [ ] fevers [ ] chills [ ] weight loss [ ] weight gain  HEENT: [ ] negative [ ] dry eyes [ ] eye irritation [ ] postnasal drip [ ] nasal congestion  CV: [ ] negative  [ ] chest pain [ ] orthopnea [ ] palpitations [ ] murmur  Resp: [ ] negative [ ] cough [ ] shortness of breath [ ] dyspnea [ ] wheezing [ ] sputum [ ] hemoptysis  GI: [ ] negative [ ] nausea [ ] vomiting [ ] diarrhea [ ] constipation [ ] abd pain [ ] dysphagia   : [ ] negative [ ] dysuria [ ] nocturia [ ] hematuria [ ] increased urinary frequency  Musculoskeletal: [ ] negative [ ] back pain [ ] myalgias [ ] arthralgias [ ] fracture  Skin: [ ] negative [ ] rash [ ] itch  Neurological: [ ] negative [ ] headache [ ] dizziness [ ] syncope [ ] weakness [ ] numbness  Psychiatric: [ ] negative [ ] anxiety [ ] depression  Endocrine: [ ] negative [ ] diabetes [ ] thyroid problem  Hematologic/Lymphatic: [ ] negative [ ] anemia [ ] bleeding problem  Allergic/Immunologic: [ ] negative [ ] itchy eyes [ ] nasal discharge [ ] hives [ ] angioedema  [ ] All other systems negative  [x] Unable to assess ROS because of AMS    OBJECTIVE:  ICU Vital Signs Last 24 Hrs  T(C): 36.6 (26 Sep 2017 04:00), Max: 36.6 (26 Sep 2017 04:00)  T(F): 97.9 (26 Sep 2017 04:00), Max: 97.9 (26 Sep 2017 04:00)  HR: 70 (26 Sep 2017 06:45) (70 - 99)  BP: 96/55 (26 Sep 2017 06:45) (77/39 - 118/51)  BP(mean): 72 (26 Sep 2017 06:45) (52 - 79)  ABP: --  ABP(mean): --  RR: 19 (26 Sep 2017 06:45) (16 - 41)  SpO2: 100% (26 Sep 2017 06:45) (94% - 100%)        09-25 @ 07:01  -  09-26 @ 07:00  --------------------------------------------------------  IN: 442.2 mL / OUT: 112 mL / NET: 330.2 mL      CAPILLARY BLOOD GLUCOSE  87 (26 Sep 2017 05:00)    PHYSICAL EXAM:  General: Somnolent, No acute distress   HEENT:  3 cm abrasion over L forehead; Normocephalic;  Limited exam due to AMS; PERRL  Lymph Nodes: no lymphadenopathy  Neck:  Supple, No JVD, IV placed on R sided  Respiratory:  Mild crackles over left lower lobe; No wheeze, L sided Pleurx catheter present, no erythema or fluid leakage.  Cardiovascular: Regular rate and rhythm; normal S1 and S2, II/VI systolic blowing murmur. No rubs or gallops  Abdomen: Soft, Nontender, Nondistended; Bowel sounds present  Extremities:  Fistula present over LUE. 2+ PT Pulses, Trace edema over R ankle. No clubbing, cyanosis  Genitourinary: Erythematous dermatitis  Skin: Diffuse ecchymotic lesions over upper and lower extremities.  Neurological: non-focal  Psychiatry:  AAOx0.    HOSPITAL MEDICATIONS:  Standing Meds:  heparin  Injectable 5000 Unit(s) SubCutaneous every 8 hours  piperacillin/tazobactam IVPB. 3.375 Gram(s) IV Intermittent every 12 hours  levothyroxine Injectable 12.5 MICROGram(s) IV Push daily  midodrine 10 milliGRAM(s) Oral three times a day  lactobacillus acidophilus 1 Tablet(s) Oral daily  aspirin enteric coated 81 milliGRAM(s) Oral daily  norepinephrine Infusion 0.04 MICROgram(s)/kG/Min IV Continuous <Continuous>  nystatin Powder 1 Application(s) Topical every 8 hours      PRN Meds:      LABS:                        9.8    11.1  )-----------( 142      ( 26 Sep 2017 02:14 )             30.3     Hgb Trend: 9.8<--, 9.4<--, 9.6<--  09-26    134<L>  |  91<L>  |  88<H>  ----------------------------<  85  5.3   |  24  |  6.06<H>    Ca    8.1<L>      26 Sep 2017 02:14  Phos  4.6     09-26  Mg     2.3     09-26    TPro  5.2<L>  /  Alb  2.2<L>  /  TBili  0.9  /  DBili  x   /  AST  58<H>  /  ALT  23  /  AlkPhos  199<H>  09-26    Creatinine Trend: 6.06<--, 5.72<--, 5.78<--, 5.89<--, 4.42<--, 3.40<--  PT/INR - ( 25 Sep 2017 21:31 )   PT: 18.3 sec;   INR: 1.68 ratio         PTT - ( 25 Sep 2017 21:31 )  PTT:32.5 sec      Venous Blood Gas:  09-25 @ 21:17  7.40/44/44/27/74  VBG Lactate: 1.6  Venous Blood Gas:  09-25 @ 14:53  7.43/40/28/26/43  VBG Lactate: 3.6  Venous Blood Gas:  09-25 @ 11:37  7.41/44/26/28/38  VBG Lactate: 4.9      MICROBIOLOGY:  Culture - Blood (09.25.17 @ 15:08)    Gram Stain:   Growth in aerobic bottle: Gram Positive Cocci in Clusters    Specimen Source: .Blood Blood    Culture Results:   Growth in aerobic bottle: Gram Positive Cocci in Clusters         RADIOLOGY:   < from: CT Chest No Cont (09.25.17 @ 16:31) >  IMPRESSION: Pulmonary edema with bilateral pleural effusions, moderate on   right and small but loculated on the left.    < end of copied text >    < from: CT Cervical Spine No Cont (09.25.17 @ 16:30) >  CT BRAIN: Limited by streak artifact from metallic wire overlying the   patient'sforehead, motion, and out of field artifact.    No mass effect, gross evidence for intracranial hemorrhage, or evidence   for acute territorial infarct.    Extensive white matter microvascular ischemic disease    No displaced calvarial fracture.    < end of copied text >    < from: US TTE 2D F/U, Limited w/o Contrast (ED) (09.25.17 @ 12:37) >  INTERPRETATION:  A focused transthoracic cardiac ultrasound examination   was performed.   No pericardial effusion was present.  No globalwall motion abnormality was identified  The inferior vena cava was partially collapsable.   The ejection fraction was greater than 40%.    < end of copied text >        EKG: CHIEF COMPLAINT:    Interval Events: No acute overnight events. Pt remains somnolent upon examination, yet still arousable via verbal stimulation.     REVIEW OF SYSTEMS:  Constitutional: [ ] negative [ ] fevers [ ] chills [ ] weight loss [ ] weight gain  HEENT: [ ] negative [ ] dry eyes [ ] eye irritation [ ] postnasal drip [ ] nasal congestion  CV: [ ] negative  [ ] chest pain [ ] orthopnea [ ] palpitations [ ] murmur  Resp: [ ] negative [ ] cough [ ] shortness of breath [ ] dyspnea [ ] wheezing [ ] sputum [ ] hemoptysis  GI: [ ] negative [ ] nausea [ ] vomiting [ ] diarrhea [ ] constipation [ ] abd pain [ ] dysphagia   : [ ] negative [ ] dysuria [ ] nocturia [ ] hematuria [ ] increased urinary frequency  Musculoskeletal: [ ] negative [ ] back pain [ ] myalgias [ ] arthralgias [ ] fracture  Skin: [ ] negative [ ] rash [ ] itch  Neurological: [ ] negative [ ] headache [ ] dizziness [ ] syncope [ ] weakness [ ] numbness  Psychiatric: [ ] negative [ ] anxiety [ ] depression  Endocrine: [ ] negative [ ] diabetes [ ] thyroid problem  Hematologic/Lymphatic: [ ] negative [ ] anemia [ ] bleeding problem  Allergic/Immunologic: [ ] negative [ ] itchy eyes [ ] nasal discharge [ ] hives [ ] angioedema  [ ] All other systems negative  [x] Unable to assess ROS because of AMS    OBJECTIVE:  ICU Vital Signs Last 24 Hrs  T(C): 36.6 (26 Sep 2017 04:00), Max: 36.6 (26 Sep 2017 04:00)  T(F): 97.9 (26 Sep 2017 04:00), Max: 97.9 (26 Sep 2017 04:00)  HR: 70 (26 Sep 2017 06:45) (70 - 99)  BP: 96/55 (26 Sep 2017 06:45) (77/39 - 118/51)  BP(mean): 72 (26 Sep 2017 06:45) (52 - 79)  ABP: --  ABP(mean): --  RR: 19 (26 Sep 2017 06:45) (16 - 41)  SpO2: 100% (26 Sep 2017 06:45) (94% - 100%)        09-25 @ 07:01  -  09-26 @ 07:00  --------------------------------------------------------  IN: 442.2 mL / OUT: 112 mL / NET: 330.2 mL      CAPILLARY BLOOD GLUCOSE  87 (26 Sep 2017 05:00)    PHYSICAL EXAM:  General: Somnolent, No acute distress   HEENT:  3 cm abrasion over L forehead; Normocephalic;  Limited exam due to AMS; PERRL  Lymph Nodes: no lymphadenopathy  Neck:  Supple, No JVD, IV placed on R sided  Respiratory:  Mild crackles over left lower lobe; No wheeze, L sided Pleurx catheter present, no erythema or fluid leakage.  Cardiovascular: Regular rate and rhythm; normal S1 and S2, II/VI systolic blowing murmur. No rubs or gallops  Abdomen: Soft, Nontender, Nondistended; Bowel sounds present  Extremities:  Fistula present over LUE. 2+ PT Pulses, Trace edema over R ankle. No clubbing, cyanosis  Genitourinary: Erythematous dermatitis  Skin: Diffuse ecchymotic lesions over upper and lower extremities.  Neurological: non-focal  Psychiatry:  AAOx0.    HOSPITAL MEDICATIONS:  Standing Meds:  heparin  Injectable 5000 Unit(s) SubCutaneous every 8 hours  piperacillin/tazobactam IVPB. 3.375 Gram(s) IV Intermittent every 12 hours  levothyroxine Injectable 12.5 MICROGram(s) IV Push daily  midodrine 10 milliGRAM(s) Oral three times a day  lactobacillus acidophilus 1 Tablet(s) Oral daily  aspirin enteric coated 81 milliGRAM(s) Oral daily  norepinephrine Infusion 0.04 MICROgram(s)/kG/Min IV Continuous <Continuous>  nystatin Powder 1 Application(s) Topical every 8 hours    LABS:                        9.8    11.1  )-----------( 142      ( 26 Sep 2017 02:14 )             30.3     Hgb Trend: 9.8<--, 9.4<--, 9.6<--  09-26    134<L>  |  91<L>  |  88<H>  ----------------------------<  85  5.3   |  24  |  6.06<H>    Ca    8.1<L>      26 Sep 2017 02:14  Phos  4.6     09-26  Mg     2.3     09-26    TPro  5.2<L>  /  Alb  2.2<L>  /  TBili  0.9  /  DBili  x   /  AST  58<H>  /  ALT  23  /  AlkPhos  199<H>  09-26    Creatinine Trend: 6.06<--, 5.72<--, 5.78<--, 5.89<--, 4.42<--, 3.40<--  PT/INR - ( 25 Sep 2017 21:31 )   PT: 18.3 sec;   INR: 1.68 ratio         PTT - ( 25 Sep 2017 21:31 )  PTT:32.5 sec      Venous Blood Gas:  09-25 @ 21:17  7.40/44/44/27/74  VBG Lactate: 1.6  Venous Blood Gas:  09-25 @ 14:53  7.43/40/28/26/43  VBG Lactate: 3.6  Venous Blood Gas:  09-25 @ 11:37  7.41/44/26/28/38  VBG Lactate: 4.9      MICROBIOLOGY:  Culture - Blood (09.25.17 @ 15:08)    Gram Stain:   Growth in aerobic bottle: Gram Positive Cocci in Clusters    Specimen Source: .Blood Blood    Culture Results:   Growth in aerobic bottle: Gram Positive Cocci in Clusters       RADIOLOGY:   < from: CT Chest No Cont (09.25.17 @ 16:31) >  IMPRESSION: Pulmonary edema with bilateral pleural effusions, moderate on   right and small but loculated on the left.    < end of copied text >    < from: CT Cervical Spine No Cont (09.25.17 @ 16:30) >  CT BRAIN: Limited by streak artifact from metallic wire overlying the   patient'sforehead, motion, and out of field artifact.    No mass effect, gross evidence for intracranial hemorrhage, or evidence   for acute territorial infarct.    Extensive white matter microvascular ischemic disease    No displaced calvarial fracture.    < end of copied text >    < from: US TTE 2D F/U, Limited w/o Contrast (ED) (09.25.17 @ 12:37) >  INTERPRETATION:  A focused transthoracic cardiac ultrasound examination   was performed.   No pericardial effusion was present.  No globalwall motion abnormality was identified  The inferior vena cava was partially collapsable.   The ejection fraction was greater than 40%.    < end of copied text >        EKG: CHIEF COMPLAINT:    Interval Events: No acute overnight events. Pt was given 1L IVNS overnight. He is alert and awake this AM and conversant, however he is hard of hearing. He complains of skin irritation at the groin    REVIEW OF SYSTEMS:  Constitutional: [x ] negative [ ] fevers [ ] chills [ ] weight loss [ ] weight gain  HEENT: [ x] negative [ ] dry eyes [ ] eye irritation [ ] postnasal drip [ ] nasal congestion  CV: [x ] negative  [ ] chest pain [ ] orthopnea [ ] palpitations [ ] murmur  Resp: [x ] negative [ ] cough [ ] shortness of breath [ ] dyspnea [ ] wheezing [ ] sputum [ ] hemoptysis  GI: [x ] negative [ ] nausea [ ] vomiting [ ] diarrhea [ ] constipation [ ] abd pain [ ] dysphagia   : [x ] negative [ ] dysuria [ ] nocturia [ ] hematuria [ ] increased urinary frequency  Musculoskeletal: [x ] negative [ ] back pain [ ] myalgias [ ] arthralgias [ ] fracture  Skin: [ ] negative [x ] rash [ ] itch  Neurological: [ ] negative [ ] headache [ ] dizziness [ ] syncope [ ] weakness [ ] numbness  Psychiatric: [ ] negative [ ] anxiety [ ] depression  Endocrine: [ ] negative [ ] diabetes [ ] thyroid problem  Hematologic/Lymphatic: [ ] negative [ ] anemia [ ] bleeding problem  Allergic/Immunologic: [ ] negative [ ] itchy eyes [ ] nasal discharge [ ] hives [ ] angioedema  [ ] All other systems negative      OBJECTIVE:  ICU Vital Signs Last 24 Hrs  T(C): 36.6 (26 Sep 2017 04:00), Max: 36.6 (26 Sep 2017 04:00)  T(F): 97.9 (26 Sep 2017 04:00), Max: 97.9 (26 Sep 2017 04:00)  HR: 70 (26 Sep 2017 06:45) (70 - 99)  BP: 96/55 (26 Sep 2017 06:45) (77/39 - 118/51)  BP(mean): 72 (26 Sep 2017 06:45) (52 - 79)  ABP: --  ABP(mean): --  RR: 19 (26 Sep 2017 06:45) (16 - 41)  SpO2: 100% (26 Sep 2017 06:45) (94% - 100%)        09-25 @ 07:01 - 09-26 @ 07:00  --------------------------------------------------------  IN: 442.2 mL / OUT: 112 mL / NET: 330.2 mL      CAPILLARY BLOOD GLUCOSE  87 (26 Sep 2017 05:00)    PHYSICAL EXAM:  General: Somnolent, No acute distress   HEENT:  3 cm abrasion over L forehead; Normocephalic; PERRL EOMI  Lymph Nodes: no lymphadenopathy  Neck:  Supple, No JVD, IV placed on R sided  Respiratory:  Mild crackles over left lower lobe; No wheeze, L sided Pleurx catheter present draining white exudate, no erythema or fluid leakage.  Cardiovascular: Regular rate and rhythm; normal S1 and S2, II/VI systolic blowing murmur. No rubs or gallops  Abdomen: Soft, Nontender, Nondistended; Bowel sounds present  Extremities:  Fistula present over LUE. 2+ PT Pulses, Trace edema over R ankle. No clubbing, cyanosis  Genitourinary: Erythematous dermatitis  Skin: Diffuse ecchymotic lesions over upper and lower extremities.  Neurological: non-focal  Psychiatry:  AAOx0.    HOSPITAL MEDICATIONS:  Standing Meds:  heparin  Injectable 5000 Unit(s) SubCutaneous every 8 hours  piperacillin/tazobactam IVPB. 3.375 Gram(s) IV Intermittent every 12 hours  levothyroxine Injectable 12.5 MICROGram(s) IV Push daily  midodrine 10 milliGRAM(s) Oral three times a day  lactobacillus acidophilus 1 Tablet(s) Oral daily  aspirin enteric coated 81 milliGRAM(s) Oral daily  norepinephrine Infusion 0.04 MICROgram(s)/kG/Min IV Continuous <Continuous>  nystatin Powder 1 Application(s) Topical every 8 hours    LABS:                        9.8    11.1  )-----------( 142      ( 26 Sep 2017 02:14 )             30.3     Hgb Trend: 9.8<--, 9.4<--, 9.6<--  09-26    134<L>  |  91<L>  |  88<H>  ----------------------------<  85  5.3   |  24  |  6.06<H>    Ca    8.1<L>      26 Sep 2017 02:14  Phos  4.6     09-26  Mg     2.3     09-26    TPro  5.2<L>  /  Alb  2.2<L>  /  TBili  0.9  /  DBili  x   /  AST  58<H>  /  ALT  23  /  AlkPhos  199<H>  09-26    Creatinine Trend: 6.06<--, 5.72<--, 5.78<--, 5.89<--, 4.42<--, 3.40<--  PT/INR - ( 25 Sep 2017 21:31 )   PT: 18.3 sec;   INR: 1.68 ratio         PTT - ( 25 Sep 2017 21:31 )  PTT:32.5 sec      Venous Blood Gas:  09-25 @ 21:17  7.40/44/44/27/74  VBG Lactate: 1.6  Venous Blood Gas:  09-25 @ 14:53  7.43/40/28/26/43  VBG Lactate: 3.6  Venous Blood Gas:  09-25 @ 11:37  7.41/44/26/28/38  VBG Lactate: 4.9      MICROBIOLOGY:  Culture - Blood (09.25.17 @ 15:08)    Gram Stain:   Growth in aerobic bottle: Gram Positive Cocci in Clusters    Specimen Source: .Blood Blood    Culture Results:   Growth in aerobic bottle: Gram Positive Cocci in Clusters       RADIOLOGY:   < from: CT Chest No Cont (09.25.17 @ 16:31) >  IMPRESSION: Pulmonary edema with bilateral pleural effusions, moderate on   right and small but loculated on the left.    < end of copied text >    < from: CT Cervical Spine No Cont (09.25.17 @ 16:30) >  CT BRAIN: Limited by streak artifact from metallic wire overlying the   patient'sforehead, motion, and out of field artifact.    No mass effect, gross evidence for intracranial hemorrhage, or evidence   for acute territorial infarct.    Extensive white matter microvascular ischemic disease    No displaced calvarial fracture.    < end of copied text >    < from: US TTE 2D F/U, Limited w/o Contrast (ED) (09.25.17 @ 12:37) >  INTERPRETATION:  A focused transthoracic cardiac ultrasound examination   was performed.   No pericardial effusion was present.  No globalwall motion abnormality was identified  The inferior vena cava was partially collapsable.   The ejection fraction was greater than 40%.    < end of copied text >        EKG:

## 2017-09-26 NOTE — PROGRESS NOTE ADULT - SUBJECTIVE AND OBJECTIVE BOX
[facility] DIVISION OF KIDNEY DISEASES AND HYPERTENSION -- FOLLOW UP NOTE  --------------------------------------------------------------------------------  Chief Complaint:  ESRD on HD    24 hour events/subjective:  patient is verbal today however is perseverating about heaviness of left arm.  Follows instructions appropriately.        PAST HISTORY  --------------------------------------------------------------------------------  No significant changes to PMH, PSH, FHx, SHx, unless otherwise noted    ALLERGIES & MEDICATIONS  --------------------------------------------------------------------------------  Allergies    amlodipine (Rash)  Benadryl (Other)  Brilinta (Unknown)  Effient (Unknown)  Multaq (Other)    Intolerances      Standing Inpatient Medications  heparin  Injectable 5000 Unit(s) SubCutaneous every 8 hours  piperacillin/tazobactam IVPB. 3.375 Gram(s) IV Intermittent every 12 hours  levothyroxine Injectable 12.5 MICROGram(s) IV Push daily  midodrine 10 milliGRAM(s) Oral three times a day  lactobacillus acidophilus 1 Tablet(s) Oral daily  aspirin enteric coated 81 milliGRAM(s) Oral daily  norepinephrine Infusion 0.01 MICROgram(s)/kG/Min IV Continuous <Continuous>  nystatin Powder 1 Application(s) Topical every 8 hours    PRN Inpatient Medications      REVIEW OF SYSTEMS  --------------------------------------------------------------------------------  Patient unable to provide any history other than heaviness of left arm.    VITALS/PHYSICAL EXAM  --------------------------------------------------------------------------------  T(C): 36.6 (09-26-17 @ 04:00), Max: 36.6 (09-26-17 @ 04:00)  HR: 70 (09-26-17 @ 09:15) (70 - 99)  BP: 100/58 (09-26-17 @ 09:15) (77/39 - 118/51)  RR: 24 (09-26-17 @ 09:15) (16 - 41)  SpO2: 100% (09-26-17 @ 09:15) (94% - 100%)  Wt(kg): --  Height (cm): 180.34 (09-25-17 @ 17:23)  Weight (kg): 75.2 (09-25-17 @ 17:23)  BMI (kg/m2): 23.1 (09-25-17 @ 17:23)  BSA (m2): 1.95 (09-25-17 @ 17:23)      09-25-17 @ 07:01  -  09-26-17 @ 07:00  --------------------------------------------------------  IN: 442.2 mL / OUT: 112 mL / NET: 330.2 mL    09-26-17 @ 07:01  -  09-26-17 @ 09:27  --------------------------------------------------------  IN: 7 mL / OUT: 0 mL / NET: 7 mL      Physical Exam:  	Gen: NAD  	HEENT: MMM  	Pulm: +left lung rhonchi diffusely, no rales in right lower lung fields  	CV: RRR, S1S2; no rub  	Abd: +BS, soft, nontender/nondistended  	: No suprapubic tenderness  	UE:  no edema  	LE:  no edema  	Neuro:  limited in verbal capacity  	Skin: Warm  	Vascular access:  LUE AVF + thrill and bruit    LABS/STUDIES  --------------------------------------------------------------------------------              9.8    11.1  >-----------<  142      [09-26-17 @ 02:14]              30.3     134  |  91  |  88  ----------------------------<  85      [09-26-17 @ 02:14]  5.3   |  24  |  6.06        Ca     8.1     [09-26-17 @ 02:14]      Mg     2.3     [09-26-17 @ 02:14]      Phos  4.6     [09-26-17 @ 02:14]    TPro  5.2  /  Alb  2.2  /  TBili  0.9  /  DBili  x   /  AST  58  /  ALT  23  /  AlkPhos  199  [09-26-17 @ 02:14]    PT/INR: PT 18.3 , INR 1.68       [09-25-17 @ 21:31]  PTT: 32.5       [09-25-17 @ 21:31]    Troponin 0.12      [09-25-17 @ 21:31]        [09-25-17 @ 21:31]        [09-25-17 @ 11:37]    Creatinine Trend:  SCr 6.06 [09-26 @ 02:14]  SCr 5.72 [09-25 @ 21:31]  SCr 5.78 [09-25 @ 11:37]  SCr 5.89 [09-18 @ 09:44]  SCr 4.42 [09-17 @ 05:40]

## 2017-09-26 NOTE — PROGRESS NOTE ADULT - ATTENDING COMMENTS
as above--CTS follow up-likely removal of pleurx and decortication of left pleural space as per Tai et al.  ABX to be directed by pleural and Blood samples--zosyn   MICU for BP support    Morro Tran MD-Pulmonary   [phone]

## 2017-09-26 NOTE — PROGRESS NOTE ADULT - ATTENDING COMMENTS
Pt with septic shock from MRSA bacteremia with likely source pleural infection. Pleurex catheter draining fluid. Seen by CTS and plan for catheter removal. Cont pressors. Cautious fluid management given severe AS and MR and ESRD status. Cont GOC d/w family.

## 2017-09-26 NOTE — PROGRESS NOTE ADULT - ASSESSMENT
88 yo M with multiple medical problems including DLBCL on Rituxan, malignant pleural effusion s/p left pleurx, ESRD on HD, chronic hypotension on Midodrine presents with lethargy, hypotension and cloudy pleural fluid drainage. History as per chart and family.   Received broad spectrum antibiotics and 2L NS for presumed sepsis 2/2 empyema. Evaluated by MICU and admitted for severe sepsis.     A/P: Septic shock - likely from empyema in the setting of DLBCL on Rituxan, indwelling pleural catheter and cloudy, malodorous pleural fluid. CT chest with moderate sized pleural effusion and small loculated left pleural effusion.  Agree with MICU team - broad spectrum antibiotics, awaiting cultures from pleural fluid and blood. Would send UA as well.  BP support - stabilized s/p IVFs and antibiotics.  Thoracic surgery service following - if pleural space is infected, will need to remove pleurx.  DVT prophylaxis.    Care as per MICU team.  Dr. Tran to follow up. 88 yo M with multiple medical problems including DLBCL on Rituxan, malignant pleural effusion s/p left pleurx, ESRD on HD, chronic hypotension on Midodrine presents with lethargy, hypotension and cloudy pleural fluid drainage. History as per chart and family.   Received broad spectrum antibiotics and 2L NS for presumed sepsis 2/2 empyema. Evaluated by MICU and admitted for severe sepsis.     A/P: Septic shock - likely from empyema in the setting of DLBCL on Rituxan, indwelling pleural catheter and cloudy, malodorous pleural fluid. CT chest with moderate sized pleural effusion and small loculated left pleural effusion.  Agree with MICU team - broad spectrum antibiotics, awaiting cultures from pleural fluid and blood. Would send UA as well.  BP support - stabilized s/p IVFs and antibiotics.  Thoracic surgery service following - if pleural space is infected, will need to remove pleurx.  DVT prophylaxis.    Care as per MICU team

## 2017-09-26 NOTE — PROGRESS NOTE ADULT - ASSESSMENT
86 yo M with a PMH ESRD on HD (MWF), AAA s/p repair, CAD s/p stents (lastplaced in 03/2014), atrial fibrillation (not on A/C 2/2 falls), HFpEF (last EF 68%) s/p PPM and ablation 2016, severe aortic stenosis, severe mitral regurgitation, depression, hypothyroidism, HTN, and DLBCL currently on Rituximab, recently admitted (9/17) for increased fluid leakage from pleurx catheter p/w fall/?syncope at home with hypotension, a/w likely septic shock 2/2 empyema. 88 yo M with a PMH ESRD on HD (MWF), AAA s/p repair, CAD s/p stents (lastplaced in 03/2014), atrial fibrillation (not on A/C 2/2 falls), HFpEF (last EF 68%) s/p PPM and ablation 2016, severe aortic stenosis, severe mitral regurgitation, depression, hypothyroidism, HTN, and DLBCL currently on Rituximab, recently admitted (9/17) for increased fluid leakage from pleurx catheter p/w fall/?syncope at home with hypotension, a/w likely septic shock 2/2 empyema. c/b gram positive bacteremia.     Neuro: AMS 2/2 sepsis/metabolic derangement  - treat underlying infection w/ Vanc and Zosyn (renally dosed)  - Monitor neuro status    CV: Currently runnining levo 0.4 mcg/kg/min. Pt's pressure soft on admission but currently improved to baseline  Continue levo 0.1mcg/kg/min, goal MAP <65    Resp: Currently on 2L NC. O2 sat: 100%; Persistent pleural effusions w/ pleurex in place.  If pleural cultures +, remove pleurex  F/u w/ thoracic surgery    GI:     Renal: ESRD on HD  - HD today    ID: Blood Cx + gram + cocci. Pleural , Urine cx pending. On Zosyn IVPB day 2.   F/u culture results    Psych: A&O 0-1  - continue to monitor mental status.     DVT: Hep Sq 86 yo M with a PMH ESRD on HD (MWF), AAA s/p repair, CAD s/p stents (lastplaced in 03/2014), atrial fibrillation (not on A/C 2/2 falls), HFpEF (last EF 68%) s/p PPM and ablation 2016, severe aortic stenosis, severe mitral regurgitation, depression, hypothyroidism, HTN, and DLBCL currently on Rituximab, recently admitted (9/17) for increased fluid leakage from pleurx catheter p/w fall/?syncope at home with hypotension, a/w likely septic shock 2/2 empyema. c/b gram positive bacteremia.     Neuro: AMS 2/2 sepsis/metabolic derangement  - treat underlying infection w/ Vanc and Zosyn (renally dosed)  - Monitor neuro status    CV:  Aortic valve stenosis, unspecified etiology. Plan: severe AS and MR on previous TTE with severe pulm HTN, which is likely cause of pt's low baseline BP   will need to be gentle with IVF to prevent pulmonary edema pressors as needed to maintain MAP>65. Currently running levo 0.4 mcg/kg/min. Hx of Afib s/p PPM (controlled not on AC due to falls)  - Continue levo 0.1mcg/kg/min, goal MAP <65    Resp: Currently on 2L NC. O2 sat: 100%; Persistent pleural effusions w/ pleurex in place.  If pleural cultures +, remove pleurex  F/u w/ thoracic surgery    GI:     Renal: ESRD on HD  - HD today  - Vanc dosed after HD    ID: Pt with tachycardia, elevated WBC count with bandemia of 16% and tachypnea with hypotension to 70/40s (with baseline BP ~95/45); given turbid pleural fluid, concern for possible empyema, infected pleural effusion. Blood Cx + gram + cocci. Pleural , Urine cx pending. On Zosyn IVPB day 2.   - c/w Vanc/zosyn  - F/u culture results    Heme/Onc: hx of DLBL followed by Dr. Sinha at [facility]. Currently not neutropenic.  - F/u heme/onc as outpatient    Psych: A&O 0-1  - continue to monitor mental status.     DVT: Hep Sq 88 yo M with a PMH ESRD on HD (MWF), AAA s/p repair, CAD s/p stents (lastplaced in 03/2014), atrial fibrillation (not on A/C 2/2 falls), HFpEF (last EF 68%) s/p PPM and ablation 2016, severe aortic stenosis, severe mitral regurgitation, depression, hypothyroidism, HTN, and DLBCL currently on Rituximab, recently admitted (9/17) for increased fluid leakage from pleurx catheter p/w fall/?syncope at home with hypotension, a/w likely septic shock 2/2 empyema. c/b gram positive bacteremia.     Neuro: AMS 2/2 sepsis/metabolic derangement  - treat underlying infection w/ Vanc and Zosyn (renally dosed)  - Monitor neuro status    Resp: Currently on 2L NC. O2 sat: 100%; Persistent pleural effusions w/ pleurex in place.  If pleural cultures +, remove pleurex  - F/u w/ thoracic surgery  - Per pulm recs: Symbicort 180 2 puffs BID, Spiriva 1 puff qd, incentive spirometry, Chest PT, keep O2 sat 90%    CV:  Aortic valve stenosis, unspecified etiology. Plan: severe AS and MR on previous TTE with severe pulm HTN, which is likely cause of pt's low baseline BP   will need to be gentle with IVF to prevent pulmonary edema pressors as needed to maintain MAP>65. Currently running levo 0.4 mcg/kg/min. Hx of Afib s/p PPM (controlled not on AC due to falls)  - Continue levo 0.1mcg/kg/min, goal MAP <65      GI: Pt passed bedside swallow this AM. Concern for aspiration due to waxing and waning mental status.   - Continue w/ ice chips  - Consider advancing diet once Thoracic surgery set plans for further intervention    Renal: ESRD on HD  - HD today  - Vanc dosed after HD    ID: Pt with tachycardia, elevated WBC count with bandemia of 16% and tachypnea with hypotension to 70/40s (with baseline BP ~95/45); given turbid pleural fluid, concern for possible empyema, infected pleural effusion. Blood Cx + gram + cocci. Pleural , Urine cx pending. On Zosyn IVPB day 2.   - c/w Vanc/zosyn  - dc Zosyn tomorrow  - F/u culture results    Heme/Onc: hx of DLBL followed by Dr. Sinha at [facility]. Currently not neutropenic.  - F/u heme/onc as outpatient    Psych: A&O 0-2  - continue to monitor mental status.     DVT: Hep Sq 86 yo M with a PMH ESRD on HD (MWF), AAA s/p repair, CAD s/p stents (lastplaced in 03/2014), atrial fibrillation (not on A/C 2/2 falls), HFpEF (last EF 68%) s/p PPM and ablation 2016, severe aortic stenosis, severe mitral regurgitation, depression, hypothyroidism, HTN, and DLBCL currently on Rituximab, recently admitted (9/17) for increased fluid leakage from pleurx catheter p/w fall/?syncope at home with hypotension, a/w likely septic shock 2/2 empyema. c/b gram positive bacteremia.     Neuro: AMS 2/2 sepsis/metabolic derangement  - treat underlying infection w/ Vanc and Zosyn (renally dosed)  - Monitor neuro status    Resp: Currently on 2L NC. O2 sat: 100%; Persistent pleural effusions w/ pleurex in place.  If pleural cultures +, remove pleurex  - F/u w/ thoracic surgery  - Per pulm recs: Symbicort 180 2 puffs BID, Spiriva 1 puff qd, incentive spirometry, Chest PT, keep O2 sat 90%    CV:  Aortic valve stenosis, unspecified etiology. Plan: severe AS and MR on previous TTE with severe pulm HTN, which is likely cause of pt's low baseline BP   will need to be gentle with IVF to prevent pulmonary edema pressors as needed to maintain MAP>65. Currently running levo 0.4 mcg/kg/min. Hx of Afib s/p PPM (controlled not on AC due to falls)  - Continue levo 0.1mcg/kg/min, goal MAP <65    GI: Pt passed bedside swallow this AM. Concern for aspiration due to waxing and waning mental status.   - Continue w/ ice chips  - Consider advancing diet once Thoracic surgery set plans for further intervention    Renal: ESRD on HD  - HD today  - Per renal recs: continue epogen 4000 units TIW w/ HD tomorrow  - Vanc dosed after HD    ID: Pt with tachycardia, elevated WBC count with bandemia of 16% and tachypnea with hypotension to 70/40s (with baseline BP ~95/45); given turbid pleural fluid, concern for possible empyema, infected pleural effusion. Blood Cx: MRSA +. Pleural, Urine cx pending. On Zosyn IVPB day 2.   - c/w Vanc/zosyn  - dc Zosyn tomorrow  - F/u culture results    Heme/Onc: hx of DLBL followed by Dr. Sinha at [facility]. Currently not neutropenic.  - F/u heme/onc as outpatient    Psych: A&O 0-2  - continue to monitor mental status.     DVT: Hep Sq 88 yo M with a PMH ESRD on HD (MWF), AAA s/p repair, CAD s/p stents (lastplaced in 03/2014), atrial fibrillation (not on A/C 2/2 falls), HFpEF (last EF 68%) s/p PPM and ablation 2016, severe aortic stenosis, severe mitral regurgitation, depression, hypothyroidism, HTN, and DLBCL currently on Rituximab, recently admitted (9/17) for increased fluid leakage from pleurx catheter p/w fall/?syncope at home with hypotension, a/w likely septic shock 2/2 empyema. c/b gram positive bacteremia.     Neuro: AMS 2/2 sepsis/metabolic derangement  - treat underlying infection w/ Vanc and Zosyn (renally dosed)  - Monitor neuro status    Resp: Currently on 2L NC. O2 sat: 100%; Persistent pleural effusions w/ pleurex in place.  If pleural cultures +, remove pleurex  - F/u w/ thoracic surgery  - Per pulm recs: Symbicort 180 2 puffs BID, Spiriva 1 puff qd, incentive spirometry, Chest PT, keep O2 sat 90%    CV:  Aortic valve stenosis, unspecified etiology. Plan: severe AS and MR on previous TTE with severe pulm HTN, which is likely cause of pt's low baseline BP   will need to be gentle with IVF to prevent pulmonary edema pressors as needed to maintain MAP>65. Currently running levo 0.4 mcg/kg/min. Hx of Afib s/p PPM (controlled not on AC due to falls)  - Continue levo 0.1mcg/kg/min, goal MAP >65    GI: Pt passed bedside swallow this AM. Concern for aspiration due to waxing and waning mental status.   - Continue w/ ice chips  - Consider advancing diet once Thoracic surgery set plans for further intervention    Renal: ESRD on HD  - HD today  - Per renal recs: continue epogen 4000 units TIW w/ HD tomorrow  - Vanc dosed after HD    ID: Pt with tachycardia, elevated WBC count with bandemia of 16% and tachypnea with hypotension to 70/40s (with baseline BP ~95/45); given turbid pleural fluid, concern for possible empyema, infected pleural effusion. Blood Cx: MRSA +. Pleural, Urine cx pending. On Zosyn IVPB day 2.   - c/w Vanc/zosyn  - dc Zosyn tomorrow  - F/u culture results    Heme/Onc: hx of DLBL followed by Dr. Sinha at [facility]. Currently not neutropenic.  - F/u heme/onc as outpatient    Psych: A&O 0-2  - continue to monitor mental status.     DVT: Hep Sq 88 yo M with a PMH ESRD on HD (MWF), AAA s/p repair, CAD s/p stents (lastplaced in 03/2014), atrial fibrillation (not on A/C 2/2 falls), HFpEF (last EF 68%) s/p PPM and ablation 2016, severe aortic stenosis, severe mitral regurgitation, depression, hypothyroidism, HTN, and DLBCL currently on Rituximab, recently admitted (9/17) for increased fluid leakage from pleurx catheter p/w fall/?syncope at home with hypotension, a/w likely septic shock 2/2 empyema, c/b gram positive bacteremia.     Neuro: AMS 2/2 sepsis/metabolic derangement  - treat underlying infection w/ Vanc and Zosyn (renally dosed)  - Monitor neuro status  - avoid sedating agents    Resp: Currently on 2L NC. O2 sat: 100%; Persistent pleural effusions w/ pleurex in place.  If pleural cultures +, remove pleurex  - F/u w/ thoracic surgery  - Per pulm recs: Symbicort 180 2 puffs BID, Spiriva 1 puff qd, incentive spirometry, Chest PT, keep O2 sat 90%    CV:  Aortic valve stenosis, unspecified etiology. Plan: severe AS and MR on previous TTE with severe pulm HTN, which is likely cause of pt's low baseline BP   - will need to be gentle with IVF to prevent pulmonary edema, pressors as needed to maintain MAP>65. Currently running levo 0.4 mcg/kg/min. Hx of Afib s/p PPM (controlled not on AC due to falls)  - Continue levo 0.4mcg/kg/min, and titrate to goal MAP >65    GI: Pt passed bedside swallow this AM. Concern for aspiration due to waxing and waning mental status.   - Continue w/ ice chips  - Consider advancing diet once Thoracic surgery set plans for further intervention    Renal: ESRD on HD  - HD today  - Per renal recs: continue epogen 4000 units TIW w/ HD tomorrow  - Vanc dosed after HD    ID: Pt with tachycardia, elevated WBC count with bandemia of 16% and tachypnea with hypotension to 70/40s (with baseline BP ~95/45); given turbid pleural fluid, concern for possible empyema, infected pleural effusion. Blood Cx: MRSA +. Pleural, Urine cx pending. On Zosyn IVPB day 2.   - c/w Vanc/zosyn  - dc Zosyn tomorrow  - F/u culture results    Heme/Onc: hx of DLBL followed by Dr. Sinha at [facility]. Currently not neutropenic.  - F/u heme/onc as outpatient    Psych: A&O 0-2  - continue to monitor mental status.     DVT: Hep Sq 88 yo M with a PMH ESRD on HD (MWF), AAA s/p repair, CAD s/p stents (lastplaced in 03/2014), atrial fibrillation (not on A/C 2/2 falls), HFpEF (last EF 68%) s/p PPM and ablation 2016, severe aortic stenosis, severe mitral regurgitation, depression, hypothyroidism, HTN, and DLBCL currently on Rituximab, recently admitted (9/17) for increased fluid leakage from pleurx catheter p/w fall/?syncope at home with hypotension, a/w likely septic shock 2/2 empyema, c/b gram positive bacteremia.     Neuro: AMS 2/2 sepsis/metabolic derangement  - treat underlying infection w/ Vanc and Zosyn (renally dosed)  - Monitor neuro status  - avoid sedating agents    Resp: Currently on 2L NC. O2 sat: 100%; Persistent pleural effusions w/ pleurex in place.  If pleural cultures +, remove pleurex  - F/u w/ thoracic surgery  - Per pulm recs: Symbicort 180 2 puffs BID, Spiriva 1 puff qd, incentive spirometry, Chest PT, keep O2 sat 90%    CV:  Aortic valve stenosis, unspecified etiology. Plan: severe AS and MR on previous TTE with severe pulm HTN, which is likely cause of pt's low baseline BP   - will need to be gentle with IVF to prevent pulmonary edema, pressors as needed to maintain MAP>65. Currently running levo 0.4 mcg/kg/min. Hx of Afib s/p PPM (controlled not on AC due to falls)  - Continue levo 0.4mcg/kg/min, and titrate to goal MAP >65  - check official TTE to r/o vegetations    GI: Pt passed bedside swallow this AM. Concern for aspiration due to waxing and waning mental status.   - Continue w/ ice chips  - Consider advancing diet once Thoracic surgery set plans for further intervention    Renal: ESRD on HD  - HD today  - Per renal recs: continue epogen 4000 units TIW w/ HD tomorrow  - Vanc dosed after HD    ID: Pt with tachycardia, elevated WBC count with bandemia of 16% and tachypnea with hypotension to 70/40s (with baseline BP ~95/45); given turbid pleural fluid, concern for possible empyema, infected pleural effusion. Blood Cx: MRSA +. Pleural, Urine cx pending. On Zosyn IVPB day 2.   - c/w Vanc/zosyn  - dc Zosyn tomorrow  - F/u culture results, repeat blood cultures in AM    Heme/Onc: hx of DLBL followed by Dr. Sinha at [facility]. Currently not neutropenic.  - F/u heme/onc as outpatient    Psych: A&O 0-2  - continue to monitor mental status.     DVT: Hep Sq

## 2017-09-26 NOTE — PROGRESS NOTE ADULT - SUBJECTIVE AND OBJECTIVE BOX
VITAL SIGNS      Vital Signs Last 24 Hrs  T(C): 36.4 (17 @ 08:00), Max: 36.6 (17 @ 04:00)  T(F): 97.6 (17 @ 08:00), Max: 97.9 (17 @ 04:00)  HR: 70 (17 @ 10:30) (70 - 76)  BP: 124/56 (17 @ 10:15) (77/39 - 124/56)  RR: 19 (17 @ 10:30) (16 - 41)  SpO2: 100% (17 @ 10:30) (94% - 100%)                   Daily Height in cm: 180.34 (25 Sep 2017 17:23)    Daily Weight in k.3 (26 Sep 2017 01:00)      Bilirubin Total, Serum: 0.9 mg/dL ( @ 02:14)    CAPILLARY BLOOD GLUCOSE  87 (26 Sep 2017 05:00)  85 (26 Sep 2017 02:00)  90 (25 Sep 2017 21:00)              Drains:              L Pleural  [ x ] plx      to pleurvac           PHYSICAL EXAM    Neurology: alert and oriented x 2-3  CV :  RRR  Lungs:   lt sidediiminshed, lt plx draining to pleurvac  Abdomen: soft, nontender, nondistended, positive bowel sounds  Extremities:     no edema

## 2017-09-26 NOTE — PROGRESS NOTE ADULT - PROBLEM SELECTOR PLAN 4
add symbicort 180 2 puffs bid  spiriva 1 puff q day  pulmonary toilet-incentive spirometry, Chest Pt-acapella/chest vest-up to 5 times per day.    maintain oxygen levels above 90%-supplemental oxygen via nasal canula-humidified    All nebulized therapy is to be administered via hand held nebulizer-(patient must gargle and spit with water after use)

## 2017-09-26 NOTE — PROGRESS NOTE ADULT - ASSESSMENT
87 yr old male admit with confusion, leukocytosis with history of recent lt plx catheter placed for recurrent lt pleural effusion  with pleural cx revelaing gram positive cocci and bacturemia NRSA. Lt plx catheter attached to pleuravac and draining cloudy fluid  9/26  IV ABX, Plx draining ,  levophed for hypotension, routine HD today

## 2017-09-27 ENCOUNTER — APPOINTMENT (OUTPATIENT)
Dept: CARDIOLOGY | Facility: CLINIC | Age: 82
End: 2017-09-27

## 2017-09-27 LAB
ALBUMIN SERPL ELPH-MCNC: 2.2 G/DL — LOW (ref 3.3–5)
ALP SERPL-CCNC: 218 U/L — HIGH (ref 40–120)
ALT FLD-CCNC: 22 U/L RC — SIGNIFICANT CHANGE UP (ref 10–45)
ANION GAP SERPL CALC-SCNC: 18 MMOL/L — HIGH (ref 5–17)
APTT BLD: 31.6 SEC — SIGNIFICANT CHANGE UP (ref 27.5–37.4)
AST SERPL-CCNC: 53 U/L — HIGH (ref 10–40)
BILIRUB SERPL-MCNC: 0.8 MG/DL — SIGNIFICANT CHANGE UP (ref 0.2–1.2)
BUN SERPL-MCNC: 59 MG/DL — HIGH (ref 7–23)
CALCIUM SERPL-MCNC: 7.8 MG/DL — LOW (ref 8.4–10.5)
CHLORIDE SERPL-SCNC: 94 MMOL/L — LOW (ref 96–108)
CO2 SERPL-SCNC: 25 MMOL/L — SIGNIFICANT CHANGE UP (ref 22–31)
CREAT SERPL-MCNC: 4.29 MG/DL — HIGH (ref 0.5–1.3)
GLUCOSE SERPL-MCNC: 135 MG/DL — HIGH (ref 70–99)
GRAM STN FLD: SIGNIFICANT CHANGE UP
HCT VFR BLD CALC: 29.8 % — LOW (ref 39–50)
HGB BLD-MCNC: 9.7 G/DL — LOW (ref 13–17)
INR BLD: 1.43 RATIO — HIGH (ref 0.88–1.16)
MAGNESIUM SERPL-MCNC: 2.2 MG/DL — SIGNIFICANT CHANGE UP (ref 1.6–2.6)
MCHC RBC-ENTMCNC: 32.6 GM/DL — SIGNIFICANT CHANGE UP (ref 32–36)
MCHC RBC-ENTMCNC: 34.4 PG — HIGH (ref 27–34)
MCV RBC AUTO: 106 FL — HIGH (ref 80–100)
PHOSPHATE SERPL-MCNC: 3.3 MG/DL — SIGNIFICANT CHANGE UP (ref 2.5–4.5)
PLATELET # BLD AUTO: 127 K/UL — LOW (ref 150–400)
POTASSIUM SERPL-MCNC: 4.3 MMOL/L — SIGNIFICANT CHANGE UP (ref 3.5–5.3)
POTASSIUM SERPL-SCNC: 4.3 MMOL/L — SIGNIFICANT CHANGE UP (ref 3.5–5.3)
PROT SERPL-MCNC: 5.3 G/DL — LOW (ref 6–8.3)
PROTHROM AB SERPL-ACNC: 15.7 SEC — HIGH (ref 9.8–12.7)
RBC # BLD: 2.82 M/UL — LOW (ref 4.2–5.8)
RBC # FLD: 16.9 % — HIGH (ref 10.3–14.5)
SODIUM SERPL-SCNC: 137 MMOL/L — SIGNIFICANT CHANGE UP (ref 135–145)
WBC # BLD: 10.2 K/UL — SIGNIFICANT CHANGE UP (ref 3.8–10.5)
WBC # FLD AUTO: 10.2 K/UL — SIGNIFICANT CHANGE UP (ref 3.8–10.5)

## 2017-09-27 PROCEDURE — 71010: CPT | Mod: 26

## 2017-09-27 PROCEDURE — 71010: CPT | Mod: 26,77

## 2017-09-27 PROCEDURE — 99291 CRITICAL CARE FIRST HOUR: CPT

## 2017-09-27 PROCEDURE — 99233 SBSQ HOSP IP/OBS HIGH 50: CPT

## 2017-09-27 RX ORDER — DEXMEDETOMIDINE HYDROCHLORIDE IN 0.9% SODIUM CHLORIDE 4 UG/ML
0.1 INJECTION INTRAVENOUS
Qty: 200 | Refills: 0 | Status: DISCONTINUED | OUTPATIENT
Start: 2017-09-27 | End: 2017-09-28

## 2017-09-27 RX ORDER — HALOPERIDOL DECANOATE 100 MG/ML
2.5 INJECTION INTRAMUSCULAR ONCE
Qty: 0 | Refills: 0 | Status: COMPLETED | OUTPATIENT
Start: 2017-09-27 | End: 2017-09-27

## 2017-09-27 RX ORDER — ERYTHROPOIETIN 10000 [IU]/ML
4000 INJECTION, SOLUTION INTRAVENOUS; SUBCUTANEOUS
Qty: 0 | Refills: 0 | Status: DISCONTINUED | OUTPATIENT
Start: 2017-09-27 | End: 2017-09-28

## 2017-09-27 RX ORDER — MIDODRINE HYDROCHLORIDE 2.5 MG/1
20 TABLET ORAL EVERY 8 HOURS
Qty: 0 | Refills: 0 | Status: DISCONTINUED | OUTPATIENT
Start: 2017-09-27 | End: 2017-09-28

## 2017-09-27 RX ADMIN — Medication 81 MILLIGRAM(S): at 13:23

## 2017-09-27 RX ADMIN — Medication 1 TABLET(S): at 13:23

## 2017-09-27 RX ADMIN — MIDODRINE HYDROCHLORIDE 10 MILLIGRAM(S): 2.5 TABLET ORAL at 05:39

## 2017-09-27 RX ADMIN — NYSTATIN CREAM 1 APPLICATION(S): 100000 CREAM TOPICAL at 05:39

## 2017-09-27 RX ADMIN — HEPARIN SODIUM 5000 UNIT(S): 5000 INJECTION INTRAVENOUS; SUBCUTANEOUS at 14:49

## 2017-09-27 RX ADMIN — HALOPERIDOL DECANOATE 2.5 MILLIGRAM(S): 100 INJECTION INTRAMUSCULAR at 18:20

## 2017-09-27 RX ADMIN — BUDESONIDE AND FORMOTEROL FUMARATE DIHYDRATE 2 PUFF(S): 160; 4.5 AEROSOL RESPIRATORY (INHALATION) at 17:52

## 2017-09-27 RX ADMIN — HALOPERIDOL DECANOATE 2.5 MILLIGRAM(S): 100 INJECTION INTRAMUSCULAR at 19:06

## 2017-09-27 RX ADMIN — Medication 12.5 MICROGRAM(S): at 05:38

## 2017-09-27 RX ADMIN — HALOPERIDOL DECANOATE 2.5 MILLIGRAM(S): 100 INJECTION INTRAMUSCULAR at 22:20

## 2017-09-27 RX ADMIN — Medication 166.67 MILLIGRAM(S): at 14:50

## 2017-09-27 RX ADMIN — PIPERACILLIN AND TAZOBACTAM 25 GRAM(S): 4; .5 INJECTION, POWDER, LYOPHILIZED, FOR SOLUTION INTRAVENOUS at 23:33

## 2017-09-27 RX ADMIN — NYSTATIN CREAM 1 APPLICATION(S): 100000 CREAM TOPICAL at 13:23

## 2017-09-27 RX ADMIN — BUDESONIDE AND FORMOTEROL FUMARATE DIHYDRATE 2 PUFF(S): 160; 4.5 AEROSOL RESPIRATORY (INHALATION) at 05:52

## 2017-09-27 RX ADMIN — Medication 0.7 MICROGRAM(S)/KG/MIN: at 05:39

## 2017-09-27 RX ADMIN — PIPERACILLIN AND TAZOBACTAM 25 GRAM(S): 4; .5 INJECTION, POWDER, LYOPHILIZED, FOR SOLUTION INTRAVENOUS at 00:07

## 2017-09-27 RX ADMIN — MIDODRINE HYDROCHLORIDE 20 MILLIGRAM(S): 2.5 TABLET ORAL at 13:23

## 2017-09-27 RX ADMIN — HEPARIN SODIUM 5000 UNIT(S): 5000 INJECTION INTRAVENOUS; SUBCUTANEOUS at 05:39

## 2017-09-27 RX ADMIN — HEPARIN SODIUM 5000 UNIT(S): 5000 INJECTION INTRAVENOUS; SUBCUTANEOUS at 21:01

## 2017-09-27 RX ADMIN — DEXMEDETOMIDINE HYDROCHLORIDE IN 0.9% SODIUM CHLORIDE 1.88 MICROGRAM(S)/KG/HR: 4 INJECTION INTRAVENOUS at 22:20

## 2017-09-27 RX ADMIN — MIDODRINE HYDROCHLORIDE 20 MILLIGRAM(S): 2.5 TABLET ORAL at 21:01

## 2017-09-27 RX ADMIN — NYSTATIN CREAM 1 APPLICATION(S): 100000 CREAM TOPICAL at 21:01

## 2017-09-27 RX ADMIN — TIOTROPIUM BROMIDE 1 CAPSULE(S): 18 CAPSULE ORAL; RESPIRATORY (INHALATION) at 12:05

## 2017-09-27 RX ADMIN — PIPERACILLIN AND TAZOBACTAM 25 GRAM(S): 4; .5 INJECTION, POWDER, LYOPHILIZED, FOR SOLUTION INTRAVENOUS at 11:23

## 2017-09-27 NOTE — PROGRESS NOTE ADULT - ASSESSMENT
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     Neuro: AMS 2/2 sepsis/metabolic derangement. Currently improved, but mental status continues to wax and wane. Pt display signs of delirium.   - treat underlying infection w/ Vanc and Zosyn (renally dosed)  - Monitor neuro status  - avoid sedating agents    Resp: Currently on 2L NC. O2 sat: 100%; Persistent pleural effusions w/ pleurex in place.  If pleural cultures +, remove pleurex  - F/u w/ thoracic surgery   - Per pulm recs: Symbicort 180 2 puffs BID, Spiriva 1 puff qd, incentive spirometry, Chest PT, keep O2 sat 90%    CV:  Aortic valve stenosis, unspecified etiology. Plan: severe AS and MR on previous TTE with severe pulm HTN, which is likely cause of pt's low baseline BP   - will need to be gentle with IVF to prevent pulmonary edema, pressors as needed to maintain MAP>65. Currently running levo 0.12 mcg/kg/min. Hx of Afib s/p PPM (controlled not on AC due to falls)  - Continue levo 0.12 mcg/kg/min, and titrate to goal MAP >65  - check official TTE to r/o vegetations  - Consider increasing Minodrine to 20mg    GI: Concern for aspiration due to waxing and waning mental status.   - Pt place on Renal diet.  - Continue to monitor mental status for risk of aspiration.    Renal: ESRD on HD  - HD plans for Friday   - Per renal recs: continue epogen 4000 units TIW       ID: Pt with tachycardia, elevated WBC count with bandemia of 16% and tachypnea with hypotension to 70/40s (with baseline BP ~95/45); given turbid pleural fluid, concern for possible empyema, infected pleural effusion. Blood Cx: MRSA +. Pleural, Urine cx pending. On Zosyn IVPB day 2.   - c/w Vanc/zosyn  - dc Zosyn  - F/u culture results    Heme/Onc: hx of DLBL followed by Dr. Sinha at [facility]. Currently not neutropenic.  - F/u heme/onc as outpatient    Psych: A&O x2  - continue to monitor mental status.   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     Neuro: AMS 2/2 sepsis/metabolic derangement. Currently improved, but mental status continues to wax and wane. Pt display signs of delirium.   - treat underlying infection w/ Vanc and Zosyn (renally dosed)  - Monitor neuro status  - avoid sedating agents    Resp: Currently on room air; Persistent pleural effusions w/ pleurex in place.  If pleural cultures +, remove pleurex  - F/u w/ thoracic surgery   - Per pulm recs: Symbicort 180 2 puffs BID, Spiriva 1 puff qd, incentive spirometry, Chest PT, keep O2 sat 90%    CV:  Aortic valve stenosis, unspecified etiology. Plan: severe AS and MR on previous TTE with severe pulm HTN, which is likely cause of pt's low baseline BP   - will need to be gentle with IVF to prevent pulmonary edema, pressors as needed to maintain MAP>65. Currently running levo 0.12 mcg/kg/min. Hx of Afib s/p PPM (controlled not on AC due to falls)  - Continue levo 0.12 mcg/kg/min, and titrate to goal MAP >65  - check official TTE to r/o vegetations  - Consider increasing Minodrine to 20mg    GI: Concern for aspiration due to waxing and waning mental status.   - Pt place on Renal diet.  - Continue to monitor mental status for risk of aspiration.    Renal: ESRD on HD  - HD plans for Friday   - Per renal recs: continue epogen 4000 units TIW       ID: Pt presented with tachycardia, elevated WBC count with bandemia of 16% and tachypnea with hypotension to 70/40s (with baseline BP ~95/45); given turbid pleural fluid, concern for possible empyema, infected pleural effusion. Blood Cx: MRSA +. Pleural Cx pending. On Vanc/ Zosyn IVPB day 3.   - c/w Vanc/zosyn  - F/u culture results    Heme/Onc: hx of DLBL followed by Dr. Sinha at [facility]. Currently not neutropenic.  - F/u heme/onc as outpatient    Psych: A&O x2  - continue to monitor mental status.   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     Neuro: AMS 2/2 sepsis/metabolic derangement. Currently improved, but mental status continues to wax and wane. Pt display signs of delirium.   - treat underlying infection w/ Vanc and Zosyn (renally dosed)  - Monitor neuro status  - avoid sedating agents    Resp: Currently on room air; Persistent pleural effusions w/ pleurex in place.  If pleural cultures +, remove pleurex  - F/u w/ thoracic surgery for removal of pleurex cath  - Per pulm recs: C/w Symbicort 180 2 puffs BID, Spiriva 1 puff qd, incentive spirometry, Chest PT, keep O2 sat 90%    CV:  Aortic valve stenosis, unspecified etiology. Plan: severe AS and MR on previous TTE with severe pulm HTN, which is likely cause of pt's low baseline BP   - will need to be gentle with IVF to prevent pulmonary edema, pressors as needed to maintain MAP>65. Currently running levo 0.12 mcg/kg/min. Hx of Afib s/p PPM (controlled not on AC due to falls)  - Continue levo 0.12 mcg/kg/min, and titrate to goal MAP >65  - check official TTE to r/o vegetations  - Increase Minodrine to 20mg    GI: Concern for aspiration due to waxing and waning mental status.   - Pt place on Renal diet.  - Continue to monitor mental status for risk of aspiration.    Renal: ESRD on HD  - HD plans for Friday   - Per renal recs: continue epogen 4000 units TIW       ID: Pt presented with tachycardia, elevated WBC count with bandemia of 16% and tachypnea with hypotension to 70/40s (with baseline BP ~95/45); given turbid pleural fluid, concern for possible empyema, infected pleural effusion. Blood Cx: MRSA +. Pleural Cx pending. On Vanc/ Zosyn IVPB day 3.   - c/w Vanc/zosyn  - consider dcing zosyn pending results of pleural cx  - F/u culture results    Heme/Onc: hx of DLBL followed by Dr. Sinha at [facility]. Currently not neutropenic.  - F/u heme/onc as outpatient    Psych: A&O x2  - continue to monitor mental status.   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     Neuro: AMS 2/2 sepsis/metabolic derangement. Currently improved, but mental status continues to wax and wane, likely 2/2 delirium   - treat underlying infection w/ Vanc and Zosyn (renally dosed)  - Monitor neuro status  - avoid sedating agents    Resp: Currently on room air; persistent pleural effusions w/ pleurex in place.  - pleurex garm stain + GPC; awaiting culture data   - F/u w/ thoracic surgery for removal of pleurex cath  - c/w Symbicort 180 2 puffs BID, Spiriva 1 puff qd, incentive spirometry, Chest PT, keep O2 sat 90%    CV:   - septic shock 2/2 MRSA bacteremia, likely originating from infected pleural fluid; c/w levo to maintain MAP>65, increased midodrine to 20TID, wean levo as BP tolerates   - pt with severe AS and MR on previous TTE with severe pulm HTN - gentle IVF as needed only to prevent pulmonary edema  - Afib s/p PPM (controlled, not on AC due to falls, not on BB)  - check official TTE to r/o vegetations  - Increase Minodrine to 20mg    GI: Concern for aspiration due to waxing and waning mental status.   - Pt place on Renal diet.  - Continue to monitor mental status for risk of aspiration.    Renal: ESRD on HD  - HD plans for Friday   - Per renal recs: continue epogen 4000 units TIW       ID: Pt presented with tachycardia, elevated WBC count with bandemia of 16% and tachypnea with hypotension to 70/40s (with baseline BP ~95/45); given turbid pleural fluid, concern for possible empyema, infected pleural effusion. Blood Cx: MRSA +. Pleural Cx pending. On Vanc/ Zosyn IVPB day 3.   - c/w Vanc/zosyn  - consider dcing zosyn pending results of pleural cx  - F/u culture results    Heme/Onc: hx of DLBL followed by Dr. Sinha at [facility]. Currently not neutropenic.  - F/u heme/onc as outpatient    Psych: A&O x2  - continue to monitor mental status.   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     Neuro: AMS 2/2 sepsis/metabolic derangement. Currently improved, but mental status continues to wax and wane, likely 2/2 delirium   - treat underlying infection w/ Vanc and Zosyn (renally dosed)  - Monitor neuro status  - avoid sedating agents    Resp: Currently on room air; persistent pleural effusions w/ pleurex in place.  - pleurex garm stain + GPC; awaiting culture data   - F/u w/ thoracic surgery for removal of pleurex cath  - c/w Symbicort 180 2 puffs BID, Spiriva 1 puff qd, incentive spirometry, Chest PT, keep O2 sat 90%    CV:   - septic shock 2/2 MRSA bacteremia, likely originating from infected pleural fluid; c/w levo to maintain MAP>65, increased midodrine to 20TID, wean levo as BP tolerates   - pt with severe AS and MR on previous TTE with severe pulm HTN - gentle IVF as needed only to prevent pulmonary edema  - Afib s/p PPM (controlled, not on AC due to falls, not on BB)  - check official TTE to r/o vegetations  - Increase Minodrine to 20mg    GI:   - c/w renal diet, tolerating well   - Continue to monitor mental status for risk of aspiration.    Renal: ESRD on HD  - HD per renal  - Per renal recs: continue epogen 4000 units TIW       ID:   - septic shock 2/2 MRSA bacteremia, likely source is empyema   - c/w Vanc/ Zosyn IVPB (day 3), will consider dcing zosyn pending results of pleural cx  - pleural gram stain +GPCs, cx pending     Heme/Onc:   - leukocytosis resolved  - pt with chronic anemia, likely 2/2 anemia of chronic disease/ESRD - c/t monitor, c/w epogen; transfuse Hb <7.0  - mild thrombocytopenia, likely in setting of sepsis - c/t monitor      - DLBCL - f/u heme/onc as outpatient    Psych: A&O x2  - continue to monitor mental status.   - avoid sedation    DVT: Hep Sq

## 2017-09-27 NOTE — PROGRESS NOTE ADULT - PROBLEM SELECTOR PLAN 4
add symbicort 180 2 puffs bid  spiriva 1 puff q day  pulmonary toilet-incentive spirometry, Chest Pt-acapella/chest vest-up to 5 times per day.    maintain oxygen levels above 90%-supplemental oxygen via nasal canula-humidified    All nebulized therapy is to be administered via hand held nebulizer-(patient must gargle and spit with water after use) symbicort 180 2 puffs bid  spiriva 1 puff q day  pulmonary toilet-incentive spirometry, Chest Pt-acapella/chest vest-up to 5 times per day.    maintain oxygen levels above 90%-supplemental oxygen via nasal canula-humidified    All nebulized therapy is to be administered via hand held nebulizer-(patient must gargle and spit with water after use)

## 2017-09-27 NOTE — DIETITIAN INITIAL EVALUATION ADULT. - PROBLEM SELECTOR PLAN 4
nephrology following  holding HD today (due MWF); may dialyze tomorrow   if tolerating PO, midodrine prior to HD

## 2017-09-27 NOTE — PROGRESS NOTE ADULT - ATTENDING COMMENTS
as above--CTS follow up-likely removal of pleurx and decortication of left pleural space as per Tia et al.  ABX to be directed by pleural and Blood samples--zosyn   MICU for BP support    Morro Tran MD-Pulmonary   [phone] as above--CTS follow up-likely removal of pleurx and decortication of left pleural space as per Tia et al.  ABX to be directed by pleural and Blood samples--zosyn D3 and vanco D2--follow up final pleural  and blood cx  MICU for BP support    Morro Tran MD-Pulmonary   [phone]

## 2017-09-27 NOTE — PROGRESS NOTE ADULT - ASSESSMENT
88y/o Male h/o ESRD, AAA repair admitted with altered mental status, MRSA bacteremia and non draining left pleurx.   9/26 Left pleurx attached to pleurovac yielding -100 cc purulent drainage.  9/27 left pleurx removed

## 2017-09-27 NOTE — PROGRESS NOTE ADULT - ATTENDING COMMENTS
Pt with septic shock from MRSA bacteremia with likely source pleural infection. Pleurex catheter draining fluid. Seen by CTS and plan for catheter. Cont pressors. Increase midodrine dose. Cautious fluid management given severe AS and MR and ESRD status. Cont vanco and zosyn. Taper abx based on pleural cx. Check TTE r/o endocarditis. Cont [facility] d/w family.      Attending CCT 40min

## 2017-09-27 NOTE — CHART NOTE - NSCHARTNOTEFT_GEN_A_CORE
Upon Nutritional Assessment by the Registered Dietitian your patient was determined to meet criteria / has evidence of the following diagnosis/diagnoses:          [ ]  Mild Protein Calorie Malnutrition        [x ]  Moderate Protein Calorie Malnutrition        [ ] Severe Protein Calorie Malnutrition        [ ] Unspecified Protein Calorie Malnutrition        [ ] Underweight / BMI <19        [ ] Morbid Obesity / BMI > 40      Findings as based on:  [x ] Comprehensive nutrition assessment   [x ] Nutrition Focused Physical Exam  [ ] Other:       Nutrition Plan/Recommendations:  Nepro supplement 2x/day, nephrovite ordered today        PROVIDER Section:     By signing this assessment you are acknowledging and agree with the diagnosis/diagnoses assigned by the Registered Dietitian    Comments:

## 2017-09-27 NOTE — PHYSICAL THERAPY INITIAL EVALUATION ADULT - ADDITIONAL COMMENTS
Pt lives in private apartment with spouse and private HHA 24 hours. Pt requires assist for some mobility as per Private HHA at bedside. Pt amb with rollator at baseline

## 2017-09-27 NOTE — DIETITIAN INITIAL EVALUATION ADULT. - OTHER INFO
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 Plan for HD today.     GI issues:   Last BM:     Food allergies:    Vit/supplement PTA: Noted pt's wt on 9/5/17 was 161lb at [facility].   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 Plan for HD today.     GI issues: no N/V/D  Last BM: none since adm    Food allergies: NKFA   Vit/supplement PTA: Zn, renvela

## 2017-09-27 NOTE — DIETITIAN INITIAL EVALUATION ADULT. - DIET TYPE
nepro 2x/day/renal replacement pts:no protein restr,no conc K & phos, low sodium/supplement (specify)

## 2017-09-27 NOTE — PHYSICAL THERAPY INITIAL EVALUATION ADULT - PRECAUTIONS/LIMITATIONS, REHAB EVAL
fall precautions CXR: Stable small bilateral pleural effusions with mild pulmonary venous hypertension. Right elbow Xray (-)/fall precautions

## 2017-09-27 NOTE — DIETITIAN INITIAL EVALUATION ADULT. - PHYSICAL APPEARANCE
pt agreed to have NFPE performed: fat loss upper arm, severe, orbital pads mild-moderate: muscle loss temporal region moderate, clavicle mild-moderate, knee mild to moderate

## 2017-09-27 NOTE — PHYSICAL THERAPY INITIAL EVALUATION ADULT - PERTINENT HX OF CURRENT PROBLEM, REHAB EVAL
88 yo M recent admiy for increased fluid leakage from pleurx catheter. Pt s/p witnessed mechanical fall in home VNS nurse; BP after fall 70/40. Pt family reported that the VNS nurse proceeded to drain cloudy, foul smelling fluid from pleurx catheter prompting this hospital admission. PMH: ESRD on HD MWF, AAA s/p repair, ?MIs in past, atrial fib, HFpEF, s/p PPM and ablation 2016, severe aortic stenosis, severe mitral regurgitation, gout, GERD, depression, hypothyroidism, HTN 88 yo M recent admit for increased fluid leakage from pleurex catheter. Pt s/p witnessed mechanical fall in home VNS nurse; BP after fall 70/40. Pt family reported that the VNS nurse proceeded to drain cloudy, foul smelling fluid from pleurx catheter prompting this hospital admission. PMH: ESRD on HD MWF, AAA s/p repair, ?MIs in past, atrial fib, HFpEF, s/p PPM and ablation 2016, severe aortic stenosis, severe mitral regurgitation, gout, GERD, depression, hypothyroidism, HTN

## 2017-09-27 NOTE — PROGRESS NOTE ADULT - SUBJECTIVE AND OBJECTIVE BOX
CHIEF COMPLAINT:    Interval Events:    REVIEW OF SYSTEMS:  Constitutional: No fevers or chills. No weight loss. No fatigue or generalized malaise.  Eyes: No itching or discharge from the eyes  ENT: No ear pain. No ear discharge. No nasal congestion. No post nasal drip. No epistaxis. No throat pain. No sore throat. No difficulty swallowing.   CV: No chest pain. No palpitations. No lightheadedness or dizziness.   Resp: No dyspnea at rest. No dyspnea on exertion. No orthopnea. No wheezing. No cough. No stridor. No sputum production. No chest pain with respiration.  GI: No nausea. No vomiting. No diarrhea.  MSK: No joint pain or pain in any extremities  Integumentary: No skin lesions. No pedal edema.  Neurological: No gross motor weakness. No sensory changes.  [ ] All other systems negative  [ ] Unable to assess ROS because ________    OBJECTIVE:  ICU Vital Signs Last 24 Hrs  T(C): 37.1 (27 Sep 2017 04:00), Max: 37.3 (26 Sep 2017 20:00)  T(F): 98.8 (27 Sep 2017 04:00), Max: 99.1 (26 Sep 2017 20:00)  HR: 70 (27 Sep 2017 04:45) (70 - 77)  BP: 91/52 (27 Sep 2017 04:45) (81/42 - 124/56)  BP(mean): 70 (27 Sep 2017 04:45) (56 - 79)  ABP: --  ABP(mean): --  RR: 37 (27 Sep 2017 04:45) (17 - 43)  SpO2: 99% (27 Sep 2017 04:45) (78% - 100%)        09-25 @ 07:01  -  09-26 @ 07:00  --------------------------------------------------------  IN: 442.2 mL / OUT: 112 mL / NET: 330.2 mL    09-26 @ 07:01  -  09-27 @ 05:16  --------------------------------------------------------  IN: 659.5 mL / OUT: 35 mL / NET: 624.5 mL      CAPILLARY BLOOD GLUCOSE  135 (27 Sep 2017 03:00)          PHYSICAL EXAM:  General: Awake, alert, oriented X 3.   HEENT: Atraumatic, normocephalic.                 Mallampatti Grade                 No nasal congestion.                No tonsillar or pharyngeal exudates.  Lymph Nodes: No palpable lymphadenopathy  Neck: No JVD. No carotid bruit.   Respiratory: Normal chest expansion                         Normal percussion                         Normal and equal air entry                         No wheeze, rhonchi or rales.  Cardiovascular: S1 S2 normal. No murmurs, rubs or gallops.   Abdomen: Soft, non-tender, non-distended. No organomegaly.  Extremities: Warm to touch. Peripheral pulse palpable. No pedal edema.   Skin: No rashes or skin lesions  Neurological: Motor and sensory examination equal and normal in all four extremities.  Psychiatry: Appropriate mood and affect.    HOSPITAL MEDICATIONS:  MEDICATIONS  (STANDING):  heparin  Injectable 5000 Unit(s) SubCutaneous every 8 hours  piperacillin/tazobactam IVPB. 3.375 Gram(s) IV Intermittent every 12 hours  levothyroxine Injectable 12.5 MICROGram(s) IV Push daily  midodrine 10 milliGRAM(s) Oral three times a day  lactobacillus acidophilus 1 Tablet(s) Oral daily  aspirin enteric coated 81 milliGRAM(s) Oral daily  norepinephrine Infusion 0.01 MICROgram(s)/kG/Min (0.705 mL/Hr) IV Continuous <Continuous>  nystatin Powder 1 Application(s) Topical every 8 hours  tiotropium 18 MICROgram(s) Capsule 1 Capsule(s) Inhalation daily  vancomycin  IVPB 1250 milliGRAM(s) IV Intermittent once  buDESOnide 160 MICROgram(s)/formoterol 4.5 MICROgram(s) Inhaler 2 Puff(s) Inhalation two times a day    MEDICATIONS  (PRN):      LABS:                        9.7    10.2  )-----------( 127      ( 27 Sep 2017 03:02 )             29.8     09-27    137  |  94<L>  |  59<H>  ----------------------------<  135<H>  4.3   |  25  |  4.29<H>    Ca    7.8<L>      27 Sep 2017 03:02  Phos  3.3     09-27  Mg     2.2     09-27    TPro  5.3<L>  /  Alb  2.2<L>  /  TBili  0.8  /  DBili  x   /  AST  53<H>  /  ALT  22  /  AlkPhos  218<H>  09-27    PT/INR - ( 27 Sep 2017 03:02 )   PT: 15.7 sec;   INR: 1.43 ratio         PTT - ( 27 Sep 2017 03:02 )  PTT:31.6 sec      Venous Blood Gas:  09-25 @ 21:17  7.40/44/44/27/74  VBG Lactate: 1.6  Venous Blood Gas:  09-25 @ 14:53  7.43/40/28/26/43  VBG Lactate: 3.6  Venous Blood Gas:  09-25 @ 11:37  7.41/44/26/28/38  VBG Lactate: 4.9      MICROBIOLOGY:     RADIOLOGY:  [ ] Reviewed and interpreted by me    Point of Care Ultrasound Findings:    PFT:    EKG: CHIEF COMPLAINT:mno pain or sob or cough    Interval Events:CTS follow up, pleurx to pleuravac    REVIEW OF SYSTEMS:  Constitutional: No fevers or chills. No weight loss. No fatigue or generalized malaise.  Eyes: No itching or discharge from the eyes  ENT: No ear pain. No ear discharge. No nasal congestion. No post nasal drip. No epistaxis. No throat pain. No sore throat. No difficulty swallowing.   CV: No chest pain. No palpitations. No lightheadedness or dizziness.   Resp: No dyspnea at rest. No dyspnea on exertion. No orthopnea. No wheezing. No cough. No stridor. No sputum production. No chest pain with respiration.  GI: No nausea. No vomiting. No diarrhea.  MSK: No joint pain or pain in any extremities  Integumentary: No skin lesions. No pedal edema.  Neurological: No gross motor weakness. No sensory changes.  [+ ] All other systems negative  [ ] Unable to assess ROS because ________    OBJECTIVE:  ICU Vital Signs Last 24 Hrs  T(C): 37.1 (27 Sep 2017 04:00), Max: 37.3 (26 Sep 2017 20:00)  T(F): 98.8 (27 Sep 2017 04:00), Max: 99.1 (26 Sep 2017 20:00)  HR: 70 (27 Sep 2017 04:45) (70 - 77)  BP: 91/52 (27 Sep 2017 04:45) (81/42 - 124/56)  BP(mean): 70 (27 Sep 2017 04:45) (56 - 79)  ABP: --  ABP(mean): --  RR: 37 (27 Sep 2017 04:45) (17 - 43)  SpO2: 99% (27 Sep 2017 04:45) (78% - 100%)        09-25 @ 07:01  -  09-26 @ 07:00  --------------------------------------------------------  IN: 442.2 mL / OUT: 112 mL / NET: 330.2 mL    09-26 @ 07:01  -  09-27 @ 05:16  --------------------------------------------------------  IN: 659.5 mL / OUT: 35 mL / NET: 624.5 mL      CAPILLARY BLOOD GLUCOSE  135 (27 Sep 2017 03:00)          PHYSICAL EXAM:NAD today-understands where he is  General: Awake, alert, oriented X 3.   HEENT: Atraumatic, normocephalic.                 Mallampatti Grade 2                No nasal congestion.                No tonsillar or pharyngeal exudates.  Lymph Nodes: No palpable lymphadenopathy  Neck: No JVD. No carotid bruit.   Respiratory: Normal chest expansion--left CT/pleurx in place                         Normal percussion                         abnormal and unequal air entry--left base--reduced BS                         No wheeze, rhonchi or rales.  Cardiovascular: S1 S2 normal. No murmurs, rubs or gallops.   Abdomen: Soft, non-tender, non-distended. No organomegaly.  Extremities: Warm to touch. Peripheral pulse palpable. No pedal edema.   Skin: No rashes or skin lesions  Neurological: Motor and sensory examination equal and normal in all four extremities.  Psychiatry: Appropriate mood and affect.    HOSPITAL MEDICATIONS:  MEDICATIONS  (STANDING):  heparin  Injectable 5000 Unit(s) SubCutaneous every 8 hours  piperacillin/tazobactam IVPB. 3.375 Gram(s) IV Intermittent every 12 hours  levothyroxine Injectable 12.5 MICROGram(s) IV Push daily  midodrine 10 milliGRAM(s) Oral three times a day  lactobacillus acidophilus 1 Tablet(s) Oral daily  aspirin enteric coated 81 milliGRAM(s) Oral daily  norepinephrine Infusion 0.01 MICROgram(s)/kG/Min (0.705 mL/Hr) IV Continuous <Continuous>  nystatin Powder 1 Application(s) Topical every 8 hours  tiotropium 18 MICROgram(s) Capsule 1 Capsule(s) Inhalation daily  vancomycin  IVPB 1250 milliGRAM(s) IV Intermittent once  buDESOnide 160 MICROgram(s)/formoterol 4.5 MICROgram(s) Inhaler 2 Puff(s) Inhalation two times a day    MEDICATIONS  (PRN):      LABS:                        9.7    10.2  )-----------( 127      ( 27 Sep 2017 03:02 )             29.8     09-27    137  |  94<L>  |  59<H>  ----------------------------<  135<H>  4.3   |  25  |  4.29<H>    Ca    7.8<L>      27 Sep 2017 03:02  Phos  3.3     09-27  Mg     2.2     09-27    TPro  5.3<L>  /  Alb  2.2<L>  /  TBili  0.8  /  DBili  x   /  AST  53<H>  /  ALT  22  /  AlkPhos  218<H>  09-27    PT/INR - ( 27 Sep 2017 03:02 )   PT: 15.7 sec;   INR: 1.43 ratio         PTT - ( 27 Sep 2017 03:02 )  PTT:31.6 sec      Venous Blood Gas:  09-25 @ 21:17  7.40/44/44/27/74  VBG Lactate: 1.6  Venous Blood Gas:  09-25 @ 14:53  7.43/40/28/26/43  VBG Lactate: 3.6  Venous Blood Gas:  09-25 @ 11:37  7.41/44/26/28/38  VBG Lactate: 4.9      MICROBIOLOGY:     RADIOLOGY:  [ ] Reviewed and interpreted by me    Point of Care Ultrasound Findings:    PFT:    EKG:

## 2017-09-27 NOTE — PROGRESS NOTE ADULT - PROBLEM SELECTOR PLAN 2
s/p pleurx---infected space likely  HD to continue s/p pleurx---infected space g + cocci  HD to continue

## 2017-09-27 NOTE — PROGRESS NOTE ADULT - ASSESSMENT
86 yo M with multiple medical problems including DLBCL on Rituxan, malignant pleural effusion s/p left pleurx, ESRD on HD, chronic hypotension on Midodrine presents with lethargy, hypotension and cloudy pleural fluid drainage. History as per chart and family.   Received broad spectrum antibiotics and 2L NS for presumed sepsis 2/2 empyema. Evaluated by MICU and admitted for severe sepsis.     A/P: Septic shock - likely from empyema in the setting of DLBCL on Rituxan, indwelling pleural catheter and cloudy, malodorous pleural fluid. CT chest with moderate sized pleural effusion and small loculated left pleural effusion.  Agree with MICU team - broad spectrum antibiotics, awaiting cultures from pleural fluid and blood. Would send UA as well.  BP support - stabilized s/p IVFs and antibiotics.  Thoracic surgery service following - if pleural space is infected, will need to remove pleurx.  DVT prophylaxis.    Care as per MICU team 86 yo M with multiple medical problems including DLBCL on Rituxan, malignant pleural effusion s/p left pleurx, ESRD on HD, chronic hypotension on Midodrine presents with lethargy, hypotension and cloudy pleural fluid drainage. History as per chart and family.   Received broad spectrum antibiotics and 2L NS for presumed sepsis 2/2 empyema. Evaluated by MICU and admitted for severe sepsis.     A/P: Septic shock - likely from empyema in the setting of DLBCL on Rituxan, indwelling pleural catheter and cloudy, malodorous pleural fluid. CT chest with moderate sized pleural effusion and small loculated left pleural effusion.  Agree with MICU team - broad spectrum antibiotics, awaiting cultures from pleural fluid and blood. Would send UA as well.  BP support - stabilized s/p IVFs and antibiotics.  Thoracic surgery service following - + pleural space  infected, will need to remove pleurx.  DVT prophylaxis.    Care as per MICU team

## 2017-09-27 NOTE — DIETITIAN INITIAL EVALUATION ADULT. - PROBLEM SELECTOR PLAN 1
pt with tachycardia, elevated WBC count with bandemia of 16% and tachypnea with hypotension to 70/40s (with baseline BP ~95/45); given turbid pleural fluid, concern for possible empyema, infected pleural effusion  - will admit to MICU for shock  - start pressors (levophed) if MAP<65  - will c/w vanco by level and zosyn (renally dosed) for now   - blood cultures and pleural cultures pending   - will US pt at bedside when arrives on unit   - f/u CT chest  - remove pleurex catheter   - thoracici surgery following  - UA and urine cultures

## 2017-09-27 NOTE — PROGRESS NOTE ADULT - PROBLEM SELECTOR PLAN 1
Continue with antibiotics as per MICU  Daily chest x ray  Consider left pleural sonosite for possible pigtail placement for recollection of effusion  Maintain dry sterile dressing to former left pleurx site

## 2017-09-27 NOTE — PROGRESS NOTE ADULT - PROBLEM SELECTOR PLAN 3
check pleural cultures and Blood cx  zosyn D2  CTS evaluation and likely decortication check pleural cultures and Blood cx  zosyn D3; vanco D2  CTS evaluation and likely decortication

## 2017-09-27 NOTE — PROGRESS NOTE ADULT - SUBJECTIVE AND OBJECTIVE BOX
CHIEF COMPLAINT:    Interval Events: Per nursing no overnight events. Pt was not able to get any sleep last night. Pt complained that he "wants to go home" and was "very sad." Pt additionally reported that his "skin his falling off" as he stared down at his hands.      REVIEW OF SYSTEMS:  Constitutional: [x ] negative [ ] fevers [ ] chills [ ] weight loss [ ] weight gain  HEENT: [x ] negative [ ] dry eyes [ ] eye irritation [ ] postnasal drip [ ] nasal congestion  CV: [x ] negative  [ ] chest pain [ ] orthopnea [ ] palpitations [ ] murmur  Resp: [x ] negative [ ] cough [ ] shortness of breath [ ] dyspnea [ ] wheezing [ ] sputum [ ] hemoptysis  GI: [x ] negative [ ] nausea [ ] vomiting [ ] diarrhea [ ] constipation [ ] abd pain [ ] dysphagia   : [x ] negative [ ] dysuria [ ] nocturia [ ] hematuria [ ] increased urinary frequency  Musculoskeletal: [x ] negative [ ] back pain [ ] myalgias [ ] arthralgias [ ] fracture  Skin: [x ] negative [ ] rash [ ] itch  Neurological: [x ] negative [ ] headache [ ] dizziness [ ] syncope [ ] weakness [ ] numbness  Psychiatric: [x ] negative [ ] anxiety [ ] depression  Endocrine: [x ] negative [ ] diabetes [ ] thyroid problem  Hematologic/Lymphatic: [x ] negative [ ] anemia [ ] bleeding problem  Allergic/Immunologic: [x ] negative [ ] itchy eyes [ ] nasal discharge [ ] hives [ ] angioedema  [x ] All other systems negative  [ ] Unable to assess ROS because ________    OBJECTIVE:  ICU Vital Signs Last 24 Hrs  T(C): 37.1 (27 Sep 2017 04:00), Max: 37.3 (26 Sep 2017 20:00)  T(F): 98.8 (27 Sep 2017 04:00), Max: 99.1 (26 Sep 2017 20:00)  HR: 70 (27 Sep 2017 07:00) (70 - 77)  BP: 116/49 (27 Sep 2017 07:00) (81/42 - 124/56)  BP(mean): 70 (27 Sep 2017 07:00) (56 - 79)  ABP: --  ABP(mean): --  RR: 44 (27 Sep 2017 07:00) (18 - 44)  SpO2: 97% (27 Sep 2017 07:00) (78% - 100%)        09-26 @ 07:01  -  09-27 @ 07:00  --------------------------------------------------------  IN: 736.5 mL / OUT: 125 mL / NET: 611.5 mL      CAPILLARY BLOOD GLUCOSE  135 (27 Sep 2017 03:00)          PHYSICAL EXAM:  General: Awake, alert, sitting up in bed. No acute distress   HEENT:  3 cm abrasion over L forehead; Normocephalic; PERRL EOMI  Lymph Nodes: no lymphadenopathy  Neck:  Supple, No JVD, IV placed on R sided  Respiratory:  Mild crackles over left lower lobe; No wheeze, L sided Pleurx catheter present draining white exudate, no erythema or fluid leakage.  Cardiovascular: Regular rate and rhythm; normal S1 and S2, II/VI systolic blowing murmur. No rubs or gallops  Abdomen: Soft, Nontender, Nondistended; Bowel sounds present  Extremities:  Fistula present over LUE. 2+ PT Pulses, Trace edema over R ankle. No clubbing, cyanosis  Genitourinary: Erythematous dermatitis over groin b/l  Skin: Diffuse ecchymotic lesions over upper and lower extremities.  Neurological: non-focal  Psychiatry:  AAOx2 (name and place).      HOSPITAL MEDICATIONS:  Standing Meds:  heparin  Injectable 5000 Unit(s) SubCutaneous every 8 hours  piperacillin/tazobactam IVPB. 3.375 Gram(s) IV Intermittent every 12 hours  levothyroxine Injectable 12.5 MICROGram(s) IV Push daily  midodrine 10 milliGRAM(s) Oral three times a day  lactobacillus acidophilus 1 Tablet(s) Oral daily  aspirin enteric coated 81 milliGRAM(s) Oral daily  norepinephrine Infusion 0.12 MICROgram(s)/kG/Min IV Continuous <Continuous>  nystatin Powder 1 Application(s) Topical every 8 hours  tiotropium 18 MICROgram(s) Capsule 1 Capsule(s) Inhalation daily  vancomycin  IVPB 1250 milliGRAM(s) IV Intermittent once  buDESOnide 160 MICROgram(s)/formoterol 4.5 MICROgram(s) Inhaler 2 Puff(s) Inhalation two times a day    LABS:                        9.7    10.2  )-----------( 127      ( 27 Sep 2017 03:02 )             29.8     Hgb Trend: 9.7<--, 9.8<--, 9.4<--, 9.6<--  09-27    137  |  94<L>  |  59<H>  ----------------------------<  135<H>  4.3   |  25  |  4.29<H>    Ca    7.8<L>      27 Sep 2017 03:02  Phos  3.3     09-27  Mg     2.2     09-27    TPro  5.3<L>  /  Alb  2.2<L>  /  TBili  0.8  /  DBili  x   /  AST  53<H>  /  ALT  22  /  AlkPhos  218<H>  09-27    Creatinine Trend: 4.29<--, 6.06<--, 5.72<--, 5.78<--, 5.89<--, 4.42<--  PT/INR - ( 27 Sep 2017 03:02 )   PT: 15.7 sec;   INR: 1.43 ratio         PTT - ( 27 Sep 2017 03:02 )  PTT:31.6 sec      Venous Blood Gas:  09-25 @ 21:17  7.40/44/44/27/74  VBG Lactate: 1.6  Venous Blood Gas:  09-25 @ 14:53  7.43/40/28/26/43  VBG Lactate: 3.6  Venous Blood Gas:  09-25 @ 11:37  7.41/44/26/28/38  VBG Lactate: 4.9      MICROBIOLOGY:     RADIOLOGY:  < from: Xray Chest 1 View AP- PORTABLE-Urgent (09.26.17 @ 11:12) >  IMPRESSION:     Small bilateral pleural effusions without interval change since 9/25/2017.    Bilateral reticular opacities consistent with pulmonary vascular   congestion.    < end of copied text > CHIEF COMPLAINT:    Interval Events: Per nursing no overnight events. Pt was not able to get any sleep last night. Pt complained that he "wants to go home" and was "very sad." Pt additionally reported that his "skin his falling off" as he stared down at his hands.      REVIEW OF SYSTEMS:  Constitutional: [x ] negative [ ] fevers [ ] chills [ ] weight loss [ ] weight gain  HEENT: [x ] negative [ ] dry eyes [ ] eye irritation [ ] postnasal drip [ ] nasal congestion  CV: [x ] negative  [ ] chest pain [ ] orthopnea [ ] palpitations [ ] murmur  Resp: [x ] negative [ ] cough [ ] shortness of breath [ ] dyspnea [ ] wheezing [ ] sputum [ ] hemoptysis  GI: [x ] negative [ ] nausea [ ] vomiting [ ] diarrhea [ ] constipation [ ] abd pain [ ] dysphagia   : [x ] negative [ ] dysuria [ ] nocturia [ ] hematuria [ ] increased urinary frequency  Musculoskeletal: [x ] negative [ ] back pain [ ] myalgias [ ] arthralgias [ ] fracture  Skin: [x ] negative [ ] rash [ ] itch  Neurological: [x ] negative [ ] headache [ ] dizziness [ ] syncope [ ] weakness [ ] numbness  Psychiatric: [x ] negative [ ] anxiety [ ] depression  Endocrine: [x ] negative [ ] diabetes [ ] thyroid problem  Hematologic/Lymphatic: [x ] negative [ ] anemia [ ] bleeding problem  Allergic/Immunologic: [x ] negative [ ] itchy eyes [ ] nasal discharge [ ] hives [ ] angioedema  [x ] All other systems negative  [ ] Unable to assess ROS because ________    OBJECTIVE:  ICU Vital Signs Last 24 Hrs  T(C): 37.1 (27 Sep 2017 04:00), Max: 37.3 (26 Sep 2017 20:00)  T(F): 98.8 (27 Sep 2017 04:00), Max: 99.1 (26 Sep 2017 20:00)  HR: 70 (27 Sep 2017 07:00) (70 - 77)  BP: 116/49 (27 Sep 2017 07:00) (81/42 - 124/56)  BP(mean): 70 (27 Sep 2017 07:00) (56 - 79)  ABP: --  ABP(mean): --  RR: 44 (27 Sep 2017 07:00) (18 - 44)  SpO2: 97% (27 Sep 2017 07:00) (78% - 100%)    09-26 @ 07:01  -  09-27 @ 07:00  --------------------------------------------------------  IN: 736.5 mL / OUT: 125 mL / NET: 611.5 mL    CAPILLARY BLOOD GLUCOSE  135 (27 Sep 2017 03:00)    PHYSICAL EXAM:  General: Awake, alert, sitting up in bed. No acute distress   HEENT:  3 cm abrasion over L forehead; Normocephalic; PERRL EOMI  Lymph Nodes: no lymphadenopathy  Neck:  Supple, No JVD, IV placed on R sided  Respiratory:  Mild crackles over left lower lobe; No wheeze, L sided Pleurx catheter present draining white exudate, no erythema or fluid leakage.  Cardiovascular: Regular rate and rhythm; normal S1 and S2, II/VI systolic blowing murmur. No rubs or gallops  Abdomen: Soft, Nontender, Nondistended; Bowel sounds present  Extremities:  Fistula present over LUE. 2+ PT Pulses, Trace edema over R ankle. No clubbing, cyanosis. LE are cool to touch.   Genitourinary: Erythematous dermatitis over groin b/l  Skin: Diffuse ecchymotic lesions over upper and lower extremities.  Neurological: non-focal  Psychiatry:  AAOx2 (name and place).    HOSPITAL MEDICATIONS:  Standing Meds:  heparin  Injectable 5000 Unit(s) SubCutaneous every 8 hours  piperacillin/tazobactam IVPB. 3.375 Gram(s) IV Intermittent every 12 hours  levothyroxine Injectable 12.5 MICROGram(s) IV Push daily  midodrine 10 milliGRAM(s) Oral three times a day  lactobacillus acidophilus 1 Tablet(s) Oral daily  aspirin enteric coated 81 milliGRAM(s) Oral daily  norepinephrine Infusion 0.12 MICROgram(s)/kG/Min IV Continuous <Continuous>  nystatin Powder 1 Application(s) Topical every 8 hours  tiotropium 18 MICROgram(s) Capsule 1 Capsule(s) Inhalation daily  vancomycin  IVPB 1250 milliGRAM(s) IV Intermittent once  buDESOnide 160 MICROgram(s)/formoterol 4.5 MICROgram(s) Inhaler 2 Puff(s) Inhalation two times a day    LABS:                        9.7    10.2  )-----------( 127      ( 27 Sep 2017 03:02 )             29.8     Hgb Trend: 9.7<--, 9.8<--, 9.4<--, 9.6<--  09-27    137  |  94<L>  |  59<H>  ----------------------------<  135<H>  4.3   |  25  |  4.29<H>    Ca    7.8<L>      27 Sep 2017 03:02  Phos  3.3     09-27  Mg     2.2     09-27    TPro  5.3<L>  /  Alb  2.2<L>  /  TBili  0.8  /  DBili  x   /  AST  53<H>  /  ALT  22  /  AlkPhos  218<H>  09-27    Creatinine Trend: 4.29<--, 6.06<--, 5.72<--, 5.78<--, 5.89<--, 4.42<--  PT/INR - ( 27 Sep 2017 03:02 )   PT: 15.7 sec;   INR: 1.43 ratio      PTT - ( 27 Sep 2017 03:02 )  PTT:31.6 sec    Venous Blood Gas:  09-25 @ 21:17  7.40/44/44/27/74  VBG Lactate: 1.6  Venous Blood Gas:  09-25 @ 14:53  7.43/40/28/26/43  VBG Lactate: 3.6  Venous Blood Gas:  09-25 @ 11:37  7.41/44/26/28/38  VBG Lactate: 4.9      RADIOLOGY:  < from: Xray Chest 1 View AP- PORTABLE-Urgent (09.26.17 @ 11:12) >  IMPRESSION:     Small bilateral pleural effusions without interval change since 9/25/2017.    Bilateral reticular opacities consistent with pulmonary vascular   congestion.    < end of copied text > CHIEF COMPLAINT: AMS     Interval Events: Per nursing no overnight events. Pt was not able to get any sleep last night. Pt complained that he "wants to go home" and was "very sad." Pt additionally reported that his "skin his falling off" as he stared down at his hands.      REVIEW OF SYSTEMS:  Constitutional: [x ] negative [ ] fevers [ ] chills [ ] weight loss [ ] weight gain  HEENT: [x ] negative [ ] dry eyes [ ] eye irritation [ ] postnasal drip [ ] nasal congestion  CV: [x ] negative  [ ] chest pain [ ] orthopnea [ ] palpitations [ ] murmur  Resp: [x ] negative [ ] cough [ ] shortness of breath [ ] dyspnea [ ] wheezing [ ] sputum [ ] hemoptysis  GI: [x ] negative [ ] nausea [ ] vomiting [ ] diarrhea [ ] constipation [ ] abd pain [ ] dysphagia   : [x ] negative [ ] dysuria [ ] nocturia [ ] hematuria [ ] increased urinary frequency  Musculoskeletal: [x ] negative [ ] back pain [ ] myalgias [ ] arthralgias [ ] fracture  Skin: [x ] negative [ ] rash [ ] itch  Neurological: [x ] negative [ ] headache [ ] dizziness [ ] syncope [ ] weakness [ ] numbness  Psychiatric: [x ] negative [ ] anxiety [ ] depression  Endocrine: [x ] negative [ ] diabetes [ ] thyroid problem  Hematologic/Lymphatic: [x ] negative [ ] anemia [ ] bleeding problem  Allergic/Immunologic: [x ] negative [ ] itchy eyes [ ] nasal discharge [ ] hives [ ] angioedema  [x ] All other systems negative  [ ] Unable to assess ROS because ________    OBJECTIVE:  ICU Vital Signs Last 24 Hrs  T(C): 37.1 (27 Sep 2017 04:00), Max: 37.3 (26 Sep 2017 20:00)  T(F): 98.8 (27 Sep 2017 04:00), Max: 99.1 (26 Sep 2017 20:00)  HR: 70 (27 Sep 2017 07:00) (70 - 77)  BP: 116/49 (27 Sep 2017 07:00) (81/42 - 124/56)  BP(mean): 70 (27 Sep 2017 07:00) (56 - 79)  ABP: --  ABP(mean): --  RR: 44 (27 Sep 2017 07:00) (18 - 44)  SpO2: 97% (27 Sep 2017 07:00) (78% - 100%)    09-26 @ 07:01  -  09-27 @ 07:00  --------------------------------------------------------  IN: 736.5 mL / OUT: 125 mL / NET: 611.5 mL    CAPILLARY BLOOD GLUCOSE  135 (27 Sep 2017 03:00)    PHYSICAL EXAM:  General: Awake, alert, sitting up in bed. No acute distress   HEENT:  3 cm abrasion over L forehead; Normocephalic; PERRL EOMI  Lymph Nodes: no lymphadenopathy  Neck:  Supple, No JVD, IV placed on R sided  Respiratory:  Mild crackles over left lower lobe; No wheeze, L sided Pleurx catheter present draining white exudate, no erythema or fluid leakage.  Cardiovascular: Regular rate and rhythm; normal S1 and S2, II/VI systolic blowing murmur. No rubs or gallops  Abdomen: Soft, Nontender, Nondistended; Bowel sounds present  Extremities:  Fistula present over LUE. 2+ PT Pulses, Trace edema over R ankle. No clubbing, cyanosis. LE are cool to touch.   Genitourinary: Erythematous dermatitis over groin b/l  Skin: Diffuse ecchymotic lesions over upper and lower extremities.  Neurological: non-focal  Psychiatry:  AAOx2 (name and place).    HOSPITAL MEDICATIONS:  Standing Meds:  heparin  Injectable 5000 Unit(s) SubCutaneous every 8 hours  piperacillin/tazobactam IVPB. 3.375 Gram(s) IV Intermittent every 12 hours  levothyroxine Injectable 12.5 MICROGram(s) IV Push daily  midodrine 10 milliGRAM(s) Oral three times a day  lactobacillus acidophilus 1 Tablet(s) Oral daily  aspirin enteric coated 81 milliGRAM(s) Oral daily  norepinephrine Infusion 0.12 MICROgram(s)/kG/Min IV Continuous <Continuous>  nystatin Powder 1 Application(s) Topical every 8 hours  tiotropium 18 MICROgram(s) Capsule 1 Capsule(s) Inhalation daily  vancomycin  IVPB 1250 milliGRAM(s) IV Intermittent once  buDESOnide 160 MICROgram(s)/formoterol 4.5 MICROgram(s) Inhaler 2 Puff(s) Inhalation two times a day    LABS:                        9.7    10.2  )-----------( 127      ( 27 Sep 2017 03:02 )             29.8     Hgb Trend: 9.7<--, 9.8<--, 9.4<--, 9.6<--  09-27    137  |  94<L>  |  59<H>  ----------------------------<  135<H>  4.3   |  25  |  4.29<H>    Ca    7.8<L>      27 Sep 2017 03:02  Phos  3.3     09-27  Mg     2.2     09-27    TPro  5.3<L>  /  Alb  2.2<L>  /  TBili  0.8  /  DBili  x   /  AST  53<H>  /  ALT  22  /  AlkPhos  218<H>  09-27    Creatinine Trend: 4.29<--, 6.06<--, 5.72<--, 5.78<--, 5.89<--, 4.42<--  PT/INR - ( 27 Sep 2017 03:02 )   PT: 15.7 sec;   INR: 1.43 ratio      PTT - ( 27 Sep 2017 03:02 )  PTT:31.6 sec    Venous Blood Gas:  09-25 @ 21:17  7.40/44/44/27/74  VBG Lactate: 1.6  Venous Blood Gas:  09-25 @ 14:53  7.43/40/28/26/43  VBG Lactate: 3.6  Venous Blood Gas:  09-25 @ 11:37  7.41/44/26/28/38  VBG Lactate: 4.9      RADIOLOGY:  < from: Xray Chest 1 View AP- PORTABLE-Urgent (09.26.17 @ 11:12) >  IMPRESSION:     Small bilateral pleural effusions without interval change since 9/25/2017.    Bilateral reticular opacities consistent with pulmonary vascular   congestion.    < end of copied text >

## 2017-09-27 NOTE — PROGRESS NOTE ADULT - SUBJECTIVE AND OBJECTIVE BOX
VITAL SIGNS    Vital Signs Last 24 Hrs  T(C): 37.1 (17 @ 04:00), Max: 37.3 (17 @ 20:00)  T(F): 98.8 (17 @ 04:00), Max: 99.1 (17 @ 20:00)  HR: 70 (17 @ 17:52) (70 - 76)  BP: 88/48 (17 @ 16:15) (81/42 - 116/53)  RR: 27 (17 @ 16:15) (23 - 44)  SpO2: 96% (17 @ 17:52) (95% - 100%)             @ 07:01  -   @ 07:00  --------------------------------------------------------  IN: 736.5 mL / OUT: 125 mL / NET: 611.5 mL     @ 07:01  -   @ 19:22  --------------------------------------------------------  IN: 578.9 mL / OUT: 0 mL / NET: 578.9 mL    Daily Weight in k.2 (27 Sep 2017 10:48)  Admit Wt: Drug Dosing Weight  Height (cm): 180.34 (25 Sep 2017 17:23)  Weight (kg): 75.2 (25 Sep 2017 17:23)  BMI (kg/m2): 23.1 (25 Sep 2017 17:23)  BSA (m2): 1.95 (25 Sep 2017 17:23)    Bilirubin Total, Serum: 0.8 mg/dL ( @ 03:02)    PHYSICAL EXAM    Subjective: "Get me out of here"  Neurology: alert and oriented x 1,  combative, verbally aggressive  CV : S1S2  Lungs: CTA b/l Left former pleurx site +serous drainage   Abdomen: soft, NT,ND, (+ )BM  :  voiding  Extremities:  -c/c/e       LABS      137  |  94<L>  |  59<H>  ----------------------------<  135<H>  4.3   |  25  |  4.29<H>    Ca    7.8<L>      27 Sep 2017 03:02  Phos  3.3       Mg     2.2         TPro  5.3<L>  /  Alb  2.2<L>  /  TBili  0.8  /  DBili  x   /  AST  53<H>  /  ALT  22  /  AlkPhos  218<H>                                   9.7    10.2  )-----------( 127      ( 27 Sep 2017 03:02 )             29.8          PT/INR - ( 27 Sep 2017 03:02 )   PT: 15.7 sec;   INR: 1.43 ratio         PTT - ( 27 Sep 2017 03:02 )  PTT:31.6 sec       PAST MEDICAL & SURGICAL HISTORY:  Myocardial infarct: h/o X3  GERD (gastroesophageal reflux disease)  Gout  AAA (abdominal aortic aneurysm): per cardiology eval report  Hemodialysis patient  Lymphoma, non-Hodgkin's: h/o diffuse B cell  Stroke: mild  Asthma: h/o  Pleural effusion  Hepatitis  Atrial fibrillation: 2014  Chronic kidney disease: on dialysis MWF  Prostate cancer: s/p seeds  [place]  Kidney Stones s/p ureteroscopy  CAD (Coronary Artery Disease): 13 stents placed 3/2014  Hypercholesteremia  HTN (Hypertension)  S/P Radiation > 12 Weeks prostatic seeds placed 10 yrs ago  S/P Cataract Surgery  S/P Cystoscopy  S/P Inguinal Hernia  repair bilat  S/P Parathyroidectomy  S/P Hip Replacement bilat  S/P Cardiac Cath: 8 stents placed 3/2014, per pt he thinks he had 3 stents placed 15 years prior also.

## 2017-09-27 NOTE — PHYSICAL THERAPY INITIAL EVALUATION ADULT - LEVEL OF INDEPENDENCE: GAIT, REHAB EVAL
minimum assist (75% patients effort) functional eval on 9/29/17/moderate assist (50% patients effort)

## 2017-09-27 NOTE — DIETITIAN INITIAL EVALUATION ADULT. - ENERGY NEEDS
Ht:  71"  Wt: 166  BMI: 23.1 kg/m2   IBW: 172 (+/-10%)    within IBW range  Edema: 2+ generalized   Skin: coccyx stage 1

## 2017-09-28 LAB
-  AMPICILLIN/SULBACTAM: SIGNIFICANT CHANGE UP
-  CEFAZOLIN: SIGNIFICANT CHANGE UP
-  CIPROFLOXACIN: SIGNIFICANT CHANGE UP
-  CLINDAMYCIN: SIGNIFICANT CHANGE UP
-  DAPTOMYCIN: SIGNIFICANT CHANGE UP
-  ERYTHROMYCIN: SIGNIFICANT CHANGE UP
-  GENTAMICIN: SIGNIFICANT CHANGE UP
-  LEVOFLOXACIN: SIGNIFICANT CHANGE UP
-  LINEZOLID: SIGNIFICANT CHANGE UP
-  MOXIFLOXACIN(AEROBIC): SIGNIFICANT CHANGE UP
-  OXACILLIN: SIGNIFICANT CHANGE UP
-  PENICILLIN: SIGNIFICANT CHANGE UP
-  RIFAMPIN: SIGNIFICANT CHANGE UP
-  TETRACYCLINE: SIGNIFICANT CHANGE UP
-  TRIMETHOPRIM/SULFAMETHOXAZOLE: SIGNIFICANT CHANGE UP
-  VANCOMYCIN: SIGNIFICANT CHANGE UP
ALBUMIN SERPL ELPH-MCNC: 2 G/DL — LOW (ref 3.3–5)
ALP SERPL-CCNC: 314 U/L — HIGH (ref 40–120)
ALT FLD-CCNC: 25 U/L RC — SIGNIFICANT CHANGE UP (ref 10–45)
ANION GAP SERPL CALC-SCNC: 22 MMOL/L — HIGH (ref 5–17)
APTT BLD: 30.9 SEC — SIGNIFICANT CHANGE UP (ref 27.5–37.4)
AST SERPL-CCNC: 59 U/L — HIGH (ref 10–40)
BILIRUB SERPL-MCNC: 0.6 MG/DL — SIGNIFICANT CHANGE UP (ref 0.2–1.2)
BUN SERPL-MCNC: 77 MG/DL — HIGH (ref 7–23)
CALCIUM SERPL-MCNC: 7.4 MG/DL — LOW (ref 8.4–10.5)
CHLORIDE SERPL-SCNC: 97 MMOL/L — SIGNIFICANT CHANGE UP (ref 96–108)
CO2 SERPL-SCNC: 22 MMOL/L — SIGNIFICANT CHANGE UP (ref 22–31)
CREAT SERPL-MCNC: 5.18 MG/DL — HIGH (ref 0.5–1.3)
CULTURE RESULTS: SIGNIFICANT CHANGE UP
CULTURE RESULTS: SIGNIFICANT CHANGE UP
GLUCOSE SERPL-MCNC: 119 MG/DL — HIGH (ref 70–99)
GRAM STN FLD: SIGNIFICANT CHANGE UP
HCT VFR BLD CALC: 26.9 % — LOW (ref 39–50)
HGB BLD-MCNC: 8.7 G/DL — LOW (ref 13–17)
INR BLD: 1.33 RATIO — HIGH (ref 0.88–1.16)
MAGNESIUM SERPL-MCNC: 2.3 MG/DL — SIGNIFICANT CHANGE UP (ref 1.6–2.6)
MCHC RBC-ENTMCNC: 32.5 GM/DL — SIGNIFICANT CHANGE UP (ref 32–36)
MCHC RBC-ENTMCNC: 34.1 PG — HIGH (ref 27–34)
MCV RBC AUTO: 105 FL — HIGH (ref 80–100)
METHOD TYPE: SIGNIFICANT CHANGE UP
ORGANISM # SPEC MICROSCOPIC CNT: SIGNIFICANT CHANGE UP
PHOSPHATE SERPL-MCNC: 3.3 MG/DL — SIGNIFICANT CHANGE UP (ref 2.5–4.5)
PLATELET # BLD AUTO: 103 K/UL — LOW (ref 150–400)
POTASSIUM SERPL-MCNC: 4.4 MMOL/L — SIGNIFICANT CHANGE UP (ref 3.5–5.3)
POTASSIUM SERPL-SCNC: 4.4 MMOL/L — SIGNIFICANT CHANGE UP (ref 3.5–5.3)
PROT SERPL-MCNC: 5 G/DL — LOW (ref 6–8.3)
PROTHROM AB SERPL-ACNC: 14.4 SEC — HIGH (ref 9.8–12.7)
RBC # BLD: 2.56 M/UL — LOW (ref 4.2–5.8)
RBC # FLD: 17.1 % — HIGH (ref 10.3–14.5)
SODIUM SERPL-SCNC: 141 MMOL/L — SIGNIFICANT CHANGE UP (ref 135–145)
SPECIMEN SOURCE: SIGNIFICANT CHANGE UP
VANCOMYCIN FLD-MCNC: 14.4 UG/ML — SIGNIFICANT CHANGE UP
VANCOMYCIN TROUGH SERPL-MCNC: 22.6 UG/ML — HIGH (ref 10–20)
WBC # BLD: 10.2 K/UL — SIGNIFICANT CHANGE UP (ref 3.8–10.5)
WBC # FLD AUTO: 10.2 K/UL — SIGNIFICANT CHANGE UP (ref 3.8–10.5)

## 2017-09-28 PROCEDURE — 99233 SBSQ HOSP IP/OBS HIGH 50: CPT | Mod: GC

## 2017-09-28 PROCEDURE — 99291 CRITICAL CARE FIRST HOUR: CPT

## 2017-09-28 PROCEDURE — 93306 TTE W/DOPPLER COMPLETE: CPT | Mod: 26

## 2017-09-28 PROCEDURE — 99233 SBSQ HOSP IP/OBS HIGH 50: CPT

## 2017-09-28 PROCEDURE — 93010 ELECTROCARDIOGRAM REPORT: CPT

## 2017-09-28 RX ORDER — NOREPINEPHRINE BITARTRATE/D5W 8 MG/250ML
0.01 PLASTIC BAG, INJECTION (ML) INTRAVENOUS
Qty: 16 | Refills: 0 | Status: DISCONTINUED | OUTPATIENT
Start: 2017-09-28 | End: 2017-09-29

## 2017-09-28 RX ORDER — VANCOMYCIN HCL 1 G
750 VIAL (EA) INTRAVENOUS ONCE
Qty: 0 | Refills: 0 | Status: COMPLETED | OUTPATIENT
Start: 2017-09-28 | End: 2017-09-28

## 2017-09-28 RX ORDER — ERYTHROPOIETIN 10000 [IU]/ML
4000 INJECTION, SOLUTION INTRAVENOUS; SUBCUTANEOUS
Qty: 0 | Refills: 0 | Status: DISCONTINUED | OUTPATIENT
Start: 2017-09-28 | End: 2017-10-17

## 2017-09-28 RX ORDER — MIDODRINE HYDROCHLORIDE 2.5 MG/1
10 TABLET ORAL ONCE
Qty: 0 | Refills: 0 | Status: COMPLETED | OUTPATIENT
Start: 2017-09-28 | End: 2017-09-28

## 2017-09-28 RX ORDER — BUDESONIDE, MICRONIZED 100 %
0.5 POWDER (GRAM) MISCELLANEOUS EVERY 12 HOURS
Qty: 0 | Refills: 0 | Status: DISCONTINUED | OUTPATIENT
Start: 2017-09-28 | End: 2017-10-17

## 2017-09-28 RX ORDER — MIDODRINE HYDROCHLORIDE 2.5 MG/1
30 TABLET ORAL EVERY 8 HOURS
Qty: 0 | Refills: 0 | Status: DISCONTINUED | OUTPATIENT
Start: 2017-09-28 | End: 2017-10-17

## 2017-09-28 RX ADMIN — MIDODRINE HYDROCHLORIDE 20 MILLIGRAM(S): 2.5 TABLET ORAL at 06:16

## 2017-09-28 RX ADMIN — NYSTATIN CREAM 1 APPLICATION(S): 100000 CREAM TOPICAL at 13:05

## 2017-09-28 RX ADMIN — MIDODRINE HYDROCHLORIDE 30 MILLIGRAM(S): 2.5 TABLET ORAL at 16:36

## 2017-09-28 RX ADMIN — NYSTATIN CREAM 1 APPLICATION(S): 100000 CREAM TOPICAL at 06:11

## 2017-09-28 RX ADMIN — HEPARIN SODIUM 5000 UNIT(S): 5000 INJECTION INTRAVENOUS; SUBCUTANEOUS at 13:05

## 2017-09-28 RX ADMIN — NYSTATIN CREAM 1 APPLICATION(S): 100000 CREAM TOPICAL at 21:41

## 2017-09-28 RX ADMIN — ERYTHROPOIETIN 4000 UNIT(S): 10000 INJECTION, SOLUTION INTRAVENOUS; SUBCUTANEOUS at 13:15

## 2017-09-28 RX ADMIN — PIPERACILLIN AND TAZOBACTAM 25 GRAM(S): 4; .5 INJECTION, POWDER, LYOPHILIZED, FOR SOLUTION INTRAVENOUS at 16:23

## 2017-09-28 RX ADMIN — Medication 0.5 MILLIGRAM(S): at 17:12

## 2017-09-28 RX ADMIN — HEPARIN SODIUM 5000 UNIT(S): 5000 INJECTION INTRAVENOUS; SUBCUTANEOUS at 21:41

## 2017-09-28 RX ADMIN — BUDESONIDE AND FORMOTEROL FUMARATE DIHYDRATE 2 PUFF(S): 160; 4.5 AEROSOL RESPIRATORY (INHALATION) at 06:14

## 2017-09-28 RX ADMIN — Medication 150 MILLIGRAM(S): at 18:45

## 2017-09-28 RX ADMIN — HEPARIN SODIUM 5000 UNIT(S): 5000 INJECTION INTRAVENOUS; SUBCUTANEOUS at 06:11

## 2017-09-28 RX ADMIN — MIDODRINE HYDROCHLORIDE 10 MILLIGRAM(S): 2.5 TABLET ORAL at 09:40

## 2017-09-28 RX ADMIN — Medication 12.5 MICROGRAM(S): at 06:11

## 2017-09-28 NOTE — PROGRESS NOTE ADULT - SUBJECTIVE AND OBJECTIVE BOX
VITAL SIGNS        Vital Signs Last 24 Hrs  T(C): 36.6 (17 @ 08:00), Max: 37.3 (17 @ 20:00)  T(F): 97.9 (17 @ 08:00), Max: 99.1 (17 @ 20:00)  HR: 70 (17 @ 10:15) (70 - 76)  BP: 94/53 (17 @ 10:15) (78/45 - 116/53)  RR: 19 (17 @ 10:15) (13 - 48)  SpO2: 100% (17 @ 10:15) (77% - 100%)           Daily Weight in k.4 (28 Sep 2017 04:00)      Bilirubin Total, Serum: 0.6 mg/dL ( @ 02:33)    CAPILLARY BLOOD GLUCOSE  124 (27 Sep 2017 18:00)              Drains:    lt plx   out                    PHYSICAL EXAM    Neurology: alert and oriented x 1-2  CV :  RRR  Lt Plx site   no drainage  no errythema  Lungs:    Lt side diminished  Abdomen: soft, nontender, nondistended, positive bowel sounds  Extremities:     b/l le edema

## 2017-09-28 NOTE — PROGRESS NOTE ADULT - ATTENDING COMMENTS
as above--CTS follow up-likely removal of pleurx and decortication of left pleural space as per Tia et al.  ABX to be directed by pleural and Blood samples--zosyn D3 and vanco D2--follow up final pleural  and blood cx  MICU for BP support    Morro Tran MD-Pulmonary   [phone] as above--CTS follow up-s/p removal of pleurx and ? decortication of left pleural space vs. pigtail placement of catheter as per Tia et al.  ABX to be directed by pleural and Blood samples--zosyn D4 and vanco D3--follow up final pleural  and blood cx  MICU for BP support    Morro Tran MD-Pulmonary   [phone]

## 2017-09-28 NOTE — PROGRESS NOTE ADULT - ASSESSMENT
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     Neuro: AMS 2/2 sepsis/metabolic derangement. Currently improved, but mental status continues to wax and wane, likely 2/2 delirium. PT given Precedex overnight due to combativeness.    - treat underlying infection w/ Vanc and Zosyn (renally dosed)  - Monitor neuro status  - avoid sedating agents    Resp: Currently on room air; persistent pleural effusions.   - pleurex gram stain + GPC; awaiting culture data   - Pleurex cath found out of Pt, site was bandaged; Monitor for infection/ leakage.  - c/w Symbicort 180 2 puffs BID, Spiriva 1 puff qd, incentive spirometry, Chest PT, keep O2 sat 90%    CV:   - septic shock 2/2 MRSA bacteremia, likely originating from infected pleural fluid; c/w levo to maintain MAP>65, increased midodrine to 20TID, wean levo as BP tolerates   - pt with severe AS and MR on previous TTE with severe pulm HTN - gentle IVF as needed only to prevent pulmonary edema  - Afib s/p PPM (controlled, not on AC due to falls, not on BB)  - check official TTE to r/o vegetations  - Increase Minodrine to 30mg    GI:   - c/w renal diet, tolerating well   - Continue to monitor mental status for risk of aspiration.    Renal: ESRD on HD  - HD per renal  - Per renal recs: continue epogen 4000 units TIW       ID:   - septic shock 2/2 MRSA bacteremia, likely source is empyema   - c/w Vanc/ Zosyn IVPB (day 3), will consider dcing zosyn pending results of pleural cx  - pleural gram stain +GPCs, cx pending     Heme/Onc: Small drop in Hb (9.7 --> 8.7)  - leukocytosis resolved  - pt with chronic anemia, likely 2/2 anemia of chronic disease/ESRD - c/t monitor, c/w epogen; transfuse Hb <7.0  - mild thrombocytopenia, likely in setting of sepsis - c/t monitor      - DLBCL - f/u heme/onc as outpatient    Psych: A&O x2  - continue to monitor mental status.   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     Neuro: AMS 2/2 sepsis/metabolic derangement. Currently improved, but mental status continues to wax and wane, likely 2/2 delirium. PT given Haldol and Precedex overnight due to combativeness.    - treat underlying infection w/ Vanc and Zosyn (renally dosed)  - Switch Haldol to Zyprexa to avoid EPS symptoms  - Monitor neuro status  - avoid sedating agents    Resp: Currently on room air; Pt placed on 2L NC while sleeping; persistent pleural effusions.   - pleurex gram stain + GPC; awaiting culture data   - Pleurex cath found out of Pt, site was bandaged; Monitor for infection/ leakage.  - Per thoracic, consider sonosite for possible pigtail cath placement for pleural drainage  - c/w Symbicort 180 2 puffs BID, Spiriva 1 puff qd, incentive spirometry, Chest PT, keep O2 sat 90%  - Daily CXR    CV:   - septic shock 2/2 MRSA bacteremia, likely originating from infected pleural fluid; c/w levo to maintain MAP>65, increased midodrine to 20TID, wean levo as BP tolerates   - pt with severe AS and MR on previous TTE with severe pulm HTN - gentle IVF as needed only to prevent pulmonary edema  - Afib s/p PPM (controlled, not on AC due to falls, not on BB)  - check official TTE to r/o vegetations  - Increase Midodrine to 30mg    GI:   - c/w renal diet, tolerating well   - Continue to monitor mental status for risk of aspiration.    Renal: ESRD on HD  - HD today per renal  - Per renal recs: continue epogen 4000 units TIW     ID:   - septic shock 2/2 MRSA bacteremia, likely source is empyema   - c/w Vanc/ Zosyn IVPB (day 4), will consider dcing zosyn pending results of pleural cx  - pleural gram stain +GPCs, cx pending     Heme/Onc:   - Small drop in Hb (9.7 --> 8.7)  - leukocytosis resolved  - pt with chronic anemia, likely 2/2 anemia of chronic disease/ESRD - c/t monitor, c/w epogen; transfuse Hb <7.0  - mild thrombocytopenia, likely in setting of sepsis - c/t monitor      - DLBCL - f/u heme/onc as outpatient    Psych: A&O x1  - continue to monitor mental status.   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     Neuro: AMS 2/2 sepsis/metabolic derangement. Currently improved, but mental status continues to wax and wane, likely 2/2 delirium. PT given Haldol and Precedex overnight due to combativeness.    - treat underlying infection w/ Vanc and Zosyn (renally dosed)  - Switch Haldol to Zyprexa to avoid EPS symptoms  - Monitor neuro status  - avoid sedating agents    Resp: Currently on room air; Pt placed on 2L NC while sleeping; persistent pleural effusions.   - pleurex gram stain + GPC; awaiting culture data   - Pleurex cath found out of Pt, site was bandaged; Monitor for infection/ leakage.  - Per thoracic, consider sonosite for possible pigtail cath placement for pleural drainage  - c/w Symbicort 180 2 puffs BID, Spiriva 1 puff qd, incentive spirometry, Chest PT, keep O2 sat 90%  - Daily CXR    CV:   - septic shock 2/2 MRSA bacteremia, likely originating from infected pleural fluid; c/w levo to maintain MAP>65, increased midodrine to 30TID, wean levo as BP tolerates   - pt with severe AS and MR on previous TTE with severe pulm HTN - gentle IVF as needed only to prevent pulmonary edema  - Afib s/p PPM (controlled, not on AC due to falls, not on BB)  - check official TTE to r/o vegetations      GI:   - c/w renal diet, tolerating well   - Continue to monitor mental status for risk of aspiration.    Renal: ESRD on HD  - HD today per renal  - Per renal recs: continue epogen 4000 units TIW     ID:   - septic shock 2/2 MRSA bacteremia, likely source is empyema   - c/w Vanc/ Zosyn IVPB (day 4), will consider dcing zosyn pending results of pleural cx  - pleural gram stain +GPCs, cx pending     Heme/Onc:   - Small drop in Hb (9.7 --> 8.7)  - leukocytosis resolved  - pt with chronic anemia, likely 2/2 anemia of chronic disease/ESRD - c/t monitor, c/w epogen; transfuse Hb <7.0  - mild thrombocytopenia, likely in setting of sepsis - c/t monitor      - DLBCL - f/u heme/onc as outpatient    Psych: A&O x1  - continue to monitor mental status.   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     Neuro: AMS 2/2 sepsis/metabolic derangement. Currently improved, but mental status continues to wax and wane, likely 2/2 delirium. PT given Haldol and Precedex overnight due to combativeness.    - treat underlying infection w/ Vanc and Zosyn (renally dosed)  - Switch Haldol to Zyprexa 2/2 prolonged QTC on last EKG and to avoid EPS symptoms  - Monitor neuro status  - avoid sedating agents    Resp: Currently on room air; Pt placed on 2L NC while sleeping; persistent pleural effusions.   - pleurex gram stain + GPC; awaiting culture data   - Pleurex cath found out, site bandaged, with minor fluid leakage, c/t monitor site   - Per thoracic, consider sonosite for possible pigtail cath placement for pleural drainage  - c/w Symbicort 180 2 puffs BID, Spiriva 1 puff qd, incentive spirometry, Chest PT, keep O2 sat 90%  - Daily CXR    CV:   - septic shock 2/2 MRSA bacteremia, likely originating from infected pleural fluid; c/w levo to maintain MAP>65, increased midodrine to 30TID, wean levo as BP tolerates   - pt with severe AS and MR on previous TTE with severe pulm HTN - gentle IVF as needed only to prevent pulmonary edema  - Afib s/p PPM (controlled, not on AC due to falls, not on BB)  - check official TTE to r/o vegetations      GI:   - c/w renal diet, tolerating well   - Continue to monitor mental status for risk of aspiration.    Renal: ESRD on HD  - HD today per renal  - Per renal recs: continue epogen 4000 units TIW     ID:   - septic shock 2/2 MRSA bacteremia, likely source is empyema   - c/w Vanc/ Zosyn IVPB (day 4), will consider dcing zosyn pending results of pleural cx  - pleural gram stain +GPCs, cx pending     Heme/Onc:   - Small drop in Hb (9.7 --> 8.7)  - leukocytosis resolved  - pt with chronic anemia, likely 2/2 anemia of chronic disease/ESRD - c/t monitor, c/w epogen; transfuse Hb <7.0  - mild thrombocytopenia, likely in setting of sepsis - c/t monitor      - DLBCL - f/u heme/onc as outpatient    Psych: A&O x1  - continue to monitor mental status.   - avoid sedation    DVT:   - Hep Sq  - SCDs

## 2017-09-28 NOTE — PROGRESS NOTE ADULT - SUBJECTIVE AND OBJECTIVE BOX
Chief Complaint: LOC    Interval Events: Overnight Pt became combative/ aggressive; Pt was given Haldol 7.5mg IV and Precedex 0.1mcg. This morning patient was somnolent.     REVIEW OF SYSTEMS:  Constitutional: [ ] negative [ ] fevers [ ] chills [ ] weight loss [ ] weight gain  HEENT: [ ] negative [ ] dry eyes [ ] eye irritation [ ] postnasal drip [ ] nasal congestion  CV: [ ] negative  [ ] chest pain [ ] orthopnea [ ] palpitations [ ] murmur  Resp: [ ] negative [ ] cough [ ] shortness of breath [ ] dyspnea [ ] wheezing [ ] sputum [ ] hemoptysis  GI: [ ] negative [ ] nausea [ ] vomiting [ ] diarrhea [ ] constipation [ ] abd pain [ ] dysphagia   : [ ] negative [ ] dysuria [ ] nocturia [ ] hematuria [ ] increased urinary frequency  Musculoskeletal: [ ] negative [ ] back pain [ ] myalgias [ ] arthralgias [ ] fracture  Skin: [ ] negative [ ] rash [ ] itch  Neurological: [ ] negative [ ] headache [ ] dizziness [ ] syncope [ ] weakness [ ] numbness  Psychiatric: [ ] negative [ ] anxiety [ ] depression  Endocrine: [ ] negative [ ] diabetes [ ] thyroid problem  Hematologic/Lymphatic: [ ] negative [ ] anemia [ ] bleeding problem  Allergic/Immunologic: [ ] negative [ ] itchy eyes [ ] nasal discharge [ ] hives [ ] angioedema  [ ] All other systems negative  [x ] Unable to assess ROS because AMS    OBJECTIVE:  ICU Vital Signs Last 24 Hrs  T(C): 36.8 (28 Sep 2017 04:00), Max: 37.3 (27 Sep 2017 20:00)  T(F): 98.2 (28 Sep 2017 04:00), Max: 99.1 (27 Sep 2017 20:00)  HR: 70 (28 Sep 2017 07:00) (70 - 76)  BP: 106/58 (28 Sep 2017 07:00) (81/48 - 116/53)  BP(mean): 79 (28 Sep 2017 07:00) (60 - 81)  ABP: --  ABP(mean): --  RR: 15 (28 Sep 2017 07:00) (13 - 48)  SpO2: 100% (28 Sep 2017 07:00) (77% - 100%)        09-27 @ 07:01  -  09-28 @ 07:00  --------------------------------------------------------  IN: 839.1 mL / OUT: 0 mL / NET: 839.1 mL    CAPILLARY BLOOD GLUCOSE  124 (27 Sep 2017 18:00)    PHYSICAL EXAM:  General: Awake, alert, sitting up in bed. No acute distress   HEENT:  3 cm abrasion over L forehead; Normocephalic; PERRL EOMI  Lymph Nodes: no lymphadenopathy  Neck:  Supple, No JVD, IV placed on R sided  Respiratory:  Mild crackles over left lower lobe; No wheeze, L sided Pleurx catheter present draining white exudate, no erythema or fluid leakage.  Cardiovascular: Regular rate and rhythm; normal S1 and S2, II/VI systolic blowing murmur. No rubs or gallops  Abdomen: Soft, Nontender, Nondistended; Bowel sounds present  Extremities:  Fistula present over LUE. 2+ PT Pulses, Trace edema over R ankle. No clubbing, cyanosis. LE are cool to touch.   Genitourinary: Erythematous dermatitis over groin b/l  Skin: Diffuse ecchymotic lesions over upper and lower extremities.  Neurological: non-focal  Psychiatry:  AAOx2 (name and place).    HOSPITAL MEDICATIONS:  Standing Meds:  heparin  Injectable 5000 Unit(s) SubCutaneous every 8 hours  piperacillin/tazobactam IVPB. 3.375 Gram(s) IV Intermittent every 12 hours  levothyroxine Injectable 12.5 MICROGram(s) IV Push daily  lactobacillus acidophilus 1 Tablet(s) Oral daily  aspirin enteric coated 81 milliGRAM(s) Oral daily  norepinephrine Infusion 0.01 MICROgram(s)/kG/Min IV Continuous <Continuous>  nystatin Powder 1 Application(s) Topical every 8 hours  tiotropium 18 MICROgram(s) Capsule 1 Capsule(s) Inhalation daily  buDESOnide 160 MICROgram(s)/formoterol 4.5 MICROgram(s) Inhaler 2 Puff(s) Inhalation two times a day  midodrine 20 milliGRAM(s) Oral every 8 hours  Nephro-amy 1 Tablet(s) Oral daily  epoetin terry Injectable 4000 Unit(s) IV Push <User Schedule>  dexmedetomidine Infusion 0.1 MICROgram(s)/kG/Hr IV Continuous <Continuous>    LABS:                        8.7    10.2  )-----------( 103      ( 28 Sep 2017 02:33 )             26.9     Hgb Trend: 8.7<--, 9.7<--, 9.8<--, 9.4<--, 9.6<--  09-28    141  |  97  |  77<H>  ----------------------------<  119<H>  4.4   |  22  |  5.18<H>    Ca    7.4<L>      28 Sep 2017 02:33  Phos  3.3     09-28  Mg     2.3     09-28    TPro  5.0<L>  /  Alb  2.0<L>  /  TBili  0.6  /  DBili  x   /  AST  59<H>  /  ALT  25  /  AlkPhos  314<H>  09-28    Creatinine Trend: 5.18<--, 4.29<--, 6.06<--, 5.72<--, 5.78<--, 5.89<--  PT/INR - ( 28 Sep 2017 02:33 )   PT: 14.4 sec;   INR: 1.33 ratio         PTT - ( 28 Sep 2017 02:33 )  PTT:30.9 sec    MICROBIOLOGY: Culture - Blood (09.27.17 @ 05:46)    Specimen Source: .Blood Blood-Peripheral    Culture Results:   No growth to date.    RADIOLOGY: < from: Xray Chest 1 View AP- PORTABLE-Urgent (09.27.17 @ 09:40) >  IMPRESSION:     Stable small bilateral pleural effusions with mild pulmonary venous   hypertension    < end of copied text >

## 2017-09-28 NOTE — PROGRESS NOTE ADULT - ATTENDING COMMENTS
Delirium last night  1.  Hypotension--cardiac issues+infection.  Volume, pressor optimization.  Trend lactate for framework  2.  ESRD--HD TIW  3.  Empyema-- antibiotics adjusted 2.  4.  Anemia--transfusePRN and keep hgb>8

## 2017-09-28 NOTE — PROGRESS NOTE ADULT - PROBLEM SELECTOR PLAN 1
Plan for HD today.  Will plan to continue TTS dialysis schedule at this time.  Blood pressure is very low despite being on high dose midodrine.  Minimal fluid removal planned and will stop ultrafiltration if BP drops.

## 2017-09-28 NOTE — PROGRESS NOTE ADULT - SUBJECTIVE AND OBJECTIVE BOX
[facility] DIVISION OF KIDNEY DISEASES AND HYPERTENSION -- FOLLOW UP NOTE  --------------------------------------------------------------------------------  Chief Complaint: ESRD on HD    24 hour events/subjective:  patient became combative overnight, acutely delirious requiring medical treatment with haldol        PAST HISTORY  --------------------------------------------------------------------------------  No significant changes to PMH, PSH, FHx, SHx, unless otherwise noted    ALLERGIES & MEDICATIONS  --------------------------------------------------------------------------------  Allergies    amlodipine (Rash)  Benadryl (Other)  Brilinta (Unknown)  Effient (Unknown)  Multaq (Other)    Intolerances      Standing Inpatient Medications  heparin  Injectable 5000 Unit(s) SubCutaneous every 8 hours  piperacillin/tazobactam IVPB. 3.375 Gram(s) IV Intermittent every 12 hours  levothyroxine Injectable 12.5 MICROGram(s) IV Push daily  lactobacillus acidophilus 1 Tablet(s) Oral daily  aspirin enteric coated 81 milliGRAM(s) Oral daily  norepinephrine Infusion 0.01 MICROgram(s)/kG/Min IV Continuous <Continuous>  nystatin Powder 1 Application(s) Topical every 8 hours  tiotropium 18 MICROgram(s) Capsule 1 Capsule(s) Inhalation daily  buDESOnide 160 MICROgram(s)/formoterol 4.5 MICROgram(s) Inhaler 2 Puff(s) Inhalation two times a day  Nephro-amy 1 Tablet(s) Oral daily  epoetin terry Injectable 4000 Unit(s) IV Push <User Schedule>  midodrine 30 milliGRAM(s) Oral every 8 hours    PRN Inpatient Medications      REVIEW OF SYSTEMS  --------------------------------------------------------------------------------  Patient is minimally verbal at this time and unable to provide history    VITALS/PHYSICAL EXAM  --------------------------------------------------------------------------------  T(C): 36.6 (09-28-17 @ 08:00), Max: 37.3 (09-27-17 @ 20:00)  HR: 70 (09-28-17 @ 10:15) (70 - 76)  BP: 94/53 (09-28-17 @ 10:15) (78/45 - 115/55)  RR: 19 (09-28-17 @ 10:15) (13 - 48)  SpO2: 100% (09-28-17 @ 10:15) (77% - 100%)  Wt(kg): --        09-27-17 @ 07:01  -  09-28-17 @ 07:00  --------------------------------------------------------  IN: 839.1 mL / OUT: 0 mL / NET: 839.1 mL    09-28-17 @ 07:01  -  09-28-17 @ 11:02  --------------------------------------------------------  IN: 6.3 mL / OUT: 0 mL / NET: 6.3 mL      Physical Exam:  	Gen: NAD  	HEENT: dry MM  	Pulm: +rhonchi in left lung fields  	CV: RRR, S1S2; no rub  	Abd: +BS, soft, nontender/nondistended  	: No suprapubic tenderness  	UE:  no edema  	LE:  + mild edema  	Neuro: minimally verbal  	Skin: Warm  	Vascular access:  LUE AVF + thrill and bruit    LABS/STUDIES  --------------------------------------------------------------------------------              8.7    10.2  >-----------<  103      [09-28-17 @ 02:33]              26.9     141  |  97  |  77  ----------------------------<  119      [09-28-17 @ 02:33]  4.4   |  22  |  5.18        Ca     7.4     [09-28-17 @ 02:33]      Mg     2.3     [09-28-17 @ 02:33]      Phos  3.3     [09-28-17 @ 02:33]    TPro  5.0  /  Alb  2.0  /  TBili  0.6  /  DBili  x   /  AST  59  /  ALT  25  /  AlkPhos  314  [09-28-17 @ 02:33]    PT/INR: PT 14.4 , INR 1.33       [09-28-17 @ 02:33]  PTT: 30.9       [09-28-17 @ 02:33]      Creatinine Trend:  SCr 5.18 [09-28 @ 02:33]  SCr 4.29 [09-27 @ 03:02]  SCr 6.06 [09-26 @ 02:14]  SCr 5.72 [09-25 @ 21:31]  SCr 5.78 [09-25 @ 11:37]        Iron 56, TIBC 208, %sat --      [09-17-17 @ 05:40]  Ferritin 1179      [09-17-17 @ 05:40]  HbA1c 5.7      [11-10-15 @ 07:48]  TSH 4.73      [09-16-17 @ 16:20]    HBsAb <3.0      [09-06-17 @ 16:18]  HBsAg NEGATIVE      [09-06-17 @ 16:18]

## 2017-09-28 NOTE — PROGRESS NOTE ADULT - ASSESSMENT
86 yo M with multiple medical problems including DLBCL on Rituxan, malignant pleural effusion s/p left pleurx, ESRD on HD, chronic hypotension on Midodrine presents with lethargy, hypotension and cloudy pleural fluid drainage. History as per chart and family.   Received broad spectrum antibiotics and 2L NS for presumed sepsis 2/2 empyema. Evaluated by MICU and admitted for severe sepsis.     A/P: Septic shock - likely from empyema in the setting of DLBCL on Rituxan, indwelling pleural catheter and cloudy, malodorous pleural fluid. CT chest with moderate sized pleural effusion and small loculated left pleural effusion.  Agree with MICU team - broad spectrum antibiotics, awaiting cultures from pleural fluid and blood. Would send UA as well.  BP support - stabilized s/p IVFs and antibiotics.  Thoracic surgery service following - + pleural space  infected, will need to remove pleurx.  DVT prophylaxis.    Care as per MICU team 86 yo M with multiple medical problems including DLBCL on Rituxan, malignant pleural effusion s/p left pleurx, ESRD on HD, chronic hypotension on Midodrine presents with lethargy, hypotension and cloudy pleural fluid drainage. History as per chart and family.   Received broad spectrum antibiotics and 2L NS for presumed sepsis 2/2 empyema. Evaluated by MICU and admitted for severe sepsis.     A/P: Septic shock - likely from empyema in the setting of DLBCL on Rituxan, indwelling pleural catheter and cloudy, malodorous pleural fluid. CT chest with moderate sized pleural effusion and small loculated left pleural effusion.  Agree with MICU team - broad spectrum antibiotics  BP support - stabilized s/p IVFs and antibiotics.  Thoracic surgery service following - + pleural space  infected- removed pleurx.  DVT prophylaxis.    Care as per MICU team

## 2017-09-28 NOTE — PROGRESS NOTE ADULT - PROBLEM SELECTOR PLAN 3
check pleural cultures and Blood cx  zosyn D3; vanco D2  CTS evaluation and likely decortication check pleural cultures and Blood cx  zosyn D4; vanco D3  CTS evaluation and likely decortication--?sono guided pig tail catheter

## 2017-09-28 NOTE — PROGRESS NOTE ADULT - ATTENDING COMMENTS
Pt with septic shock from MRSA bacteremia with likely source pleural infection. Pleurex catheter no longer present. Cont pressors and Increase midodrine dose. Cautious fluid management given severe AS and MR and ESRD status. Cont vanco and zosyn. Taper abx based on pleural cx. f/u TTE results for endocarditis. Has delirium- zyprexa prn and reorientation strategies. Cont GOC d/w family.      Attending Critical Care Time 40min.

## 2017-09-28 NOTE — PROGRESS NOTE ADULT - ASSESSMENT
88y/o Male h/o ESRD, AAA repair admitted with altered mental status, MRSA bacteremia and non draining left pleurx.   9/26 Left pleurx attached to pleurovac yielding -100 cc purulent drainage.  9/27 left pleurx removed  9/28  VSS  Lt plx site  no drainage,  new prelim bc negative

## 2017-09-28 NOTE — PROGRESS NOTE ADULT - PROBLEM SELECTOR PLAN 1
Continue with antibiotics per MICU  Daily chest x ray  Consider left pleural sonosite for possible pigtail placement for recollection of effusion  Maintain dry dressing if any drainage Continue with antibiotics per MICU  Daily chest x ray  Consider left pleural sonosite for possible pigtail placement for recollection of effusion,   daily    chest xrays please  Maintain dry dressing if any drainage

## 2017-09-28 NOTE — PROGRESS NOTE ADULT - SUBJECTIVE AND OBJECTIVE BOX
CHIEF COMPLAINT:    Interval Events:    REVIEW OF SYSTEMS:  Constitutional: No fevers or chills. No weight loss. No fatigue or generalized malaise.  Eyes: No itching or discharge from the eyes  ENT: No ear pain. No ear discharge. No nasal congestion. No post nasal drip. No epistaxis. No throat pain. No sore throat. No difficulty swallowing.   CV: No chest pain. No palpitations. No lightheadedness or dizziness.   Resp: No dyspnea at rest. No dyspnea on exertion. No orthopnea. No wheezing. No cough. No stridor. No sputum production. No chest pain with respiration.  GI: No nausea. No vomiting. No diarrhea.  MSK: No joint pain or pain in any extremities  Integumentary: No skin lesions. No pedal edema.  Neurological: No gross motor weakness. No sensory changes.  [ ] All other systems negative  [ ] Unable to assess ROS because ________    OBJECTIVE:  ICU Vital Signs Last 24 Hrs  T(C): 36.6 (28 Sep 2017 00:00), Max: 37.3 (27 Sep 2017 20:00)  T(F): 97.8 (28 Sep 2017 00:00), Max: 99.1 (27 Sep 2017 20:00)  HR: 70 (28 Sep 2017 03:30) (70 - 76)  BP: 95/50 (28 Sep 2017 03:30) (82/45 - 116/53)  BP(mean): 67 (28 Sep 2017 03:30) (60 - 87)  ABP: --  ABP(mean): --  RR: 14 (28 Sep 2017 03:30) (14 - 48)  SpO2: 100% (28 Sep 2017 03:30) (77% - 100%)        09-26 @ 07:01  -  09-27 @ 07:00  --------------------------------------------------------  IN: 736.5 mL / OUT: 125 mL / NET: 611.5 mL    09-27 @ 07:01 - 09-28 @ 05:07  --------------------------------------------------------  IN: 765.1 mL / OUT: 0 mL / NET: 765.1 mL      CAPILLARY BLOOD GLUCOSE  124 (27 Sep 2017 18:00)          PHYSICAL EXAM:  General: Awake, alert, oriented X 3.   HEENT: Atraumatic, normocephalic.                 Mallampatti Grade                 No nasal congestion.                No tonsillar or pharyngeal exudates.  Lymph Nodes: No palpable lymphadenopathy  Neck: No JVD. No carotid bruit.   Respiratory: Normal chest expansion                         Normal percussion                         Normal and equal air entry                         No wheeze, rhonchi or rales.  Cardiovascular: S1 S2 normal. No murmurs, rubs or gallops.   Abdomen: Soft, non-tender, non-distended. No organomegaly.  Extremities: Warm to touch. Peripheral pulse palpable. No pedal edema.   Skin: No rashes or skin lesions  Neurological: Motor and sensory examination equal and normal in all four extremities.  Psychiatry: Appropriate mood and affect.    HOSPITAL MEDICATIONS:  MEDICATIONS  (STANDING):  heparin  Injectable 5000 Unit(s) SubCutaneous every 8 hours  piperacillin/tazobactam IVPB. 3.375 Gram(s) IV Intermittent every 12 hours  levothyroxine Injectable 12.5 MICROGram(s) IV Push daily  lactobacillus acidophilus 1 Tablet(s) Oral daily  aspirin enteric coated 81 milliGRAM(s) Oral daily  norepinephrine Infusion 0.01 MICROgram(s)/kG/Min (0.705 mL/Hr) IV Continuous <Continuous>  nystatin Powder 1 Application(s) Topical every 8 hours  tiotropium 18 MICROgram(s) Capsule 1 Capsule(s) Inhalation daily  buDESOnide 160 MICROgram(s)/formoterol 4.5 MICROgram(s) Inhaler 2 Puff(s) Inhalation two times a day  midodrine 20 milliGRAM(s) Oral every 8 hours  Nephro-amy 1 Tablet(s) Oral daily  epoetin teryr Injectable 4000 Unit(s) IV Push <User Schedule>  dexmedetomidine Infusion 0.1 MICROgram(s)/kG/Hr (1.88 mL/Hr) IV Continuous <Continuous>    MEDICATIONS  (PRN):      LABS:                        8.7    10.2  )-----------( 103      ( 28 Sep 2017 02:33 )             26.9     09-28    141  |  97  |  77<H>  ----------------------------<  119<H>  4.4   |  22  |  5.18<H>    Ca    7.4<L>      28 Sep 2017 02:33  Phos  3.3     09-28  Mg     2.3     09-28    TPro  5.0<L>  /  Alb  2.0<L>  /  TBili  0.6  /  DBili  x   /  AST  59<H>  /  ALT  25  /  AlkPhos  314<H>  09-28    PT/INR - ( 28 Sep 2017 02:33 )   PT: 14.4 sec;   INR: 1.33 ratio         PTT - ( 28 Sep 2017 02:33 )  PTT:30.9 sec          MICROBIOLOGY:     RADIOLOGY:  [ ] Reviewed and interpreted by me    Point of Care Ultrasound Findings:    PFT:    EKG: CHIEF COMPLAINT:resting-fatigued-no sob or pain    Interval Events:pleurx removed    REVIEW OF SYSTEMS:  Constitutional: No fevers or chills. No weight loss. No fatigue or generalized malaise.  Eyes: No itching or discharge from the eyes  ENT: No ear pain. No ear discharge. No nasal congestion. No post nasal drip. No epistaxis. No throat pain. No sore throat. No difficulty swallowing.   CV: No chest pain. No palpitations. No lightheadedness or dizziness.   Resp: No dyspnea at rest. + dyspnea on exertion. No orthopnea. No wheezing. No cough. No stridor. No sputum production. No chest pain with respiration.  GI: No nausea. No vomiting. No diarrhea.  MSK: No joint pain or pain in any extremities  Integumentary: No skin lesions. No pedal edema.  Neurological: No gross motor weakness. No sensory changes.  [+ ] All other systems negative  [ ] Unable to assess ROS because ________    OBJECTIVE:  ICU Vital Signs Last 24 Hrs  T(C): 36.6 (28 Sep 2017 00:00), Max: 37.3 (27 Sep 2017 20:00)  T(F): 97.8 (28 Sep 2017 00:00), Max: 99.1 (27 Sep 2017 20:00)  HR: 70 (28 Sep 2017 03:30) (70 - 76)  BP: 95/50 (28 Sep 2017 03:30) (82/45 - 116/53)  BP(mean): 67 (28 Sep 2017 03:30) (60 - 87)  ABP: --  ABP(mean): --  RR: 14 (28 Sep 2017 03:30) (14 - 48)  SpO2: 100% (28 Sep 2017 03:30) (77% - 100%)        09-26 @ 07:01  -  09-27 @ 07:00  --------------------------------------------------------  IN: 736.5 mL / OUT: 125 mL / NET: 611.5 mL    09-27 @ 07:01 - 09-28 @ 05:07  --------------------------------------------------------  IN: 765.1 mL / OUT: 0 mL / NET: 765.1 mL      CAPILLARY BLOOD GLUCOSE  124 (27 Sep 2017 18:00)          PHYSICAL EXAM:NAD-sleepy resting comfortably  General: Awake, alert, oriented X 3.   HEENT: Atraumatic, normocephalic.                 Mallampatti Grade 3                No nasal congestion.                No tonsillar or pharyngeal exudates.  Lymph Nodes: No palpable lymphadenopathy  Neck: No JVD. No carotid bruit.   Respiratory: Normal chest expansion                         Normal percussion                         Normal and equal air entry                         No wheeze, rhonchi or rales.--but reduced BS left base  Cardiovascular: S1 S2 normal. 3+ murmurs, rubs or gallops.   Abdomen: Soft, non-tender, non-distended. No organomegaly.  Extremities: Warm to touch. Peripheral pulse palpable. No pedal edema.   Skin: No rashes or skin lesions  Neurological: Motor and sensory examination equal and normal in all four extremities.  Psychiatry: Appropriate mood and affect.    HOSPITAL MEDICATIONS:  MEDICATIONS  (STANDING):  heparin  Injectable 5000 Unit(s) SubCutaneous every 8 hours  piperacillin/tazobactam IVPB. 3.375 Gram(s) IV Intermittent every 12 hours  levothyroxine Injectable 12.5 MICROGram(s) IV Push daily  lactobacillus acidophilus 1 Tablet(s) Oral daily  aspirin enteric coated 81 milliGRAM(s) Oral daily  norepinephrine Infusion 0.01 MICROgram(s)/kG/Min (0.705 mL/Hr) IV Continuous <Continuous>  nystatin Powder 1 Application(s) Topical every 8 hours  tiotropium 18 MICROgram(s) Capsule 1 Capsule(s) Inhalation daily  buDESOnide 160 MICROgram(s)/formoterol 4.5 MICROgram(s) Inhaler 2 Puff(s) Inhalation two times a day  midodrine 20 milliGRAM(s) Oral every 8 hours  Nephro-amy 1 Tablet(s) Oral daily  epoetin terry Injectable 4000 Unit(s) IV Push <User Schedule>  dexmedetomidine Infusion 0.1 MICROgram(s)/kG/Hr (1.88 mL/Hr) IV Continuous <Continuous>    MEDICATIONS  (PRN):      LABS:                        8.7    10.2  )-----------( 103      ( 28 Sep 2017 02:33 )             26.9     09-28    141  |  97  |  77<H>  ----------------------------<  119<H>  4.4   |  22  |  5.18<H>    Ca    7.4<L>      28 Sep 2017 02:33  Phos  3.3     09-28  Mg     2.3     09-28    TPro  5.0<L>  /  Alb  2.0<L>  /  TBili  0.6  /  DBili  x   /  AST  59<H>  /  ALT  25  /  AlkPhos  314<H>  09-28    PT/INR - ( 28 Sep 2017 02:33 )   PT: 14.4 sec;   INR: 1.33 ratio         PTT - ( 28 Sep 2017 02:33 )  PTT:30.9 sec          MICROBIOLOGY:     RADIOLOGY:  [ ] Reviewed and interpreted by me    Point of Care Ultrasound Findings:    PFT:    EKG:

## 2017-09-28 NOTE — PROGRESS NOTE ADULT - SUBJECTIVE AND OBJECTIVE BOX
Interval Events: Overnight Pt became combative/ aggressive; Pt was given Haldol 7.5mg IV and Precedex 0.1mcg. This morning patient was somnolent.     REVIEW OF SYSTEMS:  Constitutional: [ ] negative [ ] fevers [ ] chills [ ] weight loss [ ] weight gain  HEENT: [ ] negative [ ] dry eyes [ ] eye irritation [ ] postnasal drip [ ] nasal congestion  CV: [ ] negative  [ ] chest pain [ ] orthopnea [ ] palpitations [ ] murmur  Resp: [ ] negative [ ] cough [ ] shortness of breath [ ] dyspnea [ ] wheezing [ ] sputum [ ] hemoptysis  GI: [ ] negative [ ] nausea [ ] vomiting [ ] diarrhea [ ] constipation [ ] abd pain [ ] dysphagia   : [ ] negative [ ] dysuria [ ] nocturia [ ] hematuria [ ] increased urinary frequency  Musculoskeletal: [ ] negative [ ] back pain [ ] myalgias [ ] arthralgias [ ] fracture  Skin: [ ] negative [ ] rash [ ] itch  Neurological: [ ] negative [ ] headache [ ] dizziness [ ] syncope [ ] weakness [ ] numbness  Psychiatric: [ ] negative [ ] anxiety [ ] depression  Endocrine: [ ] negative [ ] diabetes [ ] thyroid problem  Hematologic/Lymphatic: [ ] negative [ ] anemia [ ] bleeding problem  Allergic/Immunologic: [ ] negative [ ] itchy eyes [ ] nasal discharge [ ] hives [ ] angioedema  [ ] All other systems negative  [x ] Unable to assess ROS because AMS    OBJECTIVE:  ICU Vital Signs Last 24 Hrs  T(C): 36.8 (28 Sep 2017 04:00), Max: 37.3 (27 Sep 2017 20:00)  T(F): 98.2 (28 Sep 2017 04:00), Max: 99.1 (27 Sep 2017 20:00)  HR: 70 (28 Sep 2017 07:00) (70 - 76)  BP: 106/58 (28 Sep 2017 07:00) (81/48 - 116/53)  BP(mean): 79 (28 Sep 2017 07:00) (60 - 81)  ABP: --  ABP(mean): --  RR: 15 (28 Sep 2017 07:00) (13 - 48)  SpO2: 100% (28 Sep 2017 07:00) (77% - 100%)        09-27 @ 07:01  -  09-28 @ 07:00  --------------------------------------------------------  IN: 839.1 mL / OUT: 0 mL / NET: 839.1 mL      CAPILLARY BLOOD GLUCOSE  124 (27 Sep 2017 18:00)          PHYSICAL EXAM:  General: Awake, alert, sitting up in bed. No acute distress   HEENT:  3 cm abrasion over L forehead; Normocephalic; PERRL EOMI  Lymph Nodes: no lymphadenopathy  Neck:  Supple, No JVD, IV placed on R sided  Respiratory:  Mild crackles over left lower lobe; No wheeze, L sided Pleurx catheter present draining white exudate, no erythema or fluid leakage.  Cardiovascular: Regular rate and rhythm; normal S1 and S2, II/VI systolic blowing murmur. No rubs or gallops  Abdomen: Soft, Nontender, Nondistended; Bowel sounds present  Extremities:  Fistula present over LUE. 2+ PT Pulses, Trace edema over R ankle. No clubbing, cyanosis. LE are cool to touch.   Genitourinary: Erythematous dermatitis over groin b/l  Skin: Diffuse ecchymotic lesions over upper and lower extremities.  Neurological: non-focal  Psychiatry:  AAOx2 (name and place).    HOSPITAL MEDICATIONS:  Standing Meds:  heparin  Injectable 5000 Unit(s) SubCutaneous every 8 hours  piperacillin/tazobactam IVPB. 3.375 Gram(s) IV Intermittent every 12 hours  levothyroxine Injectable 12.5 MICROGram(s) IV Push daily  lactobacillus acidophilus 1 Tablet(s) Oral daily  aspirin enteric coated 81 milliGRAM(s) Oral daily  norepinephrine Infusion 0.01 MICROgram(s)/kG/Min IV Continuous <Continuous>  nystatin Powder 1 Application(s) Topical every 8 hours  tiotropium 18 MICROgram(s) Capsule 1 Capsule(s) Inhalation daily  buDESOnide 160 MICROgram(s)/formoterol 4.5 MICROgram(s) Inhaler 2 Puff(s) Inhalation two times a day  midodrine 20 milliGRAM(s) Oral every 8 hours  Nephro-amy 1 Tablet(s) Oral daily  epoetin terry Injectable 4000 Unit(s) IV Push <User Schedule>  dexmedetomidine Infusion 0.1 MICROgram(s)/kG/Hr IV Continuous <Continuous>    LABS:                        8.7    10.2  )-----------( 103      ( 28 Sep 2017 02:33 )             26.9     Hgb Trend: 8.7<--, 9.7<--, 9.8<--, 9.4<--, 9.6<--  09-28    141  |  97  |  77<H>  ----------------------------<  119<H>  4.4   |  22  |  5.18<H>    Ca    7.4<L>      28 Sep 2017 02:33  Phos  3.3     09-28  Mg     2.3     09-28    TPro  5.0<L>  /  Alb  2.0<L>  /  TBili  0.6  /  DBili  x   /  AST  59<H>  /  ALT  25  /  AlkPhos  314<H>  09-28    Creatinine Trend: 5.18<--, 4.29<--, 6.06<--, 5.72<--, 5.78<--, 5.89<--  PT/INR - ( 28 Sep 2017 02:33 )   PT: 14.4 sec;   INR: 1.33 ratio         PTT - ( 28 Sep 2017 02:33 )  PTT:30.9 sec    MICROBIOLOGY: Culture - Blood (09.27.17 @ 05:46)    Specimen Source: .Blood Blood-Peripheral    Culture Results:   No growth to date.    RADIOLOGY: < from: Xray Chest 1 View AP- PORTABLE-Urgent (09.27.17 @ 09:40) >  IMPRESSION:     Stable small bilateral pleural effusions with mild pulmonary venous   hypertension    < end of copied text > Interval Events: Overnight Pt became combative/ aggressive; Pt was given Haldol 7.5mg IV and Precedex 0.1mcg. This morning patient was somnolent.     REVIEW OF SYSTEMS:  Constitutional: [ ] negative [ ] fevers [ ] chills [ ] weight loss [ ] weight gain  HEENT: [ ] negative [ ] dry eyes [ ] eye irritation [ ] postnasal drip [ ] nasal congestion  CV: [ ] negative  [ ] chest pain [ ] orthopnea [ ] palpitations [ ] murmur  Resp: [ ] negative [ ] cough [ ] shortness of breath [ ] dyspnea [ ] wheezing [ ] sputum [ ] hemoptysis  GI: [ ] negative [ ] nausea [ ] vomiting [ ] diarrhea [ ] constipation [ ] abd pain [ ] dysphagia   : [ ] negative [ ] dysuria [ ] nocturia [ ] hematuria [ ] increased urinary frequency  Musculoskeletal: [ ] negative [ ] back pain [ ] myalgias [ ] arthralgias [ ] fracture  Skin: [ ] negative [ ] rash [ ] itch  Neurological: [ ] negative [ ] headache [ ] dizziness [ ] syncope [ ] weakness [ ] numbness  Psychiatric: [ ] negative [ ] anxiety [ ] depression  Endocrine: [ ] negative [ ] diabetes [ ] thyroid problem  Hematologic/Lymphatic: [ ] negative [ ] anemia [ ] bleeding problem  Allergic/Immunologic: [ ] negative [ ] itchy eyes [ ] nasal discharge [ ] hives [ ] angioedema  [ ] All other systems negative  [x ] Unable to assess ROS because AMS    OBJECTIVE:  ICU Vital Signs Last 24 Hrs  T(C): 36.8 (28 Sep 2017 04:00), Max: 37.3 (27 Sep 2017 20:00)  T(F): 98.2 (28 Sep 2017 04:00), Max: 99.1 (27 Sep 2017 20:00)  HR: 70 (28 Sep 2017 07:00) (70 - 76)  BP: 106/58 (28 Sep 2017 07:00) (81/48 - 116/53)  BP(mean): 79 (28 Sep 2017 07:00) (60 - 81)  ABP: --  ABP(mean): --  RR: 15 (28 Sep 2017 07:00) (13 - 48)  SpO2: 100% (28 Sep 2017 07:00) (77% - 100%)        09-27 @ 07:01  -  09-28 @ 07:00  --------------------------------------------------------  IN: 839.1 mL / OUT: 0 mL / NET: 839.1 mL    CAPILLARY BLOOD GLUCOSE  124 (27 Sep 2017 18:00)    PHYSICAL EXAM:  General: Awake, alert, sitting up in bed. No acute distress   HEENT:  3 cm abrasion over L forehead; Normocephalic; PERRL EOMI  Lymph Nodes: no lymphadenopathy  Neck:  Supple, No JVD, IV placed on R sided  Respiratory:  Mild crackles over left lower lobe; No wheeze, L sided Pleurx catheter present draining white exudate, no erythema or fluid leakage.  Cardiovascular: Regular rate and rhythm; normal S1 and S2, II/VI systolic blowing murmur. No rubs or gallops  Abdomen: Soft, Nontender, Nondistended; Bowel sounds present  Extremities:  Fistula present over LUE. 2+ PT Pulses, Trace edema over R ankle. No clubbing, cyanosis. LE are cool to touch.   Genitourinary: Erythematous dermatitis over groin b/l  Skin: Diffuse ecchymotic lesions over upper and lower extremities.  Neurological: non-focal  Psychiatry:  AAOx2 (name and place).    HOSPITAL MEDICATIONS:  Standing Meds:  heparin  Injectable 5000 Unit(s) SubCutaneous every 8 hours  piperacillin/tazobactam IVPB. 3.375 Gram(s) IV Intermittent every 12 hours  levothyroxine Injectable 12.5 MICROGram(s) IV Push daily  lactobacillus acidophilus 1 Tablet(s) Oral daily  aspirin enteric coated 81 milliGRAM(s) Oral daily  norepinephrine Infusion 0.01 MICROgram(s)/kG/Min IV Continuous <Continuous>  nystatin Powder 1 Application(s) Topical every 8 hours  tiotropium 18 MICROgram(s) Capsule 1 Capsule(s) Inhalation daily  buDESOnide 160 MICROgram(s)/formoterol 4.5 MICROgram(s) Inhaler 2 Puff(s) Inhalation two times a day  midodrine 20 milliGRAM(s) Oral every 8 hours  Nephro-amy 1 Tablet(s) Oral daily  epoetin terry Injectable 4000 Unit(s) IV Push <User Schedule>  dexmedetomidine Infusion 0.1 MICROgram(s)/kG/Hr IV Continuous <Continuous>    LABS:                        8.7    10.2  )-----------( 103      ( 28 Sep 2017 02:33 )             26.9     Hgb Trend: 8.7<--, 9.7<--, 9.8<--, 9.4<--, 9.6<--  09-28    141  |  97  |  77<H>  ----------------------------<  119<H>  4.4   |  22  |  5.18<H>    Ca    7.4<L>      28 Sep 2017 02:33  Phos  3.3     09-28  Mg     2.3     09-28    TPro  5.0<L>  /  Alb  2.0<L>  /  TBili  0.6  /  DBili  x   /  AST  59<H>  /  ALT  25  /  AlkPhos  314<H>  09-28    Creatinine Trend: 5.18<--, 4.29<--, 6.06<--, 5.72<--, 5.78<--, 5.89<--  PT/INR - ( 28 Sep 2017 02:33 )   PT: 14.4 sec;   INR: 1.33 ratio         PTT - ( 28 Sep 2017 02:33 )  PTT:30.9 sec    MICROBIOLOGY: Culture - Blood (09.27.17 @ 05:46)    Specimen Source: .Blood Blood-Peripheral    Culture Results:   No growth to date.    RADIOLOGY: < from: Xray Chest 1 View AP- PORTABLE-Urgent (09.27.17 @ 09:40) >  IMPRESSION:     Stable small bilateral pleural effusions with mild pulmonary venous   hypertension    < end of copied text > Chief Complaint: LOC    Interval Events: Overnight Pt became combative/ aggressive; Pt was given Haldol 7.5mg IV and Precedex 0.1mcg. This morning patient was somnolent.     REVIEW OF SYSTEMS:  Constitutional: [ ] negative [ ] fevers [ ] chills [ ] weight loss [ ] weight gain  HEENT: [ ] negative [ ] dry eyes [ ] eye irritation [ ] postnasal drip [ ] nasal congestion  CV: [ ] negative  [ ] chest pain [ ] orthopnea [ ] palpitations [ ] murmur  Resp: [ ] negative [ ] cough [ ] shortness of breath [ ] dyspnea [ ] wheezing [ ] sputum [ ] hemoptysis  GI: [ ] negative [ ] nausea [ ] vomiting [ ] diarrhea [ ] constipation [ ] abd pain [ ] dysphagia   : [ ] negative [ ] dysuria [ ] nocturia [ ] hematuria [ ] increased urinary frequency  Musculoskeletal: [ ] negative [ ] back pain [ ] myalgias [ ] arthralgias [ ] fracture  Skin: [ ] negative [ ] rash [ ] itch  Neurological: [ ] negative [ ] headache [ ] dizziness [ ] syncope [ ] weakness [ ] numbness  Psychiatric: [ ] negative [ ] anxiety [ ] depression  Endocrine: [ ] negative [ ] diabetes [ ] thyroid problem  Hematologic/Lymphatic: [ ] negative [ ] anemia [ ] bleeding problem  Allergic/Immunologic: [ ] negative [ ] itchy eyes [ ] nasal discharge [ ] hives [ ] angioedema  [ ] All other systems negative  [x ] Unable to assess ROS because AMS    OBJECTIVE:  ICU Vital Signs Last 24 Hrs  T(C): 36.8 (28 Sep 2017 04:00), Max: 37.3 (27 Sep 2017 20:00)  T(F): 98.2 (28 Sep 2017 04:00), Max: 99.1 (27 Sep 2017 20:00)  HR: 70 (28 Sep 2017 07:00) (70 - 76)  BP: 106/58 (28 Sep 2017 07:00) (81/48 - 116/53)  BP(mean): 79 (28 Sep 2017 07:00) (60 - 81)  ABP: --  ABP(mean): --  RR: 15 (28 Sep 2017 07:00) (13 - 48)  SpO2: 100% (28 Sep 2017 07:00) (77% - 100%)        09-27 @ 07:01  -  09-28 @ 07:00  --------------------------------------------------------  IN: 839.1 mL / OUT: 0 mL / NET: 839.1 mL    CAPILLARY BLOOD GLUCOSE  124 (27 Sep 2017 18:00)    PHYSICAL EXAM:  General: Awake, alert, sitting up in bed. No acute distress   HEENT:  3 cm abrasion over L forehead; Normocephalic; PERRL EOMI  Lymph Nodes: no lymphadenopathy  Neck:  Supple, No JVD, IV placed on R sided  Respiratory:  Mild crackles over left lower lobe; No wheeze, L sided Pleurx catheter present draining white exudate, no erythema or fluid leakage.  Cardiovascular: Regular rate and rhythm; normal S1 and S2, II/VI systolic blowing murmur. No rubs or gallops  Abdomen: Soft, Nontender, Nondistended; Bowel sounds present  Extremities:  Fistula present over LUE. 2+ PT Pulses, Trace edema over R ankle. No clubbing, cyanosis. LE are cool to touch.   Genitourinary: Erythematous dermatitis over groin b/l  Skin: Diffuse ecchymotic lesions over upper and lower extremities.  Neurological: non-focal  Psychiatry:  AAOx2 (name and place).    HOSPITAL MEDICATIONS:  Standing Meds:  heparin  Injectable 5000 Unit(s) SubCutaneous every 8 hours  piperacillin/tazobactam IVPB. 3.375 Gram(s) IV Intermittent every 12 hours  levothyroxine Injectable 12.5 MICROGram(s) IV Push daily  lactobacillus acidophilus 1 Tablet(s) Oral daily  aspirin enteric coated 81 milliGRAM(s) Oral daily  norepinephrine Infusion 0.01 MICROgram(s)/kG/Min IV Continuous <Continuous>  nystatin Powder 1 Application(s) Topical every 8 hours  tiotropium 18 MICROgram(s) Capsule 1 Capsule(s) Inhalation daily  buDESOnide 160 MICROgram(s)/formoterol 4.5 MICROgram(s) Inhaler 2 Puff(s) Inhalation two times a day  midodrine 20 milliGRAM(s) Oral every 8 hours  Nephro-amy 1 Tablet(s) Oral daily  epoetin terry Injectable 4000 Unit(s) IV Push <User Schedule>  dexmedetomidine Infusion 0.1 MICROgram(s)/kG/Hr IV Continuous <Continuous>    LABS:                        8.7    10.2  )-----------( 103      ( 28 Sep 2017 02:33 )             26.9     Hgb Trend: 8.7<--, 9.7<--, 9.8<--, 9.4<--, 9.6<--  09-28    141  |  97  |  77<H>  ----------------------------<  119<H>  4.4   |  22  |  5.18<H>    Ca    7.4<L>      28 Sep 2017 02:33  Phos  3.3     09-28  Mg     2.3     09-28    TPro  5.0<L>  /  Alb  2.0<L>  /  TBili  0.6  /  DBili  x   /  AST  59<H>  /  ALT  25  /  AlkPhos  314<H>  09-28    Creatinine Trend: 5.18<--, 4.29<--, 6.06<--, 5.72<--, 5.78<--, 5.89<--  PT/INR - ( 28 Sep 2017 02:33 )   PT: 14.4 sec;   INR: 1.33 ratio         PTT - ( 28 Sep 2017 02:33 )  PTT:30.9 sec    MICROBIOLOGY: Culture - Blood (09.27.17 @ 05:46)    Specimen Source: .Blood Blood-Peripheral    Culture Results:   No growth to date.    RADIOLOGY: < from: Xray Chest 1 View AP- PORTABLE-Urgent (09.27.17 @ 09:40) >  IMPRESSION:     Stable small bilateral pleural effusions with mild pulmonary venous   hypertension    < end of copied text >

## 2017-09-28 NOTE — PROGRESS NOTE ADULT - ASSESSMENT
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     Neuro: AMS 2/2 sepsis/metabolic derangement. Currently improved, but mental status continues to wax and wane, likely 2/2 delirium. PT given Haldol and Precedex overnight due to combativeness.    - treat underlying infection w/ Vanc and Zosyn (renally dosed)  - Switch Haldol to Zyprexa 2/2 prolonged QTC on last EKG and to avoid EPS symptoms  - Monitor neuro status  - avoid sedating agents    Resp: Currently on room air; Pt placed on 2L NC while sleeping; persistent pleural effusions.   - pleurex gram stain + GPC; awaiting culture data   - Pleurex cath found out, site bandaged, with minor fluid leakage, c/t monitor site   - Per thoracic, consider sonosite for possible pigtail cath placement for pleural drainage  - c/w Symbicort 180 2 puffs BID, Spiriva 1 puff qd, incentive spirometry, Chest PT, keep O2 sat 90%  - Daily CXR    CV:   - septic shock 2/2 MRSA bacteremia, likely originating from infected pleural fluid; c/w levo to maintain MAP>65, increased midodrine to 30TID, wean levo as BP tolerates   - pt with severe AS and MR on previous TTE with severe pulm HTN - gentle IVF as needed only to prevent pulmonary edema  - Afib s/p PPM (controlled, not on AC due to falls, not on BB)  - check official TTE to r/o vegetations      GI:   - c/w renal diet, tolerating well   - Continue to monitor mental status for risk of aspiration.    Renal: ESRD on HD  - HD today per renal  - Per renal recs: continue epogen 4000 units TIW     ID:   - septic shock 2/2 MRSA bacteremia, likely source is empyema   - c/w Vanc/ Zosyn IVPB (day 4), will consider dcing zosyn pending results of pleural cx  - pleural gram stain +GPCs, cx pending     Heme/Onc:   - Small drop in Hb (9.7 --> 8.7)  - leukocytosis resolved  - pt with chronic anemia, likely 2/2 anemia of chronic disease/ESRD - c/t monitor, c/w epogen; transfuse Hb <7.0  - mild thrombocytopenia, likely in setting of sepsis - c/t monitor      - DLBCL - f/u heme/onc as outpatient    Psych: A&O x1  - continue to monitor mental status.   - avoid sedation    DVT:   - Hep Sq  - SCDs

## 2017-09-29 LAB
ALBUMIN SERPL ELPH-MCNC: 2.2 G/DL — LOW (ref 3.3–5)
ALP SERPL-CCNC: 380 U/L — HIGH (ref 40–120)
ALT FLD-CCNC: 28 U/L RC — SIGNIFICANT CHANGE UP (ref 10–45)
ANION GAP SERPL CALC-SCNC: 19 MMOL/L — HIGH (ref 5–17)
APTT BLD: 29.2 SEC — SIGNIFICANT CHANGE UP (ref 27.5–37.4)
AST SERPL-CCNC: 67 U/L — HIGH (ref 10–40)
BILIRUB SERPL-MCNC: 0.6 MG/DL — SIGNIFICANT CHANGE UP (ref 0.2–1.2)
BUN SERPL-MCNC: 48 MG/DL — HIGH (ref 7–23)
CALCIUM SERPL-MCNC: 7.5 MG/DL — LOW (ref 8.4–10.5)
CHLORIDE SERPL-SCNC: 93 MMOL/L — LOW (ref 96–108)
CO2 SERPL-SCNC: 25 MMOL/L — SIGNIFICANT CHANGE UP (ref 22–31)
CREAT SERPL-MCNC: 3.69 MG/DL — HIGH (ref 0.5–1.3)
GLUCOSE SERPL-MCNC: 109 MG/DL — HIGH (ref 70–99)
HCT VFR BLD CALC: 29.2 % — LOW (ref 39–50)
HGB BLD-MCNC: 9.6 G/DL — LOW (ref 13–17)
INR BLD: 1.32 RATIO — HIGH (ref 0.88–1.16)
MAGNESIUM SERPL-MCNC: 2 MG/DL — SIGNIFICANT CHANGE UP (ref 1.6–2.6)
MCHC RBC-ENTMCNC: 32.7 GM/DL — SIGNIFICANT CHANGE UP (ref 32–36)
MCHC RBC-ENTMCNC: 34.4 PG — HIGH (ref 27–34)
MCV RBC AUTO: 105 FL — HIGH (ref 80–100)
PHOSPHATE SERPL-MCNC: 2.4 MG/DL — LOW (ref 2.5–4.5)
PLATELET # BLD AUTO: 107 K/UL — LOW (ref 150–400)
POTASSIUM SERPL-MCNC: 3.9 MMOL/L — SIGNIFICANT CHANGE UP (ref 3.5–5.3)
POTASSIUM SERPL-SCNC: 3.9 MMOL/L — SIGNIFICANT CHANGE UP (ref 3.5–5.3)
PROT SERPL-MCNC: 5.2 G/DL — LOW (ref 6–8.3)
PROTHROM AB SERPL-ACNC: 14.5 SEC — HIGH (ref 9.8–12.7)
RBC # BLD: 2.78 M/UL — LOW (ref 4.2–5.8)
RBC # FLD: 17.1 % — HIGH (ref 10.3–14.5)
SODIUM SERPL-SCNC: 137 MMOL/L — SIGNIFICANT CHANGE UP (ref 135–145)
WBC # BLD: 11.1 K/UL — HIGH (ref 3.8–10.5)
WBC # FLD AUTO: 11.1 K/UL — HIGH (ref 3.8–10.5)

## 2017-09-29 PROCEDURE — 99233 SBSQ HOSP IP/OBS HIGH 50: CPT

## 2017-09-29 PROCEDURE — 76700 US EXAM ABDOM COMPLETE: CPT | Mod: 26

## 2017-09-29 PROCEDURE — 93010 ELECTROCARDIOGRAM REPORT: CPT

## 2017-09-29 PROCEDURE — 99233 SBSQ HOSP IP/OBS HIGH 50: CPT | Mod: GC

## 2017-09-29 PROCEDURE — 99232 SBSQ HOSP IP/OBS MODERATE 35: CPT

## 2017-09-29 PROCEDURE — 71010: CPT | Mod: 26

## 2017-09-29 RX ORDER — SEVELAMER CARBONATE 2400 MG/1
800 POWDER, FOR SUSPENSION ORAL
Qty: 0 | Refills: 0 | Status: DISCONTINUED | OUTPATIENT
Start: 2017-09-29 | End: 2017-09-30

## 2017-09-29 RX ORDER — HEPARIN SODIUM 5000 [USP'U]/ML
5000 INJECTION INTRAVENOUS; SUBCUTANEOUS EVERY 12 HOURS
Qty: 0 | Refills: 0 | Status: DISCONTINUED | OUTPATIENT
Start: 2017-09-29 | End: 2017-10-17

## 2017-09-29 RX ORDER — SODIUM,POTASSIUM PHOSPHATES 278-250MG
1 POWDER IN PACKET (EA) ORAL
Qty: 0 | Refills: 0 | Status: DISCONTINUED | OUTPATIENT
Start: 2017-09-29 | End: 2017-09-29

## 2017-09-29 RX ORDER — ATORVASTATIN CALCIUM 80 MG/1
40 TABLET, FILM COATED ORAL AT BEDTIME
Qty: 0 | Refills: 0 | Status: DISCONTINUED | OUTPATIENT
Start: 2017-09-29 | End: 2017-10-17

## 2017-09-29 RX ADMIN — Medication 1 TABLET(S): at 11:05

## 2017-09-29 RX ADMIN — ATORVASTATIN CALCIUM 40 MILLIGRAM(S): 80 TABLET, FILM COATED ORAL at 22:19

## 2017-09-29 RX ADMIN — MIDODRINE HYDROCHLORIDE 30 MILLIGRAM(S): 2.5 TABLET ORAL at 18:00

## 2017-09-29 RX ADMIN — MIDODRINE HYDROCHLORIDE 30 MILLIGRAM(S): 2.5 TABLET ORAL at 00:04

## 2017-09-29 RX ADMIN — PIPERACILLIN AND TAZOBACTAM 25 GRAM(S): 4; .5 INJECTION, POWDER, LYOPHILIZED, FOR SOLUTION INTRAVENOUS at 16:54

## 2017-09-29 RX ADMIN — NYSTATIN CREAM 1 APPLICATION(S): 100000 CREAM TOPICAL at 05:26

## 2017-09-29 RX ADMIN — HEPARIN SODIUM 5000 UNIT(S): 5000 INJECTION INTRAVENOUS; SUBCUTANEOUS at 18:03

## 2017-09-29 RX ADMIN — NYSTATIN CREAM 1 APPLICATION(S): 100000 CREAM TOPICAL at 16:54

## 2017-09-29 RX ADMIN — SEVELAMER CARBONATE 800 MILLIGRAM(S): 2400 POWDER, FOR SUSPENSION ORAL at 18:01

## 2017-09-29 RX ADMIN — MIDODRINE HYDROCHLORIDE 30 MILLIGRAM(S): 2.5 TABLET ORAL at 07:48

## 2017-09-29 RX ADMIN — Medication 0.5 MILLIGRAM(S): at 05:12

## 2017-09-29 RX ADMIN — MIDODRINE HYDROCHLORIDE 30 MILLIGRAM(S): 2.5 TABLET ORAL at 23:21

## 2017-09-29 RX ADMIN — Medication 12.5 MICROGRAM(S): at 05:26

## 2017-09-29 RX ADMIN — Medication 255 MILLIMOLE(S): at 05:26

## 2017-09-29 RX ADMIN — Medication 0.5 MILLIGRAM(S): at 18:01

## 2017-09-29 RX ADMIN — NYSTATIN CREAM 1 APPLICATION(S): 100000 CREAM TOPICAL at 22:19

## 2017-09-29 RX ADMIN — HEPARIN SODIUM 5000 UNIT(S): 5000 INJECTION INTRAVENOUS; SUBCUTANEOUS at 05:26

## 2017-09-29 RX ADMIN — PIPERACILLIN AND TAZOBACTAM 25 GRAM(S): 4; .5 INJECTION, POWDER, LYOPHILIZED, FOR SOLUTION INTRAVENOUS at 04:45

## 2017-09-29 RX ADMIN — Medication 81 MILLIGRAM(S): at 11:05

## 2017-09-29 NOTE — PROGRESS NOTE ADULT - PROBLEM SELECTOR PLAN 3
check pleural cultures and Blood cx  zosyn D4; vanco D3  CTS evaluation and likely decortication--?sono guided pig tail catheter MRSA bacteremia  zosyn D5; vanco D4  CTS evaluation and likely decortication--?sono guided pig tail catheter

## 2017-09-29 NOTE — CHART NOTE - NSCHARTNOTEFT_GEN_A_CORE
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 Last HD 9/28, next HD 9/30.    Source: Patient [ ]    Family [ ]     other [ x] chart    Diet : renal, Nepro 2x/day      Patient reports [ ] nausea  [ ] vomiting [ ] diarrhea [ ] constipation  [ ]chewing problems [ ] swallowing issues  [ ] other:   last BM 9/29     PO intake:  < 50% [ ] 50-75% [ ]   % [ ]  other : pt ate 75% of breakfast this am, is dring nepro supplement     Source for PO intake [ ] Patient [ ] family [ ] chart [x ] staff [ ] other (pt confused)     Enteral /Parenteral Nutrition: n/a      Current Weight:  dosing Weight (kg): 75.2, 165lb (09-25 @ 17:23), 9/29 167lb      Pertinent Medications: MEDICATIONS  (STANDING):  heparin  Injectable 5000 Unit(s) SubCutaneous every 8 hours  piperacillin/tazobactam IVPB. 3.375 Gram(s) IV Intermittent every 12 hours  levothyroxine Injectable 12.5 MICROGram(s) IV Push daily  lactobacillus acidophilus 1 Tablet(s) Oral daily  aspirin enteric coated 81 milliGRAM(s) Oral daily  nystatin Powder 1 Application(s) Topical every 8 hours  Nephro-amy 1 Tablet(s) Oral daily  midodrine 30 milliGRAM(s) Oral every 8 hours  epoetin terry Injectable 4000 Unit(s) IV Push <User Schedule>  buDESOnide   0.5 milliGRAM(s) Respule 0.5 milliGRAM(s) Inhalation every 12 hours  norepinephrine Infusion 0.01 MICROgram(s)/kG/Min (0.705 mL/Hr) IV Continuous <Continuous>    MEDICATIONS  (PRN):    Pertinent Labs:  09-29 Na137 mmol/L Glu 109 mg/dL<H> K+ 3.9 mmol/L Cr  3.69 mg/dL<H> BUN 48 mg/dL<H> Phos 2.4 mg/dL<L>         Skin: stage 1 coccyx    Estimated Needs:   [x ] no change since previous assessment  [ ] recalculated:       Previous Nutrition Diagnosis: [ x] Malnutrition     Nutrition Diagnosis is [x ] ongoing  [ ] resolved [ ] not applicable   addressed w/ nepro supplement, nephrovite    New Nutrition Diagnosis: [x ] not applicable     Interventions:     Recommend    [ ] Change Diet To:    [ ] Nutrition Supplement    [ ] Nutrition Support    [x ] Other: continue renal diet, Nepro 2x/day, nephrovite       Monitoring and Evaluation:     [x ] PO intake [x ] Tolerance to diet prescription [x ] weights [x ] follow up per protocol    [x ] other: labs Malnutrition follow up    86 yo M with a PMH ESRD on HD (MWF), AAA s/p repair, CAD s/p stents (lastplaced in 03/2014), atrial fibrillation (not on A/C 2/2 falls), HFpEF (last EF 68%) s/p PPM and ablation 2016, severe aortic stenosis, severe mitral regurgitation, depression, hypothyroidism, HTN, and DLBCL 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 Last HD 9/28, next HD 9/30.    Source: Patient [ ]    Family [ ]     other [ x] chart    Diet : renal, Nepro 2x/day      Patient reports [ ] nausea  [ ] vomiting [ ] diarrhea [ ] constipation  [ ]chewing problems [ ] swallowing issues  [ ] other:   last BM 9/29     PO intake:  < 50% [ ] 50-75% [ ]   % [ ]  other : pt ate 75% of breakfast this am, is drinking nepro supplement     Source for PO intake [ ] Patient [ ] family [ ] chart [x ] staff [ ] other (pt confused)     Enteral /Parenteral Nutrition: n/a      Current Weight:  dosing Weight (kg): 75.2, 165lb (09-25 @ 17:23), 9/29 167lb      Pertinent Medications: MEDICATIONS  (STANDING):  heparin  Injectable 5000 Unit(s) SubCutaneous every 8 hours  piperacillin/tazobactam IVPB. 3.375 Gram(s) IV Intermittent every 12 hours  levothyroxine Injectable 12.5 MICROGram(s) IV Push daily  lactobacillus acidophilus 1 Tablet(s) Oral daily  aspirin enteric coated 81 milliGRAM(s) Oral daily  nystatin Powder 1 Application(s) Topical every 8 hours  Nephro-amy 1 Tablet(s) Oral daily  midodrine 30 milliGRAM(s) Oral every 8 hours  epoetin terry Injectable 4000 Unit(s) IV Push <User Schedule>  buDESOnide   0.5 milliGRAM(s) Respule 0.5 milliGRAM(s) Inhalation every 12 hours  norepinephrine Infusion 0.01 MICROgram(s)/kG/Min (0.705 mL/Hr) IV Continuous <Continuous>    MEDICATIONS  (PRN):    Pertinent Labs:  09-29 Na137 mmol/L Glu 109 mg/dL<H> K+ 3.9 mmol/L Cr  3.69 mg/dL<H> BUN 48 mg/dL<H> Phos 2.4 mg/dL<L>         Skin: stage 1 coccyx    Estimated Needs:   [x ] no change since previous assessment  [ ] recalculated:       Previous Nutrition Diagnosis: [ x] Malnutrition     Nutrition Diagnosis is [x ] ongoing  [ ] resolved [ ] not applicable   addressed w/ nepro supplement, nephrovite    New Nutrition Diagnosis: [x ] not applicable     Interventions:     Recommend    [ ] Change Diet To:    [ ] Nutrition Supplement    [ ] Nutrition Support    [x ] Other: continue renal diet, Nepro 2x/day, nephrovite       Monitoring and Evaluation:     [x ] PO intake [x ] Tolerance to diet prescription [x ] weights [x ] follow up per protocol    [x ] other: labs

## 2017-09-29 NOTE — PROGRESS NOTE ADULT - SUBJECTIVE AND OBJECTIVE BOX
Subjective:  Hi   Supine in bed,  wife at bedside      V/S  T(C): 36.5 (09-29-17 @ 08:00), Max: 36.8 (09-28-17 @ 16:00)  HR: 73 (09-29-17 @ 10:00) (70 - 77)  BP: 89/47 (09-29-17 @ 10:00) (85/50 - 110/60)  RR: 25 (09-29-17 @ 10:00) (15 - 42)  SpO2: 100% (09-29-17 @ 10:00) (97% - 100%)                                              MEDICATIONS  (STANDING):  piperacillin/tazobactam IVPB. 3.375 Gram(s) IV Intermittent every 12 hours  levothyroxine Injectable 12.5 MICROGram(s) IV Push daily  lactobacillus acidophilus 1 Tablet(s) Oral daily  aspirin enteric coated 81 milliGRAM(s) Oral daily  nystatin Powder 1 Application(s) Topical every 8 hours  Nephro-amy 1 Tablet(s) Oral daily  midodrine 30 milliGRAM(s) Oral every 8 hours  epoetin terry Injectable 4000 Unit(s) IV Push <User Schedule>  buDESOnide   0.5 milliGRAM(s) Respule 0.5 milliGRAM(s) Inhalation every 12 hours  atorvastatin 40 milliGRAM(s) Oral at bedtime  sevelamer hydrochloride 800 milliGRAM(s) Oral three times a day with meals  heparin  Injectable 5000 Unit(s) SubCutaneous every 12 hours      09-29    137  |  93<L>  |  48<H>  ----------------------------<  109<H>  3.9   |  25  |  3.69<H>    Ca    7.5<L>      29 Sep 2017 02:47  Phos  2.4     09-29  Mg     2.0     09-29    TPro  5.2<L>  /  Alb  2.2<L>  /  TBili  0.6  /  DBili  x   /  AST  67<H>  /  ALT  28  /  AlkPhos  380<H>  09-29                               9.6    11.1  )-----------( 107      ( 29 Sep 2017 02:47 )             29.2        PT/INR - ( 29 Sep 2017 02:47 )   PT: 14.5 sec;   INR: 1.32 ratio         PTT - ( 29 Sep 2017 02:47 )  PTT:29.2 sec         CAPILLARY BLOOD GLUCOSE  96 (28 Sep 2017 13:04)               CXR:   he heart is normal in size. Bilateral pleural effusion. Pulmonary   vascular congestion. No change when compared to previous study done   9/27/2017. A pacer is in good position. Calcified aortic knob.        Physical Exam:    Neuro: alert, no distress    Pulm: supplemental O2  diminshed course sounds bilaterally    CV:n S 1 S 2     Abd:  soft  NT/  ND     Extremities: 1+ edema B/l                    PAST MEDICAL & SURGICAL HISTORY:  Myocardial infarct: h/o X3  GERD (gastroesophageal reflux disease)  Gout  AAA (abdominal aortic aneurysm): per cardiology eval report  Hemodialysis patient  Lymphoma, non-Hodgkin's: h/o diffuse B cell  Stroke: mild  Asthma: h/o  Pleural effusion  Hepatitis  Atrial fibrillation: 11/2014  Chronic kidney disease: on dialysis MWF  Prostate cancer: s/p seeds  [place] ([place])  Kidney Stones s/p ureteroscopy  CAD (Coronary Artery Disease): 13 stents placed 3/2014  Hypercholesteremia  HTN (Hypertension)  S/P Radiation > 12 Weeks prostatic seeds placed 10 yrs ago  S/P Cataract Surgery  S/P Cystoscopy  S/P Inguinal Hernia  repair bilat  S/P Parathyroidectomy  S/P Hip Replacement bilat  S/P Cardiac Cath: 8 stents placed 3/2014, per pt he thinks he had 3 stents placed 15 years prior also.

## 2017-09-29 NOTE — PROGRESS NOTE ADULT - ATTENDING COMMENTS
as above--CTS follow up-s/p removal of pleurx and ? decortication of left pleural space vs. pigtail placement of catheter as per Tia et al.  ABX to be directed by pleural and Blood samples--zosyn D4 and vanco D3--follow up final pleural  and blood cx  MICU for BP support    Morro Tran MD-Pulmonary   [phone] as above--CTS follow up-s/p removal of pleurx and ? decortication of left pleural space vs. pigtail placement of catheter as per Tia et al.  ABX to be directed by pleural and Blood samples--zosyn D5 and vanco D4-MRSA bacteremia--would consider GUADALUPE rather than TTE to r/o endocarditis  MS issues continue-haldol/zyprexa etc.  MICU for BP support    Morro Tran MD-Pulmonary   [phone]

## 2017-09-29 NOTE — PROGRESS NOTE ADULT - PROBLEM SELECTOR PLAN 4
symbicort 180 2 puffs bid  spiriva 1 puff q day  pulmonary toilet-incentive spirometry, Chest Pt-acapella/chest vest-up to 5 times per day.    maintain oxygen levels above 90%-supplemental oxygen via nasal canula-humidified    All nebulized therapy is to be administered via hand held nebulizer-(patient must gargle and spit with water after use)

## 2017-09-29 NOTE — CHART NOTE - NSCHARTNOTEFT_GEN_A_CORE
87M with ESRD on HD, CAD s/p multiple stents (last in 3/2014), AAA s/p repair, A fib with PPM (no AC due to falls) and ablation in 2016, HFpEF (last TTE 9/28), severe AS and MR, depression, dementia (AOx1 at baseline), hypothyroidism, HTN, DLBCL currently on rituximab, pleural effusion with pleurx placed on 9/5 recently admitted 9/17-9/18 for a hypotensive episode after pleurx exchange by thoracic surgery due to increased drainage. Admitted this time on 9/25 for increased, foul smelling drainage from pleurx, hypotension and witnessed fall. He was admitted to MICU for septic shock management requiring levophed. He was started on vanc and zosyn. Blood cultures grew MRSA. CT chest showed loculated left sided pleural effusion and moderate right pleural effusion. Thoracic surgery was consulted and pleurx was placed on continuous wall suction until 9/27 when it fell out of chest. Surgical site is non-erythematous and not draining any fluid at time of transfer.    Patient was on midodrine 20 TID which was increased to 30TID in MICU. Levophed was stopped on 9/28. Patient remained at baseline pressure 90s systolic. Nephrology is following for HD needs, scheduled TTS at this time. Patients mental status was waxing and waning in MICU and was given haldol and precedex for intermittent aggitation. QTc was found to be 598 and haldol was stopped.       PE  General: NAD, oriented to person, alert  HEENT: normocephalic  Card: harsh systolic murmur, no JVD  Pulm: b/l rales diffusely, decreased breath sounds at bases b/l  GI: nontender, +BS  MSK: dialysis fistula in left arm      # empyema 2/2 likely MRSA c/b MRSA bacteremia  - currently on vancomycin, received 4 days, will check level tonight as last dose was 9/28 6pm, will need atleast 4 weeks of treatment  - has received 5 days of zosyn, will continue until final speciation of pleural fluid  - daily CXR to assess for accumulation  - possible pigtail catheter placement if reaccumulates    # ESRD on HD  - nephrology following, electrolytes stable today  - c/w sevelamer, nephrovite, epo  - HD TTS    # DLBCL on rituximab outpt  - will f/u with hem/onc outpatient    # HFpEF  - last TTE 9/28 showing normal LV systolic function  - monitor Plef with daily cxr    # afib s/p ppm  - no AC outpatient due to recurrent falls  - on asa and hep sq inpatient    # hypothyroid  - c/w synthroid

## 2017-09-29 NOTE — PROGRESS NOTE ADULT - ASSESSMENT
86y/o Male h/o ESRD, AAA repair admitted with altered mental status, MRSA bacteremia and non draining left pleurx.   9/26 Left pleurx attached to pleurovac yielding -100 cc purulent drainage.  9/27 left pleurx removed  9/28  VSS  Lt plx site  no drainage,  new prelim bc negative  9/29  Transfer 3 myers

## 2017-09-29 NOTE — PROGRESS NOTE ADULT - ATTENDING COMMENTS
Pt improved today, not delirious currently. Off pressors but required midodrine at a higher dose than baseline. Cont vanco and zosyn. Can d/c zosyn if pleural cx shows MRSA. TTE negative for endocarditis. Follow for effusion recurrence w/ US or cxr. Only small effusion on L now-appears simple on US. Has b/l effusion and b-lines. Attempt goal increase fluid removal during HD tomorrow. Transfer to floors

## 2017-09-29 NOTE — CHART NOTE - NSCHARTNOTEFT_GEN_A_CORE
MICU Transfer Note    Transfer from: MICU    Transfer to: ( x ) Medicine    (  ) Telemetry     (   ) RCU        (    ) Palliative         (   ) Stroke Unit          (   ) __________________    Accepting physician: Tano Hernandez MD      MICU COURSE: 88 yo M with a PMH ESRD on HD (MWF), AAA s/p repair, CAD s/p stents (lastplaced in 03/2014), atrial fibrillation (not on A/C 2/2 falls) ablation 2016, HFpEF (last EF 68%) s/p PPM, severe aortic stenosis, severe mitral regurgitation, depression, hypothyroidism, HTN, and DLBCL currently on Rituximab, recently admitted (9/17) for increased fluid leakage from pleurex catheter p/w fall/?syncope at home with hypotension, a/w likely septic shock 2/2 empyema c/b MRSA bacteremia. Pt was started on Vanc/Zosyn (Currently Day 5; renally dosed) for MRSA (Zosyn may be discontinued pending pleural culture). Thoracic surgery was consulted and pleurex catheter was placed on continuous wall suction for drainage. Pt's pleurex catheter was found out of chest wall, surgical site covered with sterile dressing and monitored for signs of infection. Currently, surgical site is non-erythematous and not draining fluid. Pt was additionally placed on Levophed for BP control. Pt has been off Levo since 5pm yesterday. Pt currently on Midodrine 30mg TID (home dose: Midodrine 10mg TID) for BP management. Baseline pressure: 90s/40s. Pt received 2 rounds of HD on the unit (on epogen; Renal following). Pt's mental status has been waxing and waning. Pt intermittently became aggressive and combative requiring Haldol IV and precedex. EKG showed . Haldol dc'd.     PLAN:     Neuro: AMS 2/2 sepsis/metabolic derangement. Currently improved, but mental status continues to wax and wane, likely 2/2 delirium. PT did not require zyprexa or sedation overnight.     - treat underlying infection w/ Vanc and Zosyn (renally dosed)  - Consider Zyprexa if pt becomes agitated  - Monitor neuro status  - avoid sedating agents    Resp: Currently on room air; Pt placed on 2L NC while sleeping; persistent pleural effusions.   - pleurex gram stain + GPC; awaiting culture data   - Pleurex cath found out, site bandaged, with minor fluid leakage, c/t monitor site   - Per thoracic, consider sonosite for possible pigtail cath placement for pleural drainage  - dc'd Symbicort and Spiriva and switched to Pulmicort due to Pt's inability to follow commands yesterday; Per pulm recs: incentive spirometry, Chest PT, keep O2 sat 90%  - Daily CXR    CV:   - septic shock 2/2 MRSA bacteremia, likely originating from infected pleural fluid; c/w levo to maintain MAP>65, increased midodrine to 30TID, Pt now off of Levo  - pt with severe AS and MR on previous TTE with severe pulm HTN - gentle IVF as needed only to prevent pulmonary edema  - Afib s/p PPM (controlled, not on AC due to falls, not on BB)  - No vegetations found on TTE      GI:   - c/w renal diet, tolerating well   - Continue to monitor mental status for risk of aspiration.    Renal: ESRD on HD  - HD per renal  - Per renal recs: continue epogen 4000 units TIW     ID:   - septic shock 2/2 MRSA bacteremia, likely source is empyema   - c/w Vanc/ Zosyn IVPB (day 5), will consider dcing zosyn pending results of pleural cx  - pleural gram stain +GPCs, cx pending     Heme/Onc:   - leukocytosis resolved  - pt with chronic anemia, likely 2/2 anemia of chronic disease/ESRD - c/t monitor, c/w epogen; transfuse Hb <7.0  - mild thrombocytopenia, likely in setting of sepsis - c/t monitor      - DLBCL - f/u heme/onc as outpatient    Psych: A&O x1  - continue to monitor mental status.   - avoid sedation    DVT:   - Hep Sq  - SCDs          For Followup:    - Daily CXR  - Pleural fluid cultures  - Consider ID consult for long term abx/ PICC line placement  - Titrate midodrine for BP control          Vital Signs Last 24 Hrs  T(C): 36.5 (29 Sep 2017 08:00), Max: 36.8 (28 Sep 2017 16:00)  T(F): 97.7 (29 Sep 2017 08:00), Max: 98.3 (28 Sep 2017 16:00)  HR: 73 (29 Sep 2017 10:00) (70 - 77)  BP: 89/47 (29 Sep 2017 10:00) (85/50 - 110/60)  BP(mean): 65 (29 Sep 2017 10:00) (64 - 80)  RR: 25 (29 Sep 2017 10:00) (15 - 42)  SpO2: 100% (29 Sep 2017 10:00) (97% - 100%)  I&O's Summary    28 Sep 2017 07:01  -  29 Sep 2017 07:00  --------------------------------------------------------  IN: 1636 mL / OUT: 1300 mL / NET: 336 mL    29 Sep 2017 07:01  -  29 Sep 2017 10:51  --------------------------------------------------------  IN: 240 mL / OUT: 0 mL / NET: 240 mL        MEDICATIONS  (STANDING):  heparin  Injectable 5000 Unit(s) SubCutaneous every 8 hours  piperacillin/tazobactam IVPB. 3.375 Gram(s) IV Intermittent every 12 hours  levothyroxine Injectable 12.5 MICROGram(s) IV Push daily  lactobacillus acidophilus 1 Tablet(s) Oral daily  aspirin enteric coated 81 milliGRAM(s) Oral daily  nystatin Powder 1 Application(s) Topical every 8 hours  Nephro-amy 1 Tablet(s) Oral daily  midodrine 30 milliGRAM(s) Oral every 8 hours  epoetin terry Injectable 4000 Unit(s) IV Push <User Schedule>  buDESOnide   0.5 milliGRAM(s) Respule 0.5 milliGRAM(s) Inhalation every 12 hours  norepinephrine Infusion 0.01 MICROgram(s)/kG/Min (0.705 mL/Hr) IV Continuous <Continuous>          LABS                                            9.6                   Neurophils% (auto):   x      (09-29 @ 02:47):    11.1 )-----------(107          Lymphocytes% (auto):  x                                             29.2                   Eosinphils% (auto):   x        Manual%: Neutrophils x    ; Lymphocytes x    ; Eosinophils x    ; Bands%: x    ; Blasts x                                    137    |  93     |  48                  Calcium: 7.5   / iCa: x      (09-29 @ 02:47)    ----------------------------<  109       Magnesium: 2.0                              3.9     |  25     |  3.69             Phosphorous: 2.4      TPro  5.2    /  Alb  2.2    /  TBili  0.6    /  DBili  x      /  AST  67     /  ALT  28     /  AlkPhos  380    29 Sep 2017 02:47    ( 09-29 @ 02:47 )   PT: 14.5 sec;   INR: 1.32 ratio  aPTT: 29.2 sec MICU Transfer Note    Transfer from: MICU    Transfer to: ( x ) Medicine    (  ) Telemetry     (   ) RCU        (    ) Palliative         (   ) Stroke Unit          (   ) __________________    Accepting physician: Tano Hernandez MD      MICU COURSE: 86 yo M with a PMH ESRD on HD (MWF), AAA s/p repair, CAD s/p stents (lastplaced in 03/2014), atrial fibrillation (not on A/C 2/2 falls) ablation 2016, HFpEF (last EF 68%) s/p PPM, severe aortic stenosis, severe mitral regurgitation, depression, hypothyroidism, HTN, and DLBCL currently on Rituximab, recently admitted (9/17) for increased fluid leakage from pleurex catheter p/w fall/?syncope at home with hypotension, a/w likely septic shock 2/2 empyema c/b MRSA bacteremia. Pt was started on Vanc/Zosyn (Currently Day 5; renally dosed) for MRSA (Zosyn may be discontinued pending pleural culture). Thoracic surgery was consulted and pleurex catheter was placed on continuous wall suction for drainage. Pt's pleurex catheter was found out of chest wall, surgical site covered with sterile dressing and monitored for signs of infection. Currently, surgical site is non-erythematous and not draining fluid. Pt was additionally placed on Levophed for BP control. Pt has been off Levo since 5pm yesterday. Pt currently on Midodrine 30mg TID (home dose: Midodrine 10mg TID) for BP management. Baseline pressure: 90s/40s. Pt received 2 rounds of HD on the unit (on epogen; Renal following). Pt's mental status has been waxing and waning. Pt intermittently became aggressive and combative requiring Haldol IV and precedex. EKG showed . Haldol dc'd. PT off pressors, hemodynamically stable to go to floors.     PLAN:     Neuro: AMS 2/2 sepsis/metabolic derangement. Currently improved, but mental status continues to wax and wane, likely 2/2 delirium. PT did not require zyprexa or sedation overnight.     - treat underlying infection w/ Vanc and Zosyn (renally dosed)  - Consider Zyprexa if pt becomes agitated  - Monitor neuro status  - avoid sedating agents    Resp: Currently on room air; Pt placed on 2L NC while sleeping; persistent pleural effusions.   - pleurex gram stain + GPC; awaiting culture data   - Pleurex cath found out, site bandaged, with minor fluid leakage, c/t monitor site   - Per thoracic, consider sonosite for possible pigtail cath placement for pleural drainage  - dc'd Symbicort and Spiriva and switched to Pulmicort due to Pt's inability to follow commands yesterday; Per pulm recs: incentive spirometry, Chest PT, keep O2 sat 90%  - Daily CXR    CV:   - septic shock 2/2 MRSA bacteremia, likely originating from infected pleural fluid; c/w levo to maintain MAP>65, increased midodrine to 30TID, Pt now off of Levo  - pt with severe AS and MR on previous TTE with severe pulm HTN - gentle IVF as needed only to prevent pulmonary edema  - Afib s/p PPM (controlled, not on AC due to falls, not on BB)  - No vegetations found on TTE      GI:   - c/w renal diet, tolerating well   - Continue to monitor mental status for risk of aspiration.    Renal: ESRD on HD  - HD per renal  - Per renal recs: continue epogen 4000 units TIW     ID:   - septic shock 2/2 MRSA bacteremia, likely source is empyema   - c/w Vanc/ Zosyn IVPB (day 5), will consider dcing zosyn pending results of pleural cx  - pleural gram stain +GPCs, cx pending     Heme/Onc:   - leukocytosis resolved  - pt with chronic anemia, likely 2/2 anemia of chronic disease/ESRD - c/t monitor, c/w epogen; transfuse Hb <7.0  - mild thrombocytopenia, likely in setting of sepsis - c/t monitor      - DLBCL - f/u heme/onc as outpatient    Psych: A&O x1  - continue to monitor mental status.   - avoid sedation    DVT:   - Hep Sq  - SCDs          For Followup:    - Daily CXR  - Pleural fluid cultures  - Consider ID consult for long term abx/ PICC line placement  - Titrate midodrine for BP control          Vital Signs Last 24 Hrs  T(C): 36.5 (29 Sep 2017 08:00), Max: 36.8 (28 Sep 2017 16:00)  T(F): 97.7 (29 Sep 2017 08:00), Max: 98.3 (28 Sep 2017 16:00)  HR: 73 (29 Sep 2017 10:00) (70 - 77)  BP: 89/47 (29 Sep 2017 10:00) (85/50 - 110/60)  BP(mean): 65 (29 Sep 2017 10:00) (64 - 80)  RR: 25 (29 Sep 2017 10:00) (15 - 42)  SpO2: 100% (29 Sep 2017 10:00) (97% - 100%)  I&O's Summary    28 Sep 2017 07:01  -  29 Sep 2017 07:00  --------------------------------------------------------  IN: 1636 mL / OUT: 1300 mL / NET: 336 mL    29 Sep 2017 07:01  -  29 Sep 2017 10:51  --------------------------------------------------------  IN: 240 mL / OUT: 0 mL / NET: 240 mL        MEDICATIONS  (STANDING):  heparin  Injectable 5000 Unit(s) SubCutaneous every 8 hours  piperacillin/tazobactam IVPB. 3.375 Gram(s) IV Intermittent every 12 hours  levothyroxine Injectable 12.5 MICROGram(s) IV Push daily  lactobacillus acidophilus 1 Tablet(s) Oral daily  aspirin enteric coated 81 milliGRAM(s) Oral daily  nystatin Powder 1 Application(s) Topical every 8 hours  Nephro-amy 1 Tablet(s) Oral daily  midodrine 30 milliGRAM(s) Oral every 8 hours  epoetin terry Injectable 4000 Unit(s) IV Push <User Schedule>  buDESOnide   0.5 milliGRAM(s) Respule 0.5 milliGRAM(s) Inhalation every 12 hours  norepinephrine Infusion 0.01 MICROgram(s)/kG/Min (0.705 mL/Hr) IV Continuous <Continuous>          LABS                                            9.6                   Neurophils% (auto):   x      (09-29 @ 02:47):    11.1 )-----------(107          Lymphocytes% (auto):  x                                             29.2                   Eosinphils% (auto):   x        Manual%: Neutrophils x    ; Lymphocytes x    ; Eosinophils x    ; Bands%: x    ; Blasts x                                    137    |  93     |  48                  Calcium: 7.5   / iCa: x      (09-29 @ 02:47)    ----------------------------<  109       Magnesium: 2.0                              3.9     |  25     |  3.69             Phosphorous: 2.4      TPro  5.2    /  Alb  2.2    /  TBili  0.6    /  DBili  x      /  AST  67     /  ALT  28     /  AlkPhos  380    29 Sep 2017 02:47    ( 09-29 @ 02:47 )   PT: 14.5 sec;   INR: 1.32 ratio  aPTT: 29.2 sec MICU Transfer Note    Transfer from: MICU    Transfer to: ( x ) Medicine    (  ) Telemetry     (   ) RCU        (    ) Palliative         (   ) Stroke Unit          (   ) __________________    Accepting physician: Tano Hernandez MD      MICU COURSE: 86 yo M with a PMH ESRD on HD (MWF), AAA s/p repair, CAD s/p stents (lastplaced in 03/2014), atrial fibrillation (not on A/C 2/2 falls) ablation 2016, HFpEF (last EF 68%) s/p PPM, severe aortic stenosis, severe mitral regurgitation, depression, hypothyroidism, HTN, and DLBCL currently on Rituximab, recently admitted (9/17) for increased fluid leakage from pleurex catheter p/w fall/?syncope at home with hypotension, a/w likely septic shock 2/2 empyema c/b MRSA bacteremia. Pt was started on Vanc/Zosyn (Currently Day 5; renally dosed) for MRSA (Zosyn may be discontinued pending pleural culture). Thoracic surgery was consulted and pleurex catheter was placed on continuous wall suction for drainage. Pt's pleurex catheter was found out of chest wall, surgical site covered with sterile dressing and monitored for signs of infection. Currently, surgical site is non-erythematous and not draining fluid. Pt was additionally placed on Levophed for septic shock. Pt has been off Levo since 5pm yesterday. Pt's home dose: Midodrine 10mg TID was up-titrated currently on Midodrine 30mg TID. Baseline pressure: 90s/40s. TTE was done (suboptimal), no vegetation noted. F/u w/ cards for recommendations. Pt received 2 rounds of HD on the unit (on epogen; Renal following). Pt's mental status has been waxing and waning. Pt intermittently became aggressive and combative requiring Haldol IV and precedex. EKG showed . Haldol dc'd. PT off pressors, hemodynamically stable to go to floors.     PLAN:     Neuro: AMS 2/2 sepsis/metabolic derangement. Currently improved, but mental status continues to wax and wane, likely 2/2 delirium. PT did not require zyprexa or sedation overnight.     - treat underlying infection w/ Vanc and Zosyn (renally dosed)  - Consider Zyprexa if pt becomes agitated  - Monitor neuro status  - avoid sedating agents    Resp: Currently on room air; Pt placed on 2L NC while sleeping; persistent pleural effusions.   - pleurex gram stain + GPC; awaiting culture data   - Pleurex cath found out, site bandaged, with minor fluid leakage, c/t monitor site   - Per thoracic, consider sonosite for possible pigtail cath placement for pleural drainage  - dc'd Symbicort and Spiriva and switched to Pulmicort due to Pt's inability to follow commands yesterday; Per pulm recs: incentive spirometry, Chest PT, keep O2 sat 90%  - Daily CXR    CV:   - septic shock 2/2 MRSA bacteremia, likely originating from infected pleural fluid; c/w levo to maintain MAP>65, increased midodrine to 30TID, Pt now off of Levo  - pt with severe AS and MR on previous TTE with severe pulm HTN - gentle IVF as needed only to prevent pulmonary edema  - Afib s/p PPM (controlled, not on AC due to falls, not on BB)  - No vegetations found on TTE      GI:   - c/w renal diet, tolerating well   - Continue to monitor mental status for risk of aspiration.    Renal: ESRD on HD  - HD per renal  - Per renal recs: continue epogen 4000 units TIW     ID:   - septic shock 2/2 MRSA bacteremia, likely source is empyema   - c/w Vanc/ Zosyn IVPB (day 5), will consider dcing zosyn pending results of pleural cx  - pleural gram stain +GPCs, cx pending     Heme/Onc:   - leukocytosis resolved  - pt with chronic anemia, likely 2/2 anemia of chronic disease/ESRD - c/t monitor, c/w epogen; transfuse Hb <7.0  - mild thrombocytopenia, likely in setting of sepsis - c/t monitor      - DLBCL - f/u heme/onc as outpatient    Psych: A&O x1  - continue to monitor mental status.   - avoid sedation    DVT:   - Hep Sq  - SCDs          For Followup:    - Daily CXR  - Pleural fluid cultures  - Consider ID consult for long term abx/ PICC line placement  - Down- Titrate midodrine           Vital Signs Last 24 Hrs  T(C): 36.5 (29 Sep 2017 08:00), Max: 36.8 (28 Sep 2017 16:00)  T(F): 97.7 (29 Sep 2017 08:00), Max: 98.3 (28 Sep 2017 16:00)  HR: 73 (29 Sep 2017 10:00) (70 - 77)  BP: 89/47 (29 Sep 2017 10:00) (85/50 - 110/60)  BP(mean): 65 (29 Sep 2017 10:00) (64 - 80)  RR: 25 (29 Sep 2017 10:00) (15 - 42)  SpO2: 100% (29 Sep 2017 10:00) (97% - 100%)  I&O's Summary    28 Sep 2017 07:01  -  29 Sep 2017 07:00  --------------------------------------------------------  IN: 1636 mL / OUT: 1300 mL / NET: 336 mL    29 Sep 2017 07:01  -  29 Sep 2017 10:51  --------------------------------------------------------  IN: 240 mL / OUT: 0 mL / NET: 240 mL        MEDICATIONS  (STANDING):  heparin  Injectable 5000 Unit(s) SubCutaneous every 8 hours  piperacillin/tazobactam IVPB. 3.375 Gram(s) IV Intermittent every 12 hours  levothyroxine Injectable 12.5 MICROGram(s) IV Push daily  lactobacillus acidophilus 1 Tablet(s) Oral daily  aspirin enteric coated 81 milliGRAM(s) Oral daily  nystatin Powder 1 Application(s) Topical every 8 hours  Nephro-amy 1 Tablet(s) Oral daily  midodrine 30 milliGRAM(s) Oral every 8 hours  epoetin terry Injectable 4000 Unit(s) IV Push <User Schedule>  buDESOnide   0.5 milliGRAM(s) Respule 0.5 milliGRAM(s) Inhalation every 12 hours  norepinephrine Infusion 0.01 MICROgram(s)/kG/Min (0.705 mL/Hr) IV Continuous <Continuous>          LABS                                            9.6                   Neurophils% (auto):   x      (09-29 @ 02:47):    11.1 )-----------(107          Lymphocytes% (auto):  x                                             29.2                   Eosinphils% (auto):   x        Manual%: Neutrophils x    ; Lymphocytes x    ; Eosinophils x    ; Bands%: x    ; Blasts x                                    137    |  93     |  48                  Calcium: 7.5   / iCa: x      (09-29 @ 02:47)    ----------------------------<  109       Magnesium: 2.0                              3.9     |  25     |  3.69             Phosphorous: 2.4      TPro  5.2    /  Alb  2.2    /  TBili  0.6    /  DBili  x      /  AST  67     /  ALT  28     /  AlkPhos  380    29 Sep 2017 02:47    ( 09-29 @ 02:47 )   PT: 14.5 sec;   INR: 1.32 ratio  aPTT: 29.2 sec MICU Transfer Note    Transfer from: MICU    Transfer to: ( x ) Medicine    (  ) Telemetry     (   ) RCU        (    ) Palliative         (   ) Stroke Unit          (   ) __________________    Accepting physician: Tano Hernandez MD      MICU COURSE: 86 yo M with a PMH ESRD on HD (MWF), AAA s/p repair, CAD s/p stents (lastplaced in 03/2014), atrial fibrillation (not on A/C 2/2 falls) ablation 2016, HFpEF (last EF 68%) s/p PPM, severe aortic stenosis, severe mitral regurgitation, depression, hypothyroidism, HTN, and DLBCL currently on Rituximab, recently admitted (9/17) for increased fluid leakage from pleurex catheter p/w fall/?syncope at home with hypotension, a/w likely septic shock 2/2 MRSA bacteremia 2/2 empyema. Pt was started on Vanc/Zosyn (Currently Day 5; renally dosed) for MRSA (Zosyn may be discontinued pending pleural culture). Thoracic surgery was consulted and pleurex catheter was placed on continuous wall suction for drainage. Pt's pleurex catheter was found out of chest wall, surgical site covered with sterile dressing and monitored for signs of infection. Currently, surgical site is non-erythematous and not draining fluid. Pt was additionally placed on Levophed for septic shock. Pt has been off Levo since 5pm yesterday. Pt's home dose: Midodrine 10mg TID was up-titrated currently on Midodrine 30mg TID. Baseline pressure: 90s/40s. TTE was done (suboptimal), no vegetation noted. F/u w/ cards for recommendations. Pt received 2 rounds of HD on the unit (on epogen; Renal following). Pt's mental status has been waxing and waning. Pt intermittently became aggressive and combative requiring Haldol IV and precedex. EKG showed . Haldol dc'd. PT off pressors, hemodynamically stable to go to floors.     PLAN:     Neuro: AMS 2/2 sepsis/metabolic derangement. Currently improved, but mental status continues to wax and wane, likely 2/2 delirium. PT did not require zyprexa or sedation overnight.     - treat underlying infection w/ Vanc and Zosyn (renally dosed)  - Consider Zyprexa if pt becomes agitated  - Monitor neuro status  - avoid sedating agents    Resp: Currently on room air; Pt placed on 2L NC while sleeping; persistent pleural effusions.   - pleurex gram stain + GPC; awaiting culture data   - Pleurex cath found out, site bandaged, with minor fluid leakage, c/t monitor site   - Per thoracic, consider sonosite for possible pigtail cath placement for pleural drainage  - dc'd Symbicort and Spiriva and switched to Pulmicort due to Pt's inability to follow commands yesterday; Per pulm recs: incentive spirometry, Chest PT, keep O2 sat 90%  - Daily CXR    CV:   - septic shock 2/2 MRSA bacteremia, likely originating from infected pleural fluid; c/w levo to maintain MAP>65, increased midodrine to 30TID, Pt now off of Levo  - pt with severe AS and MR on previous TTE with severe pulm HTN - gentle IVF as needed only to prevent pulmonary edema  - Afib s/p PPM (controlled, not on AC due to falls, not on BB)  - No vegetations found on TTE      GI:   - c/w renal diet, tolerating well   - Continue to monitor mental status for risk of aspiration.    Renal: ESRD on HD  - HD per renal  - Per renal recs: continue epogen 4000 units TIW     ID:   - septic shock 2/2 MRSA bacteremia, likely source is empyema   - c/w Vanc/ Zosyn IVPB (day 5), will consider dcing zosyn pending results of pleural cx  - pleural gram stain +GPCs, cx pending     Heme/Onc:   - leukocytosis resolved  - pt with chronic anemia, likely 2/2 anemia of chronic disease/ESRD - c/t monitor, c/w epogen; transfuse Hb <7.0  - mild thrombocytopenia, likely in setting of sepsis - c/t monitor      - DLBCL - f/u heme/onc as outpatient    Psych: A&O x1  - continue to monitor mental status.   - avoid sedation    DVT:   - Hep Sq  - SCDs          For Followup:    - Daily CXR  - Pleural fluid cultures  - Consider ID consult for long term abx/ PICC line placement  - Down- Titrate midodrine           Vital Signs Last 24 Hrs  T(C): 36.5 (29 Sep 2017 08:00), Max: 36.8 (28 Sep 2017 16:00)  T(F): 97.7 (29 Sep 2017 08:00), Max: 98.3 (28 Sep 2017 16:00)  HR: 73 (29 Sep 2017 10:00) (70 - 77)  BP: 89/47 (29 Sep 2017 10:00) (85/50 - 110/60)  BP(mean): 65 (29 Sep 2017 10:00) (64 - 80)  RR: 25 (29 Sep 2017 10:00) (15 - 42)  SpO2: 100% (29 Sep 2017 10:00) (97% - 100%)  I&O's Summary    28 Sep 2017 07:01  -  29 Sep 2017 07:00  --------------------------------------------------------  IN: 1636 mL / OUT: 1300 mL / NET: 336 mL    29 Sep 2017 07:01  -  29 Sep 2017 10:51  --------------------------------------------------------  IN: 240 mL / OUT: 0 mL / NET: 240 mL        MEDICATIONS  (STANDING):  heparin  Injectable 5000 Unit(s) SubCutaneous every 8 hours  piperacillin/tazobactam IVPB. 3.375 Gram(s) IV Intermittent every 12 hours  levothyroxine Injectable 12.5 MICROGram(s) IV Push daily  lactobacillus acidophilus 1 Tablet(s) Oral daily  aspirin enteric coated 81 milliGRAM(s) Oral daily  nystatin Powder 1 Application(s) Topical every 8 hours  Nephro-amy 1 Tablet(s) Oral daily  midodrine 30 milliGRAM(s) Oral every 8 hours  epoetin terry Injectable 4000 Unit(s) IV Push <User Schedule>  buDESOnide   0.5 milliGRAM(s) Respule 0.5 milliGRAM(s) Inhalation every 12 hours  norepinephrine Infusion 0.01 MICROgram(s)/kG/Min (0.705 mL/Hr) IV Continuous <Continuous>          LABS                                            9.6                   Neurophils% (auto):   x      (09-29 @ 02:47):    11.1 )-----------(107          Lymphocytes% (auto):  x                                             29.2                   Eosinphils% (auto):   x        Manual%: Neutrophils x    ; Lymphocytes x    ; Eosinophils x    ; Bands%: x    ; Blasts x                                    137    |  93     |  48                  Calcium: 7.5   / iCa: x      (09-29 @ 02:47)    ----------------------------<  109       Magnesium: 2.0                              3.9     |  25     |  3.69             Phosphorous: 2.4      TPro  5.2    /  Alb  2.2    /  TBili  0.6    /  DBili  x      /  AST  67     /  ALT  28     /  AlkPhos  380    29 Sep 2017 02:47    ( 09-29 @ 02:47 )   PT: 14.5 sec;   INR: 1.32 ratio  aPTT: 29.2 sec

## 2017-09-29 NOTE — PROGRESS NOTE ADULT - ATTENDING COMMENTS
ESRD on HD TThSAt; next hd tomorrow  hypotension on midodrine; cont midodrine half hr pre HD  anemia ckd- prbc as needed;

## 2017-09-29 NOTE — PROGRESS NOTE ADULT - ASSESSMENT
88 yo M with multiple medical problems including DLBCL on Rituxan, malignant pleural effusion s/p left pleurx, ESRD on HD, chronic hypotension on Midodrine presents with lethargy, hypotension and cloudy pleural fluid drainage. History as per chart and family.   Received broad spectrum antibiotics and 2L NS for presumed sepsis 2/2 empyema. Evaluated by MICU and admitted for severe sepsis.     A/P: Septic shock - likely from empyema in the setting of DLBCL on Rituxan, indwelling pleural catheter and cloudy, malodorous pleural fluid. CT chest with moderate sized pleural effusion and small loculated left pleural effusion.  Agree with MICU team - broad spectrum antibiotics  BP support - stabilized s/p IVFs and antibiotics.  Thoracic surgery service following - + pleural space  infected- removed pleurx.  DVT prophylaxis.    Care as per MICU team

## 2017-09-29 NOTE — PROGRESS NOTE ADULT - SUBJECTIVE AND OBJECTIVE BOX
CHIEF COMPLAINT: LOC    Interval Events: Pt overnight was taken off Levo maintained Bp 90s/50s on Midodrine 30mg. Pt was occaisionally disoriented and confused. Pt this morning was alert and stated that he "is ready to go home." Pt additionally requested two cups of coffee.     REVIEW OF SYSTEMS:  Constitutional: [ x] negative [ ] fevers [ ] chills [ ] weight loss [ ] weight gain  HEENT: [ x] negative [ ] dry eyes [ ] eye irritation [ ] postnasal drip [ ] nasal congestion  CV: [x ] negative  [ ] chest pain [ ] orthopnea [ ] palpitations [ ] murmur  Resp: [ x] negative [ ] cough [ ] shortness of breath [ ] dyspnea [ ] wheezing [ ] sputum [ ] hemoptysis  GI: [x ] negative [ ] nausea [ ] vomiting [ ] diarrhea [ ] constipation [ ] abd pain [ ] dysphagia   : [x ] negative [ ] dysuria [ ] nocturia [ ] hematuria [ ] increased urinary frequency  Musculoskeletal: [x ] negative [ ] back pain [ ] myalgias [ ] arthralgias [ ] fracture  Skin: [ x] negative [ ] rash [ ] itch  Neurological: [x ] negative [ ] headache [ ] dizziness [ ] syncope [ ] weakness [ ] numbness  Psychiatric: [x ] negative [ ] anxiety [ ] depression  Endocrine: [x ] negative [ ] diabetes [ ] thyroid problem  Hematologic/Lymphatic: [x ] negative [ ] anemia [ ] bleeding problem  Allergic/Immunologic: [x ] negative [ ] itchy eyes [ ] nasal discharge [ ] hives [ ] angioedema  [ x] All other systems negative  [ ] Unable to assess ROS because ________    OBJECTIVE:  ICU Vital Signs Last 24 Hrs  T(C): 36.8 (29 Sep 2017 04:00), Max: 36.8 (28 Sep 2017 16:00)  T(F): 98.2 (29 Sep 2017 04:00), Max: 98.3 (28 Sep 2017 16:00)  HR: 71 (29 Sep 2017 07:00) (70 - 77)  BP: 93/52 (29 Sep 2017 06:00) (78/45 - 110/60)  BP(mean): 69 (29 Sep 2017 06:00) (57 - 80)  ABP: --  ABP(mean): --  RR: 33 (29 Sep 2017 07:00) (15 - 43)  SpO2: 100% (29 Sep 2017 07:00) (97% - 100%)        09-28 @ 07:01  -  09-29 @ 07:00  --------------------------------------------------------  IN: 1636 mL / OUT: 1300 mL / NET: 336 mL      CAPILLARY BLOOD GLUCOSE  96 (28 Sep 2017 13:04)          PHYSICAL EXAM:  General: Awake, alert, sitting up in bed. No acute distress   HEENT:  3 cm abrasion over L forehead; Normocephalic; PERRL EOMI  Lymph Nodes: no lymphadenopathy  Neck:  Supple, No JVD, IV placed on R sided  Respiratory:  Mild crackles over left lower lobe; No wheeze, .  Cardiovascular: Regular rate and rhythm; normal S1 and S2, II/VI systolic blowing murmur. No rubs or gallops  Abdomen: Soft, Nontender, Nondistended; Bowel sounds present  Extremities:  Fistula present over LUE. 2+ PT Pulses, Trace edema over R ankle. No clubbing, cyanosis. LE are cool to touch.   Genitourinary: Erythematous dermatitis over groin b/l  Skin: Diffuse ecchymotic lesions over upper and lower extremities.  Neurological: non-focal  Psychiatry:  AAOx1 (name).    HOSPITAL MEDICATIONS:  Standing Meds:  heparin  Injectable 5000 Unit(s) SubCutaneous every 8 hours  piperacillin/tazobactam IVPB. 3.375 Gram(s) IV Intermittent every 12 hours  levothyroxine Injectable 12.5 MICROGram(s) IV Push daily  lactobacillus acidophilus 1 Tablet(s) Oral daily  aspirin enteric coated 81 milliGRAM(s) Oral daily  nystatin Powder 1 Application(s) Topical every 8 hours  Nephro-amy 1 Tablet(s) Oral daily  midodrine 30 milliGRAM(s) Oral every 8 hours  epoetin terry Injectable 4000 Unit(s) IV Push <User Schedule>  buDESOnide   0.5 milliGRAM(s) Respule 0.5 milliGRAM(s) Inhalation every 12 hours  norepinephrine Infusion 0.01 MICROgram(s)/kG/Min IV Continuous <Continuous>    LABS:                        9.6    11.1  )-----------( 107      ( 29 Sep 2017 02:47 )             29.2     Hgb Trend: 9.6<--, 8.7<--, 9.7<--, 9.8<--, 9.4<--  09-29    137  |  93<L>  |  48<H>  ----------------------------<  109<H>  3.9   |  25  |  3.69<H>    Ca    7.5<L>      29 Sep 2017 02:47  Phos  2.4     09-29  Mg     2.0     09-29    TPro  5.2<L>  /  Alb  2.2<L>  /  TBili  0.6  /  DBili  x   /  AST  67<H>  /  ALT  28  /  AlkPhos  380<H>  09-29    Creatinine Trend: 3.69<--, 5.18<--, 4.29<--, 6.06<--, 5.72<--, 5.78<--  PT/INR - ( 29 Sep 2017 02:47 )   PT: 14.5 sec;   INR: 1.32 ratio         PTT - ( 29 Sep 2017 02:47 )  PTT:29.2 sec          MICROBIOLOGY: Culture - Body Fluid with Gram Stain (09.28.17 @ 11:19)    Gram Stain:   Rare polymorphonuclear leukocytes per low power field  Few Gram positive cocci in pairs per oil power field    Specimen Source: .Body Fluid    Culture - Blood (09.28.17 @ 05:53)    Specimen Source: .Blood Blood-Venous    Culture Results:   No growth to date.    Culture - Blood (09.27.17 @ 05:46)    Specimen Source: .Blood Blood-Peripheral    Culture Results:   No growth to date.    Radiology: < from: Transthoracic Echocardiogram (09.28.17 @ 14:26) >  Conclusions:  1. Moderate mitral regurgitation.  2. Calcified trileaflet aortic valve with decreased  opening. Unable to obtain transaortic gradients due to  bandaging.  3. Increased relative wall thickness with normal left  ventricular mass index, consistent with concentric left  ventricular remodeling.  4. Normal left ventricular systolic function.  5. Right ventricular enlargement with decreased right  ventricular systolic function.  6. Estimatedpulmonary artery systolic pressure equals 75  mm Hg, assuming right atrial pressure equals 8 mm Hg,  consistent with severe pulmonary pressures.  Unable to rule out endocarditis, consider GUADALUPE if clinically  indicated.    < end of copied text >    EKG: CHIEF COMPLAINT: LOC    Interval Events: Pt overnight was taken off Levo maintained Bp 90s/50s on Midodrine 30mg. Pt was occaisionally disoriented and confused. Pt this morning was alert and stated that he "is ready to go home." Pt additionally requested two cups of coffee.     REVIEW OF SYSTEMS:  Constitutional: [ x] negative [ ] fevers [ ] chills [ ] weight loss [ ] weight gain  HEENT: [ x] negative [ ] dry eyes [ ] eye irritation [ ] postnasal drip [ ] nasal congestion  CV: [x ] negative  [ ] chest pain [ ] orthopnea [ ] palpitations [ ] murmur  Resp: [ x] negative [ ] cough [ ] shortness of breath [ ] dyspnea [ ] wheezing [ ] sputum [ ] hemoptysis  GI: [x ] negative [ ] nausea [ ] vomiting [ ] diarrhea [ ] constipation [ ] abd pain [ ] dysphagia   : [x ] negative [ ] dysuria [ ] nocturia [ ] hematuria [ ] increased urinary frequency  Musculoskeletal: [x ] negative [ ] back pain [ ] myalgias [ ] arthralgias [ ] fracture  Skin: [ x] negative [ ] rash [ ] itch  Neurological: [x ] negative [ ] headache [ ] dizziness [ ] syncope [ ] weakness [ ] numbness  Psychiatric: [x ] negative [ ] anxiety [ ] depression  Endocrine: [x ] negative [ ] diabetes [ ] thyroid problem  Hematologic/Lymphatic: [x ] negative [ ] anemia [ ] bleeding problem  Allergic/Immunologic: [x ] negative [ ] itchy eyes [ ] nasal discharge [ ] hives [ ] angioedema  [ x] All other systems negative  [ ] Unable to assess ROS because ________    OBJECTIVE:  ICU Vital Signs Last 24 Hrs  T(C): 36.8 (29 Sep 2017 04:00), Max: 36.8 (28 Sep 2017 16:00)  T(F): 98.2 (29 Sep 2017 04:00), Max: 98.3 (28 Sep 2017 16:00)  HR: 71 (29 Sep 2017 07:00) (70 - 77)  BP: 93/52 (29 Sep 2017 06:00) (78/45 - 110/60)  BP(mean): 69 (29 Sep 2017 06:00) (57 - 80)  ABP: --  ABP(mean): --  RR: 33 (29 Sep 2017 07:00) (15 - 43)  SpO2: 100% (29 Sep 2017 07:00) (97% - 100%)        09-28 @ 07:01  -  09-29 @ 07:00  --------------------------------------------------------  IN: 1636 mL / OUT: 1300 mL / NET: 336 mL      CAPILLARY BLOOD GLUCOSE  96 (28 Sep 2017 13:04)      PHYSICAL EXAM:  General: Awake, alert, sitting up in bed. No acute distress   HEENT:  3 cm abrasion over L forehead; Normocephalic; L pupil 2mm R pupil 3mm Reactive to light b/l, EOMI  Lymph Nodes: no lymphadenopathy  Neck:  Supple, No JVD, IV placed on R sided  Respiratory:  CTAB, no crackles, No wheeze. Left sided surgical site clean, dry, non-erythematous  Cardiovascular: Regular rate and rhythm; normal S1 and S2, II/VI systolic blowing murmur. No rubs or gallops  Abdomen: Soft, Nontender, Nondistended; Bowel sounds present  Extremities:  Fistula present over LUE. 2+ PT Pulses, Trace edema over R ankle. No clubbing, cyanosis. LE are cool to touch b/l.   Genitourinary: mild dermatitis over groin b/l  Skin: Diffuse ecchymotic lesions over upper and lower extremities.  Neurological: non-focal  Psychiatry:  AACharlene (name).    HOSPITAL MEDICATIONS:  Standing Meds:  heparin  Injectable 5000 Unit(s) SubCutaneous every 8 hours  piperacillin/tazobactam IVPB. 3.375 Gram(s) IV Intermittent every 12 hours  levothyroxine Injectable 12.5 MICROGram(s) IV Push daily  lactobacillus acidophilus 1 Tablet(s) Oral daily  aspirin enteric coated 81 milliGRAM(s) Oral daily  nystatin Powder 1 Application(s) Topical every 8 hours  Nephro-amy 1 Tablet(s) Oral daily  midodrine 30 milliGRAM(s) Oral every 8 hours  epoetin terry Injectable 4000 Unit(s) IV Push <User Schedule>  buDESOnide   0.5 milliGRAM(s) Respule 0.5 milliGRAM(s) Inhalation every 12 hours  norepinephrine Infusion 0.01 MICROgram(s)/kG/Min IV Continuous <Continuous>    LABS:                        9.6    11.1  )-----------( 107      ( 29 Sep 2017 02:47 )             29.2     Hgb Trend: 9.6<--, 8.7<--, 9.7<--, 9.8<--, 9.4<--  09-29    137  |  93<L>  |  48<H>  ----------------------------<  109<H>  3.9   |  25  |  3.69<H>    Ca    7.5<L>      29 Sep 2017 02:47  Phos  2.4     09-29  Mg     2.0     09-29    TPro  5.2<L>  /  Alb  2.2<L>  /  TBili  0.6  /  DBili  x   /  AST  67<H>  /  ALT  28  /  AlkPhos  380<H>  09-29    Creatinine Trend: 3.69<--, 5.18<--, 4.29<--, 6.06<--, 5.72<--, 5.78<--  PT/INR - ( 29 Sep 2017 02:47 )   PT: 14.5 sec;   INR: 1.32 ratio         PTT - ( 29 Sep 2017 02:47 )  PTT:29.2 sec          MICROBIOLOGY: Culture - Body Fluid with Gram Stain (09.28.17 @ 11:19)    Gram Stain:   Rare polymorphonuclear leukocytes per low power field  Few Gram positive cocci in pairs per oil power field    Specimen Source: .Body Fluid    Culture - Blood (09.28.17 @ 05:53)    Specimen Source: .Blood Blood-Venous    Culture Results:   No growth to date.    Culture - Blood (09.27.17 @ 05:46)    Specimen Source: .Blood Blood-Peripheral    Culture Results:   No growth to date.    Radiology: < from: Transthoracic Echocardiogram (09.28.17 @ 14:26) >  Conclusions:  1. Moderate mitral regurgitation.  2. Calcified trileaflet aortic valve with decreased  opening. Unable to obtain transaortic gradients due to  bandaging.  3. Increased relative wall thickness with normal left  ventricular mass index, consistent with concentric left  ventricular remodeling.  4. Normal left ventricular systolic function.  5. Right ventricular enlargement with decreased right  ventricular systolic function.  6. Estimatedpulmonary artery systolic pressure equals 75  mm Hg, assuming right atrial pressure equals 8 mm Hg,  consistent with severe pulmonary pressures.  Unable to rule out endocarditis, consider GUADALUPE if clinically  indicated.    < end of copied text >    EKG:

## 2017-09-29 NOTE — PROGRESS NOTE ADULT - SUBJECTIVE AND OBJECTIVE BOX
[facility] DIVISION OF KIDNEY DISEASES AND HYPERTENSION -- FOLLOW UP NOTE  --------------------------------------------------------------------------------  Chief Complaint:  ESRD on HD    24 hour events/subjective:  Patient is verbal, no longer perseverating, denies any chest pain or shortness of breath at this time.        PAST HISTORY  --------------------------------------------------------------------------------  No significant changes to PMH, PSH, FHx, SHx, unless otherwise noted    ALLERGIES & MEDICATIONS  --------------------------------------------------------------------------------  Allergies    amlodipine (Rash)  Benadryl (Other)  Brilinta (Unknown)  Effient (Unknown)  Multaq (Other)    Intolerances      Standing Inpatient Medications  heparin  Injectable 5000 Unit(s) SubCutaneous every 8 hours  piperacillin/tazobactam IVPB. 3.375 Gram(s) IV Intermittent every 12 hours  levothyroxine Injectable 12.5 MICROGram(s) IV Push daily  lactobacillus acidophilus 1 Tablet(s) Oral daily  aspirin enteric coated 81 milliGRAM(s) Oral daily  nystatin Powder 1 Application(s) Topical every 8 hours  Nephro-amy 1 Tablet(s) Oral daily  midodrine 30 milliGRAM(s) Oral every 8 hours  epoetin trery Injectable 4000 Unit(s) IV Push <User Schedule>  buDESOnide   0.5 milliGRAM(s) Respule 0.5 milliGRAM(s) Inhalation every 12 hours  norepinephrine Infusion 0.01 MICROgram(s)/kG/Min IV Continuous <Continuous>    PRN Inpatient Medications      REVIEW OF SYSTEMS  --------------------------------------------------------------------------------  Gen: No fevers/chills  Skin: No rashes  Head/Eyes/Ears/Mouth: No headache  Respiratory: No dyspnea  CV: No chest pain  GI: No abdominal pain  : No dysuria  MSK: No edema  Neuro: No dizziness/lightheadedness    VITALS/PHYSICAL EXAM  --------------------------------------------------------------------------------  T(C): 36.5 (09-29-17 @ 08:00), Max: 36.8 (09-28-17 @ 16:00)  HR: 70 (09-29-17 @ 08:00) (70 - 77)  BP: 89/54 (09-29-17 @ 08:00) (85/50 - 110/60)  RR: 25 (09-29-17 @ 08:00) (15 - 43)  SpO2: 100% (09-29-17 @ 08:00) (97% - 100%)  Wt(kg): --        09-28-17 @ 07:01  -  09-29-17 @ 07:00  --------------------------------------------------------  IN: 1636 mL / OUT: 1300 mL / NET: 336 mL    09-29-17 @ 07:01  -  09-29-17 @ 09:02  --------------------------------------------------------  IN: 0 mL / OUT: 0 mL / NET: 0 mL      Physical Exam:  	Gen: NAD  	HEENT: MMM  	Pulm: +left sided rhonchi, no rales, no wheeze  	CV: RRR, S1S2; no rub  	Abd: +BS, soft, nontender/nondistended  	: No suprapubic tenderness  	UE:  no edema  	LE:  mild LE edema  	Neuro: No focal deficits  	Psych: Normal affect and mood  	Skin: Warm  	Vascular access:  LUE AVF + thrill and bruit    LABS/STUDIES  --------------------------------------------------------------------------------              9.6    11.1  >-----------<  107      [09-29-17 @ 02:47]              29.2     137  |  93  |  48  ----------------------------<  109      [09-29-17 @ 02:47]  3.9   |  25  |  3.69        Ca     7.5     [09-29-17 @ 02:47]      Mg     2.0     [09-29-17 @ 02:47]      Phos  2.4     [09-29-17 @ 02:47]      Creatinine Trend:  SCr 3.69 [09-29 @ 02:47]  SCr 5.18 [09-28 @ 02:33]  SCr 4.29 [09-27 @ 03:02]  SCr 6.06 [09-26 @ 02:14]  SCr 5.72 [09-25 @ 21:31]

## 2017-09-29 NOTE — PROGRESS NOTE ADULT - PROBLEM SELECTOR PLAN 1
Continue with antibiotics per MICU  Daily chest x ray  Consider IR pigtail if condition / respiratory status warrants drainage of effusion    daily    chest xrays   Maintain dry dressing if any drainage

## 2017-09-29 NOTE — PROGRESS NOTE ADULT - PROBLEM SELECTOR PLAN 3
Manage per MICU, off vasopressors overnight.  Currently on 30mg po TID of midodrine.  Will attempt 0.5kg fluid removal during HD tomorrow, to stop UF if blood pressure drops.

## 2017-09-29 NOTE — PROGRESS NOTE ADULT - SUBJECTIVE AND OBJECTIVE BOX
CHIEF COMPLAINT:    Interval Events:    REVIEW OF SYSTEMS:  Constitutional: No fevers or chills. No weight loss. No fatigue or generalized malaise.  Eyes: No itching or discharge from the eyes  ENT: No ear pain. No ear discharge. No nasal congestion. No post nasal drip. No epistaxis. No throat pain. No sore throat. No difficulty swallowing.   CV: No chest pain. No palpitations. No lightheadedness or dizziness.   Resp: No dyspnea at rest. No dyspnea on exertion. No orthopnea. No wheezing. No cough. No stridor. No sputum production. No chest pain with respiration.  GI: No nausea. No vomiting. No diarrhea.  MSK: No joint pain or pain in any extremities  Integumentary: No skin lesions. No pedal edema.  Neurological: No gross motor weakness. No sensory changes.  [ ] All other systems negative  [ ] Unable to assess ROS because ________    OBJECTIVE:  ICU Vital Signs Last 24 Hrs  T(C): 36.8 (29 Sep 2017 04:00), Max: 36.8 (28 Sep 2017 16:00)  T(F): 98.2 (29 Sep 2017 04:00), Max: 98.3 (28 Sep 2017 16:00)  HR: 70 (29 Sep 2017 04:00) (70 - 77)  BP: 88/51 (29 Sep 2017 04:00) (78/45 - 110/60)  BP(mean): 65 (29 Sep 2017 04:00) (57 - 80)  ABP: --  ABP(mean): --  RR: 35 (29 Sep 2017 04:00) (15 - 43)  SpO2: 100% (29 Sep 2017 04:00) (97% - 100%)        09-27 @ 07:01  -  09-28 @ 07:00  --------------------------------------------------------  IN: 839.1 mL / OUT: 0 mL / NET: 839.1 mL    09-28 @ 07:01 - 09-29 @ 05:05  --------------------------------------------------------  IN: 1311 mL / OUT: 1300 mL / NET: 11 mL      CAPILLARY BLOOD GLUCOSE  96 (28 Sep 2017 13:04)          PHYSICAL EXAM:  General: Awake, alert, oriented X 3.   HEENT: Atraumatic, normocephalic.                 Mallampatti Grade                 No nasal congestion.                No tonsillar or pharyngeal exudates.  Lymph Nodes: No palpable lymphadenopathy  Neck: No JVD. No carotid bruit.   Respiratory: Normal chest expansion                         Normal percussion                         Normal and equal air entry                         No wheeze, rhonchi or rales.  Cardiovascular: S1 S2 normal. No murmurs, rubs or gallops.   Abdomen: Soft, non-tender, non-distended. No organomegaly.  Extremities: Warm to touch. Peripheral pulse palpable. No pedal edema.   Skin: No rashes or skin lesions  Neurological: Motor and sensory examination equal and normal in all four extremities.  Psychiatry: Appropriate mood and affect.    HOSPITAL MEDICATIONS:  MEDICATIONS  (STANDING):  heparin  Injectable 5000 Unit(s) SubCutaneous every 8 hours  piperacillin/tazobactam IVPB. 3.375 Gram(s) IV Intermittent every 12 hours  levothyroxine Injectable 12.5 MICROGram(s) IV Push daily  lactobacillus acidophilus 1 Tablet(s) Oral daily  aspirin enteric coated 81 milliGRAM(s) Oral daily  nystatin Powder 1 Application(s) Topical every 8 hours  Nephro-amy 1 Tablet(s) Oral daily  midodrine 30 milliGRAM(s) Oral every 8 hours  epoetin terry Injectable 4000 Unit(s) IV Push <User Schedule>  buDESOnide   0.5 milliGRAM(s) Respule 0.5 milliGRAM(s) Inhalation every 12 hours  norepinephrine Infusion 0.01 MICROgram(s)/kG/Min (0.705 mL/Hr) IV Continuous <Continuous>  sodium phosphate IVPB 15 milliMole(s) IV Intermittent once    MEDICATIONS  (PRN):      LABS:                        9.6    11.1  )-----------( 107      ( 29 Sep 2017 02:47 )             29.2     09-29    137  |  93<L>  |  48<H>  ----------------------------<  109<H>  3.9   |  25  |  3.69<H>    Ca    7.5<L>      29 Sep 2017 02:47  Phos  2.4     09-29  Mg     2.0     09-29    TPro  5.2<L>  /  Alb  2.2<L>  /  TBili  0.6  /  DBili  x   /  AST  67<H>  /  ALT  28  /  AlkPhos  380<H>  09-29    PT/INR - ( 29 Sep 2017 02:47 )   PT: 14.5 sec;   INR: 1.32 ratio         PTT - ( 29 Sep 2017 02:47 )  PTT:29.2 sec          MICROBIOLOGY:     RADIOLOGY:  [ ] Reviewed and interpreted by me    Point of Care Ultrasound Findings:    PFT:    EKG: CHIEF COMPLAINT:no pain or sob at present, no cough    Interval Events:HD    REVIEW OF SYSTEMS:  Constitutional: No fevers or chills. No weight loss. No fatigue or generalized malaise.  Eyes: No itching or discharge from the eyes  ENT: No ear pain. No ear discharge. No nasal congestion. No post nasal drip. No epistaxis. No throat pain. No sore throat. No difficulty swallowing.   CV: No chest pain. No palpitations. No lightheadedness or dizziness.   Resp: No dyspnea at rest. + dyspnea on exertion. No orthopnea. No wheezing. No cough. No stridor. No sputum production. No chest pain with respiration.  GI: No nausea. No vomiting. No diarrhea.  MSK: No joint pain or pain in any extremities  Integumentary: No skin lesions. No pedal edema.  Neurological: No gross motor weakness. No sensory changes.  [+ ] All other systems negative  [ ] Unable to assess ROS because ________    OBJECTIVE:  ICU Vital Signs Last 24 Hrs  T(C): 36.8 (29 Sep 2017 04:00), Max: 36.8 (28 Sep 2017 16:00)  T(F): 98.2 (29 Sep 2017 04:00), Max: 98.3 (28 Sep 2017 16:00)  HR: 70 (29 Sep 2017 04:00) (70 - 77)  BP: 88/51 (29 Sep 2017 04:00) (78/45 - 110/60)  BP(mean): 65 (29 Sep 2017 04:00) (57 - 80)  ABP: --  ABP(mean): --  RR: 35 (29 Sep 2017 04:00) (15 - 43)  SpO2: 100% (29 Sep 2017 04:00) (97% - 100%)        09-27 @ 07:01  -  09-28 @ 07:00  --------------------------------------------------------  IN: 839.1 mL / OUT: 0 mL / NET: 839.1 mL    09-28 @ 07:01 - 09-29 @ 05:05  --------------------------------------------------------  IN: 1311 mL / OUT: 1300 mL / NET: 11 mL      CAPILLARY BLOOD GLUCOSE  96 (28 Sep 2017 13:04)          PHYSICAL EXAM: NAD in bed  General: Awake, alert, oriented X 3.   HEENT: Atraumatic, normocephalic.                 Mallampatti Grade 3                No nasal congestion.                No tonsillar or pharyngeal exudates.  Lymph Nodes: No palpable lymphadenopathy  Neck: No JVD. No carotid bruit.   Respiratory: Normal chest expansion--reduced BS left base greater than right                          No wheeze, rhonchi but left base rales.  Cardiovascular: S1 S2 normal. 2 + murmurs, rubs or gallops.   Abdomen: Soft, non-tender, non-distended. No organomegaly.  Extremities: Warm to touch. Peripheral pulse palpable. No pedal edema.   Skin: No rashes or skin lesions  Neurological: Motor and sensory examination equal and normal in all four extremities.  Psychiatry: Appropriate mood and affect.    HOSPITAL MEDICATIONS:  MEDICATIONS  (STANDING):  heparin  Injectable 5000 Unit(s) SubCutaneous every 8 hours  piperacillin/tazobactam IVPB. 3.375 Gram(s) IV Intermittent every 12 hours  levothyroxine Injectable 12.5 MICROGram(s) IV Push daily  lactobacillus acidophilus 1 Tablet(s) Oral daily  aspirin enteric coated 81 milliGRAM(s) Oral daily  nystatin Powder 1 Application(s) Topical every 8 hours  Nephro-amy 1 Tablet(s) Oral daily  midodrine 30 milliGRAM(s) Oral every 8 hours  epoetin terry Injectable 4000 Unit(s) IV Push <User Schedule>  buDESOnide   0.5 milliGRAM(s) Respule 0.5 milliGRAM(s) Inhalation every 12 hours  norepinephrine Infusion 0.01 MICROgram(s)/kG/Min (0.705 mL/Hr) IV Continuous <Continuous>  sodium phosphate IVPB 15 milliMole(s) IV Intermittent once    MEDICATIONS  (PRN):      LABS:                        9.6    11.1  )-----------( 107      ( 29 Sep 2017 02:47 )             29.2     09-29    137  |  93<L>  |  48<H>  ----------------------------<  109<H>  3.9   |  25  |  3.69<H>    Ca    7.5<L>      29 Sep 2017 02:47  Phos  2.4     09-29  Mg     2.0     09-29    TPro  5.2<L>  /  Alb  2.2<L>  /  TBili  0.6  /  DBili  x   /  AST  67<H>  /  ALT  28  /  AlkPhos  380<H>  09-29    PT/INR - ( 29 Sep 2017 02:47 )   PT: 14.5 sec;   INR: 1.32 ratio         PTT - ( 29 Sep 2017 02:47 )  PTT:29.2 sec          MICROBIOLOGY:     RADIOLOGY:  [ ] Reviewed and interpreted by me    Point of Care Ultrasound Findings:    PFT:    EKG:

## 2017-09-29 NOTE — PROGRESS NOTE ADULT - PROBLEM SELECTOR PLAN 1
Patient appears clinically stable, no respiratory distres, laboratory values reviewed.  Will plan to continue TTS dialysis schedule at this time.

## 2017-09-30 DIAGNOSIS — I48.2 CHRONIC ATRIAL FIBRILLATION: ICD-10-CM

## 2017-09-30 DIAGNOSIS — I50.9 HEART FAILURE, UNSPECIFIED: ICD-10-CM

## 2017-09-30 DIAGNOSIS — A41.02 SEPSIS DUE TO METHICILLIN RESISTANT STAPHYLOCOCCUS AUREUS: ICD-10-CM

## 2017-09-30 LAB
-  AMPICILLIN/SULBACTAM: SIGNIFICANT CHANGE UP
-  CEFAZOLIN: SIGNIFICANT CHANGE UP
-  CIPROFLOXACIN: SIGNIFICANT CHANGE UP
-  CLINDAMYCIN: SIGNIFICANT CHANGE UP
-  DAPTOMYCIN: SIGNIFICANT CHANGE UP
-  ERYTHROMYCIN: SIGNIFICANT CHANGE UP
-  GENTAMICIN: SIGNIFICANT CHANGE UP
-  LEVOFLOXACIN: SIGNIFICANT CHANGE UP
-  LINEZOLID: SIGNIFICANT CHANGE UP
-  MOXIFLOXACIN(AEROBIC): SIGNIFICANT CHANGE UP
-  OXACILLIN: SIGNIFICANT CHANGE UP
-  PENICILLIN: SIGNIFICANT CHANGE UP
-  RIFAMPIN: SIGNIFICANT CHANGE UP
-  TETRACYCLINE: SIGNIFICANT CHANGE UP
-  TRIMETHOPRIM/SULFAMETHOXAZOLE: SIGNIFICANT CHANGE UP
-  VANCOMYCIN: SIGNIFICANT CHANGE UP
ALBUMIN SERPL ELPH-MCNC: 2.4 G/DL — LOW (ref 3.3–5)
ALP SERPL-CCNC: 459 U/L — HIGH (ref 40–120)
ALT FLD-CCNC: 31 U/L — SIGNIFICANT CHANGE UP (ref 10–45)
ANION GAP SERPL CALC-SCNC: 23 MMOL/L — HIGH (ref 5–17)
APTT BLD: 31.5 SEC — SIGNIFICANT CHANGE UP (ref 27.5–37.4)
AST SERPL-CCNC: 83 U/L — HIGH (ref 10–40)
BASOPHILS # BLD AUTO: 0.01 K/UL — SIGNIFICANT CHANGE UP (ref 0–0.2)
BASOPHILS NFR BLD AUTO: 0.1 % — SIGNIFICANT CHANGE UP (ref 0–2)
BILIRUB SERPL-MCNC: 0.7 MG/DL — SIGNIFICANT CHANGE UP (ref 0.2–1.2)
BUN SERPL-MCNC: 71 MG/DL — HIGH (ref 7–23)
CALCIUM SERPL-MCNC: 7.6 MG/DL — LOW (ref 8.4–10.5)
CHLORIDE SERPL-SCNC: 92 MMOL/L — LOW (ref 96–108)
CO2 SERPL-SCNC: 23 MMOL/L — SIGNIFICANT CHANGE UP (ref 22–31)
CREAT SERPL-MCNC: 4.99 MG/DL — HIGH (ref 0.5–1.3)
EOSINOPHIL # BLD AUTO: 0.17 K/UL — SIGNIFICANT CHANGE UP (ref 0–0.5)
EOSINOPHIL NFR BLD AUTO: 1.4 % — SIGNIFICANT CHANGE UP (ref 0–6)
GGT SERPL-CCNC: 124 U/L — HIGH (ref 9–50)
GLUCOSE SERPL-MCNC: 143 MG/DL — HIGH (ref 70–99)
HCT VFR BLD CALC: 26.3 % — LOW (ref 39–50)
HGB BLD-MCNC: 8.7 G/DL — LOW (ref 13–17)
IMM GRANULOCYTES NFR BLD AUTO: 1.4 % — SIGNIFICANT CHANGE UP (ref 0–1.5)
INR BLD: 1.21 RATIO — HIGH (ref 0.88–1.16)
LYMPHOCYTES # BLD AUTO: 0.53 K/UL — LOW (ref 1–3.3)
LYMPHOCYTES # BLD AUTO: 4.3 % — LOW (ref 13–44)
MAGNESIUM SERPL-MCNC: 2.2 MG/DL — SIGNIFICANT CHANGE UP (ref 1.6–2.6)
MCHC RBC-ENTMCNC: 31.8 PG — SIGNIFICANT CHANGE UP (ref 27–34)
MCHC RBC-ENTMCNC: 33.1 GM/DL — SIGNIFICANT CHANGE UP (ref 32–36)
MCV RBC AUTO: 96 FL — SIGNIFICANT CHANGE UP (ref 80–100)
METHOD TYPE: SIGNIFICANT CHANGE UP
MONOCYTES # BLD AUTO: 0.4 K/UL — SIGNIFICANT CHANGE UP (ref 0–0.9)
MONOCYTES NFR BLD AUTO: 3.3 % — SIGNIFICANT CHANGE UP (ref 2–14)
NEUTROPHILS # BLD AUTO: 10.95 K/UL — HIGH (ref 1.8–7.4)
NEUTROPHILS NFR BLD AUTO: 89.5 % — HIGH (ref 43–77)
PHOSPHATE SERPL-MCNC: 3.3 MG/DL — SIGNIFICANT CHANGE UP (ref 2.5–4.5)
PLATELET # BLD AUTO: 163 K/UL — SIGNIFICANT CHANGE UP (ref 150–400)
POTASSIUM SERPL-MCNC: 4.3 MMOL/L — SIGNIFICANT CHANGE UP (ref 3.5–5.3)
POTASSIUM SERPL-SCNC: 4.3 MMOL/L — SIGNIFICANT CHANGE UP (ref 3.5–5.3)
PROT SERPL-MCNC: 5.7 G/DL — LOW (ref 6–8.3)
PROTHROM AB SERPL-ACNC: 13.7 SEC — HIGH (ref 10–13.1)
RBC # BLD: 2.74 M/UL — LOW (ref 4.2–5.8)
RBC # FLD: 18.5 % — HIGH (ref 10.3–14.5)
SODIUM SERPL-SCNC: 138 MMOL/L — SIGNIFICANT CHANGE UP (ref 135–145)
VANCOMYCIN FLD-MCNC: 21.3 UG/ML — SIGNIFICANT CHANGE UP
WBC # BLD: 12.23 K/UL — HIGH (ref 3.8–10.5)
WBC # FLD AUTO: 12.23 K/UL — HIGH (ref 3.8–10.5)

## 2017-09-30 PROCEDURE — 90935 HEMODIALYSIS ONE EVALUATION: CPT | Mod: GC

## 2017-09-30 PROCEDURE — 99232 SBSQ HOSP IP/OBS MODERATE 35: CPT | Mod: GC

## 2017-09-30 PROCEDURE — 71010: CPT | Mod: 26

## 2017-09-30 PROCEDURE — 73502 X-RAY EXAM HIP UNI 2-3 VIEWS: CPT | Mod: 26,RT

## 2017-09-30 RX ORDER — VANCOMYCIN HCL 1 G
750 VIAL (EA) INTRAVENOUS ONCE
Qty: 0 | Refills: 0 | Status: COMPLETED | OUTPATIENT
Start: 2017-09-30 | End: 2017-09-30

## 2017-09-30 RX ADMIN — MIDODRINE HYDROCHLORIDE 30 MILLIGRAM(S): 2.5 TABLET ORAL at 17:19

## 2017-09-30 RX ADMIN — Medication 150 MILLIGRAM(S): at 17:20

## 2017-09-30 RX ADMIN — MIDODRINE HYDROCHLORIDE 30 MILLIGRAM(S): 2.5 TABLET ORAL at 23:20

## 2017-09-30 RX ADMIN — PIPERACILLIN AND TAZOBACTAM 25 GRAM(S): 4; .5 INJECTION, POWDER, LYOPHILIZED, FOR SOLUTION INTRAVENOUS at 17:20

## 2017-09-30 RX ADMIN — NYSTATIN CREAM 1 APPLICATION(S): 100000 CREAM TOPICAL at 05:39

## 2017-09-30 RX ADMIN — NYSTATIN CREAM 1 APPLICATION(S): 100000 CREAM TOPICAL at 21:30

## 2017-09-30 RX ADMIN — ERYTHROPOIETIN 4000 UNIT(S): 10000 INJECTION, SOLUTION INTRAVENOUS; SUBCUTANEOUS at 15:42

## 2017-09-30 RX ADMIN — NYSTATIN CREAM 1 APPLICATION(S): 100000 CREAM TOPICAL at 17:20

## 2017-09-30 RX ADMIN — Medication 0.5 MILLIGRAM(S): at 17:19

## 2017-09-30 RX ADMIN — ATORVASTATIN CALCIUM 40 MILLIGRAM(S): 80 TABLET, FILM COATED ORAL at 21:30

## 2017-09-30 RX ADMIN — Medication 1 TABLET(S): at 11:01

## 2017-09-30 RX ADMIN — SEVELAMER CARBONATE 800 MILLIGRAM(S): 2400 POWDER, FOR SUSPENSION ORAL at 08:58

## 2017-09-30 RX ADMIN — HEPARIN SODIUM 5000 UNIT(S): 5000 INJECTION INTRAVENOUS; SUBCUTANEOUS at 05:38

## 2017-09-30 RX ADMIN — Medication 0.5 MILLIGRAM(S): at 05:40

## 2017-09-30 RX ADMIN — MIDODRINE HYDROCHLORIDE 30 MILLIGRAM(S): 2.5 TABLET ORAL at 11:01

## 2017-09-30 RX ADMIN — PIPERACILLIN AND TAZOBACTAM 25 GRAM(S): 4; .5 INJECTION, POWDER, LYOPHILIZED, FOR SOLUTION INTRAVENOUS at 03:26

## 2017-09-30 RX ADMIN — HEPARIN SODIUM 5000 UNIT(S): 5000 INJECTION INTRAVENOUS; SUBCUTANEOUS at 17:19

## 2017-09-30 RX ADMIN — Medication 1 TABLET(S): at 11:02

## 2017-09-30 RX ADMIN — Medication 12.5 MICROGRAM(S): at 05:38

## 2017-09-30 RX ADMIN — Medication 81 MILLIGRAM(S): at 11:02

## 2017-09-30 NOTE — PROGRESS NOTE ADULT - SUBJECTIVE AND OBJECTIVE BOX
Patient is a 87y old  Male who presents with a chief complaint of fall 2/2 hypotension (25 Sep 2017 15:50)      SUBJECTIVE / OVERNIGHT EVENTS: no acute events overnight. Had 3 loose BM, no blood, no melena.     MEDICATIONS  (STANDING):  piperacillin/tazobactam IVPB. 3.375 Gram(s) IV Intermittent every 12 hours  levothyroxine Injectable 12.5 MICROGram(s) IV Push daily  lactobacillus acidophilus 1 Tablet(s) Oral daily  aspirin enteric coated 81 milliGRAM(s) Oral daily  nystatin Powder 1 Application(s) Topical every 8 hours  Nephro-amy 1 Tablet(s) Oral daily  midodrine 30 milliGRAM(s) Oral every 8 hours  epoetin terry Injectable 4000 Unit(s) IV Push <User Schedule>  buDESOnide   0.5 milliGRAM(s) Respule 0.5 milliGRAM(s) Inhalation every 12 hours  atorvastatin 40 milliGRAM(s) Oral at bedtime  sevelamer hydrochloride 800 milliGRAM(s) Oral three times a day with meals  heparin  Injectable 5000 Unit(s) SubCutaneous every 12 hours    MEDICATIONS  (PRN):        CAPILLARY BLOOD GLUCOSE        I&O's Summary    29 Sep 2017 07:01  -  30 Sep 2017 07:00  --------------------------------------------------------  IN: 820 mL / OUT: 0 mL / NET: 820 mL      PE  General: NAD, oriented to person, alert  HEENT: normocephalic, conjunctive and sclera clear  Card: harsh systolic murmur, no JVD  Pulm: b/l rales diffusely, decreased breath sounds at bases b/l  GI: nontender, +BS  Extremities: dialysis fistula in left arm, no clubbing, cyanosis, edema    LABS:    WBC Trend: 11.1<--, 10.2<--, 10.2<--  Hb Trend: 9.6<--, 8.7<--, 9.7<--, 9.8<--, 9.4<--  Plt Trend: 107<--, 103<--, 127<--, 142<--, 128<--  09-29    137  |  93<L>  |  48<H>  ----------------------------<  109<H>  3.9   |  25  |  3.69<H>    Ca    7.5<L>      29 Sep 2017 02:47  Phos  2.4     09-29  Mg     2.0     09-29    TPro  5.2<L>  /  Alb  2.2<L>  /  TBili  0.6  /  DBili  x   /  AST  67<H>  /  ALT  28  /  AlkPhos  380<H>  09-29    Creatinine Trend: 3.69<--, 5.18<--, 4.29<--, 6.06<--, 5.72<--, 5.78<--  ( 29 Sep 2017 02:47 )   PT: 14.5 sec;   INR: 1.32 ratio;       PTT:29.2 sec          Microbiology:  Urine Cx:  Blood Cx:    RADIOLOGY & ADDITIONAL TESTS:  X- Ray:  CT:  Ultrasound:  [ ] imaging personally reviewed and interpreted by me    Consultant(s) Notes Reviewed:      Care Discussed with Consultants/Other Providers: Patient is a 87y old  Male who presents with a chief complaint of fall 2/2 hypotension (25 Sep 2017 15:50)      SUBJECTIVE / OVERNIGHT EVENTS: Had 3 loose BM, no blood, no melena. No other issues overnight    MEDICATIONS  (STANDING):  piperacillin/tazobactam IVPB. 3.375 Gram(s) IV Intermittent every 12 hours  levothyroxine Injectable 12.5 MICROGram(s) IV Push daily  lactobacillus acidophilus 1 Tablet(s) Oral daily  aspirin enteric coated 81 milliGRAM(s) Oral daily  nystatin Powder 1 Application(s) Topical every 8 hours  Nephro-amy 1 Tablet(s) Oral daily  midodrine 30 milliGRAM(s) Oral every 8 hours  epoetin terry Injectable 4000 Unit(s) IV Push <User Schedule>  buDESOnide   0.5 milliGRAM(s) Respule 0.5 milliGRAM(s) Inhalation every 12 hours  atorvastatin 40 milliGRAM(s) Oral at bedtime  sevelamer hydrochloride 800 milliGRAM(s) Oral three times a day with meals  heparin  Injectable 5000 Unit(s) SubCutaneous every 12 hours    MEDICATIONS  (PRN):        CAPILLARY BLOOD GLUCOSE        I&O's Summary    29 Sep 2017 07:01  -  30 Sep 2017 07:00  --------------------------------------------------------  IN: 820 mL / OUT: 0 mL / NET: 820 mL      PE  General: NAD, oriented to person, alert  HEENT: normocephalic, conjunctive and sclera clear  Card: harsh systolic murmur, no JVD  Pulm: b/l rales diffusely, decreased breath sounds at bases b/l  GI: nontender, +BS  Extremities: dialysis fistula in left arm, no clubbing, cyanosis, edema    LABS:    WBC Trend: 11.1<--, 10.2<--, 10.2<--  Hb Trend: 9.6<--, 8.7<--, 9.7<--, 9.8<--, 9.4<--  Plt Trend: 107<--, 103<--, 127<--, 142<--, 128<--  09-29    137  |  93<L>  |  48<H>  ----------------------------<  109<H>  3.9   |  25  |  3.69<H>    Ca    7.5<L>      29 Sep 2017 02:47  Phos  2.4     09-29  Mg     2.0     09-29    TPro  5.2<L>  /  Alb  2.2<L>  /  TBili  0.6  /  DBili  x   /  AST  67<H>  /  ALT  28  /  AlkPhos  380<H>  09-29    Creatinine Trend: 3.69<--, 5.18<--, 4.29<--, 6.06<--, 5.72<--, 5.78<--  ( 29 Sep 2017 02:47 )   PT: 14.5 sec;   INR: 1.32 ratio;       PTT:29.2 sec          Microbiology:  Urine Cx:  Blood Cx:    RADIOLOGY & ADDITIONAL TESTS:  X- Ray:  CT:  Ultrasound:  [ ] imaging personally reviewed and interpreted by me    Consultant(s) Notes Reviewed:      Care Discussed with Consultants/Other Providers:

## 2017-09-30 NOTE — PROGRESS NOTE ADULT - ASSESSMENT
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

## 2017-09-30 NOTE — PROGRESS NOTE ADULT - SUBJECTIVE AND OBJECTIVE BOX
[facility] Division of Kidney Diseases & Hypertension  HEMODIALYSIS NOTE  --------------------------------------------------------------------------------  Chief Complaint: ESRD/Ongoing hemodialysis requirement    24 hour events/subjective:  no events overnight       PAST HISTORY  --------------------------------------------------------------------------------  No significant changes to PMH, PSH, FHx, SHx, unless otherwise noted    ALLERGIES & MEDICATIONS  --------------------------------------------------------------------------------  Allergies    amlodipine (Rash)  Benadryl (Other)  Brilinta (Unknown)  Effient (Unknown)  Multaq (Other)    Intolerances      Standing Inpatient Medications  piperacillin/tazobactam IVPB. 3.375 Gram(s) IV Intermittent every 12 hours  levothyroxine Injectable 12.5 MICROGram(s) IV Push daily  lactobacillus acidophilus 1 Tablet(s) Oral daily  aspirin enteric coated 81 milliGRAM(s) Oral daily  nystatin Powder 1 Application(s) Topical every 8 hours  Nephro-amy 1 Tablet(s) Oral daily  midodrine 30 milliGRAM(s) Oral every 8 hours  epoetin terry Injectable 4000 Unit(s) IV Push <User Schedule>  buDESOnide   0.5 milliGRAM(s) Respule 0.5 milliGRAM(s) Inhalation every 12 hours  atorvastatin 40 milliGRAM(s) Oral at bedtime  sevelamer hydrochloride 800 milliGRAM(s) Oral three times a day with meals  heparin  Injectable 5000 Unit(s) SubCutaneous every 12 hours    PRN Inpatient Medications      REVIEW OF SYSTEMS  --------------------------------------------------------------------------------  Gen: No weight changes, fatigue, fevers/chills, weakness  Skin: No rashes  Head/Eyes/Ears/Mouth: No headache; Normal hearing; Normal vision w/o blurriness; No sinus pain/discomfort, sore throat  Respiratory: No dyspnea, cough, wheezing, hemoptysis  CV: No chest pain, PND, orthopnea  GI: No abdominal pain, diarrhea, constipation, nausea, vomiting, melena, hematochezia  : No increased frequency, dysuria, hematuria, nocturia  MSK: No joint pain/swelling; no back pain; no edema  Neuro: No dizziness/lightheadedness, weakness, seizures, numbness, tingling  Heme: No easy bruising or bleeding  Endo: No heat/cold intolerance  Psych: No significant nervousness, anxiety, stress, depression    All other systems were reviewed and are negative, except as noted.    VITALS/PHYSICAL EXAM  --------------------------------------------------------------------------------  T(C): 37 (09-30-17 @ 03:43), Max: 37 (09-30-17 @ 03:43)  HR: 72 (09-30-17 @ 03:43) (69 - 72)  BP: 98/60 (09-30-17 @ 03:43) (91/68 - 98/60)  RR: 20 (09-30-17 @ 03:43) (20 - 24)  SpO2: 98% (09-30-17 @ 03:43) (98% - 100%)  Wt(kg): --        09-29-17 @ 07:01  -  09-30-17 @ 07:00  --------------------------------------------------------  IN: 820 mL / OUT: 0 mL / NET: 820 mL      Physical Exam:  	Gen: NAD, well-appearing  	HEENT: PERRL, supple neck, clear oropharynx  	Pulm: left diminished mid to lower, Right clear breath sounds   	CV: RRR, +FELIPE   	Back: No spinal or CVA tenderness; no sacral edema  	Abd: +BS, soft, nontender/nondistended  	: No suprapubic tenderness  	UE: Warm, FROM, no clubbing, intact strength; no edema; no asterixis  	LE: Warm, FROM, no clubbing, intact strength; trace b/l edema  	Neuro: No focal deficits, intact gait  	Psych: Normal affect and mood  	Skin: Warm, without rashes  	Vascular access: Left AV fistula +thrill +bruit    LABS/STUDIES  --------------------------------------------------------------------------------              9.6    11.1  >-----------<  107      [09-29-17 @ 02:47]              29.2     137  |  93  |  48  ----------------------------<  109      [09-29-17 @ 02:47]  3.9   |  25  |  3.69        Ca     7.5     [09-29-17 @ 02:47]      Mg     2.0     [09-29-17 @ 02:47]      Phos  2.4     [09-29-17 @ 02:47]    TPro  5.2  /  Alb  2.2  /  TBili  0.6  /  DBili  x   /  AST  67  /  ALT  28  /  AlkPhos  380  [09-29-17 @ 02:47]    PT/INR: PT 14.5 , INR 1.32       [09-29-17 @ 02:47]  PTT: 29.2       [09-29-17 @ 02:47]

## 2017-09-30 NOTE — PROGRESS NOTE ADULT - SUBJECTIVE AND OBJECTIVE BOX
Interval Events:  Patient seen and examined this am. Transferred out of ICU. Confused    REVIEW OF SYSTEMS:    [x ] Unable to assess ROS because patient confused    OBJECTIVE:    Vital Signs Last 24 Hrs  T(C): 36.3 (09-30-17 @ 09:00), Max: 37 (09-30-17 @ 03:43)  T(F): 97.4 (09-30-17 @ 09:00), Max: 98.6 (09-30-17 @ 03:43)  HR: 59 (09-30-17 @ 09:00) (59 - 72)  BP: 93/60 (09-30-17 @ 09:00) (91/68 - 98/60)  BP(mean): --  RR: 22 (09-30-17 @ 09:00) (20 - 24)  SpO2: 93% (09-30-17 @ 09:00) (93% - 100%)      PHYSICAL EXAM:  General: elderly male, nad  HEENT:  Mucous membranes are moist.  Respiratory: few rhonchi at the bases  Cardiovascular: Regular rate and rhythm. S1, S2. No murmurs, rubs of gallops appreciated.   Abdomen: Soft, non-tender, non-distended.   Extremities: No lower extremity edema.  Skin: Warm, dry, no rash.   Neurological: CNII-XII grossly intact.   Psychiatry: confused    HOSPITAL MEDICATIONS:  MEDICATIONS  (STANDING):  piperacillin/tazobactam IVPB. 3.375 Gram(s) IV Intermittent every 12 hours  levothyroxine Injectable 12.5 MICROGram(s) IV Push daily  lactobacillus acidophilus 1 Tablet(s) Oral daily  aspirin enteric coated 81 milliGRAM(s) Oral daily  nystatin Powder 1 Application(s) Topical every 8 hours  Nephro-amy 1 Tablet(s) Oral daily  midodrine 30 milliGRAM(s) Oral every 8 hours  epoetin terry Injectable 4000 Unit(s) IV Push <User Schedule>  buDESOnide   0.5 milliGRAM(s) Respule 0.5 milliGRAM(s) Inhalation every 12 hours  atorvastatin 40 milliGRAM(s) Oral at bedtime  heparin  Injectable 5000 Unit(s) SubCutaneous every 12 hours    MEDICATIONS  (PRN):      LABS:                        9.6    11.1  )-----------( 107      ( 29 Sep 2017 02:47 )             29.2     09-29    137  |  93<L>  |  48<H>  ----------------------------<  109<H>  3.9   |  25  |  3.69<H>    Ca    7.5<L>      29 Sep 2017 02:47  Phos  2.4     09-29  Mg     2.0     09-29    TPro  5.2<L>  /  Alb  2.2<L>  /  TBili  0.6  /  DBili  x   /  AST  67<H>  /  ALT  28  /  AlkPhos  380<H>  09-29    PT/INR - ( 29 Sep 2017 02:47 )   PT: 14.5 sec;   INR: 1.32 ratio         PTT - ( 29 Sep 2017 02:47 )  PTT:29.2 sec    MICROBIOLOGY:     RADIOLOGY:  [ x ] Reviewed and interpreted by me

## 2017-09-30 NOTE — PROGRESS NOTE ADULT - PROBLEM SELECTOR PLAN 1
Continue with antibiotics per MICU  Daily chest x ray  Consider IR pigtail if condition / respiratory status warrants drainage of effusion

## 2017-09-30 NOTE — PROGRESS NOTE ADULT - ATTENDING COMMENTS
Seen on HD, tolerating well but bp in SBP 80s-90  Got midodrine at 11AM today 30mg = will monitor  Will only attempt 0.5mg UF    Erickson Silveira MD  Cell   Pager   Office

## 2017-09-30 NOTE — PROGRESS NOTE ADULT - ASSESSMENT
88y/o Male h/o ESRD, AAA repair admitted with altered mental status, MRSA bacteremia and non draining left pleurx.   9/26 Left pleurx attached to pleurovac yielding -100 cc purulent drainage.  9/27 left pleurx removed  9/28  VSS  Lt plx site  no drainage,  new prelim bc negative  9/29  Transfer 3 myers  9/30 remains stable with some sob on ambulation

## 2017-09-30 NOTE — PROGRESS NOTE ADULT - PROBLEM SELECTOR PLAN 1
- continues to have b/l decreased breath sounds at bases and crackles  - pleurx fell out 9/27, surgical site clean and dry  - CT surg following, possible pig tail catheter placement  - daily cxr to assess for reaccumulation  - on vanc/zosyn for MRSA, continuing zosyn until final culture results

## 2017-09-30 NOTE — PROGRESS NOTE ADULT - ASSESSMENT
87M with ESRD on HD, CAD, AAA (s/p repair), Afib (s/p PPM), severe AS and MR, DLBCL, s/p R pleurX placement on 9/5 admitted with MRSA empyema and bacteremia. S/p MICU admission with septic shock, now HD stable but with residual fluid in L pleural space (patient self discontinued PleurX).   -- would d/c Zosyn and continue Vancomycin by level  -- obtain repeat blood cultures to document clearance  -- repeat TTE (patient with indwelling device)  -- will likely need chest tube and MIST II protocol - CT surgery following    Darline Welch MD  Pulmonary Fellow  Covering for Dr Tran

## 2017-09-30 NOTE — PROGRESS NOTE ADULT - SUBJECTIVE AND OBJECTIVE BOX
Subjective: Pt states" " Denies any CP, SOB, palpitations. No acute events overnight.    Vital Signs:  Vital Signs Last 24 Hrs  T(C): 37 (09-30-17 @ 03:43), Max: 37 (09-30-17 @ 03:43)  T(F): 98.6 (09-30-17 @ 03:43), Max: 98.6 (09-30-17 @ 03:43)  HR: 72 (09-30-17 @ 03:43) (69 - 72)  BP: 98/60 (09-30-17 @ 03:43) (91/68 - 98/60)  RR: 20 (09-30-17 @ 03:43) (20 - 24)  SpO2: 98% (09-30-17 @ 03:43) (98% - 100%) on (O2)        Relevant labs, radiology and Medications reviewed                        9.6    11.1  )-----------( 107      ( 29 Sep 2017 02:47 )             29.2     09-29    137  |  93<L>  |  48<H>  ----------------------------<  109<H>  3.9   |  25  |  3.69<H>    Ca    7.5<L>      29 Sep 2017 02:47  Phos  2.4     09-29  Mg     2.0     09-29    TPro  5.2<L>  /  Alb  2.2<L>  /  TBili  0.6  /  DBili  x   /  AST  67<H>  /  ALT  28  /  AlkPhos  380<H>  09-29    PT/INR - ( 29 Sep 2017 02:47 )   PT: 14.5 sec;   INR: 1.32 ratio         PTT - ( 29 Sep 2017 02:47 )  PTT:29.2 sec  MEDICATIONS  (STANDING):  piperacillin/tazobactam IVPB. 3.375 Gram(s) IV Intermittent every 12 hours  levothyroxine Injectable 12.5 MICROGram(s) IV Push daily  lactobacillus acidophilus 1 Tablet(s) Oral daily  aspirin enteric coated 81 milliGRAM(s) Oral daily  nystatin Powder 1 Application(s) Topical every 8 hours  Nephro-amy 1 Tablet(s) Oral daily  midodrine 30 milliGRAM(s) Oral every 8 hours  epoetin terry Injectable 4000 Unit(s) IV Push <User Schedule>  buDESOnide   0.5 milliGRAM(s) Respule 0.5 milliGRAM(s) Inhalation every 12 hours  atorvastatin 40 milliGRAM(s) Oral at bedtime  sevelamer hydrochloride 800 milliGRAM(s) Oral three times a day with meals  heparin  Injectable 5000 Unit(s) SubCutaneous every 12 hours    MEDICATIONS  (PRN):      I&O's Summary    29 Sep 2017 07:01  -  30 Sep 2017 07:00  --------------------------------------------------------  IN: 820 mL / OUT: 0 mL / NET: 820 mL        IMAGING    CXR:    CT Chest:    PAST MEDICAL & SURGICAL HISTORY:  Myocardial infarct: h/o X3  GERD (gastroesophageal reflux disease)  Gout  AAA (abdominal aortic aneurysm): per cardiology eval report  Hemodialysis patient  Lymphoma, non-Hodgkin's: h/o diffuse B cell  Stroke: mild  Asthma: h/o  Pleural effusion  Hepatitis  Atrial fibrillation: 11/2014  Chronic kidney disease: on dialysis MWF  Prostate cancer: s/p seeds  [place]  Kidney Stones s/p ureteroscopy  CAD (Coronary Artery Disease): 13 stents placed 3/2014  Hypercholesteremia  HTN (Hypertension)  S/P Radiation > 12 Weeks prostatic seeds placed 10 yrs ago  S/P Cataract Surgery  S/P Cystoscopy  S/P Inguinal Hernia  repair bilat  S/P Parathyroidectomy  S/P Hip Replacement bilat  S/P Cardiac Cath: 8 stents placed 3/2014, per pt he thinks he had 3 stents placed 15 years prior also.       Physical Exam:  Neurology: A&Ox3, nonfocal, FITZGERALD x 4, NAD  Respiratory: B/L BS CTA, diminished at bases, No wheezing, rales, rhonchi  CV: RRR, S1S2

## 2017-09-30 NOTE — PROGRESS NOTE ADULT - PROBLEM SELECTOR PLAN 2
- originally admitted to MICU requiring pressors which were stopped on 9/28  - on midodrine 30 TID with baseline pressures 90s systolic  - afebrile  - white count improved  - on vanc renally dosed, continuing with zosyn until final culture results from pleural fluid - originally admitted to MICU requiring pressors which were stopped on 9/28  - on midodrine 30 TID with baseline pressures 90s systolic  - afebrile  -monitor bowel movement if watery send c.diff, patient currently on antibiotic use  - white count improving; c/w monitoring cbc  - on vanc renally dosed, continuing with zosyn until final culture results from pleural fluid

## 2017-09-30 NOTE — PROGRESS NOTE ADULT - PROBLEM SELECTOR PLAN 3
Continue 30mg po TID of midodrine.  Will attempt 0.5kg fluid removal during HD tomorrow, to stop UF if blood pressure drops.

## 2017-09-30 NOTE — PROGRESS NOTE ADULT - PROBLEM SELECTOR PLAN 6
- preserved ejection fraction, last TTE 9/28  - not in acute exacerbation at this time  - daily cxr to evaluate pleural effusions

## 2017-09-30 NOTE — PROGRESS NOTE ADULT - ATTENDING COMMENTS
I have seen and examined the patient. I agree with the above history, physical exam, and plan of care except for as detailed below.    88 y/o M w/complicated PMH admitted for MRSA bacteremia and empyema s/p pleurX placement which was pulled out by the patient, and there is still residual pleural fluid.    - Continue vanc, d/c zosyn as patient with MRSA  - VATS likely a better option as patient at risk to remove any attempt at chest tube placement  - TTE/GUADALUPE to evaluate for endocarditis/device seeding

## 2017-10-01 DIAGNOSIS — R41.0 DISORIENTATION, UNSPECIFIED: ICD-10-CM

## 2017-10-01 LAB
ALBUMIN SERPL ELPH-MCNC: 2.1 G/DL — LOW (ref 3.3–5)
ALP SERPL-CCNC: 522 U/L — HIGH (ref 40–120)
ALT FLD-CCNC: 36 U/L — SIGNIFICANT CHANGE UP (ref 10–45)
ANION GAP SERPL CALC-SCNC: 24 MMOL/L — HIGH (ref 5–17)
AST SERPL-CCNC: 80 U/L — HIGH (ref 10–40)
BILIRUB SERPL-MCNC: 0.6 MG/DL — SIGNIFICANT CHANGE UP (ref 0.2–1.2)
BUN SERPL-MCNC: 50 MG/DL — HIGH (ref 7–23)
CALCIUM SERPL-MCNC: 8.2 MG/DL — LOW (ref 8.4–10.5)
CHLORIDE SERPL-SCNC: 97 MMOL/L — SIGNIFICANT CHANGE UP (ref 96–108)
CO2 SERPL-SCNC: 22 MMOL/L — SIGNIFICANT CHANGE UP (ref 22–31)
CREAT SERPL-MCNC: 3.67 MG/DL — HIGH (ref 0.5–1.3)
GAS PNL BLDA: SIGNIFICANT CHANGE UP
GLUCOSE SERPL-MCNC: 148 MG/DL — HIGH (ref 70–99)
HCT VFR BLD CALC: 26.7 % — LOW (ref 39–50)
HGB BLD-MCNC: 8.5 G/DL — LOW (ref 13–17)
MAGNESIUM SERPL-MCNC: 2.2 MG/DL — SIGNIFICANT CHANGE UP (ref 1.6–2.6)
MCHC RBC-ENTMCNC: 31.6 PG — SIGNIFICANT CHANGE UP (ref 27–34)
MCHC RBC-ENTMCNC: 31.8 GM/DL — LOW (ref 32–36)
MCV RBC AUTO: 99.3 FL — SIGNIFICANT CHANGE UP (ref 80–100)
PHOSPHATE SERPL-MCNC: 2.9 MG/DL — SIGNIFICANT CHANGE UP (ref 2.5–4.5)
PLATELET # BLD AUTO: 199 K/UL — SIGNIFICANT CHANGE UP (ref 150–400)
POTASSIUM SERPL-MCNC: 4.1 MMOL/L — SIGNIFICANT CHANGE UP (ref 3.5–5.3)
POTASSIUM SERPL-SCNC: 4.1 MMOL/L — SIGNIFICANT CHANGE UP (ref 3.5–5.3)
PROT SERPL-MCNC: 5.8 G/DL — LOW (ref 6–8.3)
RBC # BLD: 2.69 M/UL — LOW (ref 4.2–5.8)
RBC # FLD: 19.2 % — HIGH (ref 10.3–14.5)
SODIUM SERPL-SCNC: 143 MMOL/L — SIGNIFICANT CHANGE UP (ref 135–145)
WBC # BLD: 13.04 K/UL — HIGH (ref 3.8–10.5)
WBC # FLD AUTO: 13.04 K/UL — HIGH (ref 3.8–10.5)

## 2017-10-01 PROCEDURE — 99233 SBSQ HOSP IP/OBS HIGH 50: CPT | Mod: GC

## 2017-10-01 PROCEDURE — 71010: CPT | Mod: 26

## 2017-10-01 PROCEDURE — 99232 SBSQ HOSP IP/OBS MODERATE 35: CPT | Mod: GC

## 2017-10-01 RX ADMIN — Medication 12.5 MICROGRAM(S): at 04:53

## 2017-10-01 RX ADMIN — NYSTATIN CREAM 1 APPLICATION(S): 100000 CREAM TOPICAL at 04:54

## 2017-10-01 RX ADMIN — Medication 1 TABLET(S): at 13:20

## 2017-10-01 RX ADMIN — Medication 0.5 MILLIGRAM(S): at 06:04

## 2017-10-01 RX ADMIN — HEPARIN SODIUM 5000 UNIT(S): 5000 INJECTION INTRAVENOUS; SUBCUTANEOUS at 17:19

## 2017-10-01 RX ADMIN — MIDODRINE HYDROCHLORIDE 30 MILLIGRAM(S): 2.5 TABLET ORAL at 23:51

## 2017-10-01 RX ADMIN — HEPARIN SODIUM 5000 UNIT(S): 5000 INJECTION INTRAVENOUS; SUBCUTANEOUS at 04:54

## 2017-10-01 RX ADMIN — Medication 81 MILLIGRAM(S): at 13:20

## 2017-10-01 RX ADMIN — NYSTATIN CREAM 1 APPLICATION(S): 100000 CREAM TOPICAL at 21:00

## 2017-10-01 RX ADMIN — PIPERACILLIN AND TAZOBACTAM 25 GRAM(S): 4; .5 INJECTION, POWDER, LYOPHILIZED, FOR SOLUTION INTRAVENOUS at 04:43

## 2017-10-01 RX ADMIN — MIDODRINE HYDROCHLORIDE 30 MILLIGRAM(S): 2.5 TABLET ORAL at 15:58

## 2017-10-01 RX ADMIN — NYSTATIN CREAM 1 APPLICATION(S): 100000 CREAM TOPICAL at 13:22

## 2017-10-01 RX ADMIN — Medication 0.5 MILLIGRAM(S): at 17:19

## 2017-10-01 RX ADMIN — MIDODRINE HYDROCHLORIDE 30 MILLIGRAM(S): 2.5 TABLET ORAL at 08:06

## 2017-10-01 RX ADMIN — ATORVASTATIN CALCIUM 40 MILLIGRAM(S): 80 TABLET, FILM COATED ORAL at 21:34

## 2017-10-01 NOTE — PROGRESS NOTE ADULT - PROBLEM SELECTOR PLAN 2
- Originally admitted to MICU requiring pressors which were stopped on 9/28  - On midodrine 30 TID with baseline pressures 90s systolic  - Afebrile  - Monitor bowel movement if watery send c.diff, patient currently on antibiotic use  - White count improving; c/w monitoring cbc  - On vanc renally dosed, continuing with zosyn until final culture results from pleural fluid - Severe sepsis. resolved, no longer requiring vasopressor support. Originally admitted to MICU requiring pressors which were stopped on 9/28  - On midodrine 30 TID with baseline pressures 90s systolic. taper midodrine as tolerated  - Afebrile  - Monitor bowel movement if watery send c.diff, patient currently on antibiotic use  - White count improving; c/w monitoring cbc  - On vanc renally dosed, continuing with zosyn until final culture results from pleural fluid

## 2017-10-01 NOTE — PROGRESS NOTE ADULT - PROBLEM SELECTOR PLAN 3
- Nephrology following, HD yesterday  - Electrolytes stable  - C/w sevelamer, nephrovite and epo patient was hyperactive ON. Etiology likely multifactorial possible hypoxemia  -f/u ABG and CXR final report  -c/w supportive care  -assistance with meals  -pharmacological intervention if patient is unable to be redirected or behaviour is hindering medical care  -consider a Geriatric Medicine evaluation on Monday

## 2017-10-01 NOTE — PROGRESS NOTE ADULT - PROBLEM SELECTOR PLAN 5
- DLBCL, on rituximab outpatient  - Follow up with hem/onc outpatient - No AC due to recurrent falls  - HR stable  - On asa and sq heparin

## 2017-10-01 NOTE — PROGRESS NOTE ADULT - PROBLEM SELECTOR PLAN 4
- No AC due to recurrent falls  - HR stable  - On asa and sq heparin - Preserved ejection fraction, last TTE 9/28  - Not in acute exacerbation at this time  - Daily cxr to evaluate pleural effusions

## 2017-10-01 NOTE — PROGRESS NOTE ADULT - PROBLEM SELECTOR PLAN 1
- Continues to have b/l decreased breath sounds at bases and crackles  - Pleurex fell out 9/27, surgical site clean and dry  - CT surg following, possible pig tail catheter placement  - Daily cxr to assess for reaccumulation  - On Vanc/zosyn for MRSA, continuing zosyn until final culture results. Got 1 dose of Vancomycin last night - Continues to have b/l decreased breath sounds at bases and crackles  - Pleurex fell out 9/27, surgical site clean and dry  - CT surg following, possible pig tail catheter placement.  - F/u offical report on a.m. chest X-ray. Daily cxr to assess for reaccumulation  - d/c Vancomycin per pulmonary note from 9/30/17

## 2017-10-01 NOTE — PROGRESS NOTE ADULT - ASSESSMENT
87M with ESRD on HD, CAD, AAA (s/p repair), Afib (s/p PPM), severe AS and MR, DLBCL, s/p R pleurX placement on 9/5 admitted with MRSA empyema and bacteremia. S/p MICU admission with septic shock, now HD stable but with residual fluid in L pleural space (patient self discontinued PleurX).   --  continue Vancomycin by level  -- obtain repeat blood cultures to document clearance  -- repeat TTE (patient with indwelling device)  -- CT surgery following - may need VATS vs another chest tube    Darline Welch MD  Pulmonary Fellow  Covering for Dr Tran

## 2017-10-01 NOTE — PROGRESS NOTE ADULT - PROBLEM SELECTOR PLAN 6
- Preserved ejection fraction, last TTE 9/28  - Not in acute exacerbation at this time  - Daily cxr to evaluate pleural effusions see # 1

## 2017-10-01 NOTE — PROGRESS NOTE ADULT - SUBJECTIVE AND OBJECTIVE BOX
Interval Events:  Patient seen and examined this am. Remains confused    REVIEW OF SYSTEMS:    [x ] Unable to assess ROS because patient confused    OBJECTIVE:    Vital Signs Last 24 Hrs  T(C): 36.3 (10-01-17 @ 07:51), Max: 36.9 (09-30-17 @ 20:43)  T(F): 97.3 (10-01-17 @ 07:51), Max: 98.4 (09-30-17 @ 20:43)  HR: 56 (10-01-17 @ 07:51) (56 - 76)  BP: 99/64 (10-01-17 @ 07:51) (91/44 - 108/54)  BP(mean): --  RR: 18 (10-01-17 @ 07:51) (18 - 20)  SpO2: 96% (10-01-17 @ 07:51) (94% - 96%)    PHYSICAL EXAM:  General: elderly male, nad  HEENT:  Mucous membranes are moist.  Respiratory: few rhonchi at the bases  Cardiovascular: Regular rate and rhythm. S1, S2. No murmurs, rubs of gallops appreciated.   Abdomen: Soft, non-tender, non-distended.   Extremities: No lower extremity edema.  Skin: Warm, dry, no rash.   Neurological: CNII-XII grossly intact.   Psychiatry: confused    HOSPITAL MEDICATIONS:  MEDICATIONS  (STANDING):  levothyroxine Injectable 12.5 MICROGram(s) IV Push daily  lactobacillus acidophilus 1 Tablet(s) Oral daily  aspirin enteric coated 81 milliGRAM(s) Oral daily  nystatin Powder 1 Application(s) Topical every 8 hours  Nephro-amy 1 Tablet(s) Oral daily  midodrine 30 milliGRAM(s) Oral every 8 hours  epoetin terry Injectable 4000 Unit(s) IV Push <User Schedule>  buDESOnide   0.5 milliGRAM(s) Respule 0.5 milliGRAM(s) Inhalation every 12 hours  atorvastatin 40 milliGRAM(s) Oral at bedtime  heparin  Injectable 5000 Unit(s) SubCutaneous every 12 hours        LABS:    09-30    138  |  92<L>  |  71<H>  ----------------------------<  143<H>  4.3   |  23  |  4.99<H>    Ca    7.6<L>      30 Sep 2017 15:23  Phos  3.3     09-30  Mg     2.2     09-30    TPro  5.7<L>  /  Alb  2.4<L>  /  TBili  0.7  /  DBili  x   /  AST  83<H>  /  ALT  31  /  AlkPhos  459<H>  09-30                        8.7    12.23 )-----------( 163      ( 30 Sep 2017 15:20 )             26.3     Vanco level = 21    MICROBIOLOGY:   BCX - MRSA    RADIOLOGY:  [ x ] Reviewed and interpreted by me

## 2017-10-01 NOTE — PROGRESS NOTE ADULT - SUBJECTIVE AND OBJECTIVE BOX
Patient is a 87y old  Male who presents with a chief complaint of fall 2/2 hypotension (25 Sep 2017 15:50)      INTERVAL HPI/OVERNIGHT EVENTS: Overnight patient noted to have ecchymotic area over right buttock. Xray of hip performed. Patient denies pain.  T(C): 36.3 (10-01-17 @ 07:51), Max: 36.9 (09-30-17 @ 20:43)  HR: 56 (10-01-17 @ 07:51) (56 - 76)  BP: 99/64 (10-01-17 @ 07:51) (91/44 - 108/54)  RR: 18 (10-01-17 @ 07:51) (18 - 22)  SpO2: 96% (10-01-17 @ 07:51) (93% - 96%)  Wt(kg): --  I&O's Summary    30 Sep 2017 07:01  -  01 Oct 2017 07:00  --------------------------------------------------------  IN: 1820 mL / OUT: 800 mL / NET: 1020 mL        LABS:                        8.7    12.23 )-----------( 163      ( 30 Sep 2017 15:20 )             26.3     09-30    138  |  92<L>  |  71<H>  ----------------------------<  143<H>  4.3   |  23  |  4.99<H>    Ca    7.6<L>      30 Sep 2017 15:23  Phos  3.3     09-30  Mg     2.2     09-30    TPro  5.7<L>  /  Alb  2.4<L>  /  TBili  0.7  /  DBili  x   /  AST  83<H>  /  ALT  31  /  AlkPhos  459<H>  09-30    PT/INR - ( 30 Sep 2017 15:20 )   PT: 13.7 sec;   INR: 1.21 ratio         PTT - ( 30 Sep 2017 15:20 )  PTT:31.5 sec    CAPILLARY BLOOD GLUCOSE              REVIEW OF SYSTEMS:  Unable to obtain full ROS as patient     RADIOLOGY & ADDITIONAL TESTS:    Imaging Personally Reviewed:  [x ] YES  [ ] NO, CXR    Consultant(s) Notes Reviewed:  [x ] YES  [ ] NO, Pulm, CT Surgery, Renal    PHYSICAL EXAM:  GENERAL: NAD  HEAD:  Atraumatic, Normocephalic  EYES: EOMI, PERRLA, conjunctiva and sclera clear  ENMT: No tonsillar erythema, exudates, or enlargement  NECK: Supple  NERVOUS SYSTEM:  No focal deficits  CHEST/LUNG: Decreased breath sounds at bases   HEART: Regular rate and rhythm; No murmurs, rubs, or gallops  ABDOMEN: Soft, Nontender, Nondistended; Bowel sounds present  EXTREMITIES:  2+ Peripheral Pulses, No clubbing, cyanosis, or edema  LYMPH: No lymphadenopathy noted  SKIN: No rashes    Care Discussed with Consultants/Other Providers [ ] YES  [x ] NO Patient is a 87y old  Male who presents with a chief complaint of fall 2/2 hypotension (25 Sep 2017 15:50)      INTERVAL HPI/OVERNIGHT EVENTS: Overnight patient noted to have ecchymotic area over right buttock. Xray of hip performed. Patient denies pain.  T(C): 36.3 (10-01-17 @ 07:51), Max: 36.9 (09-30-17 @ 20:43)  HR: 56 (10-01-17 @ 07:51) (56 - 76)  BP: 99/64 (10-01-17 @ 07:51) (91/44 - 108/54)  RR: 18 (10-01-17 @ 07:51) (18 - 22)  SpO2: 96% (10-01-17 @ 07:51) (93% - 96%)  Wt(kg): --  I&O's Summary    30 Sep 2017 07:01  -  01 Oct 2017 07:00  --------------------------------------------------------  IN: 1820 mL / OUT: 800 mL / NET: 1020 mL        LABS:                        8.7    12.23 )-----------( 163      ( 30 Sep 2017 15:20 )             26.3     09-30    138  |  92<L>  |  71<H>  ----------------------------<  143<H>  4.3   |  23  |  4.99<H>    Ca    7.6<L>      30 Sep 2017 15:23  Phos  3.3     09-30  Mg     2.2     09-30    TPro  5.7<L>  /  Alb  2.4<L>  /  TBili  0.7  /  DBili  x   /  AST  83<H>  /  ALT  31  /  AlkPhos  459<H>  09-30    PT/INR - ( 30 Sep 2017 15:20 )   PT: 13.7 sec;   INR: 1.21 ratio         PTT - ( 30 Sep 2017 15:20 )  PTT:31.5 sec    CAPILLARY BLOOD GLUCOSE              REVIEW OF SYSTEMS:  Unable to obtain full ROS as patient     RADIOLOGY & ADDITIONAL TESTS:    Imaging Personally Reviewed:  [x ] YES  [ ] NO, CXR    Consultant(s) Notes Reviewed:  [x ] YES  [ ] NO, Pulm, CT Surgery, Renal    PHYSICAL EXAM:  GENERAL: NAD; frail   HEAD:  Atraumatic, Normocephalic  EYES: EOMI, PERRLA, conjunctiva and sclera clear  ENMT: No tonsillar erythema, exudates, or enlargement  NECK: Supple  NERVOUS SYSTEM:  No focal deficits  CHEST/LUNG: Decreased breath sounds at bases   HEART: Regular rate and rhythm; No murmurs, rubs, or gallops  ABDOMEN: Soft, Nontender, Nondistended; Bowel sounds present  EXTREMITIES:  2+ Peripheral Pulses, No clubbing, cyanosis, or edema  LYMPH: No lymphadenopathy noted  SKIN: No rashes  Psych: patient unable to follow commands  Neuro: Moves X 4 ext    CAM: + inattention, + acute change fluctuating behavior + altered level of consciousness    Care Discussed with Consultants/Other Providers [ ] YES  [x ] NO

## 2017-10-02 LAB
ALBUMIN SERPL ELPH-MCNC: 2.8 G/DL — LOW (ref 3.3–5)
ALP SERPL-CCNC: 470 U/L — HIGH (ref 40–120)
ALT FLD-CCNC: 39 U/L RC — SIGNIFICANT CHANGE UP (ref 10–45)
ANION GAP SERPL CALC-SCNC: 19 MMOL/L — HIGH (ref 5–17)
ANISOCYTOSIS BLD QL: SIGNIFICANT CHANGE UP
AST SERPL-CCNC: 67 U/L — HIGH (ref 10–40)
BASOPHILS # BLD AUTO: 0.1 K/UL — SIGNIFICANT CHANGE UP (ref 0–0.2)
BILIRUB SERPL-MCNC: 0.7 MG/DL — SIGNIFICANT CHANGE UP (ref 0.2–1.2)
BUN SERPL-MCNC: 63 MG/DL — HIGH (ref 7–23)
CALCIUM SERPL-MCNC: 7.5 MG/DL — LOW (ref 8.4–10.5)
CHLORIDE SERPL-SCNC: 97 MMOL/L — SIGNIFICANT CHANGE UP (ref 96–108)
CO2 SERPL-SCNC: 28 MMOL/L — SIGNIFICANT CHANGE UP (ref 22–31)
CREAT SERPL-MCNC: 4.59 MG/DL — HIGH (ref 0.5–1.3)
CULTURE RESULTS: SIGNIFICANT CHANGE UP
EOSINOPHIL # BLD AUTO: 0.2 K/UL — SIGNIFICANT CHANGE UP (ref 0–0.5)
EOSINOPHIL NFR BLD AUTO: 1 % — SIGNIFICANT CHANGE UP (ref 0–6)
GLUCOSE SERPL-MCNC: 113 MG/DL — HIGH (ref 70–99)
HCT VFR BLD CALC: 28.6 % — LOW (ref 39–50)
HGB BLD-MCNC: 9.1 G/DL — LOW (ref 13–17)
HYPOCHROMIA BLD QL: SLIGHT — SIGNIFICANT CHANGE UP
LYMPHOCYTES # BLD AUTO: 0.4 K/UL — LOW (ref 1–3.3)
LYMPHOCYTES # BLD AUTO: 5 % — LOW (ref 13–44)
MACROCYTES BLD QL: SLIGHT — SIGNIFICANT CHANGE UP
MAGNESIUM SERPL-MCNC: 2.3 MG/DL — SIGNIFICANT CHANGE UP (ref 1.6–2.6)
MCHC RBC-ENTMCNC: 31.9 GM/DL — LOW (ref 32–36)
MCHC RBC-ENTMCNC: 33.2 PG — SIGNIFICANT CHANGE UP (ref 27–34)
MCV RBC AUTO: 104 FL — HIGH (ref 80–100)
MONOCYTES # BLD AUTO: 0.7 K/UL — SIGNIFICANT CHANGE UP (ref 0–0.9)
MONOCYTES NFR BLD AUTO: 8 % — SIGNIFICANT CHANGE UP (ref 2–14)
NEUTROPHILS # BLD AUTO: 12.5 K/UL — HIGH (ref 1.8–7.4)
NEUTROPHILS NFR BLD AUTO: 86 % — HIGH (ref 43–77)
PHOSPHATE SERPL-MCNC: 3.4 MG/DL — SIGNIFICANT CHANGE UP (ref 2.5–4.5)
PLAT MORPH BLD: NORMAL — SIGNIFICANT CHANGE UP
PLATELET # BLD AUTO: 183 K/UL — SIGNIFICANT CHANGE UP (ref 150–400)
POIKILOCYTOSIS BLD QL AUTO: SLIGHT — SIGNIFICANT CHANGE UP
POTASSIUM SERPL-MCNC: 3.9 MMOL/L — SIGNIFICANT CHANGE UP (ref 3.5–5.3)
POTASSIUM SERPL-SCNC: 3.9 MMOL/L — SIGNIFICANT CHANGE UP (ref 3.5–5.3)
PROT SERPL-MCNC: 5.9 G/DL — LOW (ref 6–8.3)
RBC # BLD: 2.75 M/UL — LOW (ref 4.2–5.8)
RBC # FLD: 17.4 % — HIGH (ref 10.3–14.5)
RBC BLD AUTO: ABNORMAL
SODIUM SERPL-SCNC: 144 MMOL/L — SIGNIFICANT CHANGE UP (ref 135–145)
SPECIMEN SOURCE: SIGNIFICANT CHANGE UP
VANCOMYCIN FLD-MCNC: 23.5 UG/ML — SIGNIFICANT CHANGE UP
WBC # BLD: 13.9 K/UL — HIGH (ref 3.8–10.5)
WBC # FLD AUTO: 13.9 K/UL — HIGH (ref 3.8–10.5)

## 2017-10-02 PROCEDURE — 71010: CPT | Mod: 26

## 2017-10-02 PROCEDURE — 99233 SBSQ HOSP IP/OBS HIGH 50: CPT | Mod: GC

## 2017-10-02 PROCEDURE — 70450 CT HEAD/BRAIN W/O DYE: CPT | Mod: 26

## 2017-10-02 PROCEDURE — 99223 1ST HOSP IP/OBS HIGH 75: CPT | Mod: GC

## 2017-10-02 PROCEDURE — 71250 CT THORAX DX C-: CPT | Mod: 26

## 2017-10-02 PROCEDURE — 99233 SBSQ HOSP IP/OBS HIGH 50: CPT

## 2017-10-02 RX ADMIN — NYSTATIN CREAM 1 APPLICATION(S): 100000 CREAM TOPICAL at 22:00

## 2017-10-02 RX ADMIN — ATORVASTATIN CALCIUM 40 MILLIGRAM(S): 80 TABLET, FILM COATED ORAL at 22:49

## 2017-10-02 RX ADMIN — Medication 12.5 MICROGRAM(S): at 06:14

## 2017-10-02 RX ADMIN — HEPARIN SODIUM 5000 UNIT(S): 5000 INJECTION INTRAVENOUS; SUBCUTANEOUS at 17:14

## 2017-10-02 RX ADMIN — NYSTATIN CREAM 1 APPLICATION(S): 100000 CREAM TOPICAL at 12:13

## 2017-10-02 RX ADMIN — HEPARIN SODIUM 5000 UNIT(S): 5000 INJECTION INTRAVENOUS; SUBCUTANEOUS at 06:14

## 2017-10-02 RX ADMIN — MIDODRINE HYDROCHLORIDE 30 MILLIGRAM(S): 2.5 TABLET ORAL at 17:14

## 2017-10-02 RX ADMIN — Medication 1 TABLET(S): at 12:13

## 2017-10-02 RX ADMIN — Medication 81 MILLIGRAM(S): at 12:13

## 2017-10-02 RX ADMIN — Medication 0.5 MILLIGRAM(S): at 17:14

## 2017-10-02 RX ADMIN — Medication 0.5 MILLIGRAM(S): at 06:12

## 2017-10-02 RX ADMIN — MIDODRINE HYDROCHLORIDE 30 MILLIGRAM(S): 2.5 TABLET ORAL at 23:22

## 2017-10-02 RX ADMIN — NYSTATIN CREAM 1 APPLICATION(S): 100000 CREAM TOPICAL at 06:15

## 2017-10-02 RX ADMIN — MIDODRINE HYDROCHLORIDE 30 MILLIGRAM(S): 2.5 TABLET ORAL at 08:02

## 2017-10-02 NOTE — PROGRESS NOTE ADULT - SUBJECTIVE AND OBJECTIVE BOX
VITAL SIGNS    Vital Signs Last 24 Hrs  T(C): 36.4 (10-02-17 @ 17:14), Max: 36.8 (10-02-17 @ 07:39)  T(F): 97.5 (10-02-17 @ 17:14), Max: 98.2 (10-02-17 @ 07:39)  HR: 71 (10-02-17 @ 17:14) (69 - 81)  BP: 97/58 (10-02-17 @ 17:14) (87/41 - 105/70)  RR: 18 (10-02-17 @ 17:14) (18 - 18)  SpO2: 98% (10-02-17 @ 17:14) (96% - 100%)             10-01 @ :  -  10-02 @ 07:00  --------------------------------------------------------  IN: 1010 mL / OUT: 0 mL / NET: 1010 mL    10-02 @ :01  -  10-02 @ 18:50  --------------------------------------------------------  IN: 1000 mL / OUT: 0 mL / NET: 1000 mL    Daily     Daily Weight in k.3 (02 Oct 2017 08:00)    PHYSICAL EXAM    Neurology: alert and oriented x 1 --> to person; (+) lethargic; awaken to verbal stimuli     CV: (+) S1 and S2, No murmurs, rubs, gallops or clicks     Lungs: Diminished B/L Left > Right      Abdomen: soft, nontender, nondistended, positive bowel sounds    :  Incontinence of urine              Extremities: B/L LE Trace edema; negative calf tenderness; (+) 2 DP palpable           levothyroxine Injectable 12.5 MICROGram(s) IV Push daily  lactobacillus acidophilus 1 Tablet(s) Oral daily  aspirin enteric coated 81 milliGRAM(s) Oral daily  nystatin Powder 1 Application(s) Topical every 8 hours  Nephro-amy 1 Tablet(s) Oral daily  midodrine 30 milliGRAM(s) Oral every 8 hours  epoetin terry Injectable 4000 Unit(s) IV Push <User Schedule>  buDESOnide   0.5 milliGRAM(s) Respule 0.5 milliGRAM(s) Inhalation every 12 hours  atorvastatin 40 milliGRAM(s) Oral at bedtime  heparin  Injectable 5000 Unit(s) SubCutaneous every 12 hours    Discussed with Cardiothoracic Team at AM rounds.

## 2017-10-02 NOTE — PROGRESS NOTE ADULT - ATTENDING COMMENTS
as above--CTS follow up-s/p removal of pleurx and ? decortication of left pleural space vs. pigtail placement of catheter as per Tia et al.  ABX to be directed by pleural and Blood samples--zosyn D5 and vanco D4-MRSA bacteremia--would consider GUADALUPE rather than TTE to r/o endocarditis  MS issues continue-haldol/zyprexa etc.  MICU for BP support    Morro Tran MD-Pulmonary   [phone] as above--CTS follow up-s/p removal of pleurx and ? decortication of left pleural space vs. pigtail placement of catheter as per Tia et al.  ABX to be directed by pleural and Blood samples--zosyn D8 and vanco D7-MRSA bacteremia--would consider GUADALUPE rather than TTE to r/o endocarditis--ID follow up for duration of ABX--repeat cxr pa/lat today  MS issues continue-haldol/zyprexa etc.      Morro Tran MD-Pulmonary   [phone]

## 2017-10-02 NOTE — PROGRESS NOTE ADULT - ASSESSMENT
86 yo M with multiple medical problems including DLBCL on Rituxan, malignant pleural effusion s/p left pleurx, ESRD on HD, chronic hypotension on Midodrine presents with lethargy, hypotension and cloudy pleural fluid drainage. History as per chart and family.   Received broad spectrum antibiotics and 2L NS for presumed sepsis 2/2 empyema. Evaluated by MICU and admitted for severe sepsis.     A/P: Septic shock - likely from empyema in the setting of DLBCL on Rituxan, indwelling pleural catheter and cloudy, malodorous pleural fluid. CT chest with moderate sized pleural effusion and small loculated left pleural effusion.  Agree with MICU team - broad spectrum antibiotics  BP support - stabilized s/p IVFs and antibiotics.  Thoracic surgery service following - + pleural space  infected- removed pleurx.  DVT prophylaxis.    Care as per MICU team 86 yo M with multiple medical problems including DLBCL on Rituxan, malignant pleural effusion s/p left pleurx, ESRD on HD, chronic hypotension on Midodrine presents with lethargy, hypotension and cloudy pleural fluid drainage. History as per chart and family.   Received broad spectrum antibiotics and 2L NS for presumed sepsis 2/2 empyema. Evaluated by MICU and admitted for severe sepsis.     A/P: Septic shock - likely from empyema in the setting of DLBCL on Rituxan, indwelling pleural catheter and cloudy, malodorous pleural fluid. CT chest with moderate sized pleural effusion and small loculated left pleural effusion.  On broad spectrum antibiotics  BP support - stabilized s/p IVFs and antibiotics.  Thoracic surgery service following - + pleural space  infected- removed pleurx.  DVT prophylaxis.

## 2017-10-02 NOTE — PROGRESS NOTE ADULT - SUBJECTIVE AND OBJECTIVE BOX
CHIEF COMPLAINT:    Interval Events:    REVIEW OF SYSTEMS:  Constitutional: No fevers or chills. No weight loss. No fatigue or generalized malaise.  Eyes: No itching or discharge from the eyes  ENT: No ear pain. No ear discharge. No nasal congestion. No post nasal drip. No epistaxis. No throat pain. No sore throat. No difficulty swallowing.   CV: No chest pain. No palpitations. No lightheadedness or dizziness.   Resp: No dyspnea at rest. No dyspnea on exertion. No orthopnea. No wheezing. No cough. No stridor. No sputum production. No chest pain with respiration.  GI: No nausea. No vomiting. No diarrhea.  MSK: No joint pain or pain in any extremities  Integumentary: No skin lesions. No pedal edema.  Neurological: No gross motor weakness. No sensory changes.  [ ] All other systems negative  [ ] Unable to assess ROS because ________    OBJECTIVE:  ICU Vital Signs Last 24 Hrs  T(C): 36.6 (01 Oct 2017 21:00), Max: 36.8 (01 Oct 2017 15:55)  T(F): 97.9 (01 Oct 2017 21:00), Max: 98.3 (01 Oct 2017 15:55)  HR: 69 (01 Oct 2017 21:00) (56 - 70)  BP: 102/66 (01 Oct 2017 21:00) (99/64 - 103/64)  BP(mean): --  ABP: --  ABP(mean): --  RR: 18 (01 Oct 2017 21:00) (18 - 18)  SpO2: 100% (01 Oct 2017 21:00) (96% - 100%)        09-30 @ 07:01  -  10-01 @ 07:00  --------------------------------------------------------  IN: 1820 mL / OUT: 800 mL / NET: 1020 mL    10-01 @ 07:01  -  10-02 @ 05:35  --------------------------------------------------------  IN: 1010 mL / OUT: 250 mL / NET: 760 mL      CAPILLARY BLOOD GLUCOSE          PHYSICAL EXAM:  General: Awake, alert, oriented X 3.   HEENT: Atraumatic, normocephalic.                 Mallampatti Grade                 No nasal congestion.                No tonsillar or pharyngeal exudates.  Lymph Nodes: No palpable lymphadenopathy  Neck: No JVD. No carotid bruit.   Respiratory: Normal chest expansion                         Normal percussion                         Normal and equal air entry                         No wheeze, rhonchi or rales.  Cardiovascular: S1 S2 normal. No murmurs, rubs or gallops.   Abdomen: Soft, non-tender, non-distended. No organomegaly.  Extremities: Warm to touch. Peripheral pulse palpable. No pedal edema.   Skin: No rashes or skin lesions  Neurological: Motor and sensory examination equal and normal in all four extremities.  Psychiatry: Appropriate mood and affect.    HOSPITAL MEDICATIONS:  MEDICATIONS  (STANDING):  levothyroxine Injectable 12.5 MICROGram(s) IV Push daily  lactobacillus acidophilus 1 Tablet(s) Oral daily  aspirin enteric coated 81 milliGRAM(s) Oral daily  nystatin Powder 1 Application(s) Topical every 8 hours  Nephro-amy 1 Tablet(s) Oral daily  midodrine 30 milliGRAM(s) Oral every 8 hours  epoetin terry Injectable 4000 Unit(s) IV Push <User Schedule>  buDESOnide   0.5 milliGRAM(s) Respule 0.5 milliGRAM(s) Inhalation every 12 hours  atorvastatin 40 milliGRAM(s) Oral at bedtime  heparin  Injectable 5000 Unit(s) SubCutaneous every 12 hours    MEDICATIONS  (PRN):      LABS:                        8.5    13.04 )-----------( 199      ( 01 Oct 2017 21:42 )             26.7     10-01    143  |  97  |  50<H>  ----------------------------<  148<H>  4.1   |  22  |  3.67<H>    Ca    8.2<L>      01 Oct 2017 21:42  Phos  2.9     10-01  Mg     2.2     10-01    TPro  5.8<L>  /  Alb  2.1<L>  /  TBili  0.6  /  DBili  x   /  AST  80<H>  /  ALT  36  /  AlkPhos  522<H>  10-01    PT/INR - ( 30 Sep 2017 15:20 )   PT: 13.7 sec;   INR: 1.21 ratio         PTT - ( 30 Sep 2017 15:20 )  PTT:31.5 sec    Arterial Blood Gas:  10-01 @ 12:57  7.48/36/151/26/100/3.3  ABG lactate: --        MICROBIOLOGY:     RADIOLOGY:  [ ] Reviewed and interpreted by me    Point of Care Ultrasound Findings:    PFT:    EKG: CHIEF COMPLAINT:no complaints--minimal cough    Interval Events:oob in chair    REVIEW OF SYSTEMS:  Constitutional: No fevers or chills. No weight loss. No fatigue or generalized malaise.  Eyes: No itching or discharge from the eyes  ENT: No ear pain. No ear discharge. No nasal congestion. No post nasal drip. No epistaxis. No throat pain. No sore throat. No difficulty swallowing.   CV: No chest pain. No palpitations. No lightheadedness or dizziness.   Resp: No dyspnea at rest. mild dyspnea on exertion. No orthopnea. No wheezing. No cough. No stridor. No sputum production. No chest pain with respiration.  GI: No nausea. No vomiting. No diarrhea.  MSK: No joint pain or pain in any extremities  Integumentary: No skin lesions. No pedal edema.  Neurological: No gross motor weakness. No sensory changes.  [ +] All other systems negative  [ ] Unable to assess ROS because ________    OBJECTIVE:  ICU Vital Signs Last 24 Hrs  T(C): 36.6 (01 Oct 2017 21:00), Max: 36.8 (01 Oct 2017 15:55)  T(F): 97.9 (01 Oct 2017 21:00), Max: 98.3 (01 Oct 2017 15:55)  HR: 69 (01 Oct 2017 21:00) (56 - 70)  BP: 102/66 (01 Oct 2017 21:00) (99/64 - 103/64)  BP(mean): --  ABP: --  ABP(mean): --  RR: 18 (01 Oct 2017 21:00) (18 - 18)  SpO2: 100% (01 Oct 2017 21:00) (96% - 100%)        09-30 @ 07:01  -  10-01 @ 07:00  --------------------------------------------------------  IN: 1820 mL / OUT: 800 mL / NET: 1020 mL    10-01 @ 07:01  -  10-02 @ 05:35  --------------------------------------------------------  IN: 1010 mL / OUT: 250 mL / NET: 760 mL      CAPILLARY BLOOD GLUCOSE          PHYSICAL EXAM:nad in chair on O2  General: Awake, alert, oriented X 3.   HEENT: Atraumatic, normocephalic.                 Mallampatti Grade 2                No nasal congestion.                No tonsillar or pharyngeal exudates.  Lymph Nodes: No palpable lymphadenopathy  Neck: No JVD. No carotid bruit.   Respiratory: Normal chest expansion                         Normal percussion                         Normal and equal air entry                         No wheeze, rhonchi --+ mild rales at bases  Cardiovascular: S1 S2 normal. 3+ murmurs,no rubs or gallops.   Abdomen: Soft, non-tender, non-distended. No organomegaly.  Extremities: Warm to touch. Peripheral pulse palpable. No pedal edema.   Skin: No rashes but bruises skin lesions  Neurological: Motor and sensory examination equal and normal in all four extremities.  Psychiatry: Appropriate mood and affect.    HOSPITAL MEDICATIONS:  MEDICATIONS  (STANDING):  levothyroxine Injectable 12.5 MICROGram(s) IV Push daily  lactobacillus acidophilus 1 Tablet(s) Oral daily  aspirin enteric coated 81 milliGRAM(s) Oral daily  nystatin Powder 1 Application(s) Topical every 8 hours  Nephro-amy 1 Tablet(s) Oral daily  midodrine 30 milliGRAM(s) Oral every 8 hours  epoetin terry Injectable 4000 Unit(s) IV Push <User Schedule>  buDESOnide   0.5 milliGRAM(s) Respule 0.5 milliGRAM(s) Inhalation every 12 hours  atorvastatin 40 milliGRAM(s) Oral at bedtime  heparin  Injectable 5000 Unit(s) SubCutaneous every 12 hours    MEDICATIONS  (PRN):      LABS:                        8.5    13.04 )-----------( 199      ( 01 Oct 2017 21:42 )             26.7     10-01    143  |  97  |  50<H>  ----------------------------<  148<H>  4.1   |  22  |  3.67<H>    Ca    8.2<L>      01 Oct 2017 21:42  Phos  2.9     10-01  Mg     2.2     10-01    TPro  5.8<L>  /  Alb  2.1<L>  /  TBili  0.6  /  DBili  x   /  AST  80<H>  /  ALT  36  /  AlkPhos  522<H>  10-01    PT/INR - ( 30 Sep 2017 15:20 )   PT: 13.7 sec;   INR: 1.21 ratio         PTT - ( 30 Sep 2017 15:20 )  PTT:31.5 sec    Arterial Blood Gas:  10-01 @ 12:57  7.48/36/151/26/100/3.3  ABG lactate: --        MICROBIOLOGY:     RADIOLOGY:  [ ] Reviewed and interpreted by me    Point of Care Ultrasound Findings:    PFT:    EKG:

## 2017-10-02 NOTE — PROGRESS NOTE ADULT - ASSESSMENT
88y/o Male h/o ESRD, AAA repair admitted with altered mental status, MRSA bacteremia and non draining left pleurx.   Transferred to MICU for septic shock requiring levophed on 9/25 with malodorous cloudy pleural fluid.   9/26 Left pleurx attached to pleurovac yielding -100 cc purulent drainage.  9/27 left pleurx removed  9/28  VSS  Lt plx site  no drainage,  new prelim bc negative  9/29  Transfer 3 [facility]   9/30 remains stable with some sob on ambulation  10/2 Increased lethargy; Head CT finding:  Age-appropriateinvolutional and ischemic gliotic changes without change since 9/25/2017. No acute hemorrhage.   10/2 Chest X-Ray finding: Bilateral layering pleural effusions obscure evaluation of the underlying lungs and appear increased compared to the prior study which may be related to patient orientation. Underlying empyema not well evaluated. Plan for IR procedure for Left pigtail placement with possible TPA, per Dr. Worley. 88y/o Male h/o ESRD, AAA repair admitted with altered mental status, MRSA bacteremia and non draining left pleurx.   Transferred to MICU for septic shock requiring levophed on 9/25 with malodorous cloudy pleural fluid.   9/26 Left pleurx attached to pleurovac yielding -100 cc purulent drainage.  9/27 left pleurx removed  9/28  VSS  Lt plx site  no drainage,  new prelim bc negative  9/29  Transfer 3 [facility]   9/30 remains stable with some sob on ambulation  10/2 Increased lethargy; Head CT finding:  Age-appropriate involutional and ischemic gliotic changes without change since 9/25/2017. No acute hemorrhage.   Leukocytosis 2/2 infectious process --> WBC 13.9; Continue on IV Abx Vanco post HD as per ID    10/2 Chest X-Ray finding: Bilateral layering pleural effusions obscure evaluation of the underlying lungs and appear increased compared to the prior study which may be related to patient orientation. Underlying empyema not well evaluated. Plan for IR procedure for Left pigtail placement with possible TPA, per Dr. Worley.

## 2017-10-02 NOTE — PROGRESS NOTE ADULT - PROBLEM SELECTOR PLAN 1
- Continue with antibiotics per ID   - Daily chest x ray  - Non Con Chest CT   - NPO after midnight for IR pigtail with possible TPA in AM 10/3  Plan of Care discussed with attending - Continue with antibiotics per ID   - Daily chest x ray  - Non Con Chest CT   - Monitor WBC  - PT/ INR in AM 10/3   - NPO after midnight for IR pigtail with possible TPA in AM 10/3  Plan of Care discussed with attending

## 2017-10-02 NOTE — CONSULT NOTE ADULT - ATTENDING COMMENTS
See H+P
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     #MRSA bacteremia 2/2 empyema: pt admitted with septic shock 2/2 MRSA bacteremia 2/2 empyema following PleurX placement 9/5 and exchange in the setting of immunosuppression for DLBCL (Rituximab). PleurX was removed on 9/27, and pt was transferred from MICU to floors 9/29. WBC increasing to 13 from 11 over the past several days with neutrophilic predominance. RVP was negative. Aortic graft and pacemaker could potentially be seeded given bacteremia.    CXR still with effusion-?need further drainage/decortiocation-will defer to thoracic.  Will need repeat blood Cx  Will likely need GUADALUPE,imaging of graft (AAA repair),particularly  if cx stay positive  Will likely need atleast 6 weeks of IV vanco-continue post HD  Prognosis is very guarded,given age comorbidities and current issues would also recommend discussion of goals of care and to Tailor plan per course,results.  Case d/w Dr olivarez,Medicine team and Thoracic NP  Will Follow.  Beeper [id number]-[facility]/afterhours/No response-[id number]
Pt seen in ED poorly responsive.  Hypotension mitigated by saline boluses.  Cloudy drainage from chest tube.  Elevated lactate  1.  Hypotension--antibiotics, hemodynamic optimization.  MICU evaluation.  2.  ESRD--today is normal HD but no absolute indications for HD today  3.  Acidosis--lactate likely infection related.  Pulmonary chest surgery, ID input recommended.  Antibiotic adjust for 2.    4.  Lymphoma--limits overall prognosis. Daughter heavily involved in care and decision making and will help define limits depending on course.

## 2017-10-02 NOTE — PROGRESS NOTE ADULT - PROBLEM SELECTOR PLAN 3
MRSA bacteremia  zosyn D5; vanco D4  CTS evaluation and likely decortication--?sono guided pig tail catheter MRSA bacteremia  zosyn D8; vanco D7  CTS evaluation and likely decortication--?sono guided pig tail catheter

## 2017-10-02 NOTE — PROGRESS NOTE ADULT - PROBLEM SELECTOR PLAN 8
BP support-levo at present-keep MAP above 65  midodrine as able BP support-off levo at present-keep MAP above 65  midodrine as able

## 2017-10-02 NOTE — PROGRESS NOTE ADULT - PROBLEM SELECTOR PLAN 2
- Severe sepsis. resolved, no longer requiring vasopressor support. Originally admitted to MICU requiring pressors which were stopped on 9/28  - On midodrine 30 TID with baseline pressures 90s systolic. taper midodrine as tolerated  - Afebrile  - Monitor bowel movement if watery send c.diff, patient currently on antibiotic use  - white count increasing past 2 days  - On vanc renally dosed, zosyn d/c 9/30 due to cultures all growing staph

## 2017-10-02 NOTE — CONSULT NOTE ADULT - ASSESSMENT
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     #MRSA bacteremia 2/2 empyema: pt admitted with septic shock 2/2 MRSA bacteremia 2/2 empyema following PleurX placement 9/5 and exchange in the setting of immunosuppression for DLBCL (Rituximab). PleurX was removed on 9/27, and pt was transferred from MICU to floors 9/29. WBC increasing to 13 from 11 over the past several days with neutrophilic predominance. RVP was negative.   -Concern for inadequate source control. Repeat CXR pending.  -f/u thoracic surgery recommendations regarding decortication.  -c/w vancomycin dosed by level.  -completed Zosyn x 7 days    Iglesia Swain  ID Resident  Case pending discussion with Dr. Olivares. 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     #MRSA bacteremia 2/2 empyema: pt admitted with septic shock 2/2 MRSA bacteremia 2/2 empyema following PleurX placement 9/5 and exchange in the setting of immunosuppression for DLBCL (Rituximab). PleurX was removed on 9/27, and pt was transferred from MICU to floors 9/29. WBC increasing to 13 from 11 over the past several days with neutrophilic predominance. RVP was negative.   -Concern for inadequate source control. Repeat CXR pending.  -f/u thoracic surgery recommendations regarding decortication.  -c/w vancomycin dosed by level.  -completed Zosyn x 7 days  -serum lactate    Iglesia Swain  ID Resident  Case pending discussion with Dr. Olivares. 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     #MRSA bacteremia 2/2 empyema: pt admitted with septic shock 2/2 MRSA bacteremia 2/2 empyema following PleurX placement 9/5 and exchange in the setting of immunosuppression for DLBCL (Rituximab). PleurX was removed on 9/27, and pt was transferred from MICU to floors 9/29. WBC increasing to 13 from 11 over the past several days with neutrophilic predominance. RVP was negative. Aortic graft and pacemaker could potentially be seeded given bacteremia.  -Concern for inadequate source control. Repeat CXR pending.  -f/u thoracic surgery recommendations regarding decortication / drain.  -GUADALUPE to evaluate pacemaker  -CT abd/pelvis with contrast to assess aortic graft followed by HD.  -Vanc 750mg post each HD.  -No chemotherapy during active infxn.  -Discuss long-term chemotherapy plan with oncology.  -Consider palliative consult if family agreeable.   -completed Zosyn x 7 days  -serum lactate    Iglesia Swain  ID Resident  Case pending discussion with Dr. Olivares.

## 2017-10-02 NOTE — PROGRESS NOTE ADULT - SUBJECTIVE AND OBJECTIVE BOX
Patient is a 87y old  Male who presents with a chief complaint of fall 2/2 hypotension (25 Sep 2017 15:50)      SUBJECTIVE / OVERNIGHT EVENTS: Overnight no acute events. Over the weekend patient was more confused than baseline, ABG with pH 7.48, CO2 36, O2 151. CXR showed worsening b/l plef. HD on Saturday able to take off .5kg due to hypotension.     MEDICATIONS  (STANDING):  levothyroxine Injectable 12.5 MICROGram(s) IV Push daily  lactobacillus acidophilus 1 Tablet(s) Oral daily  aspirin enteric coated 81 milliGRAM(s) Oral daily  nystatin Powder 1 Application(s) Topical every 8 hours  Nephro-amy 1 Tablet(s) Oral daily  midodrine 30 milliGRAM(s) Oral every 8 hours  epoetin terry Injectable 4000 Unit(s) IV Push <User Schedule>  buDESOnide   0.5 milliGRAM(s) Respule 0.5 milliGRAM(s) Inhalation every 12 hours  atorvastatin 40 milliGRAM(s) Oral at bedtime  heparin  Injectable 5000 Unit(s) SubCutaneous every 12 hours    MEDICATIONS  (PRN):        CAPILLARY BLOOD GLUCOSE        I&O's Summary    01 Oct 2017 07:01  -  02 Oct 2017 07:00  --------------------------------------------------------  IN: 1010 mL / OUT: 250 mL / NET: 760 mL      PE  General: NAD, sleeping in bed, reported to have been up all night trying to get out of bed  HEENT: normocephalic, atraumatic  Card: harsh systolic murmur, no JVD, regular rate  Pulm: b/l rales diffusely, decreased breath sounds at bases b/l  GI: nontender, +BS  Extremities: dialysis fistula in left arm, no clubbing, cyanosis, edema  Skin: no rashes    LABS:  (10-01 @ 21:42)                        8.5  13.04 )-----------( 199                 26.7    Neutrophils = -- (--%)  Lymphocytes = -- (--%)  Eosinophils = -- (--%)  Basophils = -- (--%)  Monocytes = -- (--%)  Bands = --%    WBC Trend: 13.04<--, 12.23<--, 11.1<--  Hb Trend: 8.5<--, 8.7<--, 9.6<--, 8.7<--, 9.7<--  Plt Trend: 199<--, 163<--, 107<--, 103<--, 127<--  10-01    143  |  97  |  50<H>  ----------------------------<  148<H>  4.1   |  22  |  3.67<H>    Ca    8.2<L>      01 Oct 2017 21:42  Phos  2.9     10-01  Mg     2.2     10-01    TPro  5.8<L>  /  Alb  2.1<L>  /  TBili  0.6  /  DBili  x   /  AST  80<H>  /  ALT  36  /  AlkPhos  522<H>  10-01    Creatinine Trend: 3.67<--, 4.99<--, 3.69<--, 5.18<--, 4.29<--, 6.06<--  ( 30 Sep 2017 15:20 )   PT: 13.7 sec;   INR: 1.21 ratio;       PTT:31.5 sec          Microbiology:  Urine Cx:  Blood Cx:    RADIOLOGY & ADDITIONAL TESTS:  X- Ray:  CT:  Ultrasound:  [ ] imaging personally reviewed and interpreted by me    Consultant(s) Notes Reviewed:      Care Discussed with Consultants/Other Providers:

## 2017-10-02 NOTE — PROGRESS NOTE ADULT - PROBLEM SELECTOR PLAN 3
patient was hyperactive over the weekend. Etiology likely multifactorial  -ABG done, not hypoxic or hypercarbic  -c/w supportive care  -assistance with meals  -pharmacological intervention if patient is unable to be redirected or behavior is hindering medical care, long QT before  -consider a Geriatric Medicine evaluation on Monday

## 2017-10-02 NOTE — CONSULT NOTE ADULT - SUBJECTIVE AND OBJECTIVE BOX
HPI:  Pt is a 87M with ESRD on HD, CAD s/p multiple stents (last in 3/2014), AAA s/p repair, A fib with PPM (no AC due to falls) and ablation in 2016, HFpEF (last TTE 9/28), severe AS and MR, depression, dementia (AOx1 at baseline), hypothyroidism, HTN, DLBCL currently on rituximab, malignant pleural effusion s/p Pleurx 9/5, recently admitted 9/17-18 for hypotension after pleurx exchange, returning to hospital with fall and increased lethargy at home, admitted to MICU for septic shock requiring levophed on 9/25 with malodorous cloudy pleural fluid. He was initially treated with vancomycin and Zosyn. CT chest showed loculated left sided pleural effusion and moderate right pleural effusion. Thoracic surgery was consulted and pleurx was placed on continuous wall suction until 9/27 when it fell out of chest. The patient was transferred to the medicine floors on 9/29. Midodrine was started and titrated up to 30mg TID in the MICU prior to transfer. Levophed had been stopped on 9/28. Zosyn stopped after 7 days, continued on vancomycin by level. Pt is followed by thoracic surgery with plan for likely decortication per pulmonary team. Also of note, abdominal US showed pancreatic and CBD dilatation. Total bilirubin has remained normal, alk phos trended up now trending back down with LFT's.     ED:  T(C): 36.4 (25 Sep 2017 10:52), Max: 36.4 (25 Sep 2017 10:49)  T(F): 97.6 (25 Sep 2017 10:52), Max: 97.6 (25 Sep 2017 10:52)  HR: 70 (25 Sep 2017 15:15) (70 - 99)  BP: 93/53 (25 Sep 2017 15:15) (78/47 - 99/45)  BP(mean): --  RR: 24 (25 Sep 2017 15:15) (24 - 26)  SpO2: 100% (25 Sep 2017 15:15) (95% - 100%)    Pt was given acetaminopehn IVPG 1g , Zosyn 3.375g in dextrose 5% IV, Vanc 1000mg IVPG, Adacel 0.5mg IM, 2 boluses of normal saline totaling 750 mL. (25 Sep 2017 15:50)    ROS:  CONSTITUTIONAL: No fever, weight loss, or fatigue  EYES: No eye pain, visual disturbances, or discharge  ENMT:  No difficulty hearing, tinnitus, vertigo; No sinus or throat pain  RESPIRATORY: No cough, wheezing, chills or hemoptysis; No shortness of breath  CARDIOVASCULAR: No chest pain, palpitations, dizziness, or leg swelling  GASTROINTESTINAL: No abdominal or epigastric pain. No nausea, vomiting, or hematemesis; No diarrhea or constipation. No melena or hematochezia.  GENITOURINARY: No dysuria, frequency, hematuria, or incontinence  NEUROLOGICAL: No headaches, loss of strength, numbness, or tremors  SKIN: No itching, burning, rashes, or lesions   LYMPH NODES: No enlarged glands  ENDOCRINE: No heat or cold intolerance; No polydipsia or polyuria  MUSCULOSKELETAL: No joint pain or swelling;   PSYCHIATRIC: Denies depression, anxiety  HEME/LYMPH: No easy bruising, or bleeding gums  ALLERGY AND IMMUNOLOGIC: No hives or eczema    PAST MEDICAL & SURGICAL HISTORY:  Myocardial infarct: h/o X3  GERD (gastroesophageal reflux disease)  Gout  AAA (abdominal aortic aneurysm): per cardiology eval report  Hemodialysis patient  Lymphoma, non-Hodgkin's: h/o diffuse B cell  Stroke: mild  Asthma: h/o  Pleural effusion  Hepatitis  Atrial fibrillation: 11/2014  Chronic kidney disease: on dialysis MWF  Prostate cancer: s/p seeds  [place] ([place] of [place])  Kidney Stones s/p ureteroscopy  CAD (Coronary Artery Disease): 13 stents placed 3/2014  Hypercholesteremia  HTN (Hypertension)  S/P Radiation > 12 Weeks prostatic seeds placed 10 yrs ago  S/P Cataract Surgery  S/P Cystoscopy  S/P Inguinal Hernia  repair bilat  S/P Parathyroidectomy  S/P Hip Replacement bilat  S/P Cardiac Cath: 8 stents placed 3/2014, per pt he thinks he had 3 stents placed 15 years prior also.      Allergies    amlodipine (Rash)  Benadryl (Other)  Brilinta (Unknown)  Effient (Unknown)  Multaq (Other)    Intolerances        ANTIMICROBIALS:      OTHER MEDS:  levothyroxine Injectable 12.5 MICROGram(s) IV Push daily  lactobacillus acidophilus 1 Tablet(s) Oral daily  aspirin enteric coated 81 milliGRAM(s) Oral daily  nystatin Powder 1 Application(s) Topical every 8 hours  Nephro-amy 1 Tablet(s) Oral daily  midodrine 30 milliGRAM(s) Oral every 8 hours  epoetin terry Injectable 4000 Unit(s) IV Push <User Schedule>  buDESOnide   0.5 milliGRAM(s) Respule 0.5 milliGRAM(s) Inhalation every 12 hours  atorvastatin 40 milliGRAM(s) Oral at bedtime  heparin  Injectable 5000 Unit(s) SubCutaneous every 12 hours      SOCIAL HISTORY:    FAMILY HISTORY:  No pertinent family history      Drug Dosing Weight  Height (cm): 180.34 (25 Sep 2017 17:23)  Weight (kg): 75.2 (25 Sep 2017 17:23)  BMI (kg/m2): 23.1 (25 Sep 2017 17:23)  BSA (m2): 1.95 (25 Sep 2017 17:23)      Vital Signs Last 24 Hrs  T(C): 36.6 (02 Oct 2017 10:59), Max: 36.8 (01 Oct 2017 15:55)  T(F): 97.8 (02 Oct 2017 10:59), Max: 98.3 (01 Oct 2017 15:55)  HR: 71 (02 Oct 2017 11:17) (69 - 81)  BP: 93/55 (02 Oct 2017 11:17) (87/41 - 105/70)  BP(mean): --  RR: 18 (02 Oct 2017 10:59) (18 - 18)  SpO2: 100% (02 Oct 2017 10:59) (96% - 100%)    PHYSICAL EXAM:  GENERAL: NAD, well-groomed, well-developed  HEAD:  Atraumatic, Normocephalic  EYES: EOMI, PERRLA, conjunctiva and sclera clear  ENMT: No tonsillar erythema, exudates, or enlargement; Moist mucous membranes, Good dentition  NECK: Supple, No JVD  NERVOUS SYSTEM: AOX3, motor and sensation grossly intact in b/l UE and b/l LE  PSYCHIATRIC: Appropriate affect and mood  CHEST/LUNG: Clear to auscultation bilaterally; No rales, rhonchi, wheezing, or rubs  HEART: Regular rate and rhythm; No murmurs, rubs, or gallops. No LE edema  ABDOMEN: Soft, Nontender, Nondistended; Bowel sounds present  EXTREMITIES:  2+ Peripheral Pulses, No clubbing, cyanosis  SKIN: No rashes or lesions                          9.1    13.9  )-----------( 183      ( 02 Oct 2017 10:54 )             28.6       10-02    144  |  97  |  63<H>  ----------------------------<  113<H>  3.9   |  28  |  4.59<H>    Ca    7.5<L>      02 Oct 2017 10:54  Phos  3.4     10-02  Mg     2.3     10-02    TPro  5.9<L>  /  Alb  2.8<L>  /  TBili  0.7  /  DBili  x   /  AST  67<H>  /  ALT  39  /  AlkPhos  470<H>  10-02      MICROBIOLOGY:  Culture - Body Fluid with Gram Stain (09.28.17 @ 11:19)    -  Trimethoprim/Sulfamethoxazole: S <=0.5/9.5    -  Vancomycin: S 2    -  Tetra/Doxy: S 2    -  RIF- Rifampin: S <=1    -  Oxacillin: R >2    -  Penicillin: R >8    -  Moxifloxacin(Aerobic): R >4    -  Linezolid: S 1    -  Levofloxacin: R >4    -  Gentamicin: S <=1    -  Daptomycin: S 1    -  Erythromycin: R >4    -  Clindamycin: R >4    -  Ciprofloxacin: R >2    -  Cefazolin: R >16    -  Ampicillin/Sulbactam: R <=8/4    Gram Stain:   Rare polymorphonuclear leukocytes per low power field  Few Gram positive cocci in pairs per oil power field    Specimen Source: .Body Fluid    Culture Results:   Numerous Methicillin resistant Staphylococcus aureus    Organism Identification: Methicillin resistant Staphylococcus aureus    Organism: Methicillin resistant Staphylococcus aureus    Method Type: SHAR    Culture - Blood (09.27.17 @ 05:46)    Specimen Source: .Blood Blood-Peripheral    Culture Results:   No growth at 5 days.      RADIOLOGY:  < from: CT Chest No Cont (09.25.17 @ 16:31) >  IMPRESSION: Pulmonary edema with bilateral pleural effusions, moderate on   right and small but loculated on the left.    < end of copied text >  < from: CT Cervical Spine No Cont (09.25.17 @ 16:30) >    CT BRAIN: Limited by streak artifact from metallic wire overlying the   patient'sforehead, motion, and out of field artifact.    No mass effect, gross evidence for intracranial hemorrhage, or evidence   for acute territorial infarct.    Extensive white matter microvascular ischemic disease    No displaced calvarial fracture.    CT CERVICAL SPINE: Limited by motion.    No gross evidence for acute fracture or traumatic subluxation. No   prevertebral soft tissue swelling.      < end of copied text > HPI:  Pt is a 87M with ESRD on HD, CAD s/p multiple stents (last in 3/2014), AAA s/p repair, A fib with PPM (no AC due to falls) and ablation in 2016, HFpEF (last TTE 9/28), severe AS and MR, depression, dementia (AOx1 at baseline), hypothyroidism, HTN, DLBCL currently on rituximab, malignant pleural effusion s/p Pleurx 9/5, recently admitted 9/17-18 for hypotension after pleurx exchange, returning to hospital with fall and increased lethargy at home, admitted to MICU for septic shock requiring levophed on 9/25 with malodorous cloudy pleural fluid. He was initially treated with vancomycin and Zosyn. CT chest showed loculated left sided pleural effusion and moderate right pleural effusion. Thoracic surgery was consulted and pleurx was placed on continuous wall suction until 9/27 when it fell out of chest. The patient was transferred to the medicine floors on 9/29. Midodrine was started and titrated up to 30mg TID in the MICU prior to transfer. Levophed had been stopped on 9/28. Zosyn stopped after 7 days, continued on vancomycin by level. Pt is followed by thoracic surgery with plan for likely decortication per pulmonary team. Also of note, abdominal US showed pancreatic and CBD dilatation. Total bilirubin has remained normal, alk phos trended up now trending back down with LFT's. Pt is currently obtunded and not at his baseline per primary team at bedside; he is ordered for a CT head.     ED:  T(C): 36.4 (25 Sep 2017 10:52), Max: 36.4 (25 Sep 2017 10:49)  T(F): 97.6 (25 Sep 2017 10:52), Max: 97.6 (25 Sep 2017 10:52)  HR: 70 (25 Sep 2017 15:15) (70 - 99)  BP: 93/53 (25 Sep 2017 15:15) (78/47 - 99/45)  BP(mean): --  RR: 24 (25 Sep 2017 15:15) (24 - 26)  SpO2: 100% (25 Sep 2017 15:15) (95% - 100%)    Pt was given acetaminopehn IVPG 1g , Zosyn 3.375g in dextrose 5% IV, Vanc 1000mg IVPG, Adacel 0.5mg IM, 2 boluses of normal saline totaling 750 mL. (25 Sep 2017 15:50)    ROS:  unable to obtain as pt is obtunded (baseline A&Ox1). Primary team aware, CT head ordered.     PAST MEDICAL & SURGICAL HISTORY:  Myocardial infarct: h/o X3  GERD (gastroesophageal reflux disease)  Gout  AAA (abdominal aortic aneurysm): per cardiology eval report  Hemodialysis patient  Lymphoma, non-Hodgkin's: h/o diffuse B cell  Stroke: mild  Asthma: h/o  Pleural effusion  Hepatitis  Atrial fibrillation: 11/2014  Chronic kidney disease: on dialysis MWF  Prostate cancer: s/p seeds  [place] (Hard of Hearing)  Kidney Stones s/p ureteroscopy  CAD (Coronary Artery Disease): 13 stents placed 3/2014  Hypercholesteremia  HTN (Hypertension)  S/P Radiation > 12 Weeks prostatic seeds placed 10 yrs ago  S/P Cataract Surgery  S/P Cystoscopy  S/P Inguinal Hernia  repair bilat  S/P Parathyroidectomy  S/P Hip Replacement bilat  S/P Cardiac Cath: 8 stents placed 3/2014, per pt he thinks he had 3 stents placed 15 years prior also.      Allergies    amlodipine (Rash)  Benadryl (Other)  Brilinta (Unknown)  Effient (Unknown)  Multaq (Other)    Intolerances        ANTIMICROBIALS:      OTHER MEDS:  levothyroxine Injectable 12.5 MICROGram(s) IV Push daily  lactobacillus acidophilus 1 Tablet(s) Oral daily  aspirin enteric coated 81 milliGRAM(s) Oral daily  nystatin Powder 1 Application(s) Topical every 8 hours  Nephro-amy 1 Tablet(s) Oral daily  midodrine 30 milliGRAM(s) Oral every 8 hours  epoetin terry Injectable 4000 Unit(s) IV Push <User Schedule>  buDESOnide   0.5 milliGRAM(s) Respule 0.5 milliGRAM(s) Inhalation every 12 hours  atorvastatin 40 milliGRAM(s) Oral at bedtime  heparin  Injectable 5000 Unit(s) SubCutaneous every 12 hours      SOCIAL HISTORY:    FAMILY HISTORY:  No pertinent family history      Drug Dosing Weight  Height (cm): 180.34 (25 Sep 2017 17:23)  Weight (kg): 75.2 (25 Sep 2017 17:23)  BMI (kg/m2): 23.1 (25 Sep 2017 17:23)  BSA (m2): 1.95 (25 Sep 2017 17:23)      Vital Signs Last 24 Hrs  T(C): 36.6 (02 Oct 2017 10:59), Max: 36.8 (01 Oct 2017 15:55)  T(F): 97.8 (02 Oct 2017 10:59), Max: 98.3 (01 Oct 2017 15:55)  HR: 71 (02 Oct 2017 11:17) (69 - 81)  BP: 93/55 (02 Oct 2017 11:17) (87/41 - 105/70)  BP(mean): --  RR: 18 (02 Oct 2017 10:59) (18 - 18)  SpO2: 100% (02 Oct 2017 10:59) (96% - 100%)    PHYSICAL EXAM:  GENERAL: lying on left side in bed, dysarthric, conversant, NAD  HEAD:  Atraumatic, Normocephalic  EYES: MMM, conj clear, no scleral icterus  ENMT: No tonsillar erythema, exudates, or enlargement  NECK: JVD  NERVOUS SYSTEM: AOX3, motor and sensation grossly intact in b/l UE and b/l LE  PSYCHIATRIC: Appropriate affect and mood  CHEST/LUNG: reduced breath sounds at the bases bilaterally, crackles residential up L lung.  HEART: RRR no MRG  ABDOMEN: obese abd, soft, ND, NBS  EXTREMITIES:  2+ Peripheral Pulses, No clubbing, cyanosis  SKIN: multiple wounds bilateral upper and lower extremities likely 2/2 fall, no ulcers, wounds mostly dressed, no evidence of surrounding cellulitis around wounds.                           9.1    13.9  )-----------( 183      ( 02 Oct 2017 10:54 )             28.6       10-02    144  |  97  |  63<H>  ----------------------------<  113<H>  3.9   |  28  |  4.59<H>    Ca    7.5<L>      02 Oct 2017 10:54  Phos  3.4     10-02  Mg     2.3     10-02    TPro  5.9<L>  /  Alb  2.8<L>  /  TBili  0.7  /  DBili  x   /  AST  67<H>  /  ALT  39  /  AlkPhos  470<H>  10-02      MICROBIOLOGY:  Culture - Body Fluid with Gram Stain (09.28.17 @ 11:19)    -  Trimethoprim/Sulfamethoxazole: S <=0.5/9.5    -  Vancomycin: S 2    -  Tetra/Doxy: S 2    -  RIF- Rifampin: S <=1    -  Oxacillin: R >2    -  Penicillin: R >8    -  Moxifloxacin(Aerobic): R >4    -  Linezolid: S 1    -  Levofloxacin: R >4    -  Gentamicin: S <=1    -  Daptomycin: S 1    -  Erythromycin: R >4    -  Clindamycin: R >4    -  Ciprofloxacin: R >2    -  Cefazolin: R >16    -  Ampicillin/Sulbactam: R <=8/4    Gram Stain:   Rare polymorphonuclear leukocytes per low power field  Few Gram positive cocci in pairs per oil power field    Specimen Source: .Body Fluid    Culture Results:   Numerous Methicillin resistant Staphylococcus aureus    Organism Identification: Methicillin resistant Staphylococcus aureus    Organism: Methicillin resistant Staphylococcus aureus    Method Type: SHAR    Culture - Blood (09.27.17 @ 05:46)    Specimen Source: .Blood Blood-Peripheral    Culture Results:   No growth at 5 days.      RADIOLOGY:  < from: CT Chest No Cont (09.25.17 @ 16:31) >  IMPRESSION: Pulmonary edema with bilateral pleural effusions, moderate on   right and small but loculated on the left.    < end of copied text >  < from: CT Cervical Spine No Cont (09.25.17 @ 16:30) >    CT BRAIN: Limited by streak artifact from metallic wire overlying the   patient'sforehead, motion, and out of field artifact.    No mass effect, gross evidence for intracranial hemorrhage, or evidence   for acute territorial infarct.    Extensive white matter microvascular ischemic disease    No displaced calvarial fracture.    CT CERVICAL SPINE: Limited by motion.    No gross evidence for acute fracture or traumatic subluxation. No   prevertebral soft tissue swelling.      < end of copied text > HPI:  Pt is a 87M with ESRD on HD, CAD s/p multiple stents (last in 3/2014), AAA s/p repair, A fib with PPM (no AC due to falls) and ablation in 2016, HFpEF (last TTE 9/28), severe AS and MR, depression, dementia (AOx1 at baseline), hypothyroidism, HTN, DLBCL currently on rituximab, malignant pleural effusion s/p Pleurx 9/5, recently admitted 9/17-18 for hypotension after pleurx exchange, returning to hospital with fall and increased lethargy at home, admitted to MICU for septic shock requiring levophed on 9/25 with malodorous cloudy pleural fluid. He was initially treated with vancomycin and Zosyn. CT chest showed loculated left sided pleural effusion and moderate right pleural effusion. Thoracic surgery was consulted and pleurx was placed on continuous wall suction until 9/27 when it fell out of chest. The patient was transferred to the medicine floors on 9/29. Midodrine was started and titrated up to 30mg TID in the MICU prior to transfer. Levophed had been stopped on 9/28. Zosyn stopped after 7 days, continued on vancomycin by level. Pt is followed by thoracic surgery with plan for likely decortication per pulmonary team. Also of note, abdominal US showed pancreatic and CBD dilatation. Total bilirubin has remained normal, alk phos trended up now trending back down with LFT's. Pt is currently obtunded and not at his baseline per primary team at bedside; he is ordered for a CT head.     ED:  T(C): 36.4 (25 Sep 2017 10:52), Max: 36.4 (25 Sep 2017 10:49)  T(F): 97.6 (25 Sep 2017 10:52), Max: 97.6 (25 Sep 2017 10:52)  HR: 70 (25 Sep 2017 15:15) (70 - 99)  BP: 93/53 (25 Sep 2017 15:15) (78/47 - 99/45)  BP(mean): --  RR: 24 (25 Sep 2017 15:15) (24 - 26)  SpO2: 100% (25 Sep 2017 15:15) (95% - 100%)    Pt was given acetaminopehn IVPG 1g , Zosyn 3.375g in dextrose 5% IV, Vanc 1000mg IVPG, Adacel 0.5mg IM, 2 boluses of normal saline totaling 750 mL. (25 Sep 2017 15:50)    ROS:  unable to obtain as pt is obtunded (baseline A&Ox1). Primary team aware, CT head ordered.     PAST MEDICAL & SURGICAL HISTORY:  Myocardial infarct: h/o X3  GERD (gastroesophageal reflux disease)  Gout  AAA (abdominal aortic aneurysm): per cardiology eval report  Hemodialysis patient  Lymphoma, non-Hodgkin's: h/o diffuse B cell  Stroke: mild  Asthma: h/o  Pleural effusion  Hepatitis  Atrial fibrillation: 11/2014  Chronic kidney disease: on dialysis MWF  Prostate cancer: s/p seeds  [place] ([place] of [place])  Kidney Stones s/p ureteroscopy  CAD (Coronary Artery Disease): 13 stents placed 3/2014  Hypercholesteremia  HTN (Hypertension)  S/P Radiation > 12 Weeks prostatic seeds placed 10 yrs ago  S/P Cataract Surgery  S/P Cystoscopy  S/P Inguinal Hernia  repair bilat  S/P Parathyroidectomy  S/P Hip Replacement bilat  S/P Cardiac Cath: 8 stents placed 3/2014, per pt he thinks he had 3 stents placed 15 years prior also.      Allergies    amlodipine (Rash)  Benadryl (Other)  Brilinta (Unknown)  Effient (Unknown)  Multaq (Other)    Intolerances        ANTIMICROBIALS:      OTHER MEDS:  levothyroxine Injectable 12.5 MICROGram(s) IV Push daily  lactobacillus acidophilus 1 Tablet(s) Oral daily  aspirin enteric coated 81 milliGRAM(s) Oral daily  nystatin Powder 1 Application(s) Topical every 8 hours  Nephro-amy 1 Tablet(s) Oral daily  midodrine 30 milliGRAM(s) Oral every 8 hours  epoetin terry Injectable 4000 Unit(s) IV Push <User Schedule>  buDESOnide   0.5 milliGRAM(s) Respule 0.5 milliGRAM(s) Inhalation every 12 hours  atorvastatin 40 milliGRAM(s) Oral at bedtime  heparin  Injectable 5000 Unit(s) SubCutaneous every 12 hours      SOCIAL HISTORY:Not obtainable    FAMILY HISTORY:  No pertinent family history      Drug Dosing Weight  Height (cm): 180.34 (25 Sep 2017 17:23)  Weight (kg): 75.2 (25 Sep 2017 17:23)  BMI (kg/m2): 23.1 (25 Sep 2017 17:23)  BSA (m2): 1.95 (25 Sep 2017 17:23)      Vital Signs Last 24 Hrs  T(C): 36.6 (02 Oct 2017 10:59), Max: 36.8 (01 Oct 2017 15:55)  T(F): 97.8 (02 Oct 2017 10:59), Max: 98.3 (01 Oct 2017 15:55)  HR: 71 (02 Oct 2017 11:17) (69 - 81)  BP: 93/55 (02 Oct 2017 11:17) (87/41 - 105/70)  BP(mean): --  RR: 18 (02 Oct 2017 10:59) (18 - 18)  SpO2: 100% (02 Oct 2017 10:59) (96% - 100%)    PHYSICAL EXAM:  GENERAL: lying on left side in bed, dysarthric, conversant, NAD  HEAD:  Atraumatic, Normocephalic  EYES: MMM, conj clear, no scleral icterus  ENMT: No tonsillar erythema, exudates, or enlargement  NECK: JVD  NERVOUS SYSTEM: AOX3, motor and sensation grossly intact in b/l UE and b/l LE  PSYCHIATRIC: Appropriate affect and mood  CHEST/LUNG: reduced breath sounds at the bases bilaterally, crackles half-way up L lung.  HEART: RRR no MRG  ABDOMEN: obese abd, soft, ND, NBS  EXTREMITIES:  2+ Peripheral Pulses, No clubbing, cyanosis  SKIN: multiple wounds bilateral upper and lower extremities likely 2/2 fall, no ulcers, wounds mostly dressed, no evidence of surrounding cellulitis around wounds.                           9.1    13.9  )-----------( 183      ( 02 Oct 2017 10:54 )             28.6       10-02    144  |  97  |  63<H>  ----------------------------<  113<H>  3.9   |  28  |  4.59<H>    Ca    7.5<L>      02 Oct 2017 10:54  Phos  3.4     10-02  Mg     2.3     10-02    TPro  5.9<L>  /  Alb  2.8<L>  /  TBili  0.7  /  DBili  x   /  AST  67<H>  /  ALT  39  /  AlkPhos  470<H>  10-02      MICROBIOLOGY:  Culture - Body Fluid with Gram Stain (09.28.17 @ 11:19)    -  Trimethoprim/Sulfamethoxazole: S <=0.5/9.5    -  Vancomycin: S 2    -  Tetra/Doxy: S 2    -  RIF- Rifampin: S <=1    -  Oxacillin: R >2    -  Penicillin: R >8    -  Moxifloxacin(Aerobic): R >4    -  Linezolid: S 1    -  Levofloxacin: R >4    -  Gentamicin: S <=1    -  Daptomycin: S 1    -  Erythromycin: R >4    -  Clindamycin: R >4    -  Ciprofloxacin: R >2    -  Cefazolin: R >16    -  Ampicillin/Sulbactam: R <=8/4    Gram Stain:   Rare polymorphonuclear leukocytes per low power field  Few Gram positive cocci in pairs per oil power field    Specimen Source: .Body Fluid    Culture Results:   Numerous Methicillin resistant Staphylococcus aureus    Organism Identification: Methicillin resistant Staphylococcus aureus    Organism: Methicillin resistant Staphylococcus aureus    Method Type: SHAR    Culture - Blood (09.27.17 @ 05:46)    Specimen Source: .Blood Blood-Peripheral    Culture Results:   No growth at 5 days.      RADIOLOGY:  < from: CT Chest No Cont (09.25.17 @ 16:31) >  IMPRESSION: Pulmonary edema with bilateral pleural effusions, moderate on   right and small but loculated on the left.    < end of copied text >  < from: CT Cervical Spine No Cont (09.25.17 @ 16:30) >    CT BRAIN: Limited by streak artifact from metallic wire overlying the   patient'sforehead, motion, and out of field artifact.    No mass effect, gross evidence for intracranial hemorrhage, or evidence   for acute territorial infarct.    Extensive white matter microvascular ischemic disease    No displaced calvarial fracture.    CT CERVICAL SPINE: Limited by motion.    No gross evidence for acute fracture or traumatic subluxation. No   prevertebral soft tissue swelling.      < end of copied text >

## 2017-10-03 DIAGNOSIS — Z71.89 OTHER SPECIFIED COUNSELING: ICD-10-CM

## 2017-10-03 LAB
ALBUMIN SERPL ELPH-MCNC: 2.5 G/DL — LOW (ref 3.3–5)
ALP SERPL-CCNC: 421 U/L — HIGH (ref 40–120)
ALT FLD-CCNC: 34 U/L — SIGNIFICANT CHANGE UP (ref 10–45)
ANION GAP SERPL CALC-SCNC: 23 MMOL/L — HIGH (ref 5–17)
AST SERPL-CCNC: 64 U/L — HIGH (ref 10–40)
BILIRUB SERPL-MCNC: 0.7 MG/DL — SIGNIFICANT CHANGE UP (ref 0.2–1.2)
BUN SERPL-MCNC: 81 MG/DL — HIGH (ref 7–23)
CALCIUM SERPL-MCNC: 7.6 MG/DL — LOW (ref 8.4–10.5)
CHLORIDE SERPL-SCNC: 97 MMOL/L — SIGNIFICANT CHANGE UP (ref 96–108)
CO2 SERPL-SCNC: 21 MMOL/L — LOW (ref 22–31)
CREAT SERPL-MCNC: 5.48 MG/DL — HIGH (ref 0.5–1.3)
CULTURE RESULTS: SIGNIFICANT CHANGE UP
CULTURE RESULTS: SIGNIFICANT CHANGE UP
GLUCOSE SERPL-MCNC: 81 MG/DL — SIGNIFICANT CHANGE UP (ref 70–99)
HCT VFR BLD CALC: 25.9 % — LOW (ref 39–50)
HGB BLD-MCNC: 8.3 G/DL — LOW (ref 13–17)
MCHC RBC-ENTMCNC: 31.2 PG — SIGNIFICANT CHANGE UP (ref 27–34)
MCHC RBC-ENTMCNC: 32 GM/DL — SIGNIFICANT CHANGE UP (ref 32–36)
MCV RBC AUTO: 97.4 FL — SIGNIFICANT CHANGE UP (ref 80–100)
ORGANISM # SPEC MICROSCOPIC CNT: SIGNIFICANT CHANGE UP
ORGANISM # SPEC MICROSCOPIC CNT: SIGNIFICANT CHANGE UP
PLATELET # BLD AUTO: 233 K/UL — SIGNIFICANT CHANGE UP (ref 150–400)
POTASSIUM SERPL-MCNC: 4.1 MMOL/L — SIGNIFICANT CHANGE UP (ref 3.5–5.3)
POTASSIUM SERPL-SCNC: 4.1 MMOL/L — SIGNIFICANT CHANGE UP (ref 3.5–5.3)
PROT SERPL-MCNC: 5.7 G/DL — LOW (ref 6–8.3)
RBC # BLD: 2.66 M/UL — LOW (ref 4.2–5.8)
RBC # FLD: 18.8 % — HIGH (ref 10.3–14.5)
SODIUM SERPL-SCNC: 141 MMOL/L — SIGNIFICANT CHANGE UP (ref 135–145)
SPECIMEN SOURCE: SIGNIFICANT CHANGE UP
SPECIMEN SOURCE: SIGNIFICANT CHANGE UP
VANCOMYCIN FLD-MCNC: 18.3 UG/ML — SIGNIFICANT CHANGE UP
WBC # BLD: 14.07 K/UL — HIGH (ref 3.8–10.5)
WBC # FLD AUTO: 14.07 K/UL — HIGH (ref 3.8–10.5)

## 2017-10-03 PROCEDURE — 99233 SBSQ HOSP IP/OBS HIGH 50: CPT | Mod: GC

## 2017-10-03 PROCEDURE — 99233 SBSQ HOSP IP/OBS HIGH 50: CPT

## 2017-10-03 PROCEDURE — 90935 HEMODIALYSIS ONE EVALUATION: CPT | Mod: GC

## 2017-10-03 RX ORDER — VANCOMYCIN HCL 1 G
750 VIAL (EA) INTRAVENOUS ONCE
Qty: 0 | Refills: 0 | Status: COMPLETED | OUTPATIENT
Start: 2017-10-03 | End: 2017-10-03

## 2017-10-03 RX ADMIN — Medication 81 MILLIGRAM(S): at 22:56

## 2017-10-03 RX ADMIN — Medication 0.5 MILLIGRAM(S): at 05:00

## 2017-10-03 RX ADMIN — ATORVASTATIN CALCIUM 40 MILLIGRAM(S): 80 TABLET, FILM COATED ORAL at 22:56

## 2017-10-03 RX ADMIN — HEPARIN SODIUM 5000 UNIT(S): 5000 INJECTION INTRAVENOUS; SUBCUTANEOUS at 06:01

## 2017-10-03 RX ADMIN — Medication 1 TABLET(S): at 22:56

## 2017-10-03 RX ADMIN — NYSTATIN CREAM 1 APPLICATION(S): 100000 CREAM TOPICAL at 22:58

## 2017-10-03 RX ADMIN — Medication 1 TABLET(S): at 22:57

## 2017-10-03 RX ADMIN — MIDODRINE HYDROCHLORIDE 30 MILLIGRAM(S): 2.5 TABLET ORAL at 08:12

## 2017-10-03 RX ADMIN — Medication 150 MILLIGRAM(S): at 14:48

## 2017-10-03 RX ADMIN — Medication 12.5 MICROGRAM(S): at 06:01

## 2017-10-03 RX ADMIN — ERYTHROPOIETIN 4000 UNIT(S): 10000 INJECTION, SOLUTION INTRAVENOUS; SUBCUTANEOUS at 09:36

## 2017-10-03 RX ADMIN — Medication 0.5 MILLIGRAM(S): at 17:19

## 2017-10-03 RX ADMIN — NYSTATIN CREAM 1 APPLICATION(S): 100000 CREAM TOPICAL at 14:47

## 2017-10-03 RX ADMIN — NYSTATIN CREAM 1 APPLICATION(S): 100000 CREAM TOPICAL at 05:00

## 2017-10-03 NOTE — PROGRESS NOTE ADULT - ASSESSMENT
88y/o Male h/o ESRD, AAA repair admitted with altered mental status, MRSA bacteremia and non draining left pleurx.   Transferred to MICU for septic shock requiring levophed on 9/25 with malodorous cloudy pleural fluid.   9/26 Left pleurx attached to pleurovac yielding -100 cc purulent drainage.  9/27 left pleurx removed  9/28  VSS  Lt plx site  no drainage,  new prelim bc negative  9/29  Transfer 3 [facility]   9/30 remains stable with some sob on ambulation  10/2 Increased lethargy; Head CT finding:  Age-appropriate involutional and ischemic gliotic changes without change since 9/25/2017. No acute hemorrhage.   Leukocytosis 2/2 infectious process --> WBC 13.9; Continue on IV Abx Vanco post HD as per ID    10/2 Chest X-Ray finding: Bilateral layering pleural effusions obscure evaluation of the underlying lungs and appear increased compared to the prior study which may be related to patient orientation. Underlying empyema not well evaluated. Plan for IR procedure for Left pigtail placement with possible TPA, per Dr. Worley.

## 2017-10-03 NOTE — PROGRESS NOTE ADULT - SUBJECTIVE AND OBJECTIVE BOX
Patient is a 87y old  Male who presents with a chief complaint of fall 2/2 hypotension (25 Sep 2017 15:50)    Being followed by ID for empyema MRSA bacteremia    Interval history:Undergoing HD  lethargic but answers queries appropriately  Was more alert per PCA earlier  CTVS input noted-possible pigtail later today  No other acute events      ROS:  Denies complaints      Antimicrobials:    vancomycin  IVPB 750 milliGRAM(s) IV Intermittent once-post HD      Vital Signs Last 24 Hrs  T(C): 36.5 (10-03-17 @ 07:14), Max: 36.6 (10-03-17 @ 04:22)  T(F): 97.7 (10-03-17 @ 07:14), Max: 97.8 (10-03-17 @ 04:22)  HR: 60 (10-03-17 @ 07:14) (60 - 71)  BP: 116/64 (10-03-17 @ 07:14) (93/55 - 116/64)  BP(mean): --  RR: 18 (10-03-17 @ 07:14) (18 - 18)  SpO2: 97% (10-03-17 @ 07:14) (97% - 98%)    Physical Exam:    Constitutional well preserved,comfortable,pleasant    HEENT PERRLA EOMI,No pallor or icterus    No oral exudate or erythema    Neck supple no JVD or LN    PPM site no erythema or tenderness    Chest Good AE,bilateral basal crackles    CVS RRR S1 S2 WNl No murmur or rub or gallop    Abd soft BS normal No tenderness no masses    Ext No cyanosis clubbing or edema  Bilateral forearm dressings  LUE AVF no tenderness    IV site no erythema tenderness or discharge    Joints no swelling or LOM    CNS as above     Lab Data:                          9.1    13.9  )-----------( 183      ( 02 Oct 2017 10:54 )             28.6       10-02    144  |  97  |  63<H>  ----------------------------<  113<H>  3.9   |  28  |  4.59<H>    Ca    7.5<L>      02 Oct 2017 10:54  Phos  3.4     10-02  Mg     2.3     10-02    TPro  5.9<L>  /  Alb  2.8<L>  /  TBili  0.7  /  DBili  x   /  AST  67<H>  /  ALT  39  /  AlkPhos  470<H>  10-02      .Blood Blood-Peripheral  10-01-17   No growth to date.  --  --      .Body Fluid  09-28-17   Numerous Methicillin resistant Staphylococcus aureus  --  Methicillin resistant Staphylococcus aureus      .Blood Blood-Venous  09-28-17   No growth at 5 days.  --  --      .Blood Blood-Peripheral  09-27-17   No growth at 5 days.  --  --              10-01 @ 21:41  --  --  --    No growth to date.  .Blood Blood-Peripheral  09-28 @ 11:19  --  --  --    Numerous Methicillin resistant Staphylococcus aureus  .Body Fluid  09-28 @ 05:53  --  --  --    No growth at 5 days.  .Blood Blood-Venous  09-27 @ 05:46  --  --  --    No growth at 5 days.  .Blood Blood-Peripheral      Vancomycin Level, Random: 18.3 ug/mL (10-03-17 @ 09:54)  Vancomycin Level, Random: 23.5 ug/mL (10-02-17 @ 10:53)            < from: CT Chest No Cont (10.02.17 @ 19:11) >    IMPRESSION:   Pulmonary edema with unchanged loculated left pleural effusion and   moderate right pleural effusion increased since 9/25/2017.         < end of copied text >

## 2017-10-03 NOTE — PROGRESS NOTE ADULT - ATTENDING COMMENTS
as above--CTS follow up-s/p removal of pleurx and ? decortication of left pleural space vs. pigtail placement of catheter as per Tia et al.  ABX to be directed by pleural and Blood samples--zosyn D8 and vanco D7-MRSA bacteremia--would consider GUADALUPE rather than TTE to r/o endocarditis--ID follow up for duration of ABX--repeat cxr pa/lat today  MS issues continue-haldol/zyprexa etc.      Morro Tran MD-Pulmonary   [phone] as above--CTS follow up-s/p removal of pleurx and ? decortication of left pleural space vs. pigtail placement of catheter as per Tia et al.  ABX to be directed by pleural and Blood samples--zosyn D9 and vanco D8-MRSA bacteremia--would consider GUADALUPE rather than TTE to r/o endocarditis--ID follow up for duration of ABX--repeat cxr pa/lat revealed layering fluid left grtr than right--for IR pig tail catheter  MS issues continue-haldol/zyprexa etc.      Morro Tran MD-Pulmonary   [phone]

## 2017-10-03 NOTE — CONSULT NOTE ADULT - SUBJECTIVE AND OBJECTIVE BOX
Hematology Consult Note    HPI:  88yo M with a PMH ESRD on HD MWF, HFpEF s/p PPM and ablation 2016, and DLBCL on Rituximab (last dose 9/12/17),who was admitted for witnessed mechanical fall by home nurse. Per Pt's family patient had become increasingly lethargic x 2 days.  Family reported that the VNS nurse proceeded to drain cloudy, foul smelling fluid from Pleurx catheter prompting them to being them into the hospital. His DLBCL is managed by Dr Sinha, who is away but asked the consult team to assess Mr Guzman. Today the patient underwent HD and had pigtail catheter placement + tpa for worsening left sided empyema. The team spoke with patient and his current wife. They expressed concern over his overall prognosis and would like a family meeting with the sons present.      PAST MEDICAL & SURGICAL HISTORY:  Myocardial infarct: h/o X3  GERD (gastroesophageal reflux disease)  Gout  AAA (abdominal aortic aneurysm): per cardiology eval report  Hemodialysis patient  Lymphoma, non-Hodgkin's: h/o diffuse B cell  Stroke: mild  Asthma: h/o  Pleural effusion  Hepatitis  Atrial fibrillation: 11/2014  Chronic kidney disease: on dialysis MWF  Prostate cancer: s/p seeds  [place]  Kidney Stones s/p ureteroscopy  CAD (Coronary Artery Disease): 13 stents placed 3/2014  Hypercholesteremia  HTN (Hypertension)  S/P Radiation > 12 Weeks prostatic seeds placed 10 yrs ago  S/P Cataract Surgery  S/P Cystoscopy  S/P Inguinal Hernia  repair bilat  S/P Parathyroidectomy  S/P Hip Replacement bilat  S/P Cardiac Cath: 8 stents placed 3/2014, per pt he thinks he had 3 stents placed 15 years prior also.      FAMILY HISTORY:  No pertinent family history      MEDICATIONS  (STANDING):  aspirin enteric coated 81 milliGRAM(s) Oral daily  atorvastatin 40 milliGRAM(s) Oral at bedtime  buDESOnide   0.5 milliGRAM(s) Respule 0.5 milliGRAM(s) Inhalation every 12 hours  epoetin terry Injectable 4000 Unit(s) IV Push <User Schedule>  heparin  Injectable 5000 Unit(s) SubCutaneous every 12 hours  lactobacillus acidophilus 1 Tablet(s) Oral daily  levothyroxine Injectable 12.5 MICROGram(s) IV Push daily  midodrine 30 milliGRAM(s) Oral every 8 hours  Nephro-amy 1 Tablet(s) Oral daily  nystatin Powder 1 Application(s) Topical every 8 hours      Allergies    amlodipine (Rash)  Benadryl (Other)  Brilinta (Unknown)  Effient (Unknown)  Multaq (Other)      REVIEW OF SYSTEMS:    CONSTITUTIONAL: No fevers or chills  EYES/ENT: No visual changes;  No vertigo or throat pain   NECK: No pain or stiffness  RESPIRATORY: No cough, wheezing; No shortness of breath  CARDIOVASCULAR: No chest pain or palpitations  GASTROINTESTINAL: No abdominal or epigastric pain. No nausea, vomiting, or hematemesis  GENITOURINARY: No dysuria, frequency or hematuria  NEUROLOGICAL: No numbness or weakness  SKIN: No itching, burning, rashes, or lesions   All other review of systems is negative unless indicated above.        T(F): 97.3 (10-03-17 @ 13:39), Max: 97.8 (10-03-17 @ 04:22)  HR: 69 (10-03-17 @ 13:39)  BP: 104/62 (10-03-17 @ 13:39)  RR: 18 (10-03-17 @ 13:39)  SpO2: 100% (10-03-17 @ 13:39)  Wt(kg): --    General: NAD, chronically ill appearing  HEENT: normocephalic, atraumatic  Card: harsh systolic murmur, no JVD, regular rate  Pulm: b/l rales diffusely, decreased breath sounds at bases b/l; left sided catheter   GI: nontender, +BS  Extremities: dialysis fistula in left arm, no clubbing, cyanosis, edema  Skin: no rashes                          8.3    14.07 )-----------( 233      ( 03 Oct 2017 12:27 )             25.9       10-02    144  |  97  |  63<H>  ----------------------------<  113<H>  3.9   |  28  |  4.59<H>    Ca    7.5<L>      02 Oct 2017 10:54  Phos  3.4     10-02  Mg     2.3     10-02    TPro  5.9<L>  /  Alb  2.8<L>  /  TBili  0.7  /  DBili  x   /  AST  67<H>  /  ALT  39  /  AlkPhos  470<H>  10-02

## 2017-10-03 NOTE — PROGRESS NOTE ADULT - SUBJECTIVE AND OBJECTIVE BOX
Progress Note:   · Provider Specialty	Internal Medicine	      · Subjective and Objective: 	  Patient is a 87y old  Male who presents with a chief complaint of fall 2/2 hypotension, found to have MRSA bacteremia with worsening b/l plef and L empyema      SUBJECTIVE / OVERNIGHT EVENTS: No acute events overnight. HD today and pigtail catheter placement today with tpa for worsening left sided empyema. Patient was lethargic yesterday morning with left pupil larger than right. CT head was done which showed no acute hemorrhage or infarct.    MEDICATIONS  (STANDING):  levothyroxine Injectable 12.5 MICROGram(s) IV Push daily  lactobacillus acidophilus 1 Tablet(s) Oral daily  aspirin enteric coated 81 milliGRAM(s) Oral daily  nystatin Powder 1 Application(s) Topical every 8 hours  Nephro-amy 1 Tablet(s) Oral daily  midodrine 30 milliGRAM(s) Oral every 8 hours  epoetin terry Injectable 4000 Unit(s) IV Push <User Schedule>  buDESOnide   0.5 milliGRAM(s) Respule 0.5 milliGRAM(s) Inhalation every 12 hours  atorvastatin 40 milliGRAM(s) Oral at bedtime  heparin  Injectable 5000 Unit(s) SubCutaneous every 12 hours      I&O's Summary    02 Oct 2017 07:01  -  03 Oct 2017 07:00  --------------------------------------------------------  IN: 11422 mL / OUT: 0 mL / NET: 1120 mL      PE  General: NAD  HEENT: normocephalic, atraumatic  Card: harsh systolic murmur, no JVD, regular rate  Pulm: b/l rales diffusely, decreased breath sounds at bases b/l  GI: nontender, +BS  Extremities: dialysis fistula in left arm, no clubbing, cyanosis, edema  Skin: no rashes    LABS as of 10/2                      9.1  13.9 )-----------( 183                 28.6        WBC Trend: 13.9<--13.04<--, 12.23<--, 11.1<--  Hb Trend:9.1<-- 8.5<--, 8.7<--, 9.6<--, 8.7<--, 9.7<--  Plt Trend: 183<--199<--, 163<--, 107<--, 103<--, 127<--  10-01    144  |  97  |  63<H>  ----------------------------<  113<H>  3.9   |  28  |  4.59<H>    Ca    7.5<L>      02 Oct 2017   Phos  3.4     10-02  Mg     2.3     10-02      Creatinine Trend: 4.59<--3.67<--, 4.99<--, 3.69<--, 5.18<--, 4.29<--, 6.06<--  ( 30 Sep 2017 15:20 )   PT: 13.7 sec;   INR: 1.21 ratio;       PTT:31.5 sec          Microbiology:  Blood Cx: pending from 10/1    RADIOLOGY & ADDITIONAL TESTS:  X- Ray: < from: Xray Chest 1 View AP- PORTABLE-Urgent (10.02.17 @ 11:53) >  IMPRESSION:   Bilateral layering pleural effusions obscure evaluation of the underlying   lungs and appear increased compared to the prior study which may be   related to patient orientation. Underlying empyema not well evaluated    < end of copied text >    CT head: < from: CT Head No Cont (10.02.17 @ 15:28) >  IMPRESSION:    Age-appropriateinvolutional and ischemic gliotic changes without change   since 9/25/2017. No acute hemorrhage    < end of copied text > Progress Note:   · Provider Specialty	Internal Medicine	      · Subjective and Objective: 	  Patient is a 87y old  Male who presents with a chief complaint of fall 2/2 hypotension, found to have MRSA bacteremia with worsening b/l plef and L empyema      SUBJECTIVE / OVERNIGHT EVENTS: No acute events overnight. HD today and pigtail catheter placement today with tpa for worsening left sided empyema. Patient was lethargic yesterday morning with left pupil larger than right. CT head was done which showed no acute hemorrhage or infarct.    MEDICATIONS  (STANDING):  levothyroxine Injectable 12.5 MICROGram(s) IV Push daily  lactobacillus acidophilus 1 Tablet(s) Oral daily  aspirin enteric coated 81 milliGRAM(s) Oral daily  nystatin Powder 1 Application(s) Topical every 8 hours  Nephro-amy 1 Tablet(s) Oral daily  midodrine 30 milliGRAM(s) Oral every 8 hours  epoetin terry Injectable 4000 Unit(s) IV Push <User Schedule>  buDESOnide   0.5 milliGRAM(s) Respule 0.5 milliGRAM(s) Inhalation every 12 hours  atorvastatin 40 milliGRAM(s) Oral at bedtime  heparin  Injectable 5000 Unit(s) SubCutaneous every 12 hours      I&O's Summary    02 Oct 2017 07:01  -  03 Oct 2017 07:00  --------------------------------------------------------  IN: 30128 mL / OUT: 0 mL / NET: 1120 mL      PE  Vital Signs Last 24 Hrs  T(C): 36.3 (03 Oct 2017 12:13), Max: 36.6 (03 Oct 2017 04:22)  T(F): 97.3 (03 Oct 2017 12:13), Max: 97.8 (03 Oct 2017 04:22)  HR: 74 (03 Oct 2017 12:13) (60 - 74)  BP: 104/59 (03 Oct 2017 12:13) (97/58 - 116/64)  BP(mean): --  RR: 18 (03 Oct 2017 12:13) (18 - 18)  SpO2: 99% (03 Oct 2017 12:13) (97% - 99%)  General: NAD  HEENT: normocephalic, atraumatic  Card: harsh systolic murmur, no JVD, regular rate  Pulm: b/l rales diffusely, decreased breath sounds at bases b/l  GI: nontender, +BS  Extremities: dialysis fistula in left arm, no clubbing, cyanosis, edema  Skin: no rashes    LABS as of 10/2                      9.1  13.9 )-----------( 183                 28.6        WBC Trend: 13.9<--13.04<--, 12.23<--, 11.1<--  Hb Trend:9.1<-- 8.5<--, 8.7<--, 9.6<--, 8.7<--, 9.7<--  Plt Trend: 183<--199<--, 163<--, 107<--, 103<--, 127<--  10-01    144  |  97  |  63<H>  ----------------------------<  113<H>  3.9   |  28  |  4.59<H>    Ca    7.5<L>      02 Oct 2017   Phos  3.4     10-02  Mg     2.3     10-02      Creatinine Trend: 4.59<--3.67<--, 4.99<--, 3.69<--, 5.18<--, 4.29<--, 6.06<--  ( 30 Sep 2017 15:20 )   PT: 13.7 sec;   INR: 1.21 ratio;       PTT:31.5 sec          Microbiology:  Blood Cx: pending from 10/1    RADIOLOGY & ADDITIONAL TESTS:  X- Ray: < from: Xray Chest 1 View AP- PORTABLE-Urgent (10.02.17 @ 11:53) >  IMPRESSION:   Bilateral layering pleural effusions obscure evaluation of the underlying   lungs and appear increased compared to the prior study which may be   related to patient orientation. Underlying empyema not well evaluated    < end of copied text >    CT head: < from: CT Head No Cont (10.02.17 @ 15:28) >  IMPRESSION:    Age-appropriateinvolutional and ischemic gliotic changes without change   since 9/25/2017. No acute hemorrhage    < end of copied text >

## 2017-10-03 NOTE — PROGRESS NOTE ADULT - PROBLEM SELECTOR PLAN 8
- DLBCL, on rituximab outpatient  - Follow up with hem/onc outpatient - called Dr. Sinha's office yesterday and spoke with nurse, fellow will stop by to see patient

## 2017-10-03 NOTE — PROGRESS NOTE ADULT - PROBLEM SELECTOR PLAN 3
patient intermittently hyperactive and more confused  -ABG done 10/1, not hypoxic or hypercarbic  -c/w supportive care  -assistance with meals  -pharmacological intervention if patient is unable to be redirected or behavior is hindering medical care, recheck QT if haldol needed, prolonged before

## 2017-10-03 NOTE — PROVIDER CONTACT NOTE (OTHER) - REASON
patient is still N.P.O. FOR I.R, FAMILY IS UPSET AS PROCEDURE NOT DONE AND N.P.O. SINCE M.N.WAITING TO SPEAK TO DOCTOR.  HEPARIN SUBCUT.1800HRS DOSE HELD FOR PROCEDURE

## 2017-10-03 NOTE — PROGRESS NOTE ADULT - SUBJECTIVE AND OBJECTIVE BOX
CHIEF COMPLAINT:    Interval Events:    REVIEW OF SYSTEMS:  Constitutional: No fevers or chills. No weight loss. No fatigue or generalized malaise.  Eyes: No itching or discharge from the eyes  ENT: No ear pain. No ear discharge. No nasal congestion. No post nasal drip. No epistaxis. No throat pain. No sore throat. No difficulty swallowing.   CV: No chest pain. No palpitations. No lightheadedness or dizziness.   Resp: No dyspnea at rest. No dyspnea on exertion. No orthopnea. No wheezing. No cough. No stridor. No sputum production. No chest pain with respiration.  GI: No nausea. No vomiting. No diarrhea.  MSK: No joint pain or pain in any extremities  Integumentary: No skin lesions. No pedal edema.  Neurological: No gross motor weakness. No sensory changes.  [ ] All other systems negative  [ ] Unable to assess ROS because ________    OBJECTIVE:  ICU Vital Signs Last 24 Hrs  T(C): 36.6 (03 Oct 2017 04:22), Max: 36.8 (02 Oct 2017 07:39)  T(F): 97.8 (03 Oct 2017 04:22), Max: 98.2 (02 Oct 2017 07:39)  HR: 70 (03 Oct 2017 04:22) (70 - 81)  BP: 106/63 (03 Oct 2017 04:22) (87/41 - 109/72)  BP(mean): --  ABP: --  ABP(mean): --  RR: 18 (03 Oct 2017 04:22) (18 - 18)  SpO2: 98% (03 Oct 2017 04:22) (96% - 100%)        10-01 @ 07:01  -  10-02 @ 07:00  --------------------------------------------------------  IN: 1010 mL / OUT: 0 mL / NET: 1010 mL    10-02 @ 07:01  -  10-03 @ 05:06  --------------------------------------------------------  IN: 1120 mL / OUT: 0 mL / NET: 1120 mL      CAPILLARY BLOOD GLUCOSE          PHYSICAL EXAM:  General: Awake, alert, oriented X 3.   HEENT: Atraumatic, normocephalic.                 Mallampatti Grade                 No nasal congestion.                No tonsillar or pharyngeal exudates.  Lymph Nodes: No palpable lymphadenopathy  Neck: No JVD. No carotid bruit.   Respiratory: Normal chest expansion                         Normal percussion                         Normal and equal air entry                         No wheeze, rhonchi or rales.  Cardiovascular: S1 S2 normal. No murmurs, rubs or gallops.   Abdomen: Soft, non-tender, non-distended. No organomegaly.  Extremities: Warm to touch. Peripheral pulse palpable. No pedal edema.   Skin: No rashes or skin lesions  Neurological: Motor and sensory examination equal and normal in all four extremities.  Psychiatry: Appropriate mood and affect.    HOSPITAL MEDICATIONS:  MEDICATIONS  (STANDING):  levothyroxine Injectable 12.5 MICROGram(s) IV Push daily  lactobacillus acidophilus 1 Tablet(s) Oral daily  aspirin enteric coated 81 milliGRAM(s) Oral daily  nystatin Powder 1 Application(s) Topical every 8 hours  Nephro-amy 1 Tablet(s) Oral daily  midodrine 30 milliGRAM(s) Oral every 8 hours  epoetin terry Injectable 4000 Unit(s) IV Push <User Schedule>  buDESOnide   0.5 milliGRAM(s) Respule 0.5 milliGRAM(s) Inhalation every 12 hours  atorvastatin 40 milliGRAM(s) Oral at bedtime  heparin  Injectable 5000 Unit(s) SubCutaneous every 12 hours    MEDICATIONS  (PRN):      LABS:                        9.1    13.9  )-----------( 183      ( 02 Oct 2017 10:54 )             28.6     10-02    144  |  97  |  63<H>  ----------------------------<  113<H>  3.9   |  28  |  4.59<H>    Ca    7.5<L>      02 Oct 2017 10:54  Phos  3.4     10-02  Mg     2.3     10-02    TPro  5.9<L>  /  Alb  2.8<L>  /  TBili  0.7  /  DBili  x   /  AST  67<H>  /  ALT  39  /  AlkPhos  470<H>  10-02        Arterial Blood Gas:  10-01 @ 12:57  7.48/36/151/26/100/3.3  ABG lactate: --        MICROBIOLOGY:     RADIOLOGY:  [ ] Reviewed and interpreted by me    Point of Care Ultrasound Findings:    PFT:    EKG: CHIEF COMPLAINT:\ok--no pain or sob    Interval Events:for pig tail into left pleural space    REVIEW OF SYSTEMS:  Constitutional: No fevers or chills. No weight loss. No fatigue or generalized malaise.  Eyes: No itching or discharge from the eyes  ENT: No ear pain. No ear discharge. No nasal congestion. No post nasal drip. No epistaxis. No throat pain. No sore throat. No difficulty swallowing.   CV: No chest pain. No palpitations. No lightheadedness or dizziness.   Resp: No dyspnea at rest. No dyspnea on exertion. No orthopnea. No wheezing. No cough. No stridor. No sputum production. No chest pain with respiration.  GI: No nausea. No vomiting. No diarrhea.  MSK: No joint pain or pain in any extremities  Integumentary: No skin lesions. No pedal edema.  Neurological: No gross motor weakness. No sensory changes.  [+ ] All other systems negative  [ ] Unable to assess ROS because ________    OBJECTIVE:  ICU Vital Signs Last 24 Hrs  T(C): 36.6 (03 Oct 2017 04:22), Max: 36.8 (02 Oct 2017 07:39)  T(F): 97.8 (03 Oct 2017 04:22), Max: 98.2 (02 Oct 2017 07:39)  HR: 70 (03 Oct 2017 04:22) (70 - 81)  BP: 106/63 (03 Oct 2017 04:22) (87/41 - 109/72)  BP(mean): --  ABP: --  ABP(mean): --  RR: 18 (03 Oct 2017 04:22) (18 - 18)  SpO2: 98% (03 Oct 2017 04:22) (96% - 100%)        10-01 @ 07:01  -  10-02 @ 07:00  --------------------------------------------------------  IN: 1010 mL / OUT: 0 mL / NET: 1010 mL    10-02 @ 07:01  -  10-03 @ 05:06  --------------------------------------------------------  IN: 1120 mL / OUT: 0 mL / NET: 1120 mL      CAPILLARY BLOOD GLUCOSE          PHYSICAL EXAM:\nAD in chair--fully engaged  General: Awake, alert, oriented X 2.   HEENT: Atraumatic, normocephalic.                 Mallampatti Grade 2                No nasal congestion.                No tonsillar or pharyngeal exudates.  Lymph Nodes: No palpable lymphadenopathy  Neck: No JVD. No carotid bruit.   Respiratory: Normal chest expansion-reduced bs bases-left grtr than right                         No wheeze, rhonchi or rales.  Cardiovascular: S1 S2 normal. No murmurs, rubs or gallops.   Abdomen: Soft, non-tender, non-distended. No organomegaly.  Extremities: Warm to touch. Peripheral pulse palpable. No pedal edema.   Skin: No rashes or skin lesions  Neurological: Motor and sensory examination equal and normal in all four extremities.  Psychiatry: Appropriate mood and affect.    HOSPITAL MEDICATIONS:  MEDICATIONS  (STANDING):  levothyroxine Injectable 12.5 MICROGram(s) IV Push daily  lactobacillus acidophilus 1 Tablet(s) Oral daily  aspirin enteric coated 81 milliGRAM(s) Oral daily  nystatin Powder 1 Application(s) Topical every 8 hours  Nephro-amy 1 Tablet(s) Oral daily  midodrine 30 milliGRAM(s) Oral every 8 hours  epoetin terry Injectable 4000 Unit(s) IV Push <User Schedule>  buDESOnide   0.5 milliGRAM(s) Respule 0.5 milliGRAM(s) Inhalation every 12 hours  atorvastatin 40 milliGRAM(s) Oral at bedtime  heparin  Injectable 5000 Unit(s) SubCutaneous every 12 hours    MEDICATIONS  (PRN):      LABS:                        9.1    13.9  )-----------( 183      ( 02 Oct 2017 10:54 )             28.6     10-02    144  |  97  |  63<H>  ----------------------------<  113<H>  3.9   |  28  |  4.59<H>    Ca    7.5<L>      02 Oct 2017 10:54  Phos  3.4     10-02  Mg     2.3     10-02    TPro  5.9<L>  /  Alb  2.8<L>  /  TBili  0.7  /  DBili  x   /  AST  67<H>  /  ALT  39  /  AlkPhos  470<H>  10-02        Arterial Blood Gas:  10-01 @ 12:57  7.48/36/151/26/100/3.3  ABG lactate: --        MICROBIOLOGY:     RADIOLOGY:  [ ] Reviewed and interpreted by me    Point of Care Ultrasound Findings:    PFT:    EKG:

## 2017-10-03 NOTE — PROGRESS NOTE ADULT - SUBJECTIVE AND OBJECTIVE BOX
VITAL SIGNS    T(C): 36.3 (10-03-17 @ 13:39), Max: 36.6 (10-03-17 @ 04:22)  HR: 69 (10-03-17 @ 13:39) (60 - 76)  BP: 104/62 (10-03-17 @ 13:39) (104/59 - 116/64)  RR: 18 (10-03-17 @ 13:39) (18 - 18)  SpO2: 100% (10-03-17 @ 13:39) (97% - 100%)            10-02 @ 07:01  -  10-03 @ 07:00  --------------------------------------------------------  IN: 1120 mL / OUT: 0 mL / NET: 1120 mL    10-03 @ 07:01  -  10-03 @ 17:38  --------------------------------------------------------  IN: 950 mL / OUT: 1300 mL / NET: -350 mL    MEDICATIONS  aspirin enteric coated 81 milliGRAM(s) Oral daily  atorvastatin 40 milliGRAM(s) Oral at bedtime  buDESOnide   0.5 milliGRAM(s) Respule 0.5 milliGRAM(s) Inhalation every 12 hours  epoetin terry Injectable 4000 Unit(s) IV Push <User Schedule>  heparin  Injectable 5000 Unit(s) SubCutaneous every 12 hours  lactobacillus acidophilus 1 Tablet(s) Oral daily  levothyroxine Injectable 12.5 MICROGram(s) IV Push daily  midodrine 30 milliGRAM(s) Oral every 8 hours  Nephro-amy 1 Tablet(s) Oral daily  nystatin Powder 1 Application(s) Topical every 8 hours      PHYSICAL EXAM    Subjective: No complaints offered  Neurology: alert and oriented x 3, nonfocal, no gross deficits  CV : S1S2  Lungs: diminished left bas  Abdomen: soft, NT,ND, (+ )BM  :  voiding  Extremities:  -c/c/e      LABS  10-02    144  |  97  |  63<H>  ----------------------------<  113<H>  3.9   |  28  |  4.59<H>    Ca    7.5<L>      02 Oct 2017 10:54  Phos  3.4     10-02  Mg     2.3     10-02    TPro  5.9<L>  /  Alb  2.8<L>  /  TBili  0.7  /  DBili  x   /  AST  67<H>  /  ALT  39  /  AlkPhos  470<H>  10-02                                 8.3    14.07 )-----------( 233      ( 03 Oct 2017 12:27 )             25.9                 PAST MEDICAL & SURGICAL HISTORY:  Myocardial infarct: h/o X3  GERD (gastroesophageal reflux disease)  Gout  AAA (abdominal aortic aneurysm): per cardiology eval report  Hemodialysis patient  Lymphoma, non-Hodgkin's: h/o diffuse B cell  Stroke: mild  Asthma: h/o  Pleural effusion  Hepatitis  Atrial fibrillation: 11/2014  Chronic kidney disease: on dialysis MWF  Prostate cancer: s/p seeds  [place] (Hard of Hearing)  Kidney Stones s/p ureteroscopy  CAD (Coronary Artery Disease): 13 stents placed 3/2014  Hypercholesteremia  HTN (Hypertension)  S/P Radiation > 12 Weeks prostatic seeds placed 10 yrs ago  S/P Cataract Surgery  S/P Cystoscopy  S/P Inguinal Hernia  repair bilat  S/P Parathyroidectomy  S/P Hip Replacement bilat  S/P Cardiac Cath: 8 stents placed 3/2014, per pt he thinks he had 3 stents placed 15 years prior also.

## 2017-10-03 NOTE — PROGRESS NOTE ADULT - PROBLEM SELECTOR PLAN 2
- Severe sepsis. resolved, no longer requiring vasopressor support. Originally admitted to MICU requiring pressors which were stopped on 9/28  - On midodrine 30 TID with baseline pressures 90s systolic. taper midodrine as tolerated  - Afebrile  - white count increasing past 3 days  - On vanc renally dosed, zosyn d/c 9/30 due to cultures all growing staph

## 2017-10-03 NOTE — PROGRESS NOTE ADULT - ATTENDING COMMENTS
I have seen this patient with the fellow and agree with their assessment and plan. In addition, seen on hd, tolerating well  Dose vanco by level  Cont midodrine at 30mg TID    Erickson Silveira MD  Cell   Pager   Office

## 2017-10-03 NOTE — PROGRESS NOTE ADULT - SUBJECTIVE AND OBJECTIVE BOX
[facility] DIVISION OF KIDNEY DISEASES AND HYPERTENSION -- FOLLOW UP NOTE  --------------------------------------------------------------------------------  Chief Complaint: ESRD on HD    24 hour events/subjective:  Patient seen and examined at HD. Patient comfortable, tolerating HD.       PAST HISTORY  --------------------------------------------------------------------------------  No significant changes to PMH, PSH, FHx, SHx, unless otherwise noted    ALLERGIES & MEDICATIONS  --------------------------------------------------------------------------------  Allergies    amlodipine (Rash)  Benadryl (Other)  Brilinta (Unknown)  Effient (Unknown)  Multaq (Other)    Intolerances      Standing Inpatient Medications  aspirin enteric coated 81 milliGRAM(s) Oral daily  atorvastatin 40 milliGRAM(s) Oral at bedtime  buDESOnide   0.5 milliGRAM(s) Respule 0.5 milliGRAM(s) Inhalation every 12 hours  epoetin terry Injectable 4000 Unit(s) IV Push <User Schedule>  heparin  Injectable 5000 Unit(s) SubCutaneous every 12 hours  lactobacillus acidophilus 1 Tablet(s) Oral daily  levothyroxine Injectable 12.5 MICROGram(s) IV Push daily  midodrine 30 milliGRAM(s) Oral every 8 hours  Nephro-amy 1 Tablet(s) Oral daily  nystatin Powder 1 Application(s) Topical every 8 hours  vancomycin  IVPB 750 milliGRAM(s) IV Intermittent once    PRN Inpatient Medications      REVIEW OF SYSTEMS  --------------------------------------------------------------------------------  Gen: No weight changes  Skin: No rashes  Head/Eyes/Ears/Mouth: No headache  Respiratory: No dyspnea  CV: No chest pain  GI: No abdominal pain  : No increased frequency  MSK: No edema  Neuro: No dizziness/lightheadedness      All other systems were reviewed and are negative, except as noted.    VITALS/PHYSICAL EXAM  --------------------------------------------------------------------------------  T(C): 36.3 (10-03-17 @ 13:39), Max: 36.6 (10-03-17 @ 04:22)  HR: 69 (10-03-17 @ 13:39) (60 - 76)  BP: 104/62 (10-03-17 @ 13:39) (97/58 - 116/64)  RR: 18 (10-03-17 @ 13:39) (18 - 18)  SpO2: 100% (10-03-17 @ 13:39) (97% - 100%)  Wt(kg): --        10-02-17 @ 07:01  -  10-03-17 @ 07:00  --------------------------------------------------------  IN: 1120 mL / OUT: 0 mL / NET: 1120 mL    10-03-17 @ 07:01  -  10-03-17 @ 14:22  --------------------------------------------------------  IN: 800 mL / OUT: 1300 mL / NET: -500 mL        Physical Exam:  	Gen: NAD  	HEENT: MMM  	Pulm: Decrease BS at bases b/l  	CV: RRR, S1S2  	Abd: +BS, soft, nontender/nondistended  	: No suprapubic tenderness  	LE:  mild LE edema  	Neuro: No focal deficits  	Psych: Normal affect and mood  	Skin: Warm  	Vascular access:  LUE AVF + thrill and bruit    LABS/STUDIES  --------------------------------------------------------------------------------              8.3    14.07 >-----------<  233      [10-03-17 @ 12:27]              25.9     144  |  97  |  63  ----------------------------<  113      [10-02-17 @ 10:54]  3.9   |  28  |  4.59        Ca     7.5     [10-02-17 @ 10:54]      Mg     2.3     [10-02-17 @ 10:54]      Phos  3.4     [10-02-17 @ 10:54]    TPro  5.9  /  Alb  2.8  /  TBili  0.7  /  DBili  x   /  AST  67  /  ALT  39  /  AlkPhos  470  [10-02-17 @ 10:54]          Creatinine Trend:  SCr 4.59 [10-02 @ 10:54]  SCr 3.67 [10-01 @ 21:42]  SCr 4.99 [09-30 @ 15:23]  SCr 3.69 [09-29 @ 02:47]  SCr 5.18 [09-28 @ 02:33]        Iron 56, TIBC 208, %sat --      [09-17-17 @ 05:40]  Ferritin 1179      [09-17-17 @ 05:40]  HbA1c 5.7      [11-10-15 @ 07:48]  TSH 4.73      [09-16-17 @ 16:20]    HBsAb <3.0      [09-06-17 @ 16:18]  HBsAg NEGATIVE      [09-06-17 @ 16:18]  HBcAb Nonreactive      [09-06-17 @ 16:18]

## 2017-10-03 NOTE — PROGRESS NOTE ADULT - ASSESSMENT
86 yo M with multiple medical problems including DLBCL on Rituxan, malignant pleural effusion s/p left pleurx, ESRD on HD, chronic hypotension on Midodrine presents with lethargy, hypotension and cloudy pleural fluid drainage. History as per chart and family.   Received broad spectrum antibiotics and 2L NS for presumed sepsis 2/2 empyema. Evaluated by MICU and admitted for severe sepsis.     A/P: Septic shock - likely from empyema in the setting of DLBCL on Rituxan, indwelling pleural catheter and cloudy, malodorous pleural fluid. CT chest with moderate sized pleural effusion and small loculated left pleural effusion.  On broad spectrum antibiotics  BP support - stabilized s/p IVFs and antibiotics.  Thoracic surgery service following - + pleural space  infected- removed pleurx.  DVT prophylaxis.

## 2017-10-03 NOTE — CONSULT NOTE ADULT - ASSESSMENT
88yo M with a PMH ESRD on HD MWF, HFpEF s/p PPM and ablation 2016, and DLBCL on Rituximab (last dose 9/12/17),who was admitted for witnessed mechanical fall by home nurse.

## 2017-10-03 NOTE — PROGRESS NOTE ADULT - PROBLEM SELECTOR PLAN 3
MRSA bacteremia  zosyn D8; vanco D7  CTS evaluation and likely decortication--?sono guided pig tail catheter MRSA bacteremia  zosyn D8; vanco D7  CTS evaluation and likely decortication--?sono guided pig tail catheter--for today 10-3

## 2017-10-03 NOTE — PROGRESS NOTE ADULT - PROBLEM SELECTOR PLAN 1
- Continues to have b/l decreased breath sounds at bases and crackles  - Pleurex fell out 9/27, surgical site clean and dry  - CT surg following, rec pigtail catheter placement by IR, sched for today 10/3  - Daily cxr showed worsening effusions yesterday  - vanc renally dosed, will dose after HD today

## 2017-10-03 NOTE — PROGRESS NOTE ADULT - PROBLEM SELECTOR PLAN 1
- Continue with antibiotics per ID   - Daily chest x ray  - Monitor WBC  - PT/ INR in AM 10/3   - NPO after midnight for IR pigtail with possible TPA in AM 10/3  Plan of Care discussed with attending

## 2017-10-04 LAB
ALBUMIN FLD-MCNC: 1.2 G/DL — SIGNIFICANT CHANGE UP
ANION GAP SERPL CALC-SCNC: 18 MMOL/L — HIGH (ref 5–17)
APTT BLD: 25.5 SEC — LOW (ref 27.5–37.4)
B PERT IGG+IGM PNL SER: ABNORMAL
BUN SERPL-MCNC: 49 MG/DL — HIGH (ref 7–23)
CALCIUM SERPL-MCNC: 7.6 MG/DL — LOW (ref 8.4–10.5)
CHLORIDE SERPL-SCNC: 99 MMOL/L — SIGNIFICANT CHANGE UP (ref 96–108)
CO2 SERPL-SCNC: 27 MMOL/L — SIGNIFICANT CHANGE UP (ref 22–31)
COLOR FLD: SIGNIFICANT CHANGE UP
CREAT SERPL-MCNC: 4.06 MG/DL — HIGH (ref 0.5–1.3)
FLUID INTAKE SUBSTANCE CLASS: SIGNIFICANT CHANGE UP
FLUID SEGMENTED GRANULOCYTES: 94 % — SIGNIFICANT CHANGE UP
GLUCOSE FLD-MCNC: 5 MG/DL — SIGNIFICANT CHANGE UP
GLUCOSE SERPL-MCNC: 86 MG/DL — SIGNIFICANT CHANGE UP (ref 70–99)
GRAM STN FLD: SIGNIFICANT CHANGE UP
HCT VFR BLD CALC: 28.1 % — LOW (ref 39–50)
HGB BLD-MCNC: 9.1 G/DL — LOW (ref 13–17)
INR BLD: 1.37 RATIO — HIGH (ref 0.88–1.16)
LDH SERPL L TO P-CCNC: 1818 U/L — SIGNIFICANT CHANGE UP
LYMPHOCYTES # FLD: 4 % — SIGNIFICANT CHANGE UP
MCHC RBC-ENTMCNC: 32.5 GM/DL — SIGNIFICANT CHANGE UP (ref 32–36)
MCHC RBC-ENTMCNC: 34.3 PG — HIGH (ref 27–34)
MCV RBC AUTO: 106 FL — HIGH (ref 80–100)
MONOS+MACROS # FLD: 2 % — SIGNIFICANT CHANGE UP
PH FLD: 7.7 — SIGNIFICANT CHANGE UP
PLATELET # BLD AUTO: 239 K/UL — SIGNIFICANT CHANGE UP (ref 150–400)
POTASSIUM SERPL-MCNC: 4.4 MMOL/L — SIGNIFICANT CHANGE UP (ref 3.5–5.3)
POTASSIUM SERPL-SCNC: 4.4 MMOL/L — SIGNIFICANT CHANGE UP (ref 3.5–5.3)
PROT FLD-MCNC: 2.5 G/DL — SIGNIFICANT CHANGE UP
PROTHROM AB SERPL-ACNC: 15 SEC — HIGH (ref 9.8–12.7)
RBC # BLD: 2.66 M/UL — LOW (ref 4.2–5.8)
RBC # FLD: 17.6 % — HIGH (ref 10.3–14.5)
RCV VOL RI: 8750 /UL — HIGH (ref 0–5)
SODIUM SERPL-SCNC: 144 MMOL/L — SIGNIFICANT CHANGE UP (ref 135–145)
SPECIMEN SOURCE: SIGNIFICANT CHANGE UP
TOTAL NUCLEATED CELL COUNT, BODY FLUID: 350 /UL — HIGH (ref 0–5)
TUBE TYPE: SIGNIFICANT CHANGE UP
WBC # BLD: 14 K/UL — HIGH (ref 3.8–10.5)
WBC # FLD AUTO: 14 K/UL — HIGH (ref 3.8–10.5)

## 2017-10-04 PROCEDURE — 99233 SBSQ HOSP IP/OBS HIGH 50: CPT

## 2017-10-04 PROCEDURE — 32557 INSERT CATH PLEURA W/ IMAGE: CPT | Mod: LT

## 2017-10-04 PROCEDURE — 99232 SBSQ HOSP IP/OBS MODERATE 35: CPT

## 2017-10-04 PROCEDURE — 99233 SBSQ HOSP IP/OBS HIGH 50: CPT | Mod: GC

## 2017-10-04 RX ADMIN — MIDODRINE HYDROCHLORIDE 30 MILLIGRAM(S): 2.5 TABLET ORAL at 00:21

## 2017-10-04 RX ADMIN — Medication 1 TABLET(S): at 11:51

## 2017-10-04 RX ADMIN — Medication 12.5 MICROGRAM(S): at 06:27

## 2017-10-04 RX ADMIN — NYSTATIN CREAM 1 APPLICATION(S): 100000 CREAM TOPICAL at 21:34

## 2017-10-04 RX ADMIN — MIDODRINE HYDROCHLORIDE 30 MILLIGRAM(S): 2.5 TABLET ORAL at 18:33

## 2017-10-04 RX ADMIN — HEPARIN SODIUM 5000 UNIT(S): 5000 INJECTION INTRAVENOUS; SUBCUTANEOUS at 18:33

## 2017-10-04 RX ADMIN — ATORVASTATIN CALCIUM 40 MILLIGRAM(S): 80 TABLET, FILM COATED ORAL at 21:34

## 2017-10-04 RX ADMIN — Medication 81 MILLIGRAM(S): at 11:51

## 2017-10-04 RX ADMIN — NYSTATIN CREAM 1 APPLICATION(S): 100000 CREAM TOPICAL at 11:51

## 2017-10-04 RX ADMIN — NYSTATIN CREAM 1 APPLICATION(S): 100000 CREAM TOPICAL at 06:27

## 2017-10-04 RX ADMIN — Medication 0.5 MILLIGRAM(S): at 06:27

## 2017-10-04 RX ADMIN — Medication 0.5 MILLIGRAM(S): at 18:33

## 2017-10-04 RX ADMIN — HEPARIN SODIUM 5000 UNIT(S): 5000 INJECTION INTRAVENOUS; SUBCUTANEOUS at 06:27

## 2017-10-04 RX ADMIN — MIDODRINE HYDROCHLORIDE 30 MILLIGRAM(S): 2.5 TABLET ORAL at 07:57

## 2017-10-04 NOTE — PROGRESS NOTE ADULT - SUBJECTIVE AND OBJECTIVE BOX
Patient is a 87y old  Male who presents with a chief complaint of fall 2/2 hypotension (25 Sep 2017 15:50)      SUBJECTIVE / OVERNIGHT EVENTS: No acute events overnight. Discussed with family yesterday further options for infection treatment. Spoke with IR suite yesterday who said they had never heard of the patient. Spoke with CT surg who said they told IR about the catheter placement. WILL GO TODAY.    MEDICATIONS  (STANDING):  aspirin enteric coated 81 milliGRAM(s) Oral daily  atorvastatin 40 milliGRAM(s) Oral at bedtime  buDESOnide   0.5 milliGRAM(s) Respule 0.5 milliGRAM(s) Inhalation every 12 hours  epoetin terry Injectable 4000 Unit(s) IV Push <User Schedule>  heparin  Injectable 5000 Unit(s) SubCutaneous every 12 hours  lactobacillus acidophilus 1 Tablet(s) Oral daily  levothyroxine Injectable 12.5 MICROGram(s) IV Push daily  midodrine 30 milliGRAM(s) Oral every 8 hours  Nephro-amy 1 Tablet(s) Oral daily  nystatin Powder 1 Application(s) Topical every 8 hours    MEDICATIONS  (PRN):        CAPILLARY BLOOD GLUCOSE        I&O's Summary    02 Oct 2017 07:01  -  03 Oct 2017 07:00  --------------------------------------------------------  IN: 1120 mL / OUT: 0 mL / NET: 1120 mL    03 Oct 2017 07:01  -  04 Oct 2017 06:53  --------------------------------------------------------  IN: 1370 mL / OUT: 1300 mL / NET: 70 mL        PHYSICAL EXAM:  General: NAD  HEENT: normocephalic, atraumatic  Card: harsh systolic murmur, no JVD, regular rate  Pulm: b/l rales diffusely, decreased breath sounds at bases b/l  GI: nontender, +BS  Extremities: dialysis fistula in left arm, no clubbing, cyanosis, edema  Skin: no rashes    LABS:  (10-03 @ 12:27)                        8.3  14.07 )-----------( 233                 25.9    Neutrophils = -- (--%)  Lymphocytes = -- (--%)  Eosinophils = -- (--%)  Basophils = -- (--%)  Monocytes = -- (--%)  Bands = --%    WBC Trend: 14.07<--, 13.9<--, 13.04<--  Hb Trend: 8.3<--, 9.1<--, 8.5<--, 8.7<--, 9.6<--  Plt Trend: 233<--, 183<--, 199<--, 163<--, 107<--  10-03    141  |  97  |  81<H>  ----------------------------<  81  4.1   |  21<L>  |  5.48<H>    Ca    7.6<L>      03 Oct 2017 12:27  Phos  3.4     10-02  Mg     2.3     10-02    TPro  5.7<L>  /  Alb  2.5<L>  /  TBili  0.7  /  DBili  x   /  AST  64<H>  /  ALT  34  /  AlkPhos  421<H>  10-03    Creatinine Trend: 5.48<--, 4.59<--, 3.67<--, 4.99<--, 3.69<--, 5.18<--            Microbiology:  Urine Cx:  Blood Cx:    RADIOLOGY & ADDITIONAL TESTS:  X- Ray:  CT:  Ultrasound:  [ ] imaging personally reviewed and interpreted by me    Consultant(s) Notes Reviewed:      Care Discussed with Consultants/Other Providers:

## 2017-10-04 NOTE — PROGRESS NOTE ADULT - SUBJECTIVE AND OBJECTIVE BOX
Patient is a 87y old  Male who presents with a chief complaint of fall 2/2 hypotension (25 Sep 2017 15:50)    Being followed by ID for bacteremia,empyema,MRSA    Interval history:Much more awake and alert,sitting OOB in chair-replies appropriately to queries  Possible IR pigtail today  No acute events      ROS:  No cough,SOB,CP  No N/V/D./abd pain  No other complaints      Antimicrobials:  Vanco post HD      Vital Signs Last 24 Hrs  T(C): 36.6 (10-04-17 @ 07:29), Max: 36.6 (10-04-17 @ 00:25)  T(F): 97.8 (10-04-17 @ 07:29), Max: 97.8 (10-04-17 @ 00:25)  HR: 73 (10-04-17 @ 07:29) (66 - 76)  BP: 97/52 (10-04-17 @ 07:29) (93/53 - 105/54)  BP(mean): --  RR: 18 (10-04-17 @ 07:29) (18 - 18)  SpO2: 95% (10-04-17 @ 07:29) (94% - 100%)    Physical Exam:    Constitutional well preserved,comfortable,pleasant    HEENT PERRLA EOMI,No pallor or icterus    No oral exudate or erythema    Neck supple no JVD or LN    Chest Good AE,bilateral crackles    CVS RRR S1 S2 WNl No murmur or rub or gallop    Abd soft BS normal No tenderness no masses    Ext AVF no erythema or tenderness  numerous small skin tears and ecchymosis    IV site no erythema tenderness or discharge    Joints no swelling or LOM    CNS AAO X 3 no focal    Lab Data:                          8.3    14.07 )-----------( 233      ( 03 Oct 2017 12:27 )             25.9       10-03    141  |  97  |  81<H>  ----------------------------<  81  4.1   |  21<L>  |  5.48<H>    Ca    7.6<L>      03 Oct 2017 12:27  Phos  3.4     10-02  Mg     2.3     10-02    TPro  5.7<L>  /  Alb  2.5<L>  /  TBili  0.7  /  DBili  x   /  AST  64<H>  /  ALT  34  /  AlkPhos  421<H>  10-03        Culture - Blood (collected 01 Oct 2017 21:41)  Source: .Blood Blood-Peripheral  Preliminary Report (02 Oct 2017 22:01):    No growth to date.            Vancomycin Level, Random: 18.3 ug/mL (10-03-17 @ 09:54)  Vancomycin Level, Random: 23.5 ug/mL (10-02-17 @ 10:53)        Culture - Body Fluid with Gram Stain (09.28.17 @ 11:19)    -  Ampicillin/Sulbactam: R <=8/4    -  Cefazolin: R >16    -  Ciprofloxacin: R >2    -  Clindamycin: R >4    -  Daptomycin: S 1    -  Erythromycin: R >4    -  Gentamicin: S <=1    -  Linezolid: S 1    -  Moxifloxacin(Aerobic): R >4    -  Oxacillin: R >2    -  RIF- Rifampin: S <=1    -  Tetra/Doxy: S 2    -  Trimethoprim/Sulfamethoxazole: S <=0.5/9.5    -  Vancomycin: S 2    Gram Stain:   Rare polymorphonuclear leukocytes per low power field  Few Gram positive cocci in pairs per oil power field    -  Levofloxacin: R >4    -  Penicillin: R >8    Specimen Source: .Body Fluid    Culture Results:   Numerous Methicillin resistant Staphylococcus aureus    Organism Identification: Methicillin resistant Staphylococcus aureus    Organism: Methicillin resistant Staphylococcus aureus    Method Type: SHAR

## 2017-10-04 NOTE — CHART NOTE - NSCHARTNOTEFT_GEN_A_CORE
Malnutrition f/u: pt NPO today for IR pigtail catheter placement; ESRD on HD. non Hodgkin's lymphoma/chemo radiation. Visited in room, PCA present, pt's daughter present.  Source: Patient [ ]    Family [ ]     other [ ]    Diet : NPO today, previously Renal diet with nepro 2x/day      Patient reports [ ] nausea  [ ] vomiting [ ] diarrhea [ ] constipation  [ ]chewing problems [ ] swallowing issues  [x ] other: pt confused, calling out "let's go:|"   PO intake:  < 50% [ ] 50-75% [ ]   % [ ]  other : per PCA who is familiar with patient, pt consumes 80% of meals, drinks Nepro 2x/day 100%     Source for PO intake [ ] Patient [ ] family [ ] chart [x ] staff [ ] other     Enteral /Parenteral Nutrition:   NA      Current Weight: 75.3kg  % Weight Change    Pertinent Medications: MEDICATIONS  (STANDING):  aspirin enteric coated 81 milliGRAM(s) Oral daily  atorvastatin 40 milliGRAM(s) Oral at bedtime  buDESOnide   0.5 milliGRAM(s) Respule 0.5 milliGRAM(s) Inhalation every 12 hours  epoetin terry Injectable 4000 Unit(s) IV Push <User Schedule>  heparin  Injectable 5000 Unit(s) SubCutaneous every 12 hours  lactobacillus acidophilus 1 Tablet(s) Oral daily  levothyroxine Injectable 12.5 MICROGram(s) IV Push daily  midodrine 30 milliGRAM(s) Oral every 8 hours  Nephro-amy 1 Tablet(s) Oral daily  nystatin Powder 1 Application(s) Topical every 8 hours    MEDICATIONS  (PRN):    Pertinent Labs:  10-04 Na144 mmol/L Glu 86 mg/dL K+ 4.4 mmol/L Cr  4.06 mg/dL<H> BUN 49 mg/dL<H> Phos n/a   Alb n/a   PAB n/a         Skin: coccyx st I    Estimated Needs:   [x ] no change since previous assessment  [ ] recalculated:       Previous Nutrition Diagnosis: [x ] Malnutrition , moderate         Nutrition Diagnosis is [x ] ongoing  [ ] resolved [ ] not applicable          Related to:      As evidenced by:      Interventions:     Recommend          [x ] Nutrition Supplement   continue Nepro 2x/day    [x] Other: Monitor/encourage PO intake.        Monitoring and Evaluation:     [x ] PO intake [ ] Tolerance to diet prescription [ ] weights [ x] follow up per protocol    [x ] other: assist with menus

## 2017-10-04 NOTE — PROGRESS NOTE ADULT - PROBLEM SELECTOR PLAN 1
- Continue with antibiotics per ID   - Daily chest x ray  - Monitor WBC  Plan of Care discussed with attending

## 2017-10-04 NOTE — PROGRESS NOTE ADULT - PROBLEM SELECTOR PLAN 4
- Preserved ejection fraction, last TTE 9/28  - Not in acute exacerbation at this time  - last CXR and CT showed worsening right plef and unchanged left empyema

## 2017-10-04 NOTE — PROGRESS NOTE ADULT - SUBJECTIVE AND OBJECTIVE BOX
Interventional Radiology Procedure Note    Procedure:  Left chest tube and thoracentesis    Indication: Left empyema    Operators: Ton    Anesthesia (type): lidocaine 1%    EBL: 10 cc    Findings/Follow up Plan of Care:  Left chest tube and thoracentesis yielded approx 300 cc of straw colored fluid - specimen sent for culture and chemistry. Post-images- decrease effusion with tube in place and no complication.    Specimens Removed: left pleural fluid - culture and chemistry    Implants: 12 Welsh pigtail    Complications: none    Condition/Disposition: recovery on floors    Please call Interventional Radiology [phone] ([phone] after hours) with any questions, concerns, or issues.

## 2017-10-04 NOTE — CHART NOTE - NSCHARTNOTEFT_GEN_A_CORE
Interventional Radiology Pre-Procedure Note      Diagnosis/Indication: Patient is a 87y old  Male who presents with left empyema, MRSA in pleural fluid, after discussion with thoracic surgery, patient presents to IR for left chest tube placement      PAST MEDICAL & SURGICAL HISTORY:  Myocardial infarct: h/o X3  GERD (gastroesophageal reflux disease)  Gout  AAA (abdominal aortic aneurysm): per cardiology eval report  Hemodialysis patient  Lymphoma, non-Hodgkin's: h/o diffuse B cell  Stroke: mild  Asthma: h/o  Pleural effusion  Hepatitis  Atrial fibrillation: 11/2014  Chronic kidney disease: on dialysis MWF  Prostate cancer: s/p seeds  [place] (Hard of Hearing)  Kidney Stones s/p ureteroscopy  CAD (Coronary Artery Disease): 13 stents placed 3/2014  Hypercholesteremia  HTN (Hypertension)  S/P Radiation > 12 Weeks prostatic seeds placed 10 yrs ago  S/P Cataract Surgery  S/P Cystoscopy  S/P Inguinal Hernia  repair bilat  S/P Parathyroidectomy  S/P Hip Replacement bilat  S/P Cardiac Cath: 8 stents placed 3/2014, per pt he thinks he had 3 stents placed 15 years prior also.       Allergies:   amlodipine (Rash)  Benadryl (Other)  Brilinta (Unknown)  Effient (Unknown)  Multaq (Other)    LABS:                        9.1    14.0  )-----------( 239      ( 04 Oct 2017 10:47 )             28.1     10-04    144  |  99  |  49<H>  ----------------------------<  86  4.4   |  27  |  4.06<H>    Ca    7.6<L>      04 Oct 2017 10:48    TPro  5.7<L>  /  Alb  2.5<L>  /  TBili  0.7  /  DBili  x   /  AST  64<H>  /  ALT  34  /  AlkPhos  421<H>  10-03    PT/INR - ( 04 Oct 2017 10:48 )   PT: 15.0 sec;   INR: 1.37 ratio         PTT - ( 04 Oct 2017 10:48 )  PTT:25.5 sec    Procedure: Left chest tube placement    Procedure/ risks/ benefits were explained, informed consent obtained from patient's wife via telephone consent, verbalizes understanding.    Olga Ramirez PA-C  [phone] Interventional Radiology Pre-Procedure Note      Diagnosis/Indication: Patient is a 87y old  Male who presents with left empyema, MRSA in pleural fluid, after discussion with thoracic surgery, patient presents to IR for left chest tube placement      PAST MEDICAL & SURGICAL HISTORY:  Myocardial infarct: h/o X3  GERD (gastroesophageal reflux disease)  Gout  AAA (abdominal aortic aneurysm): per cardiology eval report  Hemodialysis patient  Lymphoma, non-Hodgkin's: h/o diffuse B cell  Stroke: mild  Asthma: h/o  Pleural effusion  Hepatitis  Atrial fibrillation: 11/2014  Chronic kidney disease: on dialysis MWF  Prostate cancer: s/p seeds  [place] (Hard of Hearing)  Kidney Stones s/p ureteroscopy  CAD (Coronary Artery Disease): 13 stents placed 3/2014  Hypercholesteremia  HTN (Hypertension)  S/P Radiation > 12 Weeks prostatic seeds placed 10 yrs ago  S/P Cataract Surgery  S/P Cystoscopy  S/P Inguinal Hernia  repair bilat  S/P Parathyroidectomy  S/P Hip Replacement bilat  S/P Cardiac Cath: 8 stents placed 3/2014, per pt he thinks he had 3 stents placed 15 years prior also.       Allergies:   amlodipine (Rash)  Benadryl (Other)  Brilinta (Unknown)  Effient (Unknown)  Multaq (Other)    LABS:                        9.1    14.0  )-----------( 239      ( 04 Oct 2017 10:47 )             28.1     10-04    144  |  99  |  49<H>  ----------------------------<  86  4.4   |  27  |  4.06<H>    Ca    7.6<L>      04 Oct 2017 10:48    TPro  5.7<L>  /  Alb  2.5<L>  /  TBili  0.7  /  DBili  x   /  AST  64<H>  /  ALT  34  /  AlkPhos  421<H>  10-03    PT/INR - ( 04 Oct 2017 10:48 )   PT: 15.0 sec;   INR: 1.37 ratio         PTT - ( 04 Oct 2017 10:48 )  PTT:25.5 sec    Procedure: Left chest tube placement    Procedure/ risks/ benefits were explained, informed consent obtained from patient's wife via telephone consent, verbalizes understanding.    Olga Ramirez PA-C  [phone]    AS above, discussed with Dr. Worley - will place left chest tube for empyema.  Consent obtained from pts wife.

## 2017-10-04 NOTE — PROGRESS NOTE ADULT - SUBJECTIVE AND OBJECTIVE BOX
CHIEF COMPLAINT:    Interval Events:    REVIEW OF SYSTEMS:  Constitutional: No fevers or chills. No weight loss. No fatigue or generalized malaise.  Eyes: No itching or discharge from the eyes  ENT: No ear pain. No ear discharge. No nasal congestion. No post nasal drip. No epistaxis. No throat pain. No sore throat. No difficulty swallowing.   CV: No chest pain. No palpitations. No lightheadedness or dizziness.   Resp: No dyspnea at rest. No dyspnea on exertion. No orthopnea. No wheezing. No cough. No stridor. No sputum production. No chest pain with respiration.  GI: No nausea. No vomiting. No diarrhea.  MSK: No joint pain or pain in any extremities  Integumentary: No skin lesions. No pedal edema.  Neurological: No gross motor weakness. No sensory changes.  [ ] All other systems negative  [ ] Unable to assess ROS because ________    OBJECTIVE:  ICU Vital Signs Last 24 Hrs  T(C): 36.3 (04 Oct 2017 04:30), Max: 36.6 (04 Oct 2017 00:25)  T(F): 97.4 (04 Oct 2017 04:30), Max: 97.8 (04 Oct 2017 00:25)  HR: 66 (04 Oct 2017 04:30) (60 - 76)  BP: 93/53 (04 Oct 2017 04:30) (93/53 - 116/64)  BP(mean): --  ABP: --  ABP(mean): --  RR: 18 (04 Oct 2017 04:30) (18 - 18)  SpO2: 99% (04 Oct 2017 04:30) (94% - 100%)        10-02 @ 07:01  -  10-03 @ 07:00  --------------------------------------------------------  IN: 1120 mL / OUT: 0 mL / NET: 1120 mL    10-03 @ 07:01  -  10-04 @ 05:09  --------------------------------------------------------  IN: 1370 mL / OUT: 1300 mL / NET: 70 mL      CAPILLARY BLOOD GLUCOSE          PHYSICAL EXAM:  General: Awake, alert, oriented X 3.   HEENT: Atraumatic, normocephalic.                 Mallampatti Grade                 No nasal congestion.                No tonsillar or pharyngeal exudates.  Lymph Nodes: No palpable lymphadenopathy  Neck: No JVD. No carotid bruit.   Respiratory: Normal chest expansion                         Normal percussion                         Normal and equal air entry                         No wheeze, rhonchi or rales.  Cardiovascular: S1 S2 normal. No murmurs, rubs or gallops.   Abdomen: Soft, non-tender, non-distended. No organomegaly.  Extremities: Warm to touch. Peripheral pulse palpable. No pedal edema.   Skin: No rashes or skin lesions  Neurological: Motor and sensory examination equal and normal in all four extremities.  Psychiatry: Appropriate mood and affect.    HOSPITAL MEDICATIONS:  MEDICATIONS  (STANDING):  aspirin enteric coated 81 milliGRAM(s) Oral daily  atorvastatin 40 milliGRAM(s) Oral at bedtime  buDESOnide   0.5 milliGRAM(s) Respule 0.5 milliGRAM(s) Inhalation every 12 hours  epoetin terry Injectable 4000 Unit(s) IV Push <User Schedule>  heparin  Injectable 5000 Unit(s) SubCutaneous every 12 hours  lactobacillus acidophilus 1 Tablet(s) Oral daily  levothyroxine Injectable 12.5 MICROGram(s) IV Push daily  midodrine 30 milliGRAM(s) Oral every 8 hours  Nephro-amy 1 Tablet(s) Oral daily  nystatin Powder 1 Application(s) Topical every 8 hours    MEDICATIONS  (PRN):      LABS:                        8.3    14.07 )-----------( 233      ( 03 Oct 2017 12:27 )             25.9     10-03    141  |  97  |  81<H>  ----------------------------<  81  4.1   |  21<L>  |  5.48<H>    Ca    7.6<L>      03 Oct 2017 12:27  Phos  3.4     10-02  Mg     2.3     10-02    TPro  5.7<L>  /  Alb  2.5<L>  /  TBili  0.7  /  DBili  x   /  AST  64<H>  /  ALT  34  /  AlkPhos  421<H>  10-03              MICROBIOLOGY:     RADIOLOGY:  [ ] Reviewed and interpreted by me    Point of Care Ultrasound Findings:    PFT:    EKG: CHIEF COMPLAINT:good spirits in chair; difficulty lying flat    Interval Events:? IR pigtail w/TPA    REVIEW OF SYSTEMS:  Constitutional: No fevers or chills. No weight loss. No fatigue or generalized malaise.  Eyes: No itching or discharge from the eyes  ENT: No ear pain. No ear discharge. No nasal congestion. No post nasal drip. No epistaxis. No throat pain. No sore throat. No difficulty swallowing.   CV: No chest pain. No palpitations. No lightheadedness or dizziness.   Resp: No dyspnea at rest. No dyspnea on exertion. + orthopnea. No wheezing. No cough. No stridor. No sputum production. No chest pain with respiration.  GI: No nausea. No vomiting. No diarrhea.  MSK: No joint pain or pain in any extremities  Integumentary: No skin lesions. No pedal edema.  Neurological: No gross motor weakness. No sensory changes.  [+ ] All other systems negative  [ ] Unable to assess ROS because ________    OBJECTIVE:  ICU Vital Signs Last 24 Hrs  T(C): 36.3 (04 Oct 2017 04:30), Max: 36.6 (04 Oct 2017 00:25)  T(F): 97.4 (04 Oct 2017 04:30), Max: 97.8 (04 Oct 2017 00:25)  HR: 66 (04 Oct 2017 04:30) (60 - 76)  BP: 93/53 (04 Oct 2017 04:30) (93/53 - 116/64)  BP(mean): --  ABP: --  ABP(mean): --  RR: 18 (04 Oct 2017 04:30) (18 - 18)  SpO2: 99% (04 Oct 2017 04:30) (94% - 100%)        10-02 @ 07:01  -  10-03 @ 07:00  --------------------------------------------------------  IN: 1120 mL / OUT: 0 mL / NET: 1120 mL    10-03 @ 07:01  -  10-04 @ 05:09  --------------------------------------------------------  IN: 1370 mL / OUT: 1300 mL / NET: 70 mL      CAPILLARY BLOOD GLUCOSE          PHYSICAL EXAM:NAD  General: Awake, alert, oriented X 3.   HEENT: Atraumatic, normocephalic.                 Mallampatti Grade 2                No nasal congestion.                No tonsillar or pharyngeal exudates.  Lymph Nodes: No palpable lymphadenopathy  Neck: No JVD. No carotid bruit.   Respiratory: Normal chest expansion                         Normal percussion                         Normal and equal air entry                         No wheeze, rhonchi or rales-but reduced BS bases  Cardiovascular: S1 S2 normal. 2+ murmurs, rubs or gallops.   Abdomen: Soft, non-tender, non-distended. No organomegaly.  Extremities: Warm to touch. Peripheral pulse palpable. No pedal edema.   Skin: No rashes or skin lesions  Neurological: Motor and sensory examination equal and normal in all four extremities.  Psychiatry: Appropriate mood and affect.    HOSPITAL MEDICATIONS:  MEDICATIONS  (STANDING):  aspirin enteric coated 81 milliGRAM(s) Oral daily  atorvastatin 40 milliGRAM(s) Oral at bedtime  buDESOnide   0.5 milliGRAM(s) Respule 0.5 milliGRAM(s) Inhalation every 12 hours  epoetin terry Injectable 4000 Unit(s) IV Push <User Schedule>  heparin  Injectable 5000 Unit(s) SubCutaneous every 12 hours  lactobacillus acidophilus 1 Tablet(s) Oral daily  levothyroxine Injectable 12.5 MICROGram(s) IV Push daily  midodrine 30 milliGRAM(s) Oral every 8 hours  Nephro-amy 1 Tablet(s) Oral daily  nystatin Powder 1 Application(s) Topical every 8 hours    MEDICATIONS  (PRN):      LABS:                        8.3    14.07 )-----------( 233      ( 03 Oct 2017 12:27 )             25.9     10-03    141  |  97  |  81<H>  ----------------------------<  81  4.1   |  21<L>  |  5.48<H>    Ca    7.6<L>      03 Oct 2017 12:27  Phos  3.4     10-02  Mg     2.3     10-02    TPro  5.7<L>  /  Alb  2.5<L>  /  TBili  0.7  /  DBili  x   /  AST  64<H>  /  ALT  34  /  AlkPhos  421<H>  10-03              MICROBIOLOGY:     RADIOLOGY:  [ ] Reviewed and interpreted by me    Point of Care Ultrasound Findings:    PFT:    EKG:

## 2017-10-04 NOTE — PROGRESS NOTE ADULT - ATTENDING COMMENTS
as above--CTS follow up-s/p removal of pleurx and ? decortication of left pleural space vs. pigtail placement of catheter as per Tia et al.  ABX to be directed by pleural and Blood samples--zosyn D9 and vanco D8-MRSA bacteremia--would consider GUADALUPE rather than TTE to r/o endocarditis--ID follow up for duration of ABX--repeat cxr pa/lat revealed layering fluid left grtr than right--for IR pig tail catheter  MS issues continue-haldol/zyprexa etc.      Morro Tran MD-Pulmonary   [phone] as above--CTS follow up-s/p removal of pleurx and ? decortication of left pleural space vs. pigtail placement of catheter as per Tia et al.  ABX to be directed by pleural and Blood samples--zosyn D9 and vanco D8-MRSA bacteremia--would consider GUADALUPE rather than TTE to r/o endocarditis--ID follow up for duration of ABX--repeat cxr pa/lat revealed layering fluid left grtr than right--  ???????????for IR pig tail catheter =/-TPA  MS issues continue-haldol/zyprexa etc.      Morro Tran MD-Pulmonary   [phone]

## 2017-10-04 NOTE — PROGRESS NOTE ADULT - PROBLEM SELECTOR PLAN 1
- Continues to have b/l decreased breath sounds at bases and crackles  - Pleurex fell out 9/27, surgical site clean and dry  - CT surg following, rec pigtail catheter placement by IR yesterday who recommended a CT chest to guide placement which was done however lapse in communication and was not scheduled or done. Will get placed today.  - CT of the chest showed worsening right sided pleural effusion and stable left empyema  - vanc renally dosed, therapeutic yesterday, will continue to dose with HD

## 2017-10-04 NOTE — PROGRESS NOTE ADULT - PROBLEM SELECTOR PLAN 2
- Severe sepsis. resolved, no longer requiring vasopressor support. Originally admitted to MICU requiring pressors which were stopped on 9/28  - On midodrine 30 TID with baseline pressures 90s systolic. taper midodrine as tolerated  - Afebrile  - white count stabilized  - On vanc renally dosed, zosyn d/c 9/30 due to cultures all growing MRSA

## 2017-10-04 NOTE — PROGRESS NOTE ADULT - SUBJECTIVE AND OBJECTIVE BOX
VITAL SIGNS    T(C): 36.6   HR: 73  BP: 97/52  RR: 18   SpO2: 95%            PHYSICAL EXAM    Subjective: No complaints offered  Neurology: alert and oriented x 3, nonfocal, no gross deficits  CV :S1S2  Lungs: CTA  Abdomen: soft, NT,ND, (+ )BM  :  voiding  Extremities:  -C/C/E       LABS  10-04    144  |  99  |  49<H>  ----------------------------<  86  4.4   |  27  |  4.06<H>    Ca    7.6<L>      04 Oct 2017 10:48    TPro  5.7<L>  /  Alb  2.5<L>  /  TBili  0.7  /  DBili  x   /  AST  64<H>  /  ALT  34  /  AlkPhos  421<H>  10-03                                 9.1    14.0  )-----------( 239      ( 04 Oct 2017 10:47 )             28.1          PT/INR - ( 04 Oct 2017 10:48 )   PT: 15.0 sec;   INR: 1.37 ratio         PTT - ( 04 Oct 2017 10:48 )  PTT:25.5 sec VITAL SIGNS    T(C): 36.6   HR: 73  BP: 97/52  RR: 18   SpO2: 95%            PHYSICAL EXAM    Subjective: No complaints offered  Neurology: alert and oriented x 2, nonfocal, no gross deficits  CV :S1S2  Lungs: CTA  Abdomen: soft, NT,ND, (+ )BM  :  voiding  Extremities:  -C/C/E       LABS  10-04    144  |  99  |  49<H>  ----------------------------<  86  4.4   |  27  |  4.06<H>    Ca    7.6<L>      04 Oct 2017 10:48    TPro  5.7<L>  /  Alb  2.5<L>  /  TBili  0.7  /  DBili  x   /  AST  64<H>  /  ALT  34  /  AlkPhos  421<H>  10-03                                 9.1    14.0  )-----------( 239      ( 04 Oct 2017 10:47 )             28.1          PT/INR - ( 04 Oct 2017 10:48 )   PT: 15.0 sec;   INR: 1.37 ratio         PTT - ( 04 Oct 2017 10:48 )  PTT:25.5 sec VITAL SIGNS    T(C): 36.6   HR: 73  BP: 97/52  RR: 18   SpO2: 95%            PHYSICAL EXAM    Subjective: No complaints offered  Neurology: alert and oriented x 2, nonfocal, no gross deficits  CV :S1S2  Lungs: CTA  Abdomen: soft, NT,ND, (+ )BM  Extremities:  -C/C/E       LABS  10-04    144  |  99  |  49<H>  ----------------------------<  86  4.4   |  27  |  4.06<H>    Ca    7.6<L>      04 Oct 2017 10:48    TPro  5.7<L>  /  Alb  2.5<L>  /  TBili  0.7  /  DBili  x   /  AST  64<H>  /  ALT  34  /  AlkPhos  421<H>  10-03                                 9.1    14.0  )-----------( 239      ( 04 Oct 2017 10:47 )             28.1          PT/INR - ( 04 Oct 2017 10:48 )   PT: 15.0 sec;   INR: 1.37 ratio         PTT - ( 04 Oct 2017 10:48 )  PTT:25.5 sec

## 2017-10-04 NOTE — PROGRESS NOTE ADULT - ASSESSMENT
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     #MRSA bacteremia 2/2 empyema: pt admitted with septic shock 2/2 MRSA bacteremia 2/2 empyema following PleurX placement 9/5 and exchange in the setting of immunosuppression for DLBCL (Rituximab). PleurX was removed on 9/27, and pt was transferred from MICU to floors 9/29.  CT with loculated effusion  For pigtail per [facility] today  Also has PPM and aortic graft  Will need to r/o any seeding-may need GUADALUPE and CT with IV contrast to image aortic graft,particularly if repeat Cx positive,this however should be decided after goals of care are discussed with family as given his lymphoma status and comorbidities,prognosis is grave.Primary team discussing with family.  continue Vanco post HD  follow repeat Cx  Tailor plan per course,results.  Will Follow.  Beeper [id number]-oowr/afterhours/No response-[id number]

## 2017-10-04 NOTE — PROGRESS NOTE ADULT - PROBLEM SELECTOR PLAN 8
- DLBCL, on rituximab outpatient  - Follow up with hem/onc outpatient - no longer a candidate for chemotherapy due to worsening clinical status

## 2017-10-04 NOTE — PROGRESS NOTE ADULT - ASSESSMENT
88y/o Male h/o ESRD, AAA repair admitted with altered mental status, MRSA bacteremia and non draining left pleurx.   Transferred to MICU for septic shock requiring levophed on 9/25 with malodorous cloudy pleural fluid.   9/26 Left pleurx attached to pleurovac yielding -100 cc purulent drainage.  9/27 left pleurx removed  9/28  VSS  Lt plx site  no drainage,  new prelim bc negative  9/29  Transfer 3 [facility]   9/30 remains stable with some sob on ambulation  10/2 Increased lethargy; Head CT finding:  Age-appropriate involutional and ischemic gliotic changes without change since 9/25/2017. No acute hemorrhage.   Leukocytosis 2/2 infectious process --> WBC 13.9; Continue on IV Abx Vanco post HD as per ID    10/2 Chest X-Ray finding: Bilateral layering pleural effusions obscure evaluation of the underlying lungs and appear increased compared to the prior study which may be related to patient orientation. Underlying empyema not well evaluated. Plan for IR procedure for Left pigtail placement with possible TPA, per Dr. Worley.   10/4 IR placement of left pigtail scheduled today with Dr Cali

## 2017-10-04 NOTE — PROGRESS NOTE ADULT - PROBLEM SELECTOR PLAN 3
MRSA bacteremia  zosyn D8; vanco D7  CTS evaluation and likely decortication--?sono guided pig tail catheter--for today 10-3 MRSA bacteremia  zosyn D9; vanco D8  CTS evaluation and likely decortication--?sono guided pig tail catheter--for today 10-3

## 2017-10-05 ENCOUNTER — APPOINTMENT (OUTPATIENT)
Dept: PULMONOLOGY | Facility: CLINIC | Age: 82
End: 2017-10-05

## 2017-10-05 LAB
ANION GAP SERPL CALC-SCNC: 23 MMOL/L — HIGH (ref 5–17)
BUN SERPL-MCNC: 62 MG/DL — HIGH (ref 7–23)
CALCIUM SERPL-MCNC: 7.5 MG/DL — LOW (ref 8.4–10.5)
CHLORIDE SERPL-SCNC: 97 MMOL/L — SIGNIFICANT CHANGE UP (ref 96–108)
CO2 SERPL-SCNC: 21 MMOL/L — LOW (ref 22–31)
CREAT SERPL-MCNC: 4.89 MG/DL — HIGH (ref 0.5–1.3)
GLUCOSE SERPL-MCNC: 192 MG/DL — HIGH (ref 70–99)
HBV SURFACE AB SER-ACNC: <3 MIU/ML — LOW
HBV SURFACE AB SER-ACNC: SIGNIFICANT CHANGE UP
HBV SURFACE AG SER-ACNC: SIGNIFICANT CHANGE UP
HCT VFR BLD CALC: 27.1 % — LOW (ref 39–50)
HCV AB S/CO SERPL IA: 0.09 S/CO — SIGNIFICANT CHANGE UP
HCV AB SERPL-IMP: SIGNIFICANT CHANGE UP
HGB BLD-MCNC: 8.8 G/DL — LOW (ref 13–17)
MCHC RBC-ENTMCNC: 32.4 PG — SIGNIFICANT CHANGE UP (ref 27–34)
MCHC RBC-ENTMCNC: 32.5 GM/DL — SIGNIFICANT CHANGE UP (ref 32–36)
MCV RBC AUTO: 99.6 FL — SIGNIFICANT CHANGE UP (ref 80–100)
NIGHT BLUE STAIN TISS: SIGNIFICANT CHANGE UP
PLATELET # BLD AUTO: 279 K/UL — SIGNIFICANT CHANGE UP (ref 150–400)
POTASSIUM SERPL-MCNC: 4.3 MMOL/L — SIGNIFICANT CHANGE UP (ref 3.5–5.3)
POTASSIUM SERPL-SCNC: 4.3 MMOL/L — SIGNIFICANT CHANGE UP (ref 3.5–5.3)
RBC # BLD: 2.72 M/UL — LOW (ref 4.2–5.8)
RBC # FLD: 19.6 % — HIGH (ref 10.3–14.5)
SODIUM SERPL-SCNC: 141 MMOL/L — SIGNIFICANT CHANGE UP (ref 135–145)
SPECIMEN SOURCE: SIGNIFICANT CHANGE UP
VANCOMYCIN FLD-MCNC: 12.7 UG/ML — SIGNIFICANT CHANGE UP
WBC # BLD: 12.66 K/UL — HIGH (ref 3.8–10.5)
WBC # FLD AUTO: 12.66 K/UL — HIGH (ref 3.8–10.5)

## 2017-10-05 PROCEDURE — 99233 SBSQ HOSP IP/OBS HIGH 50: CPT | Mod: GC

## 2017-10-05 PROCEDURE — 99232 SBSQ HOSP IP/OBS MODERATE 35: CPT

## 2017-10-05 PROCEDURE — 99233 SBSQ HOSP IP/OBS HIGH 50: CPT

## 2017-10-05 PROCEDURE — 99231 SBSQ HOSP IP/OBS SF/LOW 25: CPT

## 2017-10-05 RX ORDER — VANCOMYCIN HCL 1 G
1000 VIAL (EA) INTRAVENOUS ONCE
Qty: 0 | Refills: 0 | Status: COMPLETED | OUTPATIENT
Start: 2017-10-05 | End: 2017-10-05

## 2017-10-05 RX ADMIN — MIDODRINE HYDROCHLORIDE 30 MILLIGRAM(S): 2.5 TABLET ORAL at 01:31

## 2017-10-05 RX ADMIN — Medication 250 MILLIGRAM(S): at 18:39

## 2017-10-05 RX ADMIN — MIDODRINE HYDROCHLORIDE 30 MILLIGRAM(S): 2.5 TABLET ORAL at 08:31

## 2017-10-05 RX ADMIN — NYSTATIN CREAM 1 APPLICATION(S): 100000 CREAM TOPICAL at 14:19

## 2017-10-05 RX ADMIN — NYSTATIN CREAM 1 APPLICATION(S): 100000 CREAM TOPICAL at 06:07

## 2017-10-05 RX ADMIN — NYSTATIN CREAM 1 APPLICATION(S): 100000 CREAM TOPICAL at 22:10

## 2017-10-05 RX ADMIN — MIDODRINE HYDROCHLORIDE 30 MILLIGRAM(S): 2.5 TABLET ORAL at 15:54

## 2017-10-05 RX ADMIN — HEPARIN SODIUM 5000 UNIT(S): 5000 INJECTION INTRAVENOUS; SUBCUTANEOUS at 06:07

## 2017-10-05 RX ADMIN — HEPARIN SODIUM 5000 UNIT(S): 5000 INJECTION INTRAVENOUS; SUBCUTANEOUS at 18:40

## 2017-10-05 RX ADMIN — MIDODRINE HYDROCHLORIDE 30 MILLIGRAM(S): 2.5 TABLET ORAL at 23:14

## 2017-10-05 RX ADMIN — Medication 0.5 MILLIGRAM(S): at 18:39

## 2017-10-05 RX ADMIN — ATORVASTATIN CALCIUM 40 MILLIGRAM(S): 80 TABLET, FILM COATED ORAL at 22:09

## 2017-10-05 RX ADMIN — Medication 0.5 MILLIGRAM(S): at 06:07

## 2017-10-05 RX ADMIN — ERYTHROPOIETIN 4000 UNIT(S): 10000 INJECTION, SOLUTION INTRAVENOUS; SUBCUTANEOUS at 13:19

## 2017-10-05 RX ADMIN — Medication 1 TABLET(S): at 14:18

## 2017-10-05 RX ADMIN — Medication 12.5 MICROGRAM(S): at 06:07

## 2017-10-05 RX ADMIN — Medication 81 MILLIGRAM(S): at 14:18

## 2017-10-05 RX ADMIN — Medication 1 TABLET(S): at 14:17

## 2017-10-05 NOTE — PROGRESS NOTE ADULT - SUBJECTIVE AND OBJECTIVE BOX
CHIEF COMPLAINT:    Interval Events:    REVIEW OF SYSTEMS:  Constitutional: No fevers or chills. No weight loss. No fatigue or generalized malaise.  Eyes: No itching or discharge from the eyes  ENT: No ear pain. No ear discharge. No nasal congestion. No post nasal drip. No epistaxis. No throat pain. No sore throat. No difficulty swallowing.   CV: No chest pain. No palpitations. No lightheadedness or dizziness.   Resp: No dyspnea at rest. No dyspnea on exertion. No orthopnea. No wheezing. No cough. No stridor. No sputum production. No chest pain with respiration.  GI: No nausea. No vomiting. No diarrhea.  MSK: No joint pain or pain in any extremities  Integumentary: No skin lesions. No pedal edema.  Neurological: No gross motor weakness. No sensory changes.  [ ] All other systems negative  [ ] Unable to assess ROS because ________    OBJECTIVE:  ICU Vital Signs Last 24 Hrs  T(C): 36.4 (05 Oct 2017 04:33), Max: 36.6 (04 Oct 2017 07:29)  T(F): 97.5 (05 Oct 2017 04:33), Max: 97.8 (04 Oct 2017 07:29)  HR: 77 (05 Oct 2017 04:33) (71 - 77)  BP: 119/66 (05 Oct 2017 04:33) (97/52 - 119/66)  BP(mean): --  ABP: --  ABP(mean): --  RR: 18 (05 Oct 2017 04:33) (18 - 18)  SpO2: 97% (05 Oct 2017 04:33) (94% - 98%)        10-03 @ 07:01  -  10-04 @ 07:00  --------------------------------------------------------  IN: 1370 mL / OUT: 1300 mL / NET: 70 mL    10-04 @ 07:01  -  10-05 @ 05:05  --------------------------------------------------------  IN: 120 mL / OUT: 0 mL / NET: 120 mL      CAPILLARY BLOOD GLUCOSE          PHYSICAL EXAM:  General: Awake, alert, oriented X 3.   HEENT: Atraumatic, normocephalic.                 Mallampatti Grade                 No nasal congestion.                No tonsillar or pharyngeal exudates.  Lymph Nodes: No palpable lymphadenopathy  Neck: No JVD. No carotid bruit.   Respiratory: Normal chest expansion                         Normal percussion                         Normal and equal air entry                         No wheeze, rhonchi or rales.  Cardiovascular: S1 S2 normal. No murmurs, rubs or gallops.   Abdomen: Soft, non-tender, non-distended. No organomegaly.  Extremities: Warm to touch. Peripheral pulse palpable. No pedal edema.   Skin: No rashes or skin lesions  Neurological: Motor and sensory examination equal and normal in all four extremities.  Psychiatry: Appropriate mood and affect.    HOSPITAL MEDICATIONS:  MEDICATIONS  (STANDING):  aspirin enteric coated 81 milliGRAM(s) Oral daily  atorvastatin 40 milliGRAM(s) Oral at bedtime  buDESOnide   0.5 milliGRAM(s) Respule 0.5 milliGRAM(s) Inhalation every 12 hours  epoetin terry Injectable 4000 Unit(s) IV Push <User Schedule>  heparin  Injectable 5000 Unit(s) SubCutaneous every 12 hours  lactobacillus acidophilus 1 Tablet(s) Oral daily  levothyroxine Injectable 12.5 MICROGram(s) IV Push daily  midodrine 30 milliGRAM(s) Oral every 8 hours  Nephro-amy 1 Tablet(s) Oral daily  nystatin Powder 1 Application(s) Topical every 8 hours    MEDICATIONS  (PRN):      LABS:                        9.1    14.0  )-----------( 239      ( 04 Oct 2017 10:47 )             28.1     10-04    144  |  99  |  49<H>  ----------------------------<  86  4.4   |  27  |  4.06<H>    Ca    7.6<L>      04 Oct 2017 10:48    TPro  5.7<L>  /  Alb  2.5<L>  /  TBili  0.7  /  DBili  x   /  AST  64<H>  /  ALT  34  /  AlkPhos  421<H>  10-03    PT/INR - ( 04 Oct 2017 10:48 )   PT: 15.0 sec;   INR: 1.37 ratio         PTT - ( 04 Oct 2017 10:48 )  PTT:25.5 sec          MICROBIOLOGY:     RADIOLOGY:  [ ] Reviewed and interpreted by me    Point of Care Ultrasound Findings:    PFT:    EKG: CHIEF COMPLAINT:No significant complaints--left side discomfort    Interval Events:Pig tail placed by IR    REVIEW OF SYSTEMS:  Constitutional: No fevers or chills. No weight loss. No fatigue or generalized malaise.  Eyes: No itching or discharge from the eyes  ENT: No ear pain. No ear discharge. No nasal congestion. No post nasal drip. No epistaxis. No throat pain. No sore throat. No difficulty swallowing.   CV: + chest pain. No palpitations. No lightheadedness or dizziness.   Resp: No dyspnea at rest. + dyspnea on exertion. No orthopnea. No wheezing. No cough. No stridor. No sputum production. No chest pain with respiration.  GI: No nausea. No vomiting. No diarrhea.  MSK: No joint pain or pain in any extremities  Integumentary: No skin lesions. No pedal edema.  Neurological: No gross motor weakness. No sensory changes.  [+ ] All other systems negative  [ ] Unable to assess ROS because ________    OBJECTIVE:  ICU Vital Signs Last 24 Hrs  T(C): 36.4 (05 Oct 2017 04:33), Max: 36.6 (04 Oct 2017 07:29)  T(F): 97.5 (05 Oct 2017 04:33), Max: 97.8 (04 Oct 2017 07:29)  HR: 77 (05 Oct 2017 04:33) (71 - 77)  BP: 119/66 (05 Oct 2017 04:33) (97/52 - 119/66)  BP(mean): --  ABP: --  ABP(mean): --  RR: 18 (05 Oct 2017 04:33) (18 - 18)  SpO2: 97% (05 Oct 2017 04:33) (94% - 98%)        10-03 @ 07:01  -  10-04 @ 07:00  --------------------------------------------------------  IN: 1370 mL / OUT: 1300 mL / NET: 70 mL    10-04 @ 07:01  -  10-05 @ 05:05  --------------------------------------------------------  IN: 120 mL / OUT: 0 mL / NET: 120 mL      CAPILLARY BLOOD GLUCOSE          PHYSICAL EXAM:in chair on O2  General: Awake, alert, oriented X 3.   HEENT: Atraumatic, normocephalic.                 Mallampatti Grade 2                 No nasal congestion.                No tonsillar or pharyngeal exudates.  Lymph Nodes: No palpable lymphadenopathy  Neck: No JVD. No carotid bruit.   Respiratory: Normal chest expansion                         Normal percussion                         Normal and equal air entry--but reduced BS at bases bilaterally                         No wheeze, rhonchi or rales.  Cardiovascular: S1 S2 normal. No murmurs, rubs or gallops.   Abdomen: Soft, non-tender, non-distended. No organomegaly.  Extremities: Warm to touch. Peripheral pulse palpable. No pedal edema.   Skin: No rashes or skin lesions  Neurological: Motor and sensory examination equal and normal in all four extremities.  Psychiatry: Appropriate mood and affect.    HOSPITAL MEDICATIONS:  MEDICATIONS  (STANDING):  aspirin enteric coated 81 milliGRAM(s) Oral daily  atorvastatin 40 milliGRAM(s) Oral at bedtime  buDESOnide   0.5 milliGRAM(s) Respule 0.5 milliGRAM(s) Inhalation every 12 hours  epoetin terry Injectable 4000 Unit(s) IV Push <User Schedule>  heparin  Injectable 5000 Unit(s) SubCutaneous every 12 hours  lactobacillus acidophilus 1 Tablet(s) Oral daily  levothyroxine Injectable 12.5 MICROGram(s) IV Push daily  midodrine 30 milliGRAM(s) Oral every 8 hours  Nephro-amy 1 Tablet(s) Oral daily  nystatin Powder 1 Application(s) Topical every 8 hours    MEDICATIONS  (PRN):      LABS:                        9.1    14.0  )-----------( 239      ( 04 Oct 2017 10:47 )             28.1     10-04    144  |  99  |  49<H>  ----------------------------<  86  4.4   |  27  |  4.06<H>    Ca    7.6<L>      04 Oct 2017 10:48    TPro  5.7<L>  /  Alb  2.5<L>  /  TBili  0.7  /  DBili  x   /  AST  64<H>  /  ALT  34  /  AlkPhos  421<H>  10-03    PT/INR - ( 04 Oct 2017 10:48 )   PT: 15.0 sec;   INR: 1.37 ratio         PTT - ( 04 Oct 2017 10:48 )  PTT:25.5 sec          MICROBIOLOGY:     RADIOLOGY:  [ ] Reviewed and interpreted by me    Point of Care Ultrasound Findings:    PFT:    EKG:

## 2017-10-05 NOTE — PROGRESS NOTE ADULT - SUBJECTIVE AND OBJECTIVE BOX
VITAL SIGNS    T(C): 36.3   HR: 73   BP: 111/71   RR: 18   SpO2: 100%           10-04 @ 07:01  -  10-05 @ 07:00  --------------------------------------------------------  IN: 320 mL / OUT: 55 mL / NET: 265 mL    MEDICATIONS  aspirin enteric coated 81 milliGRAM(s) Oral daily  atorvastatin 40 milliGRAM(s) Oral at bedtime  buDESOnide   0.5 milliGRAM(s) Respule 0.5 milliGRAM(s) Inhalation every 12 hours  epoetin terry Injectable 4000 Unit(s) IV Push <User Schedule>  heparin  Injectable 5000 Unit(s) SubCutaneous every 12 hours  lactobacillus acidophilus 1 Tablet(s) Oral daily  levothyroxine Injectable 12.5 MICROGram(s) IV Push daily  midodrine 30 milliGRAM(s) Oral every 8 hours  Nephro-amy 1 Tablet(s) Oral daily  nystatin Powder 1 Application(s) Topical every 8 hours      PHYSICAL EXAM    Subjective: No complaints offered, OOB eating breakfast  Neurology: alert and oriented x 2, nonfocal, no gross deficits  CV : S1S2  Lungs: Left pigtail 12Fr to LWS -75cc serosanguinous fluid  Abdomen: soft, NT,ND, (+ )BM  Extremities: -c/c/e        LABS  10-04    144  |  99  |  49<H>  ----------------------------<  86  4.4   |  27  |  4.06<H>    Ca    7.6<L>      04 Oct 2017 10:48    TPro  5.7<L>  /  Alb  2.5<L>  /  TBili  0.7  /  DBili  x   /  AST  64<H>  /  ALT  34  /  AlkPhos  421<H>  10-03                                 9.1    14.0  )-----------( 239      ( 04 Oct 2017 10:47 )             28.1          PT/INR - ( 04 Oct 2017 10:48 )   PT: 15.0 sec;   INR: 1.37 ratio         PTT - ( 04 Oct 2017 10:48 )  PTT:25.5 sec

## 2017-10-05 NOTE — PROGRESS NOTE ADULT - PROBLEM SELECTOR PLAN 2
- Severe sepsis. resolved, no longer requiring IV vasopressor support. Originally admitted to MICU requiring pressors which were stopped on 9/28  - On midodrine 30 TID with baseline pressures 90s systolic. taper midodrine as tolerated; BP today improved, may be able to decrease dose to 20 mg Q8H  - Afebrile  - white count stabilized  - On vanc renally dosed, zosyn d/c 9/30 due to cultures all growing MRSA  - family meeting pending ability to meet with multiple family members; prognosis guarded as possible infection of pacemaker leads and aortic graft. Will monitor blood cultures to see if still bacteremic.

## 2017-10-05 NOTE — PROGRESS NOTE ADULT - ASSESSMENT
86 yo M with multiple medical problems including DLBCL on Rituxan, malignant pleural effusion s/p left pleurx, ESRD on HD, chronic hypotension on Midodrine presents with lethargy, hypotension and cloudy pleural fluid drainage. History as per chart and family.   Received broad spectrum antibiotics and 2L NS for presumed sepsis 2/2 empyema. Evaluated by MICU and admitted for severe sepsis.     A/P: Septic shock - likely from empyema in the setting of DLBCL on Rituxan, indwelling pleural catheter and cloudy, malodorous pleural fluid. CT chest with moderate sized pleural effusion and small loculated left pleural effusion.  On broad spectrum antibiotics  BP support - stabilized s/p IVFs and antibiotics.  Thoracic surgery service following - + pleural space  infected- removed pleurx.  DVT prophylaxis. 86 yo M with multiple medical problems including DLBCL on Rituxan, malignant pleural effusion s/p left pleurx, ESRD on HD, chronic hypotension on Midodrine presents with lethargy, hypotension and cloudy pleural fluid drainage. History as per chart and family.   Received broad spectrum antibiotics and 2L NS for presumed sepsis 2/2 empyema. Evaluated by MICU and admitted for severe sepsis.     A/P: Septic shock - likely from empyema in the setting of DLBCL on Rituxan, indwelling pleural catheter and cloudy, malodorous pleural fluid. CT chest with moderate sized pleural effusion and small loculated left pleural effusion.  On broad spectrum antibiotics  BP support - stabilized s/p IVFs and antibiotics.  Thoracic surgery service following - + pleural space  infected- removed pleurx-pigtail placed 10/4  DVT prophylaxis.

## 2017-10-05 NOTE — PROGRESS NOTE ADULT - PROBLEM SELECTOR PLAN 3
patient intermittently hyperactive and more confused  -ABG done 10/1, not hypoxic or hypercarbic; altered again on 10/4 at night, on 1:1  -c/w supportive care  -assistance with meals  -pharmacological intervention if patient is unable to be redirected or behavior is hindering medical care, recheck QT if haldol needed, prolonged before

## 2017-10-05 NOTE — PROGRESS NOTE ADULT - ASSESSMENT
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 s/p pigtail placement on 10/4.

## 2017-10-05 NOTE — PROGRESS NOTE ADULT - SUBJECTIVE AND OBJECTIVE BOX
Patient is a 87y old  Male who presents with a chief complaint of fall 2/2 hypotension (25 Sep 2017 15:50)      SUBJECTIVE / OVERNIGHT EVENTS: s/p pig-tail catheter placement by IR yesterday, connected to suction. Overnight, patient became altered, pulling at lines, put back on 1:1 by night float. This AM, back to baseline, pleasant, calm, but disoriented to place and time. Denies any symptoms except for thirst; no shortness of breath or pain, but endorses tenderness over pigtail site during exam.    MEDICATIONS  (STANDING):  aspirin enteric coated 81 milliGRAM(s) Oral daily  atorvastatin 40 milliGRAM(s) Oral at bedtime  buDESOnide   0.5 milliGRAM(s) Respule 0.5 milliGRAM(s) Inhalation every 12 hours  epoetin terry Injectable 4000 Unit(s) IV Push <User Schedule>  heparin  Injectable 5000 Unit(s) SubCutaneous every 12 hours  lactobacillus acidophilus 1 Tablet(s) Oral daily  levothyroxine Injectable 12.5 MICROGram(s) IV Push daily  midodrine 30 milliGRAM(s) Oral every 8 hours  Nephro-amy 1 Tablet(s) Oral daily  nystatin Powder 1 Application(s) Topical every 8 hours    MEDICATIONS  (PRN):        CAPILLARY BLOOD GLUCOSE        I&O's Summary    04 Oct 2017 07:01  -  05 Oct 2017 07:00  --------------------------------------------------------  IN: 320 mL / OUT: 55 mL / NET: 265 mL    ICU Vital Signs Last 24 Hrs  T(C): 36.3 (05 Oct 2017 08:10), Max: 36.5 (04 Oct 2017 19:57)  T(F): 97.3 (05 Oct 2017 08:10), Max: 97.7 (04 Oct 2017 19:57)  HR: 73 (05 Oct 2017 08:10) (71 - 77)  BP: 111/71 (05 Oct 2017 08:10) (101/59 - 119/66)  RR: 18 (05 Oct 2017 08:10) (18 - 18)  SpO2: 100% (05 Oct 2017 08:10) (94% - 100%)        General: NAD, sitting upright on O2, talking with PCA  HEENT: normocephalic, atraumatic  Card: harsh systolic murmur, no JVD, regular rate  Pulm: decreased lung sounds at bases; rales improved, no wheeze or ronchi  Back: no CVA tenderness; left flank pigtail catheter in place, connected to suction. No subcutaneous emphysema appreciated, mildly tender  GI: nontender, +BS  Extremities: dialysis fistula in left arm, no clubbing, cyanosis, edema  Skin: no rashes or erythema; multiple old echymoses and healing eschars on arms  Neurological: Motor and sensory examination equal and normal in all four extremities.  Psychiatry: Pleasantly demented, near baseline    LABS:                        9.1    14.0  )-----------( 239      ( 04 Oct 2017 10:47 )             28.1     WBC Trend: 14.0<--, 14.07<--, 13.9<--  10-04    144  |  99  |  49<H>  ----------------------------<  86  4.4   |  27  |  4.06<H>    Ca    7.6<L>      04 Oct 2017 10:48    TPro  5.7<L>  /  Alb  2.5<L>  /  TBili  0.7  /  DBili  x   /  AST  64<H>  /  ALT  34  /  AlkPhos  421<H>  10-03    Creatinine Trend: 4.06<--, 5.48<--, 4.59<--, 3.67<--, 4.99<--, 3.69<--  PT/INR - ( 04 Oct 2017 10:48 )   PT: 15.0 sec;   INR: 1.37 ratio         PTT - ( 04 Oct 2017 10:48 )  PTT:25.5 sec    RADIOLOGY & ADDITIONAL TESTS:    Imaging Personally Reviewed:  IR procedure 10/4: Successful placement of a 12 Dutch chest tube placed into the  left pleural space and thoracentesis as above using ultrasound and  fluoroscopic guidance.  Consultant(s) Notes Reviewed:  Pulmonary, infectious disease, thoracic surgery, heme/onc    Care Discussed with Consultants/Other Providers:

## 2017-10-05 NOTE — PROGRESS NOTE ADULT - ATTENDING COMMENTS
as above--CTS follow up-s/p removal of pleurx and ? decortication of left pleural space vs. pigtail placement of catheter as per Tia et al.  ABX to be directed by pleural and Blood samples--zosyn D9 and vanco D8-MRSA bacteremia--would consider GUADALUPE rather than TTE to r/o endocarditis--ID follow up for duration of ABX--repeat cxr pa/lat revealed layering fluid left grtr than right--  ???????????for IR pig tail catheter =/-TPA  MS issues continue-haldol/zyprexa etc.      Morro Tran MD-Pulmonary   [phone] as above--CTS follow up-s/p removal of pleurx and ? decortication of left pleural space vs. pigtail placement of catheter as per Tia et al.  ABX to be directed by pleural and Blood samples--zosyn D10 and vanco D9-MRSA bacteremia--would consider GUADALUPE rather than TTE to r/o endocarditis--ID follow up for duration of ABX--repeat cxr pa/lat revealed layering fluid left grtr than right--  10/4 IR pig tail catheter =/-TPA (60cc now)  MS issues continue-haldol/zyprexa etc.      Morro Tran MD-Pulmonary   [phone]

## 2017-10-05 NOTE — PROGRESS NOTE ADULT - SUBJECTIVE AND OBJECTIVE BOX
[facility] DIVISION OF KIDNEY DISEASES AND HYPERTENSION -- FOLLOW UP NOTE  --------------------------------------------------------------------------------  Chief Complaint: ESRD on HD    24 hour events/subjective:  Patient s/p IR guided left chest tube placement on 10/4/17. Patient seen at HD today. Patient tolerating HD at this time.       PAST HISTORY  --------------------------------------------------------------------------------  No significant changes to PMH, PSH, FHx, SHx, unless otherwise noted    ALLERGIES & MEDICATIONS  --------------------------------------------------------------------------------  Allergies    amlodipine (Rash)  Benadryl (Other)  Brilinta (Unknown)  Effient (Unknown)  Multaq (Other)    Intolerances      Standing Inpatient Medications  aspirin enteric coated 81 milliGRAM(s) Oral daily  atorvastatin 40 milliGRAM(s) Oral at bedtime  buDESOnide   0.5 milliGRAM(s) Respule 0.5 milliGRAM(s) Inhalation every 12 hours  epoetin terry Injectable 4000 Unit(s) IV Push <User Schedule>  heparin  Injectable 5000 Unit(s) SubCutaneous every 12 hours  lactobacillus acidophilus 1 Tablet(s) Oral daily  levothyroxine Injectable 12.5 MICROGram(s) IV Push daily  midodrine 30 milliGRAM(s) Oral every 8 hours  Nephro-amy 1 Tablet(s) Oral daily  nystatin Powder 1 Application(s) Topical every 8 hours    PRN Inpatient Medications      REVIEW OF SYSTEMS  --------------------------------------------------------------------------------    Unable to obtain.     VITALS/PHYSICAL EXAM  --------------------------------------------------------------------------------  T(C): 36.4 (10-05-17 @ 14:00), Max: 36.5 (10-04-17 @ 19:57)  HR: 75 (10-05-17 @ 14:00) (71 - 77)  BP: 84/43 (10-05-17 @ 14:00) (84/43 - 119/66)  RR: 18 (10-05-17 @ 14:00) (17 - 18)  SpO2: 99% (10-05-17 @ 14:00) (94% - 100%)  Wt(kg): --        10-04-17 @ 07:01  -  10-05-17 @ 07:00  --------------------------------------------------------  IN: 320 mL / OUT: 55 mL / NET: 265 mL    10-05-17 @ 07:01  -  10-05-17 @ 15:10  --------------------------------------------------------  IN: 480 mL / OUT: 500 mL / NET: -20 mL    Physical Exam:  	Gen: NAD  	HEENT: MMM  	Pulm: Decrease BS at bases b/l +CT  	CV: RRR, S1S2  	Abd: +BS, soft, nontender/nondistended  	: No suprapubic tenderness  	LE:  mild LE edema  	Neuro: No focal deficits  	Psych: Normal affect and mood  	Skin: Warm  	Vascular access:  LUE AVF + thrill and bruit    LABS/STUDIES  --------------------------------------------------------------------------------              8.8    12.66 >-----------<  279      [10-05-17 @ 13:25]              27.1     144  |  99  |  49  ----------------------------<  86      [10-04-17 @ 10:48]  4.4   |  27  |  4.06        Ca     7.6     [10-04-17 @ 10:48]      PT/INR: PT 15.0 , INR 1.37       [10-04-17 @ 10:48]  PTT: 25.5       [10-04-17 @ 10:48]      Creatinine Trend:  SCr 4.06 [10-04 @ 10:48]  SCr 5.48 [10-03 @ 12:27]  SCr 4.59 [10-02 @ 10:54]  SCr 3.67 [10-01 @ 21:42]  SCr 4.99 [09-30 @ 15:23]        Iron 56, TIBC 208, %sat --      [09-17-17 @ 05:40]  Ferritin 1179      [09-17-17 @ 05:40]  HbA1c 5.7      [11-10-15 @ 07:48]  TSH 4.73      [09-16-17 @ 16:20]    HBsAb <3.0      [09-06-17 @ 16:18]  HBsAg NEGATIVE      [09-06-17 @ 16:18]  HBcAb Nonreactive      [09-06-17 @ 16:18]

## 2017-10-05 NOTE — PROGRESS NOTE ADULT - PROBLEM SELECTOR PLAN 8
- DLBCL, on rituximab outpatient  - Follow up with hem/onc outpatient - no longer a candidate for chemotherapy due to worsening clinical status  - appreciate heme/onc recs

## 2017-10-05 NOTE — PROGRESS NOTE ADULT - PROBLEM SELECTOR PLAN 1
- Continues to have b/l decreased breath sounds at bases and crackles  - Pleurex fell out 9/27, surgical site clean and dry  - s/p pigtail placement by IR on 10/4  - will check f/u CXR to monitor for improvement; monitor drainage  - vanc renally dosed, therapeutic yesterday, will continue to dose with HD

## 2017-10-05 NOTE — PROGRESS NOTE ADULT - PROBLEM SELECTOR PLAN 1
- Continue with antibiotics per ID   - Daily chest x ray  - Monitor output, if collection persists will consider TPA  Plan of Care discussed with attending

## 2017-10-05 NOTE — PROGRESS NOTE ADULT - SUBJECTIVE AND OBJECTIVE BOX
Patient is a 87y old  Male who presents with a chief complaint of fall 2/2 hypotension (25 Sep 2017 15:50)    Being followed by ID for MRSa bacteremia,empyema    Interval history:s/p Pigtail actheter placement yesterday  Also agitated obvernight-now calm  No acute events      ROS:  No cough,SOB,CP  No N/V/D./abd pain  No other complaints      Antimicrobials:vanco post HD        Vital Signs Last 24 Hrs  T(C): 36.3 (10-05-17 @ 08:10), Max: 36.5 (10-04-17 @ 19:57)  T(F): 97.3 (10-05-17 @ 08:10), Max: 97.7 (10-04-17 @ 19:57)  HR: 73 (10-05-17 @ 08:10) (71 - 77)  BP: 111/71 (10-05-17 @ 08:10) (101/59 - 119/66)  BP(mean): --  RR: 18 (10-05-17 @ 08:10) (18 - 18)  SpO2: 100% (10-05-17 @ 08:10) (94% - 100%)    Physical Exam:    Constitutional well preserved,comfortable,pleasant    HEENT PERRLA EOMI,No pallor or icterus    No oral exudate or erythema    Neck supple no JVD or LN    Chest Good AE,CTA Left Pigtail catheter connected to chest tube     CVS RRR S1 S2 WNl No murmur or rub or gallop    Abd soft BS normal No tenderness no masses    Ext  multiple excoriations and echhymosis,    IV site no erythema tenderness or discharge    Joints no swelling or LOM    CNS AAO X 3 no focal  AVf no tenderness or discharge    Lab Data:                          9.1    14.0  )-----------( 239      ( 04 Oct 2017 10:47 )             28.1       10-04    144  |  99  |  49<H>  ----------------------------<  86  4.4   |  27  |  4.06<H>    Ca    7.6<L>      04 Oct 2017 10:48          Culture - Body Fluid with Gram Stain (collected 04 Oct 2017 21:56)  Source: .Body Fluid Pleural Fluid  Gram Stain (04 Oct 2017 23:32):    polymorphonuclear leukocytes per low power field    No organisms seen per oil power field    by cytocentrifuge    Culture - Blood (collected 01 Oct 2017 21:41)  Source: .Blood Blood-Peripheral  Preliminary Report (02 Oct 2017 22:01):    No growth to date.

## 2017-10-05 NOTE — PROGRESS NOTE ADULT - SUBJECTIVE AND OBJECTIVE BOX
Interventional Radiology Follow- Up Note      87y Male s/p left chest tube  on 10/4   in Interventional Radiology with Dr. larry .       Vitals: T(F): 97.5 (10-05-17 @ 14:00), Max: 97.7 (10-04-17 @ 19:57)  HR: 75 (10-05-17 @ 14:00) (71 - 77)  BP: 84/43 (10-05-17 @ 14:00) (84/43 - 119/66)  RR: 18 (10-05-17 @ 14:00) (17 - 18)  SpO2: 99% (10-05-17 @ 14:00) (94% - 100%)  Wt(kg): --    LABS:                        8.8    12.66 )-----------( 279      ( 05 Oct 2017 13:25 )             27.1     10-05    141  |  97  |  62<H>  ----------------------------<  192<H>  4.3   |  21<L>  |  4.89<H>    Ca    7.5<L>      05 Oct 2017 13:25      PT/INR - ( 04 Oct 2017 10:48 )   PT: 15.0 sec;   INR: 1.37 ratio         PTT - ( 04 Oct 2017 10:48 )  PTT:25.5 sec    Culture: Pending  output : 55CC    PHYSICAL EXAM:  General: Nontoxic, in NAD  left chest: site dressing c/d/i. 75cc of fluid in pleuravac       Impression: 87y Male with empyema s/p left chest tube  Plan:  -continue to monitor out put    -trend vitals, labs  - f/u CT surgery recommendatio  -will discuss with IR attending     Please call IR at extension [phone] or [phone] with any questions, concerns, or issues regarding above.

## 2017-10-05 NOTE — PROGRESS NOTE ADULT - PROBLEM SELECTOR PLAN 3
MRSA bacteremia  zosyn D9; vanco D8  CTS evaluation and likely decortication--?sono guided pig tail catheter--for today 10-3 MRSA bacteremia  zosyn D10; vanco D9  CTS evaluation and likely decortication--+sono guided pig tail catheter-- 10-4

## 2017-10-05 NOTE — PROGRESS NOTE ADULT - ASSESSMENT
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     #MRSA bacteremia 2/2 empyema: pt admitted with septic shock 2/2 MRSA bacteremia 2/2 empyema following PleurX placement 9/5 and exchange in the setting of immunosuppression for DLBCL (Rituximab). PleurX was removed on 9/27, and pt was transferred from MICU to floors 9/29.  CT with loculated effusion  s/p pigtail catheter-pleural fluid Cx so far negative-follow   Also has PPM and aortic graft  Will need to r/o any seeding-may need GUADALUPE and CT with IV contrast to image aortic graft,particularly if repeat Cx positive,this however should be decided after goals of care are discussed with family as given his lymphoma status and comorbidities,prognosis is grave.Primary team discussing with family.  continue Vanco post HD  follow repeat Cx  Tailor plan per course,results.  Will Follow.  Beeper [phone]-ixgf/afterhours/No response-[phone]

## 2017-10-05 NOTE — PROGRESS NOTE ADULT - ATTENDING COMMENTS
I have seen this patient with the fellow and agree with their assessment and plan. In addition, seen on hd, tolerating well  Cont midodrine at 30mg TID  Dose any new meds on HD dosing    Erickson Silveira MD  Cell   Pager   Office

## 2017-10-05 NOTE — PROGRESS NOTE ADULT - ASSESSMENT
86y/o Male h/o ESRD, AAA repair admitted with altered mental status, MRSA bacteremia and non draining left pleurx.   Transferred to MICU for septic shock requiring levophed on 9/25 with malodorous cloudy pleural fluid.   9/26 Left pleurx attached to pleurovac yielding -100 cc purulent drainage.  9/27 left pleurx removed  9/28  VSS  Lt plx site  no drainage,  new prelim bc negative  9/29  Transfer 3 [facility]   9/30 remains stable with some sob on ambulation  10/2 Increased lethargy; Head CT finding:  Age-appropriate involutional and ischemic gliotic changes without change since 9/25/2017. No acute hemorrhage.   Leukocytosis 2/2 infectious process --> WBC 13.9; Continue on IV Abx Vanco post HD as per ID    10/2 Chest X-Ray finding: Bilateral layering pleural effusions obscure evaluation of the underlying lungs and appear increased compared to the prior study which may be related to patient orientation. Underlying empyema not well evaluated. Plan for IR procedure for Left pigtail placement with possible TPA, per Dr. Worley.   10/4 IR placement of left pigtail scheduled today with Dr Camilo

## 2017-10-06 DIAGNOSIS — F03.90 UNSPECIFIED DEMENTIA, UNSPECIFIED SEVERITY, WITHOUT BEHAVIORAL DISTURBANCE, PSYCHOTIC DISTURBANCE, MOOD DISTURBANCE, AND ANXIETY: ICD-10-CM

## 2017-10-06 DIAGNOSIS — F05 DELIRIUM DUE TO KNOWN PHYSIOLOGICAL CONDITION: ICD-10-CM

## 2017-10-06 LAB
ANION GAP SERPL CALC-SCNC: 16 MMOL/L — SIGNIFICANT CHANGE UP (ref 5–17)
BUN SERPL-MCNC: 40 MG/DL — HIGH (ref 7–23)
CALCIUM SERPL-MCNC: 7.4 MG/DL — LOW (ref 8.4–10.5)
CHLORIDE SERPL-SCNC: 100 MMOL/L — SIGNIFICANT CHANGE UP (ref 96–108)
CO2 SERPL-SCNC: 27 MMOL/L — SIGNIFICANT CHANGE UP (ref 22–31)
CREAT SERPL-MCNC: 3.78 MG/DL — HIGH (ref 0.5–1.3)
CULTURE RESULTS: SIGNIFICANT CHANGE UP
GLUCOSE SERPL-MCNC: 128 MG/DL — HIGH (ref 70–99)
HCT VFR BLD CALC: 29.3 % — LOW (ref 39–50)
HGB BLD-MCNC: 9.3 G/DL — LOW (ref 13–17)
MCHC RBC-ENTMCNC: 31.8 GM/DL — LOW (ref 32–36)
MCHC RBC-ENTMCNC: 33.6 PG — SIGNIFICANT CHANGE UP (ref 27–34)
MCV RBC AUTO: 106 FL — HIGH (ref 80–100)
PLATELET # BLD AUTO: 237 K/UL — SIGNIFICANT CHANGE UP (ref 150–400)
POTASSIUM SERPL-MCNC: 3.7 MMOL/L — SIGNIFICANT CHANGE UP (ref 3.5–5.3)
POTASSIUM SERPL-SCNC: 3.7 MMOL/L — SIGNIFICANT CHANGE UP (ref 3.5–5.3)
RBC # BLD: 2.77 M/UL — LOW (ref 4.2–5.8)
RBC # FLD: 17.4 % — HIGH (ref 10.3–14.5)
SODIUM SERPL-SCNC: 143 MMOL/L — SIGNIFICANT CHANGE UP (ref 135–145)
SPECIMEN SOURCE: SIGNIFICANT CHANGE UP
WBC # BLD: 12.5 K/UL — HIGH (ref 3.8–10.5)
WBC # FLD AUTO: 12.5 K/UL — HIGH (ref 3.8–10.5)

## 2017-10-06 PROCEDURE — 99233 SBSQ HOSP IP/OBS HIGH 50: CPT

## 2017-10-06 PROCEDURE — 93010 ELECTROCARDIOGRAM REPORT: CPT

## 2017-10-06 PROCEDURE — 99233 SBSQ HOSP IP/OBS HIGH 50: CPT | Mod: GC

## 2017-10-06 PROCEDURE — 99223 1ST HOSP IP/OBS HIGH 75: CPT

## 2017-10-06 PROCEDURE — 71010: CPT | Mod: 26

## 2017-10-06 RX ORDER — VANCOMYCIN HCL 1 G
750 VIAL (EA) INTRAVENOUS
Qty: 0 | Refills: 0 | Status: DISCONTINUED | OUTPATIENT
Start: 2017-10-06 | End: 2017-10-17

## 2017-10-06 RX ORDER — LANOLIN ALCOHOL/MO/W.PET/CERES
3 CREAM (GRAM) TOPICAL AT BEDTIME
Qty: 0 | Refills: 0 | Status: DISCONTINUED | OUTPATIENT
Start: 2017-10-06 | End: 2017-10-17

## 2017-10-06 RX ADMIN — NYSTATIN CREAM 1 APPLICATION(S): 100000 CREAM TOPICAL at 21:06

## 2017-10-06 RX ADMIN — Medication 12.5 MICROGRAM(S): at 06:39

## 2017-10-06 RX ADMIN — Medication 3 MILLIGRAM(S): at 21:06

## 2017-10-06 RX ADMIN — Medication 1 TABLET(S): at 12:25

## 2017-10-06 RX ADMIN — NYSTATIN CREAM 1 APPLICATION(S): 100000 CREAM TOPICAL at 06:39

## 2017-10-06 RX ADMIN — HEPARIN SODIUM 5000 UNIT(S): 5000 INJECTION INTRAVENOUS; SUBCUTANEOUS at 17:22

## 2017-10-06 RX ADMIN — MIDODRINE HYDROCHLORIDE 30 MILLIGRAM(S): 2.5 TABLET ORAL at 22:52

## 2017-10-06 RX ADMIN — ATORVASTATIN CALCIUM 40 MILLIGRAM(S): 80 TABLET, FILM COATED ORAL at 21:06

## 2017-10-06 RX ADMIN — Medication 0.5 MILLIGRAM(S): at 06:39

## 2017-10-06 RX ADMIN — Medication 1 TABLET(S): at 12:26

## 2017-10-06 RX ADMIN — NYSTATIN CREAM 1 APPLICATION(S): 100000 CREAM TOPICAL at 17:21

## 2017-10-06 RX ADMIN — Medication 0.5 MILLIGRAM(S): at 17:21

## 2017-10-06 RX ADMIN — HEPARIN SODIUM 5000 UNIT(S): 5000 INJECTION INTRAVENOUS; SUBCUTANEOUS at 06:38

## 2017-10-06 RX ADMIN — MIDODRINE HYDROCHLORIDE 30 MILLIGRAM(S): 2.5 TABLET ORAL at 15:30

## 2017-10-06 RX ADMIN — MIDODRINE HYDROCHLORIDE 30 MILLIGRAM(S): 2.5 TABLET ORAL at 09:31

## 2017-10-06 RX ADMIN — Medication 81 MILLIGRAM(S): at 12:25

## 2017-10-06 NOTE — BEHAVIORAL HEALTH ASSESSMENT NOTE - HPI (INCLUDE ILLNESS QUALITY, SEVERITY, DURATION, TIMING, CONTEXT, MODIFYING FACTORS, ASSOCIATED SIGNS AND SYMPTOMS)
87-year-old  male domicled with wife and HHA, retired, with PPhx dementia, no prior SA or psych admissions, not in psychiatric tx, with PMH of ESRD on HD MWF, AAA s/p repair, MI x 3 last 8 stents placed 3/2014, AFib, HF s/p PPM and ablation 2016, severe aortic stenosis, severe mitral regurgitation, gout, GERD, hypothyroidism, HTN, and DLBCL currently on Rituximab, admitted to medicine for sepsis and witnessed fall by VNS nurse, and is being evaluated by psychiatry for insomnia.     On interview, patient is oriented to person only, unaware of where we are or why he is here.  Denies physical complaints, denies pain.  Repeatedly asks, “are we in a car?” and when told that he is in the hospital states, “I want to go, get the car”.  Denies AVH or paranoia.  No SI/HI. Cannot name president or recent events.  Mostly calm and cooperative, but becomes irritable at times.  Per primary team, has been confused, restless 2 nights ago and attempted to climb out of bed, but has not required PRN medications.    Pt's HHA at bedside states that he is regularly confused but it seems to have been worsening steadily for the past 6 weeks. Also Spoke with son at bedside who says that his father is usually much more coherent than he is now, and agrees with the HHA’s timeline of a steady decline in cognitive functioning. He states his father needs some help with his ADLs but recently hasn’t been able to do much for himself.  e states that his father has no history of psychiatric hospitalizations, SA and is on no medication for dementia.  Has no h/o substance abuse issues.

## 2017-10-06 NOTE — BEHAVIORAL HEALTH ASSESSMENT NOTE - NSBHCONSULTMEDS_PSY_A_CORE FT
1. Start Melatonin 3mg PO qhS    2. PRN: Ativan 0.25mg IV q12h PRN severe agitation (hold for SBP <100, HR <60)-- may worsen delirlium, so would advise judicious use for severe agitation only.  (PRN options limited as QTc is prolonged, LFTs elevated, BP low).    3. Minimize use of opioids, anticholinergics, or other deliriogenic agents when possible.  Maintain sleep wake cycle.  Provide frequent reorientation and redirection.  Family member at bedside if possible. Assess for need for glasses and hearing aid (if applicable).    4. F/u with palliative care, GOC discussions per primary team.    5. Pt does not meet criteria for psychiatric hospitalization.  Recommend outpt psych f/u at [facility] after d/c- [phone].   [facility] Psych will follow.

## 2017-10-06 NOTE — PROGRESS NOTE ADULT - PROBLEM SELECTOR PLAN 3
MRSA bacteremia  zosyn D10; vanco D9  CTS evaluation and likely decortication--+sono guided pig tail catheter-- 10-4 MRSA bacteremia  zosyn D11; vanco D10  CTS evaluation and likely decortication--+sono guided pig tail catheter-- 10-4

## 2017-10-06 NOTE — PROGRESS NOTE BEHAVIORAL HEALTH - NSBHCHARTREVIEWINVESTIGATE_PSY_A_CORE FT
< from: 12 Lead ECG (09.29.17 @ 00:04) >    Ventricular Rate 72 BPM    Atrial Rate 66 BPM    QRS Duration 170 ms     ms    QTc 593 ms    R Axis -53 degrees    T Axis 105 degrees    Diagnosis Line ELECTRONIC VENTRICULAR PACEMAKER RHYTHM    < end of copied text >

## 2017-10-06 NOTE — PROGRESS NOTE ADULT - ATTENDING COMMENTS
as above--CTS follow up-s/p removal of pleurx and ? decortication of left pleural space vs. pigtail placement of catheter as per Tia et al.  ABX to be directed by pleural and Blood samples--zosyn D10 and vanco D9-MRSA bacteremia--would consider GUADALUPE rather than TTE to r/o endocarditis--ID follow up for duration of ABX--repeat cxr pa/lat revealed layering fluid left grtr than right--  10/4 IR pig tail catheter =/-TPA (60cc now)  MS issues continue-haldol/zyprexa etc.      Morro Tran MD-Pulmonary   [phone] as above--CTS follow up-s/p removal of pleurx and ? decortication of left pleural space vs. pigtail placement of catheter as per Tia et al.  ABX to be directed by pleural and Blood samples--zosyn D11 and vanco D10-MRSA bacteremia--would consider GUADALUPE rather than TTE to r/o endocarditis--ID follow up for duration of ABX--repeat cxr pa/lat revealed layering fluid left grtr than right--  10/4 IR pig tail catheter =/-TPA (60cc now)  ***AS per ID--will likely need CT w/contrast and GUADALUPE to optimally deal w/situation--this will come after goals of care addressed.  MS issues continue-haldol/zyprexa etc.      Morro Tran MD-Pulmonary   [phone]

## 2017-10-06 NOTE — BEHAVIORAL HEALTH ASSESSMENT NOTE - NSBHSOCIALHXDETAILSFT_PSY_A_CORE
retired businessman/semi-professional , , lives with wife and HHA in [facility] [place]

## 2017-10-06 NOTE — PROGRESS NOTE ADULT - ASSESSMENT
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     #MRSA bacteremia 2/2 empyema: pt admitted with septic shock 2/2 MRSA bacteremia 2/2 empyema following PleurX placement 9/5 and exchange in the setting of immunosuppression for DLBCL (Rituximab). PleurX was removed on 9/27, and pt was transferred from MICU to floors 9/29.  CT with loculated effusion  s/p pigtail catheter-pleural fluid Cx so far negative-CTVS following    Also has PPM and aortic graft  ? GUADALUPE and CT with IV contrast to image aortic graft,to be decided after goals of care are discussed with family as given his lymphoma status and comorbidities,prognosis is grave.  Primary team discussing with family.  continue Vanco post HD  follow repeat Cx  Tailor plan per course,results.  case d/w primary team  I will be away for the next 3 days .ID service will cover.Please call [phone] if issues .

## 2017-10-06 NOTE — PROGRESS NOTE ADULT - SUBJECTIVE AND OBJECTIVE BOX
Pt. seen with Dr. Worley. Sleeping comfortably in bed. Still on 1:1. No distress  Vital Signs Last 24 Hrs  T(C): 36.4 (06 Oct 2017 15:02), Max: 36.4 (05 Oct 2017 17:15)  T(F): 97.6 (06 Oct 2017 15:02), Max: 97.6 (06 Oct 2017 06:35)  HR: 78 (06 Oct 2017 15:02) (70 - 87)  BP: 91/61 (06 Oct 2017 15:02) (88/51 - 103/65)  BP(mean): --  RR: 19 (06 Oct 2017 16:00) (18 - 19)  SpO2: 100% (06 Oct 2017 16:00) (91% - 100%)    Pt. had IR placement of Left PTC on 10/4 by IR, 300cc drained at that time.   Now Left PTC output 45cc/24hs on suction.  Pleural cult neg to date.  On Vanco IV   CXR reviewed w Dr. Worley, showed much improvement to left side since PTC placed.      A/p : 86yo M s/p Left Pleurx then developed empyema. Pleurx removed. Now new Left pTC placed for loculated effusion by IR  Plan:  - Daily CXR  -Pulm toilet, chest PT.   -Ambulation  -Cont Antibx per ID/primary team.   -Effusion improved, cont PTC drainage. No plans for Fibrinolysis at this time.

## 2017-10-06 NOTE — PROGRESS NOTE ADULT - PROBLEM SELECTOR PLAN 1
- Continues to have b/l decreased breath sounds at bases and crackles  - s/p pigtail placement by IR on 10/4  - will check f/u CXR to monitor for improvement; monitor drainage, total 80cc   - vanc renally dosed, received dose yesterday, will monitor level with HD

## 2017-10-06 NOTE — PROGRESS NOTE ADULT - PROBLEM SELECTOR PLAN 3
patient intermittently hyperactive and more confused  -ABG done 10/1, not hypoxic or hypercarbic; altered again on 10/4 at night, on 1:1  -c/w supportive care and reorientation  -assistance with meals  -pharmacological intervention if patient is unable to be redirected or behavior is hindering medical care, recheck QT if haldol needed, prolonged before

## 2017-10-06 NOTE — PROGRESS NOTE ADULT - SUBJECTIVE AND OBJECTIVE BOX
Patient is a 87y old  Male who presents with a chief complaint of fall 2/2 hypotension (25 Sep 2017 15:50)      SUBJECTIVE / OVERNIGHT EVENTS:    MEDICATIONS  (STANDING):  aspirin enteric coated 81 milliGRAM(s) Oral daily  atorvastatin 40 milliGRAM(s) Oral at bedtime  buDESOnide   0.5 milliGRAM(s) Respule 0.5 milliGRAM(s) Inhalation every 12 hours  epoetin terry Injectable 4000 Unit(s) IV Push <User Schedule>  heparin  Injectable 5000 Unit(s) SubCutaneous every 12 hours  lactobacillus acidophilus 1 Tablet(s) Oral daily  levothyroxine Injectable 12.5 MICROGram(s) IV Push daily  midodrine 30 milliGRAM(s) Oral every 8 hours  Nephro-amy 1 Tablet(s) Oral daily  nystatin Powder 1 Application(s) Topical every 8 hours    MEDICATIONS  (PRN):        CAPILLARY BLOOD GLUCOSE        I&O's Summary    05 Oct 2017 07:01  -  06 Oct 2017 07:00  --------------------------------------------------------  IN: 1860 mL / OUT: 525 mL / NET: 1335 mL        PHYSICAL EXAM:  GENERAL: NAD, well-developed  HEAD:  Atraumatic, Normocephalic  EYES: EOMI, PERRLA, conjunctiva and sclera clear  NECK: Supple, No JVD  CHEST/LUNG: Clear to auscultation bilaterally; No wheeze  HEART: Regular rate and rhythm; No murmurs, rubs, or gallops  ABDOMEN: Soft, Nontender, Nondistended; Bowel sounds present  EXTREMITIES:  2+ Peripheral Pulses, No clubbing, cyanosis, or edema  PSYCH: AAOx3  NEUROLOGY: non-focal  SKIN: No rashes or lesions    LABS:  (10-05 @ 13:25)                        8.8  12.66 )-----------( 279                 27.1    Neutrophils = -- (--%)  Lymphocytes = -- (--%)  Eosinophils = -- (--%)  Basophils = -- (--%)  Monocytes = -- (--%)  Bands = --%    WBC Trend: 12.66<--, 14.0<--, 14.07<--  Hb Trend: 8.8<--, 9.1<--, 8.3<--, 9.1<--, 8.5<--  Plt Trend: 279<--, 239<--, 233<--, 183<--, 199<--  10-05    141  |  97  |  62<H>  ----------------------------<  192<H>  4.3   |  21<L>  |  4.89<H>    Ca    7.5<L>      05 Oct 2017 13:25      Creatinine Trend: 4.89<--, 4.06<--, 5.48<--, 4.59<--, 3.67<--, 4.99<--  ( 04 Oct 2017 10:48 )   PT: 15.0 sec;   INR: 1.37 ratio;       PTT:25.5 sec          Microbiology:  Urine Cx:  Blood Cx:    RADIOLOGY & ADDITIONAL TESTS:  X- Ray:  CT:  Ultrasound:  [ ] imaging personally reviewed and interpreted by me    Consultant(s) Notes Reviewed:      Care Discussed with Consultants/Other Providers: Patient is a 87y old  Male who presents with a chief complaint of fall 2/2 hypotension (25 Sep 2017 15:50)      SUBJECTIVE / OVERNIGHT EVENTS: Patient did not sleep overnight or the night before. Continues to be agitated this AM. States he has pain in his abdomen and wants to get up. Denies any chest pain, SOB, N/V, pain at catheter site.    MEDICATIONS  (STANDING):  aspirin enteric coated 81 milliGRAM(s) Oral daily  atorvastatin 40 milliGRAM(s) Oral at bedtime  buDESOnide   0.5 milliGRAM(s) Respule 0.5 milliGRAM(s) Inhalation every 12 hours  epoetin terry Injectable 4000 Unit(s) IV Push <User Schedule>  heparin  Injectable 5000 Unit(s) SubCutaneous every 12 hours  lactobacillus acidophilus 1 Tablet(s) Oral daily  levothyroxine Injectable 12.5 MICROGram(s) IV Push daily  midodrine 30 milliGRAM(s) Oral every 8 hours  Nephro-amy 1 Tablet(s) Oral daily  nystatin Powder 1 Application(s) Topical every 8 hours        I&O's Summary    05 Oct 2017 07:01  -  06 Oct 2017 07:00  --------------------------------------------------------  IN: 1860 mL / OUT: 525 mL / NET: 1335 mL        PHYSICAL EXAM:  General: NAD, agitated in bed, moved to chair  HEENT: normocephalic, atraumatic  Card: harsh systolic murmur, no JVD, regular rate  Pulm: decreased lung sounds at bases; rales improved, no wheeze or ronchi  Back: no CVA tenderness; left flank pigtail catheter in place, connected to suction, nontender  GI: nontender, +BS  Extremities: dialysis fistula in left arm, no clubbing, cyanosis, edema  Skin: no rashes or erythema; multiple old echymoses and healing eschars on arms and legs  Neurological: Motor and sensory examination equal and normal in all four extremities.  Psychiatry: agitated this AM, states he is uncomfortable, wants to move    LABS:  (10-05 @ 13:25)                        8.8  12.66 )-----------( 279                 27.1    Neutrophils = -- (--%)  Lymphocytes = -- (--%)  Eosinophils = -- (--%)  Basophils = -- (--%)  Monocytes = -- (--%)  Bands = --%    WBC Trend: 12.66<--, 14.0<--, 14.07<--  Hb Trend: 8.8<--, 9.1<--, 8.3<--, 9.1<--, 8.5<--  Plt Trend: 279<--, 239<--, 233<--, 183<--, 199<--  10-05    141  |  97  |  62<H>  ----------------------------<  192<H>  4.3   |  21<L>  |  4.89<H>    Ca    7.5<L>      05 Oct 2017 13:25      Creatinine Trend: 4.89<--, 4.06<--, 5.48<--, 4.59<--, 3.67<--, 4.99<--  ( 04 Oct 2017 10:48 )   PT: 15.0 sec;   INR: 1.37 ratio;       PTT:25.5 sec          Microbiology:  Pleural fluid: PMNs, no growth to date      Consultant(s) Notes Reviewed:  Pulmonary, Thoracic surgery, ID

## 2017-10-06 NOTE — PROGRESS NOTE BEHAVIORAL HEALTH - NSBHCHARTREVIEWLAB_PSY_A_CORE FT
9.3    12.5  )-----------( 237      ( 06 Oct 2017 10:20 )             29.3     10-06    143  |  100  |  40<H>  ----------------------------<  128<H>  3.7   |  27  |  3.78<H>    Ca    7.4<L>      06 Oct 2017 10:20

## 2017-10-06 NOTE — PROGRESS NOTE BEHAVIORAL HEALTH - NSBHCHARTREVIEWVS_PSY_A_CORE FT
T(C): 36.4 (10-06-17 @ 15:02), Max: 36.4 (10-05-17 @ 17:15)  HR: 78 (10-06-17 @ 15:02) (70 - 87)  BP: 91/61 (10-06-17 @ 15:02) (88/51 - 103/65)  RR: 18 (10-06-17 @ 15:02) (18 - 18)  SpO2: 91% (10-06-17 @ 15:02) (91% - 100%)  Wt(kg): --

## 2017-10-06 NOTE — PROGRESS NOTE ADULT - PROBLEM SELECTOR PLAN 2
- Severe sepsis. resolved  - On midodrine 30 TID with baseline pressures 90s systolic. will decreased to 20TID today as BP has been stable and above baseline  - Afebrile, white count decreasing  - On vanc renally dosed, zosyn d/c 9/30 due to cultures all growing MRSA  - family meeting pending ability to meet with multiple family members; prognosis guarded as possible infection of pacemaker leads and aortic graft. Will monitor blood cultures to see if still bacteremic.

## 2017-10-06 NOTE — PROGRESS NOTE BEHAVIORAL HEALTH - NSBHCONSULTMEDS_PSY_A_CORE FT
1. Start Melatonin 3mg PO qhS    2. PRN: Ativan 0.25mg IV q12h PRN severe agitation (hold for SBP <100, HR <60)-- may worsen delirlium, so would advise judicious use for severe agitation only.  (PRN options limited as QTc is prolonged, LFTs elevated, BP low).    3. Minimize use of opioids, anticholinergics, or other deliriogenic agents when possible.  Maintain sleep wake cycle.  Provide frequent reorientation and redirection.  Family member at bedside if possible. Assess for need for glasses and hearing aid (if applicable).    4. F/u with palliative care, GOC discussions per primary team.    5. Pt does not meet criteria for psychiatric hospitalization.  Recommend outpt psych f/u at [facility] after d/c- [phone].   [facility] Psych will follow.

## 2017-10-06 NOTE — PROGRESS NOTE BEHAVIORAL HEALTH - NSBHCHARTREVIEWIMAGING_PSY_A_CORE FT
< from: CT Head No Cont (10.02.17 @ 15:28) >    FINDINGS:      No loss of the gray-white matter distinction to indicate acute   territorial infarct. No acute hemorrhage.    There is no hydrocephalus, mass effect or midline shift. No extra-axial   collection.       Chronic prominence of the bilateral sulci and compensatory   ventriculomegaly, consistent with mild volume loss.    Focal hypodensity in the left lentiform nucleus/anterior limb of the left   internal capsule of CSF density as visualized on head CT of 9/25/17,   although new since study of 10/27/15, and likely represents chronic   lacunar infarct.    Chronic encephalomalacia of the inferior right cerebellar hemisphere as   visualized on study of 10/27/15, and likely represents sequela of chronic   posterior inferior cerebellar artery infarct versus sequela of remote   trauma.    Redemonstration of confluent bilateral cerebral white matter   hypodensities, likely reflective of severe chronic microvascular ischemic   changes.    CNS calcified atherosclerosis is noted.    The visualized paranasal sinuses and mastoid air cells are clear.    Evidence for bilateral cataract surgery, otherwise the visualized orbits   are unremarkable.    The calvarium is intact.    IMPRESSION:    Age-appropriateinvolutional and ischemic gliotic changes without change   since 9/25/2017. No acute hemorrhage    < end of copied text >

## 2017-10-06 NOTE — PROGRESS NOTE ADULT - SUBJECTIVE AND OBJECTIVE BOX
CHIEF COMPLAINT:    Interval Events:    REVIEW OF SYSTEMS:  Constitutional: No fevers or chills. No weight loss. No fatigue or generalized malaise.  Eyes: No itching or discharge from the eyes  ENT: No ear pain. No ear discharge. No nasal congestion. No post nasal drip. No epistaxis. No throat pain. No sore throat. No difficulty swallowing.   CV: No chest pain. No palpitations. No lightheadedness or dizziness.   Resp: No dyspnea at rest. No dyspnea on exertion. No orthopnea. No wheezing. No cough. No stridor. No sputum production. No chest pain with respiration.  GI: No nausea. No vomiting. No diarrhea.  MSK: No joint pain or pain in any extremities  Integumentary: No skin lesions. No pedal edema.  Neurological: No gross motor weakness. No sensory changes.  [ ] All other systems negative  [ ] Unable to assess ROS because ________    OBJECTIVE:  ICU Vital Signs Last 24 Hrs  T(C): 36.3 (05 Oct 2017 21:50), Max: 36.4 (05 Oct 2017 10:20)  T(F): 97.4 (05 Oct 2017 21:50), Max: 97.5 (05 Oct 2017 10:20)  HR: 87 (05 Oct 2017 21:50) (73 - 87)  BP: 99/61 (05 Oct 2017 21:50) (84/43 - 111/71)  BP(mean): --  ABP: --  ABP(mean): --  RR: 18 (05 Oct 2017 21:50) (17 - 18)  SpO2: 100% (05 Oct 2017 21:50) (99% - 100%)        10-04 @ 07:01  -  10-05 @ 07:00  --------------------------------------------------------  IN: 320 mL / OUT: 55 mL / NET: 265 mL    10-05 @ 07:01  -  10-06 @ 05:06  --------------------------------------------------------  IN: 1860 mL / OUT: 525 mL / NET: 1335 mL      CAPILLARY BLOOD GLUCOSE          PHYSICAL EXAM:  General: Awake, alert, oriented X 3.   HEENT: Atraumatic, normocephalic.                 Mallampatti Grade                 No nasal congestion.                No tonsillar or pharyngeal exudates.  Lymph Nodes: No palpable lymphadenopathy  Neck: No JVD. No carotid bruit.   Respiratory: Normal chest expansion                         Normal percussion                         Normal and equal air entry                         No wheeze, rhonchi or rales.  Cardiovascular: S1 S2 normal. No murmurs, rubs or gallops.   Abdomen: Soft, non-tender, non-distended. No organomegaly.  Extremities: Warm to touch. Peripheral pulse palpable. No pedal edema.   Skin: No rashes or skin lesions  Neurological: Motor and sensory examination equal and normal in all four extremities.  Psychiatry: Appropriate mood and affect.    HOSPITAL MEDICATIONS:  MEDICATIONS  (STANDING):  aspirin enteric coated 81 milliGRAM(s) Oral daily  atorvastatin 40 milliGRAM(s) Oral at bedtime  buDESOnide   0.5 milliGRAM(s) Respule 0.5 milliGRAM(s) Inhalation every 12 hours  epoetin terry Injectable 4000 Unit(s) IV Push <User Schedule>  heparin  Injectable 5000 Unit(s) SubCutaneous every 12 hours  lactobacillus acidophilus 1 Tablet(s) Oral daily  levothyroxine Injectable 12.5 MICROGram(s) IV Push daily  midodrine 30 milliGRAM(s) Oral every 8 hours  Nephro-amy 1 Tablet(s) Oral daily  nystatin Powder 1 Application(s) Topical every 8 hours    MEDICATIONS  (PRN):      LABS:                        8.8    12.66 )-----------( 279      ( 05 Oct 2017 13:25 )             27.1     10-05    141  |  97  |  62<H>  ----------------------------<  192<H>  4.3   |  21<L>  |  4.89<H>    Ca    7.5<L>      05 Oct 2017 13:25      PT/INR - ( 04 Oct 2017 10:48 )   PT: 15.0 sec;   INR: 1.37 ratio         PTT - ( 04 Oct 2017 10:48 )  PTT:25.5 sec          MICROBIOLOGY:     RADIOLOGY:  [ ] Reviewed and interpreted by me    Point of Care Ultrasound Findings:    PFT:    EKG: CHIEF COMPLAINT:some fatigue--doesn't enjoy lying flat    Interval Events:pigtail remains in place    REVIEW OF SYSTEMS:  Constitutional: No fevers or chills. No weight loss. No fatigue or generalized malaise.  Eyes: No itching or discharge from the eyes  ENT: No ear pain. No ear discharge. No nasal congestion. No post nasal drip. No epistaxis. No throat pain. No sore throat. No difficulty swallowing.   CV: No chest pain. No palpitations. No lightheadedness or dizziness.   Resp: No dyspnea at rest. + dyspnea on exertion. + orthopnea. No wheezing. No cough. No stridor. No sputum production. No chest pain with respiration.  GI: No nausea. No vomiting. No diarrhea.  MSK: No joint pain or pain in any extremities  Integumentary: No skin lesions. + pedal edema.  Neurological: No gross motor weakness. No sensory changes.  [+ ] All other systems negative  [ ] Unable to assess ROS because ________    OBJECTIVE:  ICU Vital Signs Last 24 Hrs  T(C): 36.3 (05 Oct 2017 21:50), Max: 36.4 (05 Oct 2017 10:20)  T(F): 97.4 (05 Oct 2017 21:50), Max: 97.5 (05 Oct 2017 10:20)  HR: 87 (05 Oct 2017 21:50) (73 - 87)  BP: 99/61 (05 Oct 2017 21:50) (84/43 - 111/71)  BP(mean): --  ABP: --  ABP(mean): --  RR: 18 (05 Oct 2017 21:50) (17 - 18)  SpO2: 100% (05 Oct 2017 21:50) (99% - 100%)        10-04 @ 07:01  -  10-05 @ 07:00  --------------------------------------------------------  IN: 320 mL / OUT: 55 mL / NET: 265 mL    10-05 @ 07:01  -  10-06 @ 05:06  --------------------------------------------------------  IN: 1860 mL / OUT: 525 mL / NET: 1335 mL      CAPILLARY BLOOD GLUCOSE          PHYSICAL EXAM:NAD -sitting up  General: Awake, alert, oriented X 3.   HEENT: Atraumatic, normocephalic.                 Mallampatti Grade 2                No nasal congestion.                No tonsillar or pharyngeal exudates.  Lymph Nodes: No palpable lymphadenopathy  Neck: No JVD. No carotid bruit.   Respiratory: Normal chest expansion                     reduced BS at bases bilaterally  Cardiovascular: S1 S2 normal. 2+ murmurs, rubs or gallops.   Abdomen: Soft, non-tender, non-distended. No organomegaly.  Extremities: Warm to touch. Peripheral pulse palpable. 1+ pedal edema.   Skin: No rashes or skin lesions  Neurological: Motor and sensory examination equal and normal in all four extremities.  Psychiatry: Appropriate mood and affect.    HOSPITAL MEDICATIONS:  MEDICATIONS  (STANDING):  aspirin enteric coated 81 milliGRAM(s) Oral daily  atorvastatin 40 milliGRAM(s) Oral at bedtime  buDESOnide   0.5 milliGRAM(s) Respule 0.5 milliGRAM(s) Inhalation every 12 hours  epoetin terry Injectable 4000 Unit(s) IV Push <User Schedule>  heparin  Injectable 5000 Unit(s) SubCutaneous every 12 hours  lactobacillus acidophilus 1 Tablet(s) Oral daily  levothyroxine Injectable 12.5 MICROGram(s) IV Push daily  midodrine 30 milliGRAM(s) Oral every 8 hours  Nephro-amy 1 Tablet(s) Oral daily  nystatin Powder 1 Application(s) Topical every 8 hours    MEDICATIONS  (PRN):      LABS:                        8.8    12.66 )-----------( 279      ( 05 Oct 2017 13:25 )             27.1     10-05    141  |  97  |  62<H>  ----------------------------<  192<H>  4.3   |  21<L>  |  4.89<H>    Ca    7.5<L>      05 Oct 2017 13:25      PT/INR - ( 04 Oct 2017 10:48 )   PT: 15.0 sec;   INR: 1.37 ratio         PTT - ( 04 Oct 2017 10:48 )  PTT:25.5 sec          MICROBIOLOGY:     RADIOLOGY:  [ ] Reviewed and interpreted by me    Point of Care Ultrasound Findings:    PFT:    EKG:

## 2017-10-06 NOTE — PROGRESS NOTE ADULT - SUBJECTIVE AND OBJECTIVE BOX
Patient is a 87y old  Male who presents with a chief complaint of fall 2/2 hypotension (25 Sep 2017 15:50)    Being followed by ID for MRSA empyema ,bacteremia    Interval history:agitated at times   No other acute events      ROS:  No cough,SOB,CP  No N/V/D  No abd pain  No urinary complaints  No HA  No joint or limb pain  No other complaints      Antimicrobials:  vanco post HD      Vital Signs Last 24 Hrs  T(C): 36.4 (10-06-17 @ 11:28), Max: 36.4 (10-05-17 @ 14:00)  T(F): 97.6 (10-06-17 @ 11:28), Max: 97.6 (10-06-17 @ 06:35)  HR: 70 (10-06-17 @ 11:28) (70 - 87)  BP: 88/51 (10-06-17 @ 11:28) (84/43 - 103/65)  BP(mean): --  RR: 18 (10-06-17 @ 11:28) (17 - 18)  SpO2: 100% (10-06-17 @ 11:28) (95% - 100%)    Physical Exam:    Constitutional well preserved,comfortable,pleasant    HEENT PERRLA EOMI,No pallor or icterus    No oral exudate or erythema    Neck supple no JVD or LN    Chest Good AE,CTA  Chest tube     CVS RRR S1 S2 WNl No murmur or rub or gallop    Abd soft BS normal No tenderness no masses    Ext ecchymosis  AVF no tenderness    IV site no erythema tenderness or discharge    Joints no swelling or LOM    CNS AAO X 3 no focal    Lab Data:                          9.3    12.5  )-----------( 237      ( 06 Oct 2017 10:20 )             29.3       10-06    143  |  100  |  40<H>  ----------------------------<  128<H>  3.7   |  27  |  3.78<H>    Ca    7.4<L>      06 Oct 2017 10:20          Culture - Fungal, Body Fluid (collected 04 Oct 2017 21:56)  Source: .Body Fluid Pleural Fluid  Preliminary Report (05 Oct 2017 13:43):    Testing in progress    Culture - Body Fluid with Gram Stain (collected 04 Oct 2017 21:56)  Source: .Body Fluid Pleural Fluid  Gram Stain (04 Oct 2017 23:32):    polymorphonuclear leukocytes per low power field    No organisms seen per oil power field    by cytocentrifuge  Preliminary Report (05 Oct 2017 17:14):    No growth to date.    Culture - Acid Fast - Body Fluid w/Smear (collected 04 Oct 2017 21:56)  Source: .Body Fluid Pleural Fluid    Culture - Blood (collected 01 Oct 2017 21:41)  Source: .Blood Blood-Peripheral  Preliminary Report (02 Oct 2017 22:01):    No growth to date.              Vancomycin Level, Random: 12.7 ug/mL (10-05-17 @ 15:05)

## 2017-10-06 NOTE — PROGRESS NOTE ADULT - ASSESSMENT
86 yo M with multiple medical problems including DLBCL on Rituxan, malignant pleural effusion s/p left pleurx, ESRD on HD, chronic hypotension on Midodrine presents with lethargy, hypotension and cloudy pleural fluid drainage. History as per chart and family.   Received broad spectrum antibiotics and 2L NS for presumed sepsis 2/2 empyema. Evaluated by MICU and admitted for severe sepsis.     A/P: Septic shock - likely from empyema in the setting of DLBCL on Rituxan, indwelling pleural catheter and cloudy, malodorous pleural fluid. CT chest with moderate sized pleural effusion and small loculated left pleural effusion.  On broad spectrum antibiotics  BP support - stabilized s/p IVFs and antibiotics.  Thoracic surgery service following - + pleural space  infected- removed pleurx-pigtail placed 10/4  DVT prophylaxis.

## 2017-10-06 NOTE — BEHAVIORAL HEALTH ASSESSMENT NOTE - DESCRIPTION
ESRD on HD MWF, AAA s/p repair, ?MIs in past (?13 stents placed, last 8 stents placed in 03/2014), atrial fibrillation (not on A/C 2/2 falls), HFpEF (last EF 75% in 2015) s/p PPM and ablation 2016, severe aortic stenosis, severe mitral regurgitation, gout, GERD, depression, hypothyroidism, HTN, and DLBCL currently on Rituximab

## 2017-10-06 NOTE — BEHAVIORAL HEALTH ASSESSMENT NOTE - SUMMARY
87-year-old  male domicled with wife and HHA, retired, with PPhx dementia, no prior SA or psych admissions, not in psychiatric tx, with PMH of ESRD on HD MWF, AAA s/p repair, MI x 3 last 8 stents placed 3/2014, AFib, HF s/p PPM and ablation 2016, severe aortic stenosis, severe mitral regurgitation, gout, GERD, hypothyroidism, HTN, and DLBCL currently on Rituximab, admitted to medicine for sepsis and witnessed fall by VNS nurse, and is being evaluated by psychiatry for insomnia.  Pt AOx1 on interview, acutely confused per collaterals, likely delirium superimposed on dementia.  Has multiple medical comorbidities, guarded prognosis, palliative care c/s placed by primary team.

## 2017-10-07 DIAGNOSIS — R53.81 OTHER MALAISE: ICD-10-CM

## 2017-10-07 DIAGNOSIS — Z51.5 ENCOUNTER FOR PALLIATIVE CARE: ICD-10-CM

## 2017-10-07 LAB
HCT VFR BLD CALC: 28.8 % — LOW (ref 39–50)
HGB BLD-MCNC: 8.9 G/DL — LOW (ref 13–17)
MCHC RBC-ENTMCNC: 30.9 GM/DL — LOW (ref 32–36)
MCHC RBC-ENTMCNC: 31.6 PG — SIGNIFICANT CHANGE UP (ref 27–34)
MCV RBC AUTO: 102.1 FL — HIGH (ref 80–100)
PLATELET # BLD AUTO: 168 K/UL — SIGNIFICANT CHANGE UP (ref 150–400)
RBC # BLD: 2.82 M/UL — LOW (ref 4.2–5.8)
RBC # FLD: 20.1 % — HIGH (ref 10.3–14.5)
VANCOMYCIN FLD-MCNC: 18.1 UG/ML — SIGNIFICANT CHANGE UP
WBC # BLD: 12.4 K/UL — HIGH (ref 3.8–10.5)
WBC # FLD AUTO: 12.4 K/UL — HIGH (ref 3.8–10.5)

## 2017-10-07 PROCEDURE — 99233 SBSQ HOSP IP/OBS HIGH 50: CPT

## 2017-10-07 PROCEDURE — 71010: CPT | Mod: 26

## 2017-10-07 PROCEDURE — 90935 HEMODIALYSIS ONE EVALUATION: CPT

## 2017-10-07 PROCEDURE — 99223 1ST HOSP IP/OBS HIGH 75: CPT

## 2017-10-07 RX ADMIN — NYSTATIN CREAM 1 APPLICATION(S): 100000 CREAM TOPICAL at 13:08

## 2017-10-07 RX ADMIN — Medication 1 TABLET(S): at 11:49

## 2017-10-07 RX ADMIN — HEPARIN SODIUM 5000 UNIT(S): 5000 INJECTION INTRAVENOUS; SUBCUTANEOUS at 19:36

## 2017-10-07 RX ADMIN — NYSTATIN CREAM 1 APPLICATION(S): 100000 CREAM TOPICAL at 22:06

## 2017-10-07 RX ADMIN — Medication 150 MILLIGRAM(S): at 22:01

## 2017-10-07 RX ADMIN — Medication 0.5 MILLIGRAM(S): at 05:18

## 2017-10-07 RX ADMIN — Medication 12.5 MICROGRAM(S): at 05:19

## 2017-10-07 RX ADMIN — Medication 3 MILLIGRAM(S): at 22:06

## 2017-10-07 RX ADMIN — NYSTATIN CREAM 1 APPLICATION(S): 100000 CREAM TOPICAL at 05:18

## 2017-10-07 RX ADMIN — Medication 1 TABLET(S): at 11:48

## 2017-10-07 RX ADMIN — Medication 81 MILLIGRAM(S): at 11:48

## 2017-10-07 RX ADMIN — ATORVASTATIN CALCIUM 40 MILLIGRAM(S): 80 TABLET, FILM COATED ORAL at 22:06

## 2017-10-07 RX ADMIN — ERYTHROPOIETIN 4000 UNIT(S): 10000 INJECTION, SOLUTION INTRAVENOUS; SUBCUTANEOUS at 15:17

## 2017-10-07 RX ADMIN — MIDODRINE HYDROCHLORIDE 30 MILLIGRAM(S): 2.5 TABLET ORAL at 07:59

## 2017-10-07 RX ADMIN — HEPARIN SODIUM 5000 UNIT(S): 5000 INJECTION INTRAVENOUS; SUBCUTANEOUS at 05:19

## 2017-10-07 NOTE — CONSULT NOTE ADULT - PROBLEM SELECTOR RECOMMENDATION 2
Cont epogen 4000 units TIW with HD.  No IV iron with active bacterial infection.
Currently on broad spectrum antibiotics, which are also treating MRSA bacteremia.  Course of antibiotics will be dependent on overall goals of care, as well as disposition.

## 2017-10-07 NOTE — CONSULT NOTE ADULT - CONSULT REASON
ESRD on HD
ESRD, empyema  Advanced Illness
MRSA bacteremia, empyema
hypotension
Empyema    Pulmonary consultation on behalf of Dr. Morro Tran
DLBCL

## 2017-10-07 NOTE — PROGRESS NOTE ADULT - PROBLEM SELECTOR PLAN 1
Maintain left pigtail to LWS.  Check daily CXR  Cough and deep breathe, Incentive Spirometry Q1h, Chest PT.   Ambulate 4x daily as tolerated and with PT.  Management per primary team, will follow. Maintain left pigtail to LWS.  Check daily CXR, CBC  Cough and deep breathe, Incentive Spirometry Q1h, Chest PT.   OOB to chair and Ambulate as tolerated and with assist.  Management per primary team, will follow.

## 2017-10-07 NOTE — PROGRESS NOTE ADULT - PROBLEM SELECTOR PLAN 1
- Continues to have b/l decreased breath sounds at bases and crackles  - s/p pigtail placement by IR on 10/4  - will check f/u CXR to monitor for improvement; monitor drainage, total 130cc   - vanc renally dosed, therapeutic level today

## 2017-10-07 NOTE — PROGRESS NOTE ADULT - SUBJECTIVE AND OBJECTIVE BOX
Subjective: Pt states "I'm good" denies any CP, SOB, N/V and palpitations. No acute events overnight.     Vital Signs Last 24 Hrs  T(F): 98.1, Max: 98.1  HR: 70   BP: 95/59  RR: 18   SpO2: 94% on 3L NC       10-07-17 @ 07:01  -  10-07-17 @ 11:14  --------------------------------------------------------  IN: 540 mL / OUT: 0 mL / NET: 540 mL                          8.9    12.40 )-----------( 168      ( 07 Oct 2017 08:11 )             28.8     143  |  100  |  40<H>  ----------------------------<  128<H>  3.7   |  27  |  3.78<H>       PHYSICAL EXAM  Neurology: A&Ox1, NAD  CV : +S1S2  Lungs: Respirations non-labored, B/L BS clear, diminished on left  +left pigtail to LWS with serosanguinous drainage -40cc/24h, no air leak  Abdomen: Soft, NT/ND, +BSx4Q, +BM  Extremities: B/L LE +1 edema, negative calf tenderness, +PP           MEDICATIONS  aspirin enteric coated 81 milliGRAM(s) Oral daily  atorvastatin 40 milliGRAM(s) Oral at bedtime  buDESOnide   0.5 milliGRAM(s) Respule 0.5 milliGRAM(s) Inhalation every 12 hours  epoetin terry Injectable 4000 Unit(s) IV Push <User Schedule>  heparin  Injectable 5000 Unit(s) SubCutaneous every 12 hours  lactobacillus acidophilus 1 Tablet(s) Oral daily  levothyroxine Injectable 12.5 MICROGram(s) IV Push daily  melatonin 3 milliGRAM(s) Oral at bedtime  midodrine 30 milliGRAM(s) Oral every 8 hours  Nephro-amy 1 Tablet(s) Oral daily  nystatin Powder 1 Application(s) Topical every 8 hours  vancomycin  IVPB 750 milliGRAM(s) IV Intermittent <User Schedule>      Pt seen and examined with thoracic surgery attending, Dr. Cardoza. Subjective: Pt states "I'm good" denies any CP, SOB, N/V and palpitations. No acute events overnight.     Vital Signs Last 24 Hrs  T(F): 98.1, Max: 98.1  HR: 70   BP: 95/59  RR: 18   SpO2: 94% on 3L NC     10-07-17 @ 07:01  -  10-07-17 @ 11:14  --------------------------------------------------------  IN: 540 mL / OUT: 0 mL / NET: 540 mL               8.9    12.40 )-----------( 168      ( 07 Oct 2017 08:11 )             28.8     143  |  100  |  40<H>  ----------------------------<  128<H>  3.7   |  27  |  3.78<H>     PHYSICAL EXAM  Neurology: A&Ox1, NAD  CV : +S1S2  Lungs: Respirations non-labored, B/L BS clear, diminished on left  +left pigtail to LWS with serosanguinous drainage -40cc/24h, no air leak  Abdomen: Soft, NT/ND, +BSx4Q, +BM  Extremities: B/L LE +1 edema, negative calf tenderness, +PP           MEDICATIONS  aspirin enteric coated 81 milliGRAM(s) Oral daily  atorvastatin 40 milliGRAM(s) Oral at bedtime  buDESOnide   0.5 milliGRAM(s) Respule 0.5 milliGRAM(s) Inhalation every 12 hours  epoetin terry Injectable 4000 Unit(s) IV Push <User Schedule>  heparin  Injectable 5000 Unit(s) SubCutaneous every 12 hours  lactobacillus acidophilus 1 Tablet(s) Oral daily  levothyroxine Injectable 12.5 MICROGram(s) IV Push daily  melatonin 3 milliGRAM(s) Oral at bedtime  midodrine 30 milliGRAM(s) Oral every 8 hours  Nephro-amy 1 Tablet(s) Oral daily  nystatin Powder 1 Application(s) Topical every 8 hours  vancomycin  IVPB 750 milliGRAM(s) IV Intermittent <User Schedule>      Pt seen and examined with thoracic surgery attending, Dr. Cardoza.

## 2017-10-07 NOTE — PROGRESS NOTE ADULT - PROBLEM SELECTOR PLAN 2
Continue Abx per ID.   Effusion improved on CXR, cont PTC drainage. No plans for Fibrinolysis at this time.

## 2017-10-07 NOTE — CONSULT NOTE ADULT - PROBLEM SELECTOR RECOMMENDATION 9
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 He has discussed previously with the patient other treatment options if Ritux is no longer controlling his disease. Acutely, however, he is not a candidate for treatment. Family meeting in the process of being set up, ideally when sons available as several family members have differing views.
Patient is generally MWF dialysis schedule.  In the setting of acute illness with likely empyema with no respiratory distress, electrolytes within normal limits, will defer dialysis for today.  Will re-evaluate tomorrow AM.
Tolerating HD for now.  Will need to discuss long term HD given other comorbidities with family, especially given current disease trajectory.

## 2017-10-07 NOTE — CONSULT NOTE ADULT - CONSULT REQUESTED DATE/TIME
02-Oct-2017 13:03
07-Oct-2017 07:47
25-Sep-2017 13:22
25-Sep-2017 15:46
25-Sep-2017 17:54
03-Oct-2017 16:44

## 2017-10-07 NOTE — CONSULT NOTE ADULT - PROBLEM SELECTOR RECOMMENDATION 3
Based on mental status, uncertain if patient will be able to tolerate PO medications.  If patient can tolerate, the would give 10mg midodrine prior to HD.
PPSV2 is 30%.  Currently requires full assist with all ADLS.

## 2017-10-07 NOTE — PROGRESS NOTE ADULT - ASSESSMENT
Mr. Guzman is a 87 year old man with ESRD on HD, CAD s/p multiple stents (last in 3/2014), AAA s/p repair, A fib with PPM (no AC due to falls) and ablation in 2016, HFpEF (last TTE 9/28), severe AS and MR, depression, dementia (AOx1 at baseline), hypothyroidism, HTN, DLBCL currently on rituximab, malignant pleural effusion s/p Pleurx, presented with brief septic shock 2/2 infected L pleural space and bactermic with MRSA. now s/p L pleurX removal and insertion of a pigtail catheter -- currently on suction -- with now minimal outpt.      Currently getting vancomycin post HD. CXR appears as pulm edema given his comorbidities of valvular disease and ESRD. His course will depend on the goals of care -- wishes of the patient and family. Agree with ID that he would need CT w/ contrast and GUADALUPE if wishes to continue pursue aggressive care.     Recommend:   - for now would continue the vancomycin as you are doing.   - f/u with CTS regarding the chest tube.   - appreciate help from palliative care.     Abelardo Light MD   Pulmonary Fellow  Covering for Dr. Tran

## 2017-10-07 NOTE — PROGRESS NOTE ADULT - PROBLEM SELECTOR PLAN 3
patient intermittently hyperactive and more confused  -ABG done 10/1, not hypoxic or hypercarbic; altered again on 10/4 at night, on 1:1  - Psych consult: melatonin at bedtime, ativan .25 IV only if severely agitated, c/w supportive care and reorientation  -assistance with meals  -avoid antipsychotics (QTc 590), anticholinergics, opiates

## 2017-10-07 NOTE — CONSULT NOTE ADULT - SUBJECTIVE AND OBJECTIVE BOX
HPI:  86 yo M with a PMH ESRD on HD MWF, AAA s/p repair, ?MIs in past (?13 stents placed, last 8 stents placed in 03/2014), atrial fibrillation (not on A/C 2/2 falls), HFpEF (last EF 75% in 2015) s/p PPM and ablation 2016, severe aortic stenosis, severe mitral regurgitation, gout, GERD, depression, hypothyroidism, HTN, and DLBCL currently on Rituximab, recently admitted for increased fluid leakage from pleurx catheter p/w witnessed mechanical fall by home VNS nurse. Per Pt's family patient had become increasingly lethargic x 2 days. This morning, family stated that he was slumped over the table at breakfast. Family reported that he fell of table. Pt family reported that  the fall was witness by the VNS nurse whom subsequently measure a BP of 70/40. Pt family reported that the VNS nurse proceeded to drain cloudy, foul smelling fluid from pleurx catheter prompting them to being them into the hospital.     In the ED:     T(C): 36.4 (25 Sep 2017 10:52), Max: 36.4 (25 Sep 2017 10:49)  T(F): 97.6 (25 Sep 2017 10:52), Max: 97.6 (25 Sep 2017 10:52)  HR: 70 (25 Sep 2017 15:15) (70 - 99)  BP: 93/53 (25 Sep 2017 15:15) (78/47 - 99/45)  BP(mean): --  RR: 24 (25 Sep 2017 15:15) (24 - 26)  SpO2: 100% (25 Sep 2017 15:15) (95% - 100%)    Pt was given acetaminopehn IVPG 1g , Zosyn 3.375g in dextrose 5% IV, Vanc 1000mg IVPG, Adacel 0.5mg IM, 2 boluses of normal saline totaling 750 mL. (25 Sep 2017 15:50)      PERTINENT PMH REVIEWED:  [ ] YES [ ] NO           SOCIAL HISTORY:  Significant other/partner:  [ ] YES  [ ] NO            Children:  [ ] YES  [ ] NO                   Tenriism/Spirituality:  Subtance hx:  [ ] YES   [ ] NO           Tobacco hx:  [ ] YES  [ ] NO             Alcohol hx: [ ] YES  [ ] NO        Home Opiod hx:  [ ] YES  [ ] NO   Living Situation: [ ] Home  [ ] Long term care  [ ] Rehab    REFERRALS:   [ ] Chaplaincy  [ ] Hospice  [ ] Child Life  [ ] Social Work  [ ] Case management [ ] Holistic Therapy     FAMILY HISTORY:  No pertinent family history    [ ] Family history non contributory     BASELINE ADLs (prior to admission):  Independent [ ] moderately [ ] fully   Dependent   [ ] moderately [ ] fully    ADVANCE DIRECTIVES:  [ ] YES [ ] NO   DNR [ ] YES [ ] NO                      MOLST  [ ] YES [ ] NO    Living Will  [ ] YES [ ] NO    Health Care Proxy [ ] YES  [ ] NO      [ ] Surrogate  [ ] HCP  [ ] Guardian:                                                                  Phone#:    Allergies    amlodipine (Rash)  Benadryl (Other)  Brilinta (Unknown)  Effient (Unknown)  Multaq (Other)    Intolerances        MEDICATIONS  (STANDING):  aspirin enteric coated 81 milliGRAM(s) Oral daily  atorvastatin 40 milliGRAM(s) Oral at bedtime  buDESOnide   0.5 milliGRAM(s) Respule 0.5 milliGRAM(s) Inhalation every 12 hours  epoetin terry Injectable 4000 Unit(s) IV Push <User Schedule>  heparin  Injectable 5000 Unit(s) SubCutaneous every 12 hours  lactobacillus acidophilus 1 Tablet(s) Oral daily  levothyroxine Injectable 12.5 MICROGram(s) IV Push daily  melatonin 3 milliGRAM(s) Oral at bedtime  midodrine 30 milliGRAM(s) Oral every 8 hours  Nephro-amy 1 Tablet(s) Oral daily  nystatin Powder 1 Application(s) Topical every 8 hours  vancomycin  IVPB 750 milliGRAM(s) IV Intermittent <User Schedule>    MEDICATIONS  (PRN):      PRESENT SYMPTOMS:  Source: [ ] Patient   [ ] Family   [ ] Team     Pain: [ ] YES [ ] NO  OLDCARTS:     Dyspnea: [ ] YES [ ] NO   Anxiety: [ ] YES [ ] NO  Fatigue: [ ] YES [ ] NO   Nausea: [ ] YES [ ] NO  Loss of appetite: [ ] YES [ ] NO   Constipation: [ ] YES [ ] NO     Other Symptoms:  [ ] All other review of systems negative   [ ] Unable to obtain due to poor mentation     Karnofsky Performance Score/Palliative Performance Status Version 2:         %  Protein Calorie Malutrition:  [ ] Mild   [ ] Moderate   [ ] Severe     Vital Signs Last 24 Hrs  T(C): 36.6 (07 Oct 2017 04:22), Max: 36.6 (07 Oct 2017 04:22)  T(F): 97.9 (07 Oct 2017 04:22), Max: 97.9 (07 Oct 2017 04:22)  HR: 72 (07 Oct 2017 04:22) (70 - 84)  BP: 92/52 (07 Oct 2017 04:22) (88/51 - 101/64)  BP(mean): --  RR: 18 (07 Oct 2017 04:22) (18 - 19)  SpO2: 92% (07 Oct 2017 04:22) (91% - 100%)    Physical Exam:    General: [ ] Alert,  A&O x     [ ] lethargic   [ ] Agitated   [ ] Cachexia   HEENT: [ ] Normal   [ ] Dry mouth   [ ] ET Tube    [ ] Trach   Lungs: [ ] Clear [ ] Rhonchi  [ ] Crackles [ ] Wheezing [ ] Tachypnea  [ ] Audible excessive secretions   Cardiovascular:  [ ] Regular rate and rhythm  [ ] Irregular [ ] Tachycardia   [ ] Bradycardia   Abdomen: [ ] Soft  [ ] Distended  [ ]  [ ] +BS  [ ] Non tender [ ] Tender  [ ]PEG   [ ] NGT   Last BM:     Genitourinary: [ ] Normal [ ] Incontinent   [ ] Oliguria/Anuria   [ ] Mcnamara  Musculoskeletal:  [ ] Normal   [ ] Generalized weakness  [ ] Bedbound   Neurological: [ ] No focal deficits  [ ] Cognitive impairment     Skin: [ ] Normal   [ ] Pressure ulcers     LABS:                        9.3    12.5  )-----------( 237      ( 06 Oct 2017 10:20 )             29.3     10-06    143  |  100  |  40<H>  ----------------------------<  128<H>  3.7   |  27  |  3.78<H>    Ca    7.4<L>      06 Oct 2017 10:20          I&O's Summary    06 Oct 2017 07:01  -  07 Oct 2017 07:00  --------------------------------------------------------  IN: 1194 mL / OUT: 30 mL / NET: 1164 mL        RADIOLOGY & ADDITIONAL STUDIES: HPI:  88 yo M with a PMH ESRD on HD MWF, AAA s/p repair, ?MIs in past (?13 stents placed, last 8 stents placed in 03/2014), atrial fibrillation (not on A/C 2/2 falls), HFpEF (last EF 75% in 2015) s/p PPM and ablation 2016, severe aortic stenosis, severe mitral regurgitation, gout, GERD, depression, hypothyroidism, HTN, and DLBCL currently on Rituximab, recently admitted for increased fluid leakage from pleurx catheter p/w witnessed mechanical fall by home VNS nurse. Per Pt's family patient had become increasingly lethargic x 2 days. This morning, family stated that he was slumped over the table at breakfast. Family reported that he fell of table. Pt family reported that  the fall was witness by the VNS nurse whom subsequently measure a BP of 70/40. Pt family reported that the VNS nurse proceeded to drain cloudy, foul smelling fluid from pleurx catheter prompting them to being them into the hospital.     PERTINENT PMH REVIEWED:  [x ] YES [ ] NO           SOCIAL HISTORY:  Significant other/partner:  [x ] YES  [ ] NO            Children:  [x ] YES  [ ] NO                   Baptism/Spirituality: Temple  Subtance hx:  [ ] YES   [x ] NO           Tobacco hx:  [x ] YES  [ ] NO             Alcohol hx: [ ] YES  [x ] NO        Home Opiod hx:  [ ] YES  [ x] NO   Living Situation: [x ] Home  [ ] Long term care  [ ] Rehab    REFERRALS:   [x ] Chaplaincy  [ ] Hospice  [ ] Child Life  [ ] Social Work  [ ] Case management [ ] Holistic Therapy     FAMILY HISTORY:  No pertinent family history    [x ] Family history non contributory     BASELINE ADLs (prior to admission):  Independent [ ] moderately [ ] fully   Dependent   [ ] moderately [x ] fully    ADVANCE DIRECTIVES:  [ ] YES [x ] NO   DNR [ ] YES [x ] NO                      MOLST  [ ] YES [ x] NO    Living Will  [ ] YES [ x] NO    Health Care Proxy [ ] YES  [x ] NO      [ x] Surrogate  [ ] HCP  [ ] Guardian:               Jade Guzman (spouse)                   Phone#: [phone]    Allergies    amlodipine (Rash)  Benadryl (Other)  Brilinta (Unknown)  Effient (Unknown)  Multaq (Other)    Intolerances        MEDICATIONS  (STANDING):  aspirin enteric coated 81 milliGRAM(s) Oral daily  atorvastatin 40 milliGRAM(s) Oral at bedtime  buDESOnide   0.5 milliGRAM(s) Respule 0.5 milliGRAM(s) Inhalation every 12 hours  epoetin terry Injectable 4000 Unit(s) IV Push <User Schedule>  heparin  Injectable 5000 Unit(s) SubCutaneous every 12 hours  lactobacillus acidophilus 1 Tablet(s) Oral daily  levothyroxine Injectable 12.5 MICROGram(s) IV Push daily  melatonin 3 milliGRAM(s) Oral at bedtime  midodrine 30 milliGRAM(s) Oral every 8 hours  Nephro-amy 1 Tablet(s) Oral daily  nystatin Powder 1 Application(s) Topical every 8 hours  vancomycin  IVPB 750 milliGRAM(s) IV Intermittent <User Schedule>    MEDICATIONS  (PRN):      PRESENT SYMPTOMS:  Source: [ ] Patient   [ ] Family   [x ] Team     Pain: [ ] YES [x ] NO  OLDCARTS:     Dyspnea: [ ] YES [ ] NO   Anxiety: [ ] YES [ ] NO  Fatigue: [ ] YES [ ] NO   Nausea: [ ] YES [ ] NO  Loss of appetite: [ ] YES [ ] NO   Constipation: [ ] YES [ ] NO     Other Symptoms:  [ ] All other review of systems negative   [x ] Unable to obtain due to poor mentation     Karnofsky Performance Score/Palliative Performance Status Version 2:   30      %  Protein Calorie Malutrition:  [ ] Mild   [ ] Moderate   [ x] Severe     Vital Signs Last 24 Hrs  T(C): 36.6 (07 Oct 2017 04:22), Max: 36.6 (07 Oct 2017 04:22)  T(F): 97.9 (07 Oct 2017 04:22), Max: 97.9 (07 Oct 2017 04:22)  HR: 72 (07 Oct 2017 04:22) (70 - 84)  BP: 92/52 (07 Oct 2017 04:22) (88/51 - 101/64)  BP(mean): --  RR: 18 (07 Oct 2017 04:22) (18 - 19)  SpO2: 92% (07 Oct 2017 04:22) (91% - 100%)    Physical Exam:    General: [ ] Alert,  A&O x     [x ] lethargic   [ ] Agitated   [ ] Cachexia   HEENT: [ ] Normal   [x ] Dry mouth   [ ] ET Tube    [ ] Trach   Lungs: [ ] Clear [x ] Rhonchi  [ ] Crackles [x ] Wheezing [ ] Tachypnea  [ ] Audible excessive secretions   Cardiovascular:  [ x] Regular rate and rhythm  [ ] Irregular [ ] Tachycardia   [ ] Bradycardia   Abdomen: [ x] Soft  [ ] Distended  [ ]  [ x] +BS  [x ] Non tender [ ] Tender  [ ]PEG   [ ] NGT   Last BM:     Genitourinary: [ ] Normal [ ] Incontinent   [x ] Oliguria/Anuria   [ ] Mcnamara  Musculoskeletal:  [ ] Normal   [ ] Generalized weakness  [ x] Bedbound   Neurological: [ ] No focal deficits  [x ] Cognitive impairment     Skin: [x ] Normal   [ ] Pressure ulcers     LABS:                        9.3    12.5  )-----------( 237      ( 06 Oct 2017 10:20 )             29.3     10-06    143  |  100  |  40<H>  ----------------------------<  128<H>  3.7   |  27  |  3.78<H>    Ca    7.4<L>      06 Oct 2017 10:20          I&O's Summary    06 Oct 2017 07:01  -  07 Oct 2017 07:00  --------------------------------------------------------  IN: 1194 mL / OUT: 30 mL / NET: 1164 mL        RADIOLOGY & ADDITIONAL STUDIES:

## 2017-10-07 NOTE — PROGRESS NOTE ADULT - PROBLEM SELECTOR PLAN 8
- DLBCL, on rituximab outpatient   - no longer a candidate for chemotherapy due to worsening clinical status  - appreciate heme/onc recs

## 2017-10-07 NOTE — PROGRESS NOTE ADULT - SUBJECTIVE AND OBJECTIVE BOX
CHIEF COMPLAINT:  MSRA bactermia    Interval Events:  patient sitting up in a chair, but not answering any questions. on NC. no respiratory distress.     chest tube outpt: 30 cc in 24 hrs.    awaiting goals of care discussion with family    REVIEW OF SYSTEMS:  [x] All other systems negative  [ ] Unable to assess ROS because ________    OBJECTIVE:  ICU Vital Signs Last 24 Hrs  T(C): 36.7 (07 Oct 2017 07:52), Max: 36.7 (07 Oct 2017 07:52)  T(F): 98.1 (07 Oct 2017 07:52), Max: 98.1 (07 Oct 2017 07:52)  HR: 70 (07 Oct 2017 07:52) (70 - 84)  BP: 95/59 (07 Oct 2017 07:52) (88/51 - 101/64)  RR: 18 (07 Oct 2017 07:52) (18 - 19)  SpO2: 94% (07 Oct 2017 07:52) (91% - 100%)      10-06 @ 07:01  -  10-07 @ 07:00  --------------------------------------------------------  IN: 1194 mL / OUT: 30 mL / NET: 1164 mL    10-07 @ 07:01  -  10-07 @ 09:43  --------------------------------------------------------  IN: 540 mL / OUT: 0 mL / NET: 540 mL    PHYSICAL EXAM:  General: NAD. sitting up in a chair. not really communicating   Respiratory: inspiratory crackles at the bases. decrease BS at the base. L pigtail catheter  Cardiovascular: nl rate and reg rhythm. nl s1, s2. +systolic murmur  Abdomen: soft. nt. nd.   Extremities: no edema    HOSPITAL MEDICATIONS:  MEDICATIONS  (STANDING):  aspirin enteric coated 81 milliGRAM(s) Oral daily  atorvastatin 40 milliGRAM(s) Oral at bedtime  buDESOnide   0.5 milliGRAM(s) Respule 0.5 milliGRAM(s) Inhalation every 12 hours  epoetin terry Injectable 4000 Unit(s) IV Push <User Schedule>  heparin  Injectable 5000 Unit(s) SubCutaneous every 12 hours  lactobacillus acidophilus 1 Tablet(s) Oral daily  levothyroxine Injectable 12.5 MICROGram(s) IV Push daily  melatonin 3 milliGRAM(s) Oral at bedtime  midodrine 30 milliGRAM(s) Oral every 8 hours  Nephro-amy 1 Tablet(s) Oral daily  nystatin Powder 1 Application(s) Topical every 8 hours  vancomycin  IVPB 750 milliGRAM(s) IV Intermittent <User Schedule>    MEDICATIONS  (PRN):      LABS:                        9.3    12.5  )-----------( 237      ( 06 Oct 2017 10:20 )             29.3     10-06    143  |  100  |  40<H>  ----------------------------<  128<H>  3.7   |  27  |  3.78<H>    Ca    7.4<L>      06 Oct 2017 10:20    MICROBIOLOGY:   Last cx: negative  Previous Bcx and Pleural Cx: MRSA    RADIOLOGY:  [x] Reviewed and interpreted by me

## 2017-10-07 NOTE — PROGRESS NOTE ADULT - PROBLEM SELECTOR PLAN 1
ESRD on HD. Patient seen at HD today - attemptin 500 cc UF due to chronic hypotension.  Pt on midodrine.

## 2017-10-07 NOTE — CONSULT NOTE ADULT - PROBLEM SELECTOR RECOMMENDATION 4
No hectorol as outpatient.  Monitor calcium and phos.
Primary team with ongoing discussions with the family.  Would benefit from family meeting with 3 sons and wife so the whole family is on the same page regarding treatment goals and plan.  If patient is to continue with HD, likely wont be a candidate for hospice.  Will also need to address advanced directives and MOLST form.   Will reach out to family to schedule meeting.

## 2017-10-07 NOTE — PROGRESS NOTE ADULT - PROBLEM SELECTOR PLAN 4
- Preserved ejection fraction, last TTE 9/28  - Not in acute exacerbation at this time  - monitor left chest cath output and CXR

## 2017-10-07 NOTE — PROGRESS NOTE ADULT - PROBLEM SELECTOR PLAN 2
- Severe sepsis. resolved  - On midodrine 30 TID with baseline pressures 90s systolic  - Afebrile, white count decreasing  - On vanc renally dosed, zosyn d/c 9/30 due to cultures all growing MRSA

## 2017-10-07 NOTE — PROGRESS NOTE ADULT - SUBJECTIVE AND OBJECTIVE BOX
[facility] DIVISION OF KIDNEY DISEASES AND HYPERTENSION -- INITIAL CONSULT NOTE  --------------------------------------------------------------------------------  Chief Complaint: ESRD/Ongoing hemodialysis requirement    24 hour events/subjective:  Pt seen and examined on dialysis.  Feels ok but tired.       PAST HISTORY  --------------------------------------------------------------------------------  No significant changes to PMH, PSH, FHx, SHx, unless otherwise noted    ALLERGIES & MEDICATIONS  --------------------------------------------------------------------------------  Allergies    amlodipine (Rash)  Benadryl (Other)  Brilinta (Unknown)  Effient (Unknown)  Multaq (Other)    Intolerances      Standing Inpatient Medications  aspirin enteric coated 81 milliGRAM(s) Oral daily  atorvastatin 40 milliGRAM(s) Oral at bedtime  buDESOnide   0.5 milliGRAM(s) Respule 0.5 milliGRAM(s) Inhalation every 12 hours  epoetin terry Injectable 4000 Unit(s) IV Push <User Schedule>  heparin  Injectable 5000 Unit(s) SubCutaneous every 12 hours  lactobacillus acidophilus 1 Tablet(s) Oral daily  levothyroxine Injectable 12.5 MICROGram(s) IV Push daily  melatonin 3 milliGRAM(s) Oral at bedtime  midodrine 30 milliGRAM(s) Oral every 8 hours  Nephro-amy 1 Tablet(s) Oral daily  nystatin Powder 1 Application(s) Topical every 8 hours  vancomycin  IVPB 750 milliGRAM(s) IV Intermittent <User Schedule>    PRN Inpatient Medications      REVIEW OF SYSTEMS  --------------------------------------------------------------------------------  Gen: No  fevers/chills, weakness  Skin: No rashes  Respiratory: No dyspnea, cough, wheezing, hemoptysis  CV: No chest pain, PND, orthopnea  GI: No abdominal pain, diarrhea, constipation, nausea, vomiting, melena, hematochezia  : No increased frequency, dysuria, hematuria, nocturia  MSK: No joint pain/swelling; no back pain; no edema  Neuro: No dizziness/lightheadedness, weakness, seizures, numbness, tingling      All other systems were reviewed and are negative, except as noted.    VITALS/PHYSICAL EXAM  --------------------------------------------------------------------------------  T(C): 36.7 (10-07-17 @ 07:52), Max: 36.7 (10-07-17 @ 07:52)  HR: 70 (10-07-17 @ 07:52) (70 - 84)  BP: 95/59 (10-07-17 @ 07:52) (92/52 - 101/64)  RR: 18 (10-07-17 @ 07:52) (18 - 19)  SpO2: 94% (10-07-17 @ 07:52) (92% - 100%)  Wt(kg): --        10-06-17 @ 07:01  -  10-07-17 @ 07:00  --------------------------------------------------------  IN: 1194 mL / OUT: 30 mL / NET: 1164 mL    10-07-17 @ 07:01  -  10-07-17 @ 15:04  --------------------------------------------------------  IN: 1020 mL / OUT: 0 mL / NET: 1020 mL      Physical Exam:  	Gen: thin appears chronically ill   	HEENT: PERRL, supple neck, clear oropharynx  	Pulm: CTA B/L  	CV: RRR, S1S2; no rub  	Abd: +BS, soft, nontender/nondistended  	: No suprapubic tenderness  	LE: Warm, FROM, no clubbing, intact strength; trace LE edema  	Skin: Warm, without rashes  	Vascular access: AV fistula    LABS/STUDIES  --------------------------------------------------------------------------------              8.9    12.40 >-----------<  168      [10-07-17 @ 08:11]              28.8     143  |  100  |  40  ----------------------------<  128      [10-06-17 @ 10:20]  3.7   |  27  |  3.78        Ca     7.4     [10-06-17 @ 10:20]            Iron 56, TIBC 208, %sat --      [09-17-17 @ 05:40]  Ferritin 1179      [09-17-17 @ 05:40]  HbA1c 5.7      [11-10-15 @ 07:48]  TSH 4.73      [09-16-17 @ 16:20]    HBsAb <3.0      [10-05-17 @ 13:25]  HBsAb Nonreact      [10-05-17 @ 13:25]  HBsAg Nonreact      [10-05-17 @ 13:25]  HCV 0.09, Nonreact      [10-05-17 @ 13:25]

## 2017-10-07 NOTE — PROGRESS NOTE ADULT - ASSESSMENT
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 9/25-Found to have +MRSA in pleural fluid cx and blood cx, on IV Vancomycin, followed by ID. Pleurx dc'd. Now s/p Left PTC placed for left loculated effusion on 10/4 by IR, 300cc drained at that time.   Now Left PTC output 40cc/24hs on suction. Pleural cultures from 10/4 neg to date.

## 2017-10-07 NOTE — PROGRESS NOTE ADULT - SUBJECTIVE AND OBJECTIVE BOX
Patient is a 87y old  Male who presents with a chief complaint of fall 2/2 hypotension (25 Sep 2017 15:50)      SUBJECTIVE / OVERNIGHT EVENTS: No acute events overnight. Continues to have insomnia. More agitated when laying in bed, improved with moving to chair this AM. Started on melatonin last night. No complaints of pain.     MEDICATIONS  (STANDING):  aspirin enteric coated 81 milliGRAM(s) Oral daily  atorvastatin 40 milliGRAM(s) Oral at bedtime  buDESOnide   0.5 milliGRAM(s) Respule 0.5 milliGRAM(s) Inhalation every 12 hours  epoetin terry Injectable 4000 Unit(s) IV Push <User Schedule>  heparin  Injectable 5000 Unit(s) SubCutaneous every 12 hours  lactobacillus acidophilus 1 Tablet(s) Oral daily  levothyroxine Injectable 12.5 MICROGram(s) IV Push daily  melatonin 3 milliGRAM(s) Oral at bedtime  midodrine 30 milliGRAM(s) Oral every 8 hours  Nephro-amy 1 Tablet(s) Oral daily  nystatin Powder 1 Application(s) Topical every 8 hours  vancomycin  IVPB 750 milliGRAM(s) IV Intermittent <User Schedule>        I&O's Summary    06 Oct 2017 07:01  -  07 Oct 2017 07:00  --------------------------------------------------------  IN: 1194 mL / OUT: 30 mL / NET: 1164 mL        PHYSICAL EXAM:  General: NAD, sitting in chair combing his hair  HEENT: normocephalic, atraumatic  Card: harsh systolic murmur, no JVD, regular rate  Pulm: decreased lung sounds at bases; b/l rales, no wheezes or ronchi, on NC  Back: no CVA tenderness; left flank pigtail catheter in place, connected to suction, nontender  GI: nontender, +BS  Extremities: dialysis fistula in left arm, no clubbing, cyanosis, edema  Skin: no rashes or erythema; multiple old ecchymoses and healing eschars on arms and legs  Neurological: Motor and sensory examination equal and normal in all four extremities.  Psychiatry: sitting comfortably in chair, responsive    LABS:  (10-06 @ 10:20)                        9.3  12.5 )-----------( 237                 29.3    Neutrophils = -- (--%)  Lymphocytes = -- (--%)  Eosinophils = -- (--%)  Basophils = -- (--%)  Monocytes = -- (--%)  Bands = --%    WBC Trend: 12.5<--, 12.66<--, 14.0<--  Hb Trend: 9.3<--, 8.8<--, 9.1<--, 8.3<--, 9.1<--  Plt Trend: 237<--, 279<--, 239<--, 233<--, 183<--  10-06    143  |  100  |  40<H>  ----------------------------<  128<H>  3.7   |  27  |  3.78<H>    Ca    7.4<L>      06 Oct 2017 10:20      Creatinine Trend: 3.78<--, 4.89<--, 4.06<--, 5.48<--, 4.59<--, 3.67<--            Microbiology:  Pleural Cx: PMNs, no organisms from 10/4  Blood Cx: NGTD from 10/5    RADIOLOGY & ADDITIONAL TESTS:  X- Ray:     Consultant(s) Notes Reviewed:  Palliative, ID, CT surg, Pulm

## 2017-10-08 PROCEDURE — 71010: CPT | Mod: 26

## 2017-10-08 PROCEDURE — 99233 SBSQ HOSP IP/OBS HIGH 50: CPT | Mod: GC

## 2017-10-08 RX ADMIN — Medication 0.5 MILLIGRAM(S): at 18:15

## 2017-10-08 RX ADMIN — HEPARIN SODIUM 5000 UNIT(S): 5000 INJECTION INTRAVENOUS; SUBCUTANEOUS at 05:55

## 2017-10-08 RX ADMIN — Medication 12.5 MICROGRAM(S): at 06:07

## 2017-10-08 RX ADMIN — Medication 1 TABLET(S): at 12:02

## 2017-10-08 RX ADMIN — MIDODRINE HYDROCHLORIDE 30 MILLIGRAM(S): 2.5 TABLET ORAL at 23:24

## 2017-10-08 RX ADMIN — HEPARIN SODIUM 5000 UNIT(S): 5000 INJECTION INTRAVENOUS; SUBCUTANEOUS at 18:15

## 2017-10-08 RX ADMIN — NYSTATIN CREAM 1 APPLICATION(S): 100000 CREAM TOPICAL at 14:35

## 2017-10-08 RX ADMIN — Medication 81 MILLIGRAM(S): at 12:02

## 2017-10-08 RX ADMIN — Medication 3 MILLIGRAM(S): at 21:19

## 2017-10-08 RX ADMIN — NYSTATIN CREAM 1 APPLICATION(S): 100000 CREAM TOPICAL at 05:55

## 2017-10-08 RX ADMIN — MIDODRINE HYDROCHLORIDE 30 MILLIGRAM(S): 2.5 TABLET ORAL at 16:08

## 2017-10-08 RX ADMIN — MIDODRINE HYDROCHLORIDE 30 MILLIGRAM(S): 2.5 TABLET ORAL at 08:03

## 2017-10-08 RX ADMIN — Medication 0.5 MILLIGRAM(S): at 05:54

## 2017-10-08 RX ADMIN — NYSTATIN CREAM 1 APPLICATION(S): 100000 CREAM TOPICAL at 21:19

## 2017-10-08 RX ADMIN — ATORVASTATIN CALCIUM 40 MILLIGRAM(S): 80 TABLET, FILM COATED ORAL at 21:19

## 2017-10-08 NOTE — PROGRESS NOTE ADULT - PROBLEM SELECTOR PLAN 3
patient intermittently hyperactive and more confused, now experiencing depressive symptoms  - on 1:1 due to intermittent agitation, pulling at lines  - Psych consult: melatonin at bedtime, ativan .25 IV only if severely agitated, c/w supportive care and reorientation  -avoid antipsychotics (QTc 590), anticholinergics, opiates

## 2017-10-08 NOTE — PROGRESS NOTE ADULT - SUBJECTIVE AND OBJECTIVE BOX
Patient is a 87y old  Male who presents with a chief complaint of fall 2/2 hypotension (25 Sep 2017 15:50)      SUBJECTIVE / OVERNIGHT EVENTS: No acute events overnight. Patient was seen sitting in chair this morning very distraught. He states he really wants to go home and that no one is helping him. He started to cry stating he's old and just wants to go anywhere. Denied any pain, SOB, N/V.    MEDICATIONS  (STANDING):  aspirin enteric coated 81 milliGRAM(s) Oral daily  atorvastatin 40 milliGRAM(s) Oral at bedtime  buDESOnide   0.5 milliGRAM(s) Respule 0.5 milliGRAM(s) Inhalation every 12 hours  epoetin terry Injectable 4000 Unit(s) IV Push <User Schedule>  heparin  Injectable 5000 Unit(s) SubCutaneous every 12 hours  lactobacillus acidophilus 1 Tablet(s) Oral daily  levothyroxine Injectable 12.5 MICROGram(s) IV Push daily  melatonin 3 milliGRAM(s) Oral at bedtime  midodrine 30 milliGRAM(s) Oral every 8 hours  Nephro-amy 1 Tablet(s) Oral daily  nystatin Powder 1 Application(s) Topical every 8 hours  vancomycin  IVPB 750 milliGRAM(s) IV Intermittent <User Schedule>        I&O's Summary    07 Oct 2017 07:01  -  08 Oct 2017 07:00  --------------------------------------------------------  IN: 1580 mL / OUT: 500 mL / NET: 1080 mL    08 Oct 2017 07:01  -  08 Oct 2017 09:25  --------------------------------------------------------  IN: 220 mL / OUT: 0 mL / NET: 220 mL        PHYSICAL EXAM:  General: NAD, sitting in chair visibly upset  HEENT: normocephalic, atraumatic  Card: harsh systolic murmur, no JVD, regular rate  Pulm: decreased lung sounds at bases; b/l rales improved, no wheezes or ronchi, on NC  Back: no CVA tenderness; left flank pigtail catheter in place, connected to suction, nontender  GI: nontender, +BS  Extremities: dialysis fistula in left arm, no clubbing, cyanosis, edema  Skin: no rashes or erythema; multiple old ecchymoses and healing eschars on arms and legs  Neurological: Motor and sensory examination equal and normal in all four extremities.  Psychiatry: sitting comfortably in chair, responsive    LABS:    WBC Trend: 12.40<--, 12.5<--, 12.66<--  Hb Trend: 8.9<--, 9.3<--, 8.8<--, 9.1<--, 8.3<--  Plt Trend: 168<--, 237<--, 279<--, 239<--, 233<--  10-06    143  |  100  |  40<H>  ----------------------------<  128<H>  3.7   |  27  |  3.78<H>    Ca    7.4<L>      06 Oct 2017 10:20      Creatinine Trend: 3.78<--, 4.89<--, 4.06<--, 5.48<--, 4.59<--, 3.67<--            Microbiology:  Urine Cx:  Blood Cx:    RADIOLOGY & ADDITIONAL TESTS:  X- Ray:  CT:  Ultrasound:  [ ] imaging personally reviewed and interpreted by me    Consultant(s) Notes Reviewed:      Care Discussed with Consultants/Other Providers: Patient is a 87y old  Male who presents with a chief complaint of fall 2/2 hypotension (25 Sep 2017 15:50)      SUBJECTIVE / OVERNIGHT EVENTS: No acute events overnight. Patient was seen sitting in chair this morning very distraught. He states he really wants to go home and that no one is helping him. He started to cry stating he's old and just wants to go anywhere. Denied any pain, SOB, N/V.    MEDICATIONS  (STANDING):  aspirin enteric coated 81 milliGRAM(s) Oral daily  atorvastatin 40 milliGRAM(s) Oral at bedtime  buDESOnide   0.5 milliGRAM(s) Respule 0.5 milliGRAM(s) Inhalation every 12 hours  epoetin terry Injectable 4000 Unit(s) IV Push <User Schedule>  heparin  Injectable 5000 Unit(s) SubCutaneous every 12 hours  lactobacillus acidophilus 1 Tablet(s) Oral daily  levothyroxine Injectable 12.5 MICROGram(s) IV Push daily  melatonin 3 milliGRAM(s) Oral at bedtime  midodrine 30 milliGRAM(s) Oral every 8 hours  Nephro-amy 1 Tablet(s) Oral daily  nystatin Powder 1 Application(s) Topical every 8 hours  vancomycin  IVPB 750 milliGRAM(s) IV Intermittent <User Schedule>        I&O's Summary    07 Oct 2017 07:01  -  08 Oct 2017 07:00  --------------------------------------------------------  IN: 1580 mL / OUT: 500 mL / NET: 1080 mL    08 Oct 2017 07:01  -  08 Oct 2017 09:25  --------------------------------------------------------  IN: 220 mL / OUT: 0 mL / NET: 220 mL        PHYSICAL EXAM:  General: NAD, sitting in chair visibly upset  HEENT: normocephalic, atraumatic  Card: harsh systolic murmur, no JVD, regular rate  Pulm: decreased lung sounds at bases; b/l rales improved, no wheezes or ronchi, on NC  Back: no CVA tenderness; left flank pigtail catheter in place, connected to suction, nontender  GI: nontender, +BS  Extremities: dialysis fistula in left arm, no clubbing, cyanosis, edema  Skin: no rashes or erythema; multiple old ecchymoses and healing eschars on arms and legs  Neurological: Motor and sensory examination equal and normal in all four extremities. Confused.   Psychiatry: sitting comfortably in chair, responsive    LABS:    WBC Trend: 12.40<--, 12.5<--, 12.66<--  Hb Trend: 8.9<--, 9.3<--, 8.8<--, 9.1<--, 8.3<--  Plt Trend: 168<--, 237<--, 279<--, 239<--, 233<--  10-06    143  |  100  |  40<H>  ----------------------------<  128<H>  3.7   |  27  |  3.78<H>    Ca    7.4<L>      06 Oct 2017 10:20      Creatinine Trend: 3.78<--, 4.89<--, 4.06<--, 5.48<--, 4.59<--, 3.67<--            Microbiology:  Urine Cx:  Blood Cx:    RADIOLOGY & ADDITIONAL TESTS:  X- Ray:  CT:  Ultrasound:  [ ] imaging personally reviewed and interpreted by me    Consultant(s) Notes Reviewed:      Care Discussed with Consultants/Other Providers:

## 2017-10-08 NOTE — PROGRESS NOTE ADULT - PROBLEM SELECTOR PLAN 1
- Continues to have b/l decreased breath sounds at bases, crackles improved  - s/p pigtail placement by IR on 10/4  - will check CXR to monitor for improvement; monitor drainage, total 130cc, no output yesterday  - vanc renally dosed, will continue to check level and dose with HD - Continues to have b/l decreased breath sounds at bases, crackles improved  - s/p pigtail placement by IR on 10/4  - will check CXR to monitor for improvement; monitor drainage, total 130cc, no output yesterday  - IV vanc renally dosed, will continue to check level and dose with HD

## 2017-10-08 NOTE — PROGRESS NOTE ADULT - PROBLEM SELECTOR PLAN 4
- Preserved ejection fraction, last TTE 9/28  - Not in acute exacerbation at this time  - monitor left chest cath output and CXR - chronic diastolic heart failure, last TTE 9/28  - Not in acute exacerbation at this time  - monitor left chest cath output and CXR

## 2017-10-08 NOTE — CHART NOTE - NSCHARTNOTEFT_GEN_A_CORE
left pigtail chest tube removed with no complications.  Patient's respirations are regular and non-labored.  CXR ordered and pending

## 2017-10-08 NOTE — PROGRESS NOTE ADULT - PROBLEM SELECTOR PLAN 8
- DLBCL, on rituximab outpatient   - no longer a candidate for chemotherapy due to worsening clinical status  - appreciate heme/onc recs - DLBCL, on rituximab outpatient  - no longer a candidate for chemotherapy due to worsening clinical status  - appreciate heme/onc recs

## 2017-10-08 NOTE — PROGRESS NOTE ADULT - PROBLEM SELECTOR PLAN 2
- Severe sepsis. resolved  - On midodrine 30 TID with baseline pressures 90s systolic  - Afebrile  - On vanc renally dosed, zosyn d/c 9/30 due to cultures all growing MRSA - Severe sepsis. resolved  - On midodrine 30 TID with baseline pressures 90s systolic  - Afebrile  - On IV vanc renally dosed, monitor Vanco level, zosyn d/c 9/30 due to cultures all growing MRSA

## 2017-10-09 ENCOUNTER — APPOINTMENT (OUTPATIENT)
Dept: HEMATOLOGY ONCOLOGY | Facility: CLINIC | Age: 82
End: 2017-10-09

## 2017-10-09 LAB
CULTURE RESULTS: SIGNIFICANT CHANGE UP
HCT VFR BLD CALC: 29 % — LOW (ref 39–50)
HGB BLD-MCNC: 9.4 G/DL — LOW (ref 13–17)
MCHC RBC-ENTMCNC: 32.4 GM/DL — SIGNIFICANT CHANGE UP (ref 32–36)
MCHC RBC-ENTMCNC: 34.6 PG — HIGH (ref 27–34)
MCV RBC AUTO: 107 FL — HIGH (ref 80–100)
PLATELET # BLD AUTO: 192 K/UL — SIGNIFICANT CHANGE UP (ref 150–400)
RBC # BLD: 2.72 M/UL — LOW (ref 4.2–5.8)
RBC # FLD: 18.1 % — HIGH (ref 10.3–14.5)
SPECIMEN SOURCE: SIGNIFICANT CHANGE UP
VANCOMYCIN FLD-MCNC: 19 UG/ML — SIGNIFICANT CHANGE UP
WBC # BLD: 10.9 K/UL — HIGH (ref 3.8–10.5)
WBC # FLD AUTO: 10.9 K/UL — HIGH (ref 3.8–10.5)

## 2017-10-09 PROCEDURE — 99232 SBSQ HOSP IP/OBS MODERATE 35: CPT

## 2017-10-09 PROCEDURE — 99233 SBSQ HOSP IP/OBS HIGH 50: CPT

## 2017-10-09 PROCEDURE — 99233 SBSQ HOSP IP/OBS HIGH 50: CPT | Mod: GC

## 2017-10-09 PROCEDURE — 71010: CPT | Mod: 26

## 2017-10-09 RX ADMIN — NYSTATIN CREAM 1 APPLICATION(S): 100000 CREAM TOPICAL at 13:02

## 2017-10-09 RX ADMIN — Medication 81 MILLIGRAM(S): at 11:41

## 2017-10-09 RX ADMIN — Medication 1 TABLET(S): at 11:41

## 2017-10-09 RX ADMIN — ATORVASTATIN CALCIUM 40 MILLIGRAM(S): 80 TABLET, FILM COATED ORAL at 22:47

## 2017-10-09 RX ADMIN — Medication 12.5 MICROGRAM(S): at 05:15

## 2017-10-09 RX ADMIN — MIDODRINE HYDROCHLORIDE 30 MILLIGRAM(S): 2.5 TABLET ORAL at 17:42

## 2017-10-09 RX ADMIN — NYSTATIN CREAM 1 APPLICATION(S): 100000 CREAM TOPICAL at 22:47

## 2017-10-09 RX ADMIN — HEPARIN SODIUM 5000 UNIT(S): 5000 INJECTION INTRAVENOUS; SUBCUTANEOUS at 05:15

## 2017-10-09 RX ADMIN — Medication 3 MILLIGRAM(S): at 22:47

## 2017-10-09 RX ADMIN — Medication 0.5 MILLIGRAM(S): at 05:15

## 2017-10-09 RX ADMIN — HEPARIN SODIUM 5000 UNIT(S): 5000 INJECTION INTRAVENOUS; SUBCUTANEOUS at 17:43

## 2017-10-09 RX ADMIN — MIDODRINE HYDROCHLORIDE 30 MILLIGRAM(S): 2.5 TABLET ORAL at 22:50

## 2017-10-09 RX ADMIN — Medication 0.5 MILLIGRAM(S): at 17:43

## 2017-10-09 RX ADMIN — MIDODRINE HYDROCHLORIDE 30 MILLIGRAM(S): 2.5 TABLET ORAL at 08:19

## 2017-10-09 RX ADMIN — NYSTATIN CREAM 1 APPLICATION(S): 100000 CREAM TOPICAL at 05:15

## 2017-10-09 NOTE — PROGRESS NOTE ADULT - SUBJECTIVE AND OBJECTIVE BOX
Patient is a 87y old  Male who presents with a chief complaint of fall 2/2 hypotension (25 Sep 2017 15:50)    Being followed by ID for MRSA bacteremia      pt remains confused  He keeps pulling off oxgen  chest tube pulled yestereday  no diarrhea  unable to  answer ROS    PAST MEDICAL & SURGICAL HISTORY:  Myocardial infarct: h/o X3  GERD (gastroesophageal reflux disease)  Gout  AAA (abdominal aortic aneurysm): per cardiology eval report  Hemodialysis patient  Lymphoma, non-Hodgkin's: h/o diffuse B cell  Stroke: mild  Asthma: h/o  Pleural effusion  Hepatitis  Atrial fibrillation: 11/2014  Chronic kidney disease: on dialysis MWF  Prostate cancer: s/p seeds  [place]  Kidney Stones s/p ureteroscopy  CAD (Coronary Artery Disease): 13 stents placed 3/2014  Hypercholesteremia  HTN (Hypertension)  S/P Radiation > 12 Weeks prostatic seeds placed 10 yrs ago  S/P Cataract Surgery  S/P Cystoscopy  S/P Inguinal Hernia  repair bilat  S/P Parathyroidectomy  S/P Hip Replacement bilat  S/P Cardiac Cath: 8 stents placed 3/2014, per pt he thinks he had 3 stents placed 15 years prior also.      Antimicrobials:    vancomycin  IVPB 750 milliGRAM(s) IV Intermittent <User Schedule>    MEDICATIONS  (STANDING):  aspirin enteric coated 81 milliGRAM(s) Oral daily  atorvastatin 40 milliGRAM(s) Oral at bedtime  buDESOnide   0.5 milliGRAM(s) Respule 0.5 milliGRAM(s) Inhalation every 12 hours  epoetin terry Injectable 4000 Unit(s) IV Push <User Schedule>  heparin  Injectable 5000 Unit(s) SubCutaneous every 12 hours  lactobacillus acidophilus 1 Tablet(s) Oral daily  levothyroxine Injectable 12.5 MICROGram(s) IV Push daily  melatonin 3 milliGRAM(s) Oral at bedtime  midodrine 30 milliGRAM(s) Oral every 8 hours  Nephro-amy 1 Tablet(s) Oral daily  nystatin Powder 1 Application(s) Topical every 8 hours  vancomycin  IVPB 750 milliGRAM(s) IV Intermittent <User Schedule>      Vital Signs Last 24 Hrs  T(C): 37.1 (10-09-17 @ 08:23), Max: 37.1 (10-09-17 @ 08:23)  T(F): 98.7 (10-09-17 @ 08:23), Max: 98.7 (10-09-17 @ 08:23)  HR: 71 (10-09-17 @ 08:23) (71 - 85)  BP: 99/64 (10-09-17 @ 08:23) (99/64 - 104/65)  BP(mean): --  RR: 18 (10-09-17 @ 08:23) (18 - 19)  SpO2: 100% (10-09-17 @ 08:23) (95% - 100%)    Physical Exam:    sitting in chair , leaning forward  confused  lungs- decreased BS at base  COR- S1S2 murmur  PM site ok  abd- soft ,NT  ext- edema, skin tears      Lab Data:                          9.4    10.9  )-----------( 192      ( 09 Oct 2017 12:52 )             29.0                   .Blood Blood-Peripheral  10-05-17   No growth to date.  --  --      .Body Fluid Pleural Fluid  10-04-17   No growth to date.  --    polymorphonuclear leukocytes per low power field  No organisms seen per oil power field  by cytocentrifuge                Vancomycin Level, Random: 19.0 ug/mL (10-09-17 @ 06:39)      Culture - Body Fluid with Gram Stain (09.28.17 @ 11:19)    -  Oxacillin: R >2    -  Penicillin: R >8    -  RIF- Rifampin: S <=1    -  Tetra/Doxy: S 2    -  Trimethoprim/Sulfamethoxazole: S <=0.5/9.5    -  Vancomycin: S 2    Gram Stain:   Rare polymorphonuclear leukocytes per low power field  Few Gram positive cocci in pairs per oil power field    -  Ampicillin/Sulbactam: R <=8/4    -  Cefazolin: R >16    -  Ciprofloxacin: R >2    -  Clindamycin: R >4    -  Daptomycin: S 1    -  Erythromycin: R >4    -  Gentamicin: S <=1    -  Levofloxacin: R >4    -  Linezolid: S 1    -  Moxifloxacin(Aerobic): R >4    Specimen Source: .Body Fluid    Culture Results:   Numerous Methicillin resistant Staphylococcus aureus    Organism Identification: Methicillin resistant Staphylococcus aureus    Organism: Methicillin resistant Staphylococcus aureus    Method Type: SHAR

## 2017-10-09 NOTE — PROGRESS NOTE ADULT - ATTENDING COMMENTS
as above--CTS follow up-s/p removal of pleurx and ? decortication of left pleural space vs. pigtail placement of catheter as per Tia et al.  ABX to be directed by pleural and Blood samples--zosyn D11 and vanco D10-MRSA bacteremia--would consider GUADALUPE rather than TTE to r/o endocarditis--ID follow up for duration of ABX--repeat cxr pa/lat revealed layering fluid left grtr than right--  10/4 IR pig tail catheter =/-TPA (60cc now)  ***AS per ID--will likely need CT w/contrast and GUADALUPE to optimally deal w/situation--this will come after goals of care addressed.  MS issues continue-haldol/zyprexa etc.      Morro Tran MD-Pulmonary   [phone] as above--CTS follow up-s/p removal of pleurx and ? decortication s/p pigtail placement and removal of catheter as per Tia et al.  ABX to be directed by pleural and Blood samples--vanco D13-MRSA bacteremia--as per ID,   ***AS per ID--will likely need CT w/contrast and GUADALUPE to optimally deal w/situation--this will come after goals of care addressed.  MS issues continue-haldol/zyprexa etc.      Morro Tran MD-Pulmonary   [phone]

## 2017-10-09 NOTE — PROGRESS NOTE ADULT - ATTENDING COMMENTS
Agree with above note by R1 Dr. Mcdonald - edited where appropriate  Poor prognosis. Need to clarify whether plan is to pursue invasive diagnostic and therapeutic path to treat sequelae of MRSA bacteremia or if comfort will be prioritized. Wife is not comfortable making decision without involvement of all sons and she is HCP. Will need to hold family meeting to clarify plan. Palliative called.   Attending Physician Dr. Jalil Hernandez [phone]

## 2017-10-09 NOTE — PROGRESS NOTE ADULT - PROBLEM SELECTOR PLAN 3
MRSA bacteremia  zosyn D11; vanco D10  CTS evaluation and likely decortication--+sono guided pig tail catheter-- 10-4 MRSA bacteremia  zosyn D11; vanco D10  CTS evaluation and ?decortication--+sono guided pig tail catheter-- 10-4-7

## 2017-10-09 NOTE — PROGRESS NOTE ADULT - PROBLEM SELECTOR PLAN 2
s/p pleurx---infected space g + cocci--pig tail placed 10/4  HD to continue s/p pleurx---infected space g + cocci--pig tail placed 10/4-7  HD to continue

## 2017-10-09 NOTE — PROGRESS NOTE ADULT - ASSESSMENT
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     #MRSA bacteremia 2/2 empyema: pt admitted with septic shock 2/2 MRSA bacteremia 2/2 empyema following PleurX placement 9/5 and exchange in the setting of immunosuppression for DLBCL (Rituximab). PleurX was removed on 9/27, and pt was transferred from MICU to floors 9/29.  CT with loculated effusion  s/p pigtail catheter-pleural fluid Cx negative  , tubes now d/ginny   Also has PPM and aortic graft  Last positive  blood culture 9/25 and pleural fluid 9/28  Team is discussing with family GOC and how to proceed with work up

## 2017-10-09 NOTE — PROGRESS NOTE ADULT - SUBJECTIVE AND OBJECTIVE BOX
CHIEF COMPLAINT:    Interval Events:    REVIEW OF SYSTEMS:  Constitutional: No fevers or chills. No weight loss. No fatigue or generalized malaise.  Eyes: No itching or discharge from the eyes  ENT: No ear pain. No ear discharge. No nasal congestion. No post nasal drip. No epistaxis. No throat pain. No sore throat. No difficulty swallowing.   CV: No chest pain. No palpitations. No lightheadedness or dizziness.   Resp: No dyspnea at rest. No dyspnea on exertion. No orthopnea. No wheezing. No cough. No stridor. No sputum production. No chest pain with respiration.  GI: No nausea. No vomiting. No diarrhea.  MSK: No joint pain or pain in any extremities  Integumentary: No skin lesions. No pedal edema.  Neurological: No gross motor weakness. No sensory changes.  [ ] All other systems negative  [ ] Unable to assess ROS because ________    OBJECTIVE:  ICU Vital Signs Last 24 Hrs  T(C): 36.3 (08 Oct 2017 20:01), Max: 36.7 (08 Oct 2017 07:13)  T(F): 97.4 (08 Oct 2017 20:01), Max: 98.1 (08 Oct 2017 07:13)  HR: 75 (08 Oct 2017 20:01) (71 - 98)  BP: 104/65 (08 Oct 2017 20:01) (97/58 - 104/65)  BP(mean): --  ABP: --  ABP(mean): --  RR: 18 (08 Oct 2017 20:01) (18 - 18)  SpO2: 96% (08 Oct 2017 20:01) (95% - 96%)        10-07 @ 07:01  -  10-08 @ 07:00  --------------------------------------------------------  IN: 1580 mL / OUT: 525 mL / NET: 1055 mL    10-08 @ 07:01  -  10-09 @ 05:10  --------------------------------------------------------  IN: 820 mL / OUT: 0 mL / NET: 820 mL      CAPILLARY BLOOD GLUCOSE          PHYSICAL EXAM:  General: Awake, alert, oriented X 3.   HEENT: Atraumatic, normocephalic.                 Mallampatti Grade                 No nasal congestion.                No tonsillar or pharyngeal exudates.  Lymph Nodes: No palpable lymphadenopathy  Neck: No JVD. No carotid bruit.   Respiratory: Normal chest expansion                         Normal percussion                         Normal and equal air entry                         No wheeze, rhonchi or rales.  Cardiovascular: S1 S2 normal. No murmurs, rubs or gallops.   Abdomen: Soft, non-tender, non-distended. No organomegaly.  Extremities: Warm to touch. Peripheral pulse palpable. No pedal edema.   Skin: No rashes or skin lesions  Neurological: Motor and sensory examination equal and normal in all four extremities.  Psychiatry: Appropriate mood and affect.    HOSPITAL MEDICATIONS:  MEDICATIONS  (STANDING):  aspirin enteric coated 81 milliGRAM(s) Oral daily  atorvastatin 40 milliGRAM(s) Oral at bedtime  buDESOnide   0.5 milliGRAM(s) Respule 0.5 milliGRAM(s) Inhalation every 12 hours  epoetin terry Injectable 4000 Unit(s) IV Push <User Schedule>  heparin  Injectable 5000 Unit(s) SubCutaneous every 12 hours  lactobacillus acidophilus 1 Tablet(s) Oral daily  levothyroxine Injectable 12.5 MICROGram(s) IV Push daily  melatonin 3 milliGRAM(s) Oral at bedtime  midodrine 30 milliGRAM(s) Oral every 8 hours  Nephro-amy 1 Tablet(s) Oral daily  nystatin Powder 1 Application(s) Topical every 8 hours  vancomycin  IVPB 750 milliGRAM(s) IV Intermittent <User Schedule>    MEDICATIONS  (PRN):      LABS:                        8.9    12.40 )-----------( 168      ( 07 Oct 2017 08:11 )             28.8                     MICROBIOLOGY:     RADIOLOGY:  [ ] Reviewed and interpreted by me    Point of Care Ultrasound Findings:    PFT:    EKG: CHIEF COMPLAINT: no complaints-at present    Interval Events: pig tail removed    REVIEW OF SYSTEMS:  Constitutional: No fevers or chills. No weight loss. No fatigue or generalized malaise.  Eyes: No itching or discharge from the eyes  ENT: No ear pain. No ear discharge. No nasal congestion. No post nasal drip. No epistaxis. No throat pain. No sore throat. No difficulty swallowing.   CV: No chest pain. No palpitations. No lightheadedness or dizziness.   Resp: No dyspnea at rest. No dyspnea on exertion. No orthopnea. No wheezing. No cough. No stridor. No sputum production. No chest pain with respiration.  GI: No nausea. No vomiting. No diarrhea.  MSK: No joint pain or pain in any extremities  Integumentary: No skin lesions. No pedal edema.  Neurological: No gross motor weakness. No sensory changes.  [+ ] All other systems negative  [ ] Unable to assess ROS because ________    OBJECTIVE:  ICU Vital Signs Last 24 Hrs  T(C): 36.3 (08 Oct 2017 20:01), Max: 36.7 (08 Oct 2017 07:13)  T(F): 97.4 (08 Oct 2017 20:01), Max: 98.1 (08 Oct 2017 07:13)  HR: 75 (08 Oct 2017 20:01) (71 - 98)  BP: 104/65 (08 Oct 2017 20:01) (97/58 - 104/65)  BP(mean): --  ABP: --  ABP(mean): --  RR: 18 (08 Oct 2017 20:01) (18 - 18)  SpO2: 96% (08 Oct 2017 20:01) (95% - 96%)        10-07 @ 07:01  -  10-08 @ 07:00  --------------------------------------------------------  IN: 1580 mL / OUT: 525 mL / NET: 1055 mL    10-08 @ 07:01  -  10-09 @ 05:10  --------------------------------------------------------  IN: 820 mL / OUT: 0 mL / NET: 820 mL      CAPILLARY BLOOD GLUCOSE          PHYSICAL EXAM: in bed  General: Awake, alert, oriented X 3.   HEENT: Atraumatic, normocephalic.                 Mallampatti Grade 3                No nasal congestion.                No tonsillar or pharyngeal exudates.  Lymph Nodes: No palpable lymphadenopathy  Neck: No JVD. No carotid bruit.   Respiratory: Normal chest expansion                         Normal percussion                         Normal and equal air entry-but reduced BS at bases                         No wheeze, rhonchi or rales.  Cardiovascular: S1 S2 normal. No murmurs, rubs or gallops.   Abdomen: Soft, non-tender, non-distended. No organomegaly.  Extremities: Warm to touch. Peripheral pulse palpable. 1+ pedal edema.   Skin: No rashes or skin lesions  Neurological: Motor and sensory examination equal and normal in all four extremities.  Psychiatry: Appropriate mood and affect.    HOSPITAL MEDICATIONS:  MEDICATIONS  (STANDING):  aspirin enteric coated 81 milliGRAM(s) Oral daily  atorvastatin 40 milliGRAM(s) Oral at bedtime  buDESOnide   0.5 milliGRAM(s) Respule 0.5 milliGRAM(s) Inhalation every 12 hours  epoetin terry Injectable 4000 Unit(s) IV Push <User Schedule>  heparin  Injectable 5000 Unit(s) SubCutaneous every 12 hours  lactobacillus acidophilus 1 Tablet(s) Oral daily  levothyroxine Injectable 12.5 MICROGram(s) IV Push daily  melatonin 3 milliGRAM(s) Oral at bedtime  midodrine 30 milliGRAM(s) Oral every 8 hours  Nephro-amy 1 Tablet(s) Oral daily  nystatin Powder 1 Application(s) Topical every 8 hours  vancomycin  IVPB 750 milliGRAM(s) IV Intermittent <User Schedule>    MEDICATIONS  (PRN):      LABS:                        8.9    12.40 )-----------( 168      ( 07 Oct 2017 08:11 )             28.8                     MICROBIOLOGY:     RADIOLOGY:  [ ] Reviewed and interpreted by me    Point of Care Ultrasound Findings:    PFT:    EKG:

## 2017-10-09 NOTE — PROGRESS NOTE ADULT - PROBLEM SELECTOR PLAN 2
- Severe sepsis. resolved  - On midodrine 30 TID with baseline pressures 90s systolic  - Afebrile  - On IV vanc renally dosed, monitor Vanco level, zosyn d/c 9/30 due to cultures all growing MRSA

## 2017-10-09 NOTE — PROGRESS NOTE ADULT - SUBJECTIVE AND OBJECTIVE BOX
Patient is a 87y old  Male who presents with a chief complaint of fall 2/2 hypotension (25 Sep 2017 15:50)      SUBJECTIVE / OVERNIGHT EVENTS:    MEDICATIONS  (STANDING):  aspirin enteric coated 81 milliGRAM(s) Oral daily  atorvastatin 40 milliGRAM(s) Oral at bedtime  buDESOnide   0.5 milliGRAM(s) Respule 0.5 milliGRAM(s) Inhalation every 12 hours  epoetin terry Injectable 4000 Unit(s) IV Push <User Schedule>  heparin  Injectable 5000 Unit(s) SubCutaneous every 12 hours  lactobacillus acidophilus 1 Tablet(s) Oral daily  levothyroxine Injectable 12.5 MICROGram(s) IV Push daily  melatonin 3 milliGRAM(s) Oral at bedtime  midodrine 30 milliGRAM(s) Oral every 8 hours  Nephro-amy 1 Tablet(s) Oral daily  nystatin Powder 1 Application(s) Topical every 8 hours  vancomycin  IVPB 750 milliGRAM(s) IV Intermittent <User Schedule>    MEDICATIONS  (PRN):        CAPILLARY BLOOD GLUCOSE        I&O's Summary    08 Oct 2017 07:01  -  09 Oct 2017 07:00  --------------------------------------------------------  IN: 820 mL / OUT: 0 mL / NET: 820 mL      PHYSICAL EXAM  General: NAD  HEENT: normocephalic, atraumatic  Card: harsh systolic murmur, no JVD, regular rate  Pulm: decreased lung sounds at bases; b/l rales improved, no wheezes or ronchi, on NC  Back: no CVA tenderness; left flank pigtail removed, site nontender, clean and dry  GI: nontender, +BS  Extremities: dialysis fistula in left arm, no clubbing, cyanosis, edema  Skin: no rashes or erythema; multiple old ecchymoses and healing eschars on arms and legs  Neurological: Motor and sensory examination equal and normal in all four extremities. Confused.   Psychiatry:     LABS:    WBC Trend: 12.40<--, 12.5<--, 12.66<--  Hb Trend: 8.9<--, 9.3<--, 8.8<--, 9.1<--, 8.3<--  Plt Trend: 168<--, 237<--, 279<--, 239<--, 233<--        Creatinine Trend: 3.78<--, 4.89<--, 4.06<--, 5.48<--, 4.59<--, 3.67<--            Microbiology:  Urine Cx:  Blood Cx:    RADIOLOGY & ADDITIONAL TESTS:  X- Ray:  CT:  Ultrasound:  [ ] imaging personally reviewed and interpreted by me    Consultant(s) Notes Reviewed:      Care Discussed with Consultants/Other Providers: Patient is a 87y old  Male who presents with a chief complaint of fall 2/2 hypotension (25 Sep 2017 15:50)      SUBJECTIVE / OVERNIGHT EVENTS: No acute events overnight. Sitting in the chair this morning, increased trouble breathign when lying in bed. On NC and slightly tachypneic.     MEDICATIONS  (STANDING):  aspirin enteric coated 81 milliGRAM(s) Oral daily  atorvastatin 40 milliGRAM(s) Oral at bedtime  buDESOnide   0.5 milliGRAM(s) Respule 0.5 milliGRAM(s) Inhalation every 12 hours  epoetin terry Injectable 4000 Unit(s) IV Push <User Schedule>  heparin  Injectable 5000 Unit(s) SubCutaneous every 12 hours  lactobacillus acidophilus 1 Tablet(s) Oral daily  levothyroxine Injectable 12.5 MICROGram(s) IV Push daily  melatonin 3 milliGRAM(s) Oral at bedtime  midodrine 30 milliGRAM(s) Oral every 8 hours  Nephro-amy 1 Tablet(s) Oral daily  nystatin Powder 1 Application(s) Topical every 8 hours  vancomycin  IVPB 750 milliGRAM(s) IV Intermittent <User Schedule>    MEDICATIONS  (PRN):        CAPILLARY BLOOD GLUCOSE        I&O's Summary    08 Oct 2017 07:01  -  09 Oct 2017 07:00  --------------------------------------------------------  IN: 820 mL / OUT: 0 mL / NET: 820 mL      PHYSICAL EXAM  General: NAD, siting up in chair, increased work of breathing on NC  HEENT: normocephalic, atraumatic  Card: harsh systolic murmur, no JVD, regular rate  Pulm: decreased lung sounds at bases; b/l rales improved, no wheezes or ronchi, on NC  Back: no CVA tenderness; left flank pigtail removed, site nontender, clean and dry  GI: nontender, +BS  Extremities: dialysis fistula in left arm, no clubbing, cyanosis, edema  Skin: no rashes or erythema; multiple old ecchymoses and healing eschars on arms and legs  Neurological: Motor and sensory examination equal and normal in all four extremities. Confused.   Psychiatry: sitting in chair, not agitated today    LABS:    WBC Trend: 12.40<--, 12.5<--, 12.66<--  Hb Trend: 8.9<--, 9.3<--, 8.8<--, 9.1<--, 8.3<--  Plt Trend: 168<--, 237<--, 279<--, 239<--, 233<--        Creatinine Trend: 3.78<--, 4.89<--, 4.06<--, 5.48<--, 4.59<--, 3.67<--            Microbiology:  Urine Cx:  Blood Cx:    RADIOLOGY & ADDITIONAL TESTS:  X- Ray:  CT:  Ultrasound:  [ ] imaging personally reviewed and interpreted by me    Consultant(s) Notes Reviewed:      Care Discussed with Consultants/Other Providers: Patient is a 87y old  Male who presents with a chief complaint of fall 2/2 hypotension (25 Sep 2017 15:50)      SUBJECTIVE / OVERNIGHT EVENTS: No acute events overnight. Sitting in the chair this morning, increased trouble breathign when lying in bed. On NC and slightly tachypneic.     MEDICATIONS  (STANDING):  aspirin enteric coated 81 milliGRAM(s) Oral daily  atorvastatin 40 milliGRAM(s) Oral at bedtime  buDESOnide   0.5 milliGRAM(s) Respule 0.5 milliGRAM(s) Inhalation every 12 hours  epoetin terry Injectable 4000 Unit(s) IV Push <User Schedule>  heparin  Injectable 5000 Unit(s) SubCutaneous every 12 hours  lactobacillus acidophilus 1 Tablet(s) Oral daily  levothyroxine Injectable 12.5 MICROGram(s) IV Push daily  melatonin 3 milliGRAM(s) Oral at bedtime  midodrine 30 milliGRAM(s) Oral every 8 hours  Nephro-amy 1 Tablet(s) Oral daily  nystatin Powder 1 Application(s) Topical every 8 hours  vancomycin  IVPB 750 milliGRAM(s) IV Intermittent <User Schedule>    MEDICATIONS  (PRN):        CAPILLARY BLOOD GLUCOSE        I&O's Summary    08 Oct 2017 07:01  -  09 Oct 2017 07:00  --------------------------------------------------------  IN: 820 mL / OUT: 0 mL / NET: 820 mL      PHYSICAL EXAM  General: NAD, siting up in chair, increased work of breathing on NC  HEENT: normocephalic, atraumatic  Card: harsh systolic murmur, no JVD, regular rate  Pulm: decreased lung sounds at bases; b/l rales improved, no wheezes or ronchi, on NC  Back: no CVA tenderness; left flank pigtail removed, site nontender, clean and dry  GI: nontender, +BS  Extremities: dialysis fistula in left arm, no clubbing, cyanosis, edema  Skin: no rashes or erythema; multiple old ecchymoses and healing eschars on arms and legs  Neurological: Motor and sensory examination equal and normal in all four extremities. Confused.   Psychiatry: sitting in chair, not agitated today    LABS:    WBC Trend: 12.40<--, 12.5<--, 12.66<--  Hb Trend: 8.9<--, 9.3<--, 8.8<--, 9.1<--, 8.3<--  Plt Trend: 168<--, 237<--, 279<--, 239<--, 233<--        Creatinine Trend: 3.78<--, 4.89<--, 4.06<--, 5.48<--, 4.59<--, 3.67<--            Microbiology:  Urine Cx:  Blood Cx:    RADIOLOGY & ADDITIONAL TESTS:  X- Ray:  CT:  Ultrasound:  [ ] imaging personally reviewed and interpreted by me    Consultant(s) Notes Reviewed:  thoracic surgery, ID, pulm    Care Discussed with Consultants/Other Providers: palliative

## 2017-10-09 NOTE — PROGRESS NOTE ADULT - ASSESSMENT
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 9/25-Found to have +MRSA in pleural fluid cx and blood cx, on IV Vancomycin, followed by ID. Pleurx dc'd. Now s/p Left PTC placed for left loculated effusion on 10/4 by IR, 300cc drained at that time.   Now Left PTC output 40cc/24hs on suction. Pleural cultures from 10/4 neg to date.   10/8 left PTC dc'd.

## 2017-10-09 NOTE — PROGRESS NOTE ADULT - PROBLEM SELECTOR PLAN 4
- chronic diastolic heart failure, last TTE 9/28  - Not in acute exacerbation at this time  - monitor left chest cath output and CXR

## 2017-10-09 NOTE — PROGRESS NOTE ADULT - PROBLEM SELECTOR PLAN 3
patient intermittently hyperactive and more confused, now experiencing depressive symptoms  - on 1:1 due to intermittent agitation, pulling at lines  - Psych consult: melatonin at bedtime, ativan .25 IV only if severely agitated, c/w supportive care and reorientation  -avoid antipsychotics (QTc 590), anticholinergics, opiates Metabolic encephalopathy superimposed on baseline dementia  patient intermittently hyperactive and more confused, now experiencing depressive symptoms  - on 1:1 due to intermittent agitation, pulling at lines  - Psych consult: melatonin at bedtime, ativan .25 IV only if severely agitated, c/w supportive care and reorientation  -avoid antipsychotics (QTc 590), anticholinergics, opiates

## 2017-10-09 NOTE — PROGRESS NOTE ADULT - PROBLEM SELECTOR PLAN 1
s/p 10/4-10/8 PTC. F/U CXR no PTX.  Check daily CXR, CBC.  Cough and deep breathe, Incentive Spirometry Q1h, Chest PT.   OOB to chair and Ambulate as tolerated and with assist.  Management per primary team, will follow. s/p 10/4-10/8 PTC. F/U CXR this AM no PTX. Unchanged small BL pleural effusions.  Check daily CXR, CBC.  Cough and deep breathe, Incentive Spirometry Q1h, Chest PT.   OOB to chair and Ambulate as tolerated and with assist.  Management per primary team, will follow.

## 2017-10-09 NOTE — PROGRESS NOTE ADULT - SUBJECTIVE AND OBJECTIVE BOX
Subjective: Pt states "Hello" denies any CP, SOB, N/V and palpitations. No acute events overnight.     Vital Signs Last 24 Hrs  T(F): 98.7, Max: 98.7   HR: 71  BP: 99/64   RR: 18  SpO2: 100%  3L NC     10-09-17 @ 07:01  -  10-09-17 @ 11:20  --------------------------------------------------------  IN: 480 mL / OUT: 0 mL / NET: 480 mL    PHYSICAL EXAM  Neurology: A&Ox1, NAD  CV : +S1S2  Lungs: Respirations non-labored, B/L BS clear, diminished on left  Abdomen: Soft, NT/ND, +BSx4Q, +BM  Extremities: B/L LE +1 edema, negative calf tenderness, +PP       MEDICATIONS  aspirin enteric coated 81 milliGRAM(s) Oral daily  atorvastatin 40 milliGRAM(s) Oral at bedtime  buDESOnide   0.5 milliGRAM(s) Respule 0.5 milliGRAM(s) Inhalation every 12 hours  epoetin terry Injectable 4000 Unit(s) IV Push <User Schedule>  heparin  Injectable 5000 Unit(s) SubCutaneous every 12 hours  lactobacillus acidophilus 1 Tablet(s) Oral daily  levothyroxine Injectable 12.5 MICROGram(s) IV Push daily  melatonin 3 milliGRAM(s) Oral at bedtime  midodrine 30 milliGRAM(s) Oral every 8 hours  Nephro-amy 1 Tablet(s) Oral daily  nystatin Powder 1 Application(s) Topical every 8 hours  vancomycin  IVPB 750 milliGRAM(s) IV Intermittent <User Schedule>      Discussed with thoracic surgery attending, Dr. Worley.

## 2017-10-09 NOTE — PROGRESS NOTE ADULT - PROBLEM SELECTOR PLAN 2
Continue Abx per ID.   No plans for Fibrinolysis at this time.  Possible VATS decortication after Palliative GOC discussion. Continue Abx per ID.   No plans for Fibrinolysis at this time.  Possible VATS decortication if CXR worsens, F/U Palliative GOC discussion.

## 2017-10-09 NOTE — PROGRESS NOTE ADULT - PROBLEM SELECTOR PLAN 1
- Continues to have b/l decreased breath sounds at bases, crackles improved  - s/p pigtail placement by IR on 10/4 - 10/8, total output 130cc  - IV vanc renally dosed, will continue to check level and dose with HD

## 2017-10-09 NOTE — PROGRESS NOTE ADULT - ASSESSMENT
86 yo M with multiple medical problems including DLBCL on Rituxan, malignant pleural effusion s/p left pleurx, ESRD on HD, chronic hypotension on Midodrine presents with lethargy, hypotension and cloudy pleural fluid drainage. History as per chart and family.   Received broad spectrum antibiotics and 2L NS for presumed sepsis 2/2 empyema. Evaluated by MICU and admitted for severe sepsis.     A/P: Septic shock - likely from empyema in the setting of DLBCL on Rituxan, indwelling pleural catheter and cloudy, malodorous pleural fluid. CT chest with moderate sized pleural effusion and small loculated left pleural effusion.  On broad spectrum antibiotics  BP support - stabilized s/p IVFs and antibiotics.  Thoracic surgery service following - + pleural space  infected- removed pleurx-pigtail placed 10/4  DVT prophylaxis. 86 yo M with multiple medical problems including DLBCL on Rituxan, malignant pleural effusion s/p left pleurx, ESRD on HD, chronic hypotension on Midodrine presents with lethargy, hypotension and cloudy pleural fluid drainage. History as per chart and family.   Received broad spectrum antibiotics and 2L NS for presumed sepsis 2/2 empyema. Evaluated by MICU and admitted for severe sepsis.     A/P: Septic shock - likely from empyema in the setting of DLBCL on Rituxan, indwelling pleural catheter and cloudy, malodorous pleural fluid. CT chest with moderate sized pleural effusion and small loculated left pleural effusion.  s/p broad spectrum antibiotics--now on vanco     Thoracic surgery service following - + pleural space  infected- removed pleurx-pigtail placed 10/4  DVT prophylaxis.

## 2017-10-10 DIAGNOSIS — R78.81 BACTEREMIA: ICD-10-CM

## 2017-10-10 LAB
ANION GAP SERPL CALC-SCNC: 19 MMOL/L — HIGH (ref 5–17)
BUN SERPL-MCNC: 63 MG/DL — HIGH (ref 7–23)
CALCIUM SERPL-MCNC: 7.3 MG/DL — LOW (ref 8.4–10.5)
CHLORIDE SERPL-SCNC: 97 MMOL/L — SIGNIFICANT CHANGE UP (ref 96–108)
CO2 SERPL-SCNC: 24 MMOL/L — SIGNIFICANT CHANGE UP (ref 22–31)
CREAT SERPL-MCNC: 4.9 MG/DL — HIGH (ref 0.5–1.3)
CULTURE RESULTS: SIGNIFICANT CHANGE UP
GLUCOSE SERPL-MCNC: 90 MG/DL — SIGNIFICANT CHANGE UP (ref 70–99)
HCT VFR BLD CALC: 29.2 % — LOW (ref 39–50)
HGB BLD-MCNC: 9.2 G/DL — LOW (ref 13–17)
MCHC RBC-ENTMCNC: 31.5 GM/DL — LOW (ref 32–36)
MCHC RBC-ENTMCNC: 32.2 PG — SIGNIFICANT CHANGE UP (ref 27–34)
MCV RBC AUTO: 102.1 FL — HIGH (ref 80–100)
PLATELET # BLD AUTO: 204 K/UL — SIGNIFICANT CHANGE UP (ref 150–400)
POTASSIUM SERPL-MCNC: 5.1 MMOL/L — SIGNIFICANT CHANGE UP (ref 3.5–5.3)
POTASSIUM SERPL-SCNC: 5.1 MMOL/L — SIGNIFICANT CHANGE UP (ref 3.5–5.3)
RBC # BLD: 2.86 M/UL — LOW (ref 4.2–5.8)
RBC # FLD: 20.6 % — HIGH (ref 10.3–14.5)
SODIUM SERPL-SCNC: 140 MMOL/L — SIGNIFICANT CHANGE UP (ref 135–145)
SPECIMEN SOURCE: SIGNIFICANT CHANGE UP
WBC # BLD: 10.26 K/UL — SIGNIFICANT CHANGE UP (ref 3.8–10.5)
WBC # FLD AUTO: 10.26 K/UL — SIGNIFICANT CHANGE UP (ref 3.8–10.5)

## 2017-10-10 PROCEDURE — 71010: CPT | Mod: 26

## 2017-10-10 PROCEDURE — 99233 SBSQ HOSP IP/OBS HIGH 50: CPT

## 2017-10-10 PROCEDURE — 99221 1ST HOSP IP/OBS SF/LOW 40: CPT

## 2017-10-10 PROCEDURE — 99233 SBSQ HOSP IP/OBS HIGH 50: CPT | Mod: GC

## 2017-10-10 PROCEDURE — 99223 1ST HOSP IP/OBS HIGH 75: CPT

## 2017-10-10 PROCEDURE — 90935 HEMODIALYSIS ONE EVALUATION: CPT | Mod: GC

## 2017-10-10 RX ORDER — LEVOTHYROXINE SODIUM 125 MCG
25 TABLET ORAL DAILY
Qty: 0 | Refills: 0 | Status: DISCONTINUED | OUTPATIENT
Start: 2017-10-10 | End: 2017-10-17

## 2017-10-10 RX ADMIN — NYSTATIN CREAM 1 APPLICATION(S): 100000 CREAM TOPICAL at 22:04

## 2017-10-10 RX ADMIN — Medication 3 MILLIGRAM(S): at 22:04

## 2017-10-10 RX ADMIN — Medication 1 TABLET(S): at 13:24

## 2017-10-10 RX ADMIN — NYSTATIN CREAM 1 APPLICATION(S): 100000 CREAM TOPICAL at 06:15

## 2017-10-10 RX ADMIN — MIDODRINE HYDROCHLORIDE 30 MILLIGRAM(S): 2.5 TABLET ORAL at 13:24

## 2017-10-10 RX ADMIN — Medication 0.5 MILLIGRAM(S): at 17:21

## 2017-10-10 RX ADMIN — NYSTATIN CREAM 1 APPLICATION(S): 100000 CREAM TOPICAL at 13:25

## 2017-10-10 RX ADMIN — HEPARIN SODIUM 5000 UNIT(S): 5000 INJECTION INTRAVENOUS; SUBCUTANEOUS at 17:20

## 2017-10-10 RX ADMIN — HEPARIN SODIUM 5000 UNIT(S): 5000 INJECTION INTRAVENOUS; SUBCUTANEOUS at 06:14

## 2017-10-10 RX ADMIN — Medication 150 MILLIGRAM(S): at 17:46

## 2017-10-10 RX ADMIN — Medication 81 MILLIGRAM(S): at 13:25

## 2017-10-10 RX ADMIN — ERYTHROPOIETIN 4000 UNIT(S): 10000 INJECTION, SOLUTION INTRAVENOUS; SUBCUTANEOUS at 09:06

## 2017-10-10 RX ADMIN — Medication 25 MICROGRAM(S): at 06:14

## 2017-10-10 RX ADMIN — Medication 0.5 MILLIGRAM(S): at 06:13

## 2017-10-10 RX ADMIN — MIDODRINE HYDROCHLORIDE 30 MILLIGRAM(S): 2.5 TABLET ORAL at 06:15

## 2017-10-10 RX ADMIN — MIDODRINE HYDROCHLORIDE 30 MILLIGRAM(S): 2.5 TABLET ORAL at 22:04

## 2017-10-10 RX ADMIN — ATORVASTATIN CALCIUM 40 MILLIGRAM(S): 80 TABLET, FILM COATED ORAL at 22:04

## 2017-10-10 NOTE — PROGRESS NOTE ADULT - ATTENDING COMMENTS
Agree with above note by R1 Dr. Mcdonald - edited where appropriate  Palliative appreciated  Prognosis is very poor  1) MRSA bacteremia   2) Empyema  3) DLCBCL  4) ESRD on HD    Pt is on midodrine. He suffers from hypotension during HD despite this. He has a high probability of pacemaker lead and/or aortic graft infection and/or endocarditis.   He can no longer receive Rituximab in the setting of active infection  Wife is the legal decision maker but she defers to son Elliot to make decisions. Elliot is unwilling to discuss GOC at this time. Palliative spoke to him today and we had a long conversation with him yesterday. We will continue to have daily conversations with family.   Patient's life expectancy is likely weeks at this time especially if the infectious complications above are present. We will continue to have conversations with son about goals of care - I'm not sure if returning to rehab with above issues active is appropriate or possible.   Patient continues to require 1:1 or enhanced monitoring due to harm to self. However he is on isolation for MRSA so for now 1:1 is in place.   Attending Physician Dr. Jalil Hernandez [phone]

## 2017-10-10 NOTE — PROGRESS NOTE ADULT - PROBLEM SELECTOR PLAN 1
ESRD on HD. Patient seen at HD today. HD was complicated by intradialytic hypotension. Pt on midodrine.

## 2017-10-10 NOTE — PROGRESS NOTE ADULT - PROBLEM SELECTOR PLAN 4
Spoke with son Elliot who is interested in discharge to [facility] with dialysis. Not ready to discuss comfort measures or discuss advance directives.  States that he wishes to see if his father is able to get stronger before making this decision.  Will sign off , please reconsult as needed.

## 2017-10-10 NOTE — PROGRESS NOTE ADULT - PROBLEM SELECTOR PLAN 1
- Continues to have b/l decreased breath sounds at bases, crackles mildly improved  - s/p pigtail placement by IR on 10/4 - 10/8, total output 130cc  - IV vanc renally dosed, will continue to check level and dose with HD  - continues to require supp O2 via NC, increased WOB when lying down

## 2017-10-10 NOTE — PROGRESS NOTE ADULT - PROBLEM SELECTOR PLAN 3
- Metabolic encephalopathy superimposed on baseline dementia  - patient intermittently hyperactive and more confused, now experiencing depressive symptoms  - on 1:1 due to intermittent agitation, pulling at lines  - Psych consult: melatonin at bedtime, ativan .25 IV only if severely agitated, c/w supportive care and reorientation  -avoid antipsychotics (QTc 590), anticholinergics, opiates

## 2017-10-10 NOTE — PROGRESS NOTE ADULT - ATTENDING COMMENTS
as above--CTS follow up-s/p removal of pleurx and ? decortication s/p pigtail placement and removal of catheter as per Tia et al.  ABX to be directed by pleural and Blood samples--vanco D13-MRSA bacteremia--as per ID,   ***AS per ID--will likely need CT w/contrast and GUADALUPE to optimally deal w/situation--this will come after goals of care addressed.  MS issues continue-haldol/zyprexa etc.      Morro Tran MD-Pulmonary   [phone] as above--CTS follow up-s/p removal of pleurx and ? decortication s/p pigtail placement and removal of catheter as per Tai et al.  ABX to be directed by pleural and Blood samples--vanco D14-MRSA bacteremia--as per ID,   ***AS per ID--will likely need ?GUADALUPE to optimally deal w/situation--this will come after goals of care addressed.  MS issues continue-haldol/zyprexa etc.      Morro Tran MD-Pulmonary   [phone]

## 2017-10-10 NOTE — PROGRESS NOTE ADULT - SUBJECTIVE AND OBJECTIVE BOX
Patient is a 87y old  Male who presents with a chief complaint of fall 2/2 hypotension (25 Sep 2017 15:50)    Being followed by ID for MRSA bacteremia,empyema    Interval history:Lost IV access  For HD today  lethargic though some verbal response    No other acute events      ROS:  Not obtainable as patient lethargic and no meaningful response    Antimicrobials:    vancomycin  IVPB 750 milliGRAM(s) IV Intermittent <User Schedule>      Vital Signs Last 24 Hrs  T(C): 36 (10-10-17 @ 08:40), Max: 36.4 (10-09-17 @ 15:53)  T(F): 96.8 (10-10-17 @ 08:40), Max: 97.5 (10-09-17 @ 15:53)  HR: 70 (10-10-17 @ 08:40) (68 - 84)  BP: 91/48 (10-10-17 @ 08:40) (91/48 - 105/65)  BP(mean): --  RR: 19 (10-10-17 @ 08:40) (18 - 20)  SpO2: 96% (10-10-17 @ 08:40) (92% - 100%)    Physical Exam:    Constitutional ,comfortable,    HEENT PERRLA,No pallor or icterus    No oral exudate or erythema    Neck supple no JVD or LN    Chest decreased air entry bases,bilateral crackles at bases      CVS RRR S1 S2 WNl No murmur or rub or gallop    Abd soft BS normal No tenderness no masses    Ext  avf no erythema or tenderness  Eccvhymosis        Joints no swelling or LOM    CNS As above   Lab Data:                          9.2    10.26 )-----------( 204      ( 10 Oct 2017 08:37 )             29.2       10-10    140  |  97  |  63<H>  ----------------------------<  90  5.1   |  24  |  4.90<H>    Ca    7.3<L>      10 Oct 2017 08:27          Culture - Blood (collected 05 Oct 2017 17:37)  Source: .Blood Blood-Peripheral  Preliminary Report (06 Oct 2017 18:01):    No growth to date.              Vancomycin Level, Random: 19.0 ug/mL (10-09-17 @ 06:39)

## 2017-10-10 NOTE — PROGRESS NOTE ADULT - ASSESSMENT
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 s/p pigtail placement on 10/4 - 10/7.

## 2017-10-10 NOTE — PROGRESS NOTE ADULT - ASSESSMENT
86 y/o M with a PMHx of ESRD on HD MWF, AAA s/p repair, ?MIs in past (?13 stents placed, last 8 stents placed in 03/2014), atrial fibrillation (not on A/C 2/2 falls), HFpEF (last EF 75% in 2015) s/p PPM and ablation 2016, severe aortic stenosis, severe mitral regurgitation, gout, GERD, depression, hypothyroidism, HTN, and DLBCL currently on Rituximab, recently admitted for increased purulent fluid leakage from left pleurx catheter p/w witnessed mechanical fall by home VNS nurse. 9/25-Found to have +MRSA in pleural fluid cx and blood cx, on IV Vancomycin, followed by ID. Pleurx dc'd. Now s/p Left PTC placed for left loculated effusion on 10/4 by IR, 300cc drained at that time.   Now Left PTC output 40cc/24hs on suction. Pleural cultures from 10/4 neg to date.   10/8 left PTC dc'd. CXR no PTX.  Unchanged small BL pleural effusions.

## 2017-10-10 NOTE — PROGRESS NOTE ADULT - SUBJECTIVE AND OBJECTIVE BOX
HPI:  86 yo M with a PMH ESRD on HD MWF, AAA s/p repair, ?MIs in past (?13 stents placed, last 8 stents placed in 03/2014), atrial fibrillation (not on A/C 2/2 falls), HFpEF (last EF 75% in 2015) s/p PPM and ablation 2016, severe aortic stenosis, severe mitral regurgitation, gout, GERD, depression, hypothyroidism, HTN, and DLBCL currently on Rituximab, recently admitted for increased fluid leakage from pleurx catheter p/w witnessed mechanical fall by home VNS nurse. Per Pt's family patient had become increasingly lethargic x 2 days. This morning, family stated that he was slumped over the table at breakfast. Family reported that he fell of table. Pt family reported that  the fall was witness by the VNS nurse whom subsequently measure a BP of 70/40. Pt family reported that the VNS nurse proceeded to drain cloudy, foul smelling fluid from pleurx catheter prompting them to being them into the hospital.     In the ED:     T(C): 36.4 (25 Sep 2017 10:52), Max: 36.4 (25 Sep 2017 10:49)  T(F): 97.6 (25 Sep 2017 10:52), Max: 97.6 (25 Sep 2017 10:52)  HR: 70 (25 Sep 2017 15:15) (70 - 99)  BP: 93/53 (25 Sep 2017 15:15) (78/47 - 99/45)  BP(mean): --  RR: 24 (25 Sep 2017 15:15) (24 - 26)  SpO2: 100% (25 Sep 2017 15:15) (95% - 100%)    Pt was given acetaminopehn IVPG 1g , Zosyn 3.375g in dextrose 5% IV, Vanc 1000mg IVPG, Adacel 0.5mg IM, 2 boluses of normal saline totaling 750 mL. (25 Sep 2017 15:50)      PERTINENT PM/SXH:   Myocardial infarct  GERD (gastroesophageal reflux disease)  Gout  AAA (abdominal aortic aneurysm)  Hemodialysis patient  Lymphoma, non-Hodgkin's  Stroke  Asthma  Pleural effusion  Hepatitis  Atrial fibrillation  Chronic kidney disease  Prostate cancer  Renal Insufficiency  [place] (Hard of Hearing)  Kidney Stones s/p ureteroscopy  CAD (Coronary Artery Disease)  Hypercholesteremia  HTN (Hypertension)    S/P Radiation > 12 Weeks prostatic seeds placed 10 yrs ago  S/P Cataract Surgery  Kidney Stones  S/P Cystoscopy  S/P Inguinal Hernia  repair bilat  S/P Parathyroidectomy  S/P Hip Replacement bilat  S/P Cardiac Cath  Stented Coronary Artery    SOCIAL HISTORY:   Significant other/partner:  [ ] YES  [x ] NO               Children:  [x ] YES  [ ] NO                   [employer]/Spirituality:  Congregation  Substance hx:  [ ] YES   [ x] NO                   Tobacco hx:  [ ] YES  [ x] NO                       Alcohol hx: [ ] YES  [x ] NO         Home Opioid hx:  [ ] YES  [x ] NO   Living Situation: [ x] Home  [ ] Long term care  [ ] Rehab [ ] Other    FAMILY HISTORY:  No pertinent family history    [x ] Family history non-contributory     BASELINE (I)ADLs (prior to admission):  [place]: [ ] total  [x ] moderate [ ] dependent    ADVANCE DIRECTIVES:    DNR   MOLST  [ ] YES [ ] NO                      [ ] Completed  Health Care Proxy [ ] YES  [ ] NO   [ ] Completed  Living Will  [ ] YES [ ] NO             [ ] Surrogate  [ ] HCP  [ ] Guardian:         Elliot Guzman              [phone]                                           Phone#:    Allergies    amlodipine (Rash)  Benadryl (Other)  Brilinta (Unknown)  Effient (Unknown)  Multaq (Other)    Intolerances        MEDICATIONS  (STANDING):  aspirin enteric coated 81 milliGRAM(s) Oral daily  atorvastatin 40 milliGRAM(s) Oral at bedtime  buDESOnide   0.5 milliGRAM(s) Respule 0.5 milliGRAM(s) Inhalation every 12 hours  epoetin terry Injectable 4000 Unit(s) IV Push <User Schedule>  heparin  Injectable 5000 Unit(s) SubCutaneous every 12 hours  lactobacillus acidophilus 1 Tablet(s) Oral daily  levothyroxine 25 MICROGram(s) Oral daily  melatonin 3 milliGRAM(s) Oral at bedtime  midodrine 30 milliGRAM(s) Oral every 8 hours  Nephro-amy 1 Tablet(s) Oral daily  nystatin Powder 1 Application(s) Topical every 8 hours  vancomycin  IVPB 750 milliGRAM(s) IV Intermittent <User Schedule>    MEDICATIONS  (PRN):      PRESENT SYMPTOMS:  Source: [ ] Patient   [ ] Family   [ ] Team     Pain:                        [ ] No [ ] Yes             [ ] Mild [ ] Moderate [ ] Severe    Onset -  Location -  Duration -  Character -  Alleviating/Aggravating -  Radiation -  Timing -      Dyspnea:                [ ] No [ ] Yes             [ ] Mild [ ] Moderate [ ] Severe    Anxiety:                  [ ] No [ ] Yes             [ ] Mild [ ] Moderate [ ] Severe    Fatigue:                  [ ] No [ ] Yes             [ ] Mild [ ] Moderate [ ] Severe    Nausea:                  [ ] No [ ] Yes             [ ] Mild [ ] Moderate [ ] Severe    Loss of appetite:   [ ] No [ ] Yes             [ ] Mild [ ] Moderate [ ] Severe    Constipation:        [ ] No [ ] Yes             [ ] Mild [ ] Moderate [ ] Severe    Other Symptoms:  [ ] All other review of systems negative   [x ] Unable to obtain due to poor mentation     Karnofsky Performance Score/Palliative Performance Status Version 2:   30      %    PHYSICAL EXAM:  Vital Signs Last 24 Hrs  T(C): 36 (10 Oct 2017 08:40), Max: 36.4 (09 Oct 2017 15:53)  T(F): 96.8 (10 Oct 2017 08:40), Max: 97.5 (09 Oct 2017 15:53)  HR: 70 (10 Oct 2017 08:40) (68 - 84)  BP: 91/48 (10 Oct 2017 08:40) (91/48 - 105/65)  BP(mean): --  RR: 19 (10 Oct 2017 08:40) (18 - 20)  SpO2: 96% (10 Oct 2017 08:40) (92% - 100%) I&O's Summary    09 Oct 2017 07:01  -  10 Oct 2017 07:00  --------------------------------------------------------  IN: 1060 mL / OUT: 0 mL / NET: 1060 mL        General:  [ ] Alert  [ ] Oriented x      [ ] Lethargic  [ ] Agitated   [ ] Cachexia   [ ] Unarousable  [ ] Verbal  [ ] Non-Verbal    HEENT:  [ ] Normal   [ ] Dry mouth   [ ] ET Tube    [ ] Trach  [ ] Oral lesions    Lungs: Poor effort  [ ] Clear [ ] Tachypnea  [ ] Audible excessive secretions   [ ] Rhonchi        [ ] Right [ ] Left [ ] Bilateral  [ ] Crackles        [ ] Right [ ] Left [ ] Bilateral  [ ] Wheezing     [ ] Right [ ] Left [ ] Bilateral    Cardiovascular:  [ ] Regular [x ] Irregular [ ] Tachycardia   [ ] Bradycardia  [ ] Murmur [ ] Other    Abdomen: [x ] Soft  [ ] Distended   [ ] +BS  [ ] Non tender [ ] Tender  [ ]PEG   [ ]OGT/ NGT   Last BM:       Genitourinary: [ x] Normal [ ] Incontinent   [ ] Oliguria/Anuria   [ ] [place]    Musculoskeletal:  [ ] Normal   [x ] Weakness  [ ] Bedbound/Wheelchair bound [ ] Edema    Neurological: [ ] No focal deficits  [x ] Cognitive impairment  [ ] Dysphagia [ ] Dysarthria [ ] Paresis [ ] Other     Skin: [ ] Normal   [ ] Pressure ulcer(s)                  [ ] Rash     Multiple skin tears upper extremities b/l    LABS:                        9.2    10.26 )-----------( 204      ( 10 Oct 2017 08:37 )             29.2     10-10    140  |  97  |  63<H>  ----------------------------<  90  5.1   |  24  |  4.90<H>    Ca    7.3<L>      10 Oct 2017 08:27            Shock: [ ] Septic [ ] Cardiogenic [ ] Neurologic [ ] Hypovolemic  Vasopressors x   Inotrophs x     Protein Calorie Malnutrition: [x ] Mild [ ] Moderate [ ] Severe    Oral Intake: [ ] Unable/mouth care only [ ] Minimal [ x] Moderate [ ] Full Capability  Diet: [ ] NPO [ ] Tube feeds [ ] TPN [ ] Other     RADIOLOGY & ADDITIONAL STUDIES:    REFERRALS:   [ ] Chaplaincy  [ ] Hospice  [ ] Child Life  [ ] Social Work  [ ] Case management [ ] Holistic Therapy

## 2017-10-10 NOTE — PROGRESS NOTE ADULT - ASSESSMENT
86 yo M with multiple medical problems including DLBCL on Rituxan, malignant pleural effusion s/p left pleurx, ESRD on HD, chronic hypotension on Midodrine presents with lethargy, hypotension and cloudy pleural fluid drainage. History as per chart and family.   Received broad spectrum antibiotics and 2L NS for presumed sepsis 2/2 empyema. Evaluated by MICU and admitted for severe sepsis.     A/P: Septic shock - likely from empyema in the setting of DLBCL on Rituxan, indwelling pleural catheter and cloudy, malodorous pleural fluid. CT chest with moderate sized pleural effusion and small loculated left pleural effusion.  s/p broad spectrum antibiotics--now on vanco     Thoracic surgery service following - + pleural space  infected- removed pleurx-pigtail placed 10/4  DVT prophylaxis.

## 2017-10-10 NOTE — PROGRESS NOTE ADULT - PROBLEM SELECTOR PLAN 1
s/p 10/4-10/8 PTC.   CXR and WBC stable, pt high risk/poor operative candidate for VATS decortication at this time 2/2 clinical status/frailty. Will re-eval for possible decort if CXR worsens/symptomatic.   Check daily CXR, CBC.  Cough and deep breathe, Incentive Spirometry Q1h, Chest PT.   OOB to chair and Ambulate as tolerated and with assist.  Management per primary team.

## 2017-10-10 NOTE — PROGRESS NOTE ADULT - SUBJECTIVE AND OBJECTIVE BOX
[facility] DIVISION OF KIDNEY DISEASES AND HYPERTENSION -- FOLLOW UP NOTE  --------------------------------------------------------------------------------  Chief Complaint: ESRD ON HD    24 hour events/subjective:  Patient seen at HD. HD complicated by intradialytic hypotension.       PAST HISTORY  --------------------------------------------------------------------------------  No significant changes to PMH, PSH, FHx, SHx, unless otherwise noted    ALLERGIES & MEDICATIONS  --------------------------------------------------------------------------------  Allergies    amlodipine (Rash)  Benadryl (Other)  Brilinta (Unknown)  Effient (Unknown)  Multaq (Other)    Intolerances      Standing Inpatient Medications  aspirin enteric coated 81 milliGRAM(s) Oral daily  atorvastatin 40 milliGRAM(s) Oral at bedtime  buDESOnide   0.5 milliGRAM(s) Respule 0.5 milliGRAM(s) Inhalation every 12 hours  epoetin terry Injectable 4000 Unit(s) IV Push <User Schedule>  heparin  Injectable 5000 Unit(s) SubCutaneous every 12 hours  lactobacillus acidophilus 1 Tablet(s) Oral daily  levothyroxine 25 MICROGram(s) Oral daily  melatonin 3 milliGRAM(s) Oral at bedtime  midodrine 30 milliGRAM(s) Oral every 8 hours  Nephro-amy 1 Tablet(s) Oral daily  nystatin Powder 1 Application(s) Topical every 8 hours  vancomycin  IVPB 750 milliGRAM(s) IV Intermittent <User Schedule>    PRN Inpatient Medications      REVIEW OF SYSTEMS  --------------------------------------------------------------------------------  Unable to obtain     VITALS/PHYSICAL EXAM  --------------------------------------------------------------------------------  T(C): 36 (10-10-17 @ 08:40), Max: 36.4 (10-09-17 @ 15:53)  HR: 70 (10-10-17 @ 08:40) (68 - 84)  BP: 91/48 (10-10-17 @ 08:40) (91/48 - 105/65)  RR: 19 (10-10-17 @ 08:40) (18 - 20)  SpO2: 96% (10-10-17 @ 08:40) (92% - 100%)  Wt(kg): --        10-09-17 @ 07:01  -  10-10-17 @ 07:00  --------------------------------------------------------  IN: 1060 mL / OUT: 0 mL / NET: 1060 mL    Physical Exam:  	Gen: NAD  	HEENT: MMM  	Pulm: Decrease BS at bases b/l +CT  	CV: RRR, S1S2  	Abd: +BS, soft, nontender/nondistended  	LE:  mild LE edema  	Neuro: No focal deficits  	Psych: Normal affect and mood  	Skin: Warm  	Vascular access:  LUE AVF + thrill and bruit      LABS/STUDIES  --------------------------------------------------------------------------------              9.2    10.26 >-----------<  204      [10-10-17 @ 08:37]              29.2     140  |  97  |  63  ----------------------------<  90      [10-10-17 @ 08:27]  5.1   |  24  |  4.90        Ca     7.3     [10-10-17 @ 08:27]            Creatinine Trend:  SCr 4.90 [10-10 @ 08:27]  SCr 3.78 [10-06 @ 10:20]  SCr 4.89 [10-05 @ 13:25]  SCr 4.06 [10-04 @ 10:48]  SCr 5.48 [10-03 @ 12:27]        Iron 56, TIBC 208, %sat --      [09-17-17 @ 05:40]  Ferritin 1179      [09-17-17 @ 05:40]  HbA1c 5.7      [11-10-15 @ 07:48]  TSH 4.73      [09-16-17 @ 16:20]    HBsAb <3.0      [10-05-17 @ 13:25]  HBsAb Nonreact      [10-05-17 @ 13:25]  HBsAg Nonreact      [10-05-17 @ 13:25]  HCV 0.09, Nonreact      [10-05-17 @ 13:25]

## 2017-10-10 NOTE — PROGRESS NOTE ADULT - ASSESSMENT
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     #MRSA bacteremia 2/2 empyema: pt admitted with septic shock 2/2 MRSA bacteremia 2/2 empyema following PleurX placement 9/5 and exchange in the setting of immunosuppression for DLBCL (Rituximab). PleurX was removed on 9/27, and pt was transferred from MICU to floors 9/29.  CT with loculated effusion  s/p pigtail catheter-s/p pigtail which was removed  Thoracic surgery input noted- decortication if worsens though will tailor per GOC  Also has PPM and aortic graft  ? GUADALUPE and CT with IV contrast to image aortic graft,to be decided after goals of care are discussed with family as given his lymphoma status and comorbidities,prognosis is grave.primary team discussing with family  continue Vanco post HD  follow repeat Cx  Tailor plan per course,results.  Will Follow.  Beeper [id number]/afterhours/No response-[id number]

## 2017-10-10 NOTE — PROGRESS NOTE ADULT - ATTENDING COMMENTS
I have seen this patient with the fellow and agree with their assessment and plan. In addition, cont midodrine for low bp with HD    Erickson Silveira MD  Cell   Pager   Office

## 2017-10-10 NOTE — PROGRESS NOTE ADULT - SUBJECTIVE AND OBJECTIVE BOX
CHIEF COMPLAINT:    Interval Events:    REVIEW OF SYSTEMS:  Constitutional: No fevers or chills. No weight loss. No fatigue or generalized malaise.  Eyes: No itching or discharge from the eyes  ENT: No ear pain. No ear discharge. No nasal congestion. No post nasal drip. No epistaxis. No throat pain. No sore throat. No difficulty swallowing.   CV: No chest pain. No palpitations. No lightheadedness or dizziness.   Resp: No dyspnea at rest. No dyspnea on exertion. No orthopnea. No wheezing. No cough. No stridor. No sputum production. No chest pain with respiration.  GI: No nausea. No vomiting. No diarrhea.  MSK: No joint pain or pain in any extremities  Integumentary: No skin lesions. No pedal edema.  Neurological: No gross motor weakness. No sensory changes.  [ ] All other systems negative  [ ] Unable to assess ROS because ________    OBJECTIVE:  ICU Vital Signs Last 24 Hrs  T(C): 36.4 (09 Oct 2017 21:27), Max: 37.1 (09 Oct 2017 08:23)  T(F): 97.5 (09 Oct 2017 21:27), Max: 98.7 (09 Oct 2017 08:23)  HR: 70 (09 Oct 2017 21:27) (68 - 85)  BP: 102/58 (09 Oct 2017 21:27) (94/55 - 102/58)  BP(mean): --  ABP: --  ABP(mean): --  RR: 18 (09 Oct 2017 21:27) (18 - 20)  SpO2: 100% (09 Oct 2017 21:27) (92% - 100%)        10-08 @ 07:01  -  10-09 @ 07:00  --------------------------------------------------------  IN: 820 mL / OUT: 0 mL / NET: 820 mL    10-09 @ 07:01  -  10-10 @ 05:03  --------------------------------------------------------  IN: 960 mL / OUT: 0 mL / NET: 960 mL      CAPILLARY BLOOD GLUCOSE          PHYSICAL EXAM:  General: Awake, alert, oriented X 3.   HEENT: Atraumatic, normocephalic.                 Mallampatti Grade                 No nasal congestion.                No tonsillar or pharyngeal exudates.  Lymph Nodes: No palpable lymphadenopathy  Neck: No JVD. No carotid bruit.   Respiratory: Normal chest expansion                         Normal percussion                         Normal and equal air entry                         No wheeze, rhonchi or rales.  Cardiovascular: S1 S2 normal. No murmurs, rubs or gallops.   Abdomen: Soft, non-tender, non-distended. No organomegaly.  Extremities: Warm to touch. Peripheral pulse palpable. No pedal edema.   Skin: No rashes or skin lesions  Neurological: Motor and sensory examination equal and normal in all four extremities.  Psychiatry: Appropriate mood and affect.    HOSPITAL MEDICATIONS:  MEDICATIONS  (STANDING):  aspirin enteric coated 81 milliGRAM(s) Oral daily  atorvastatin 40 milliGRAM(s) Oral at bedtime  buDESOnide   0.5 milliGRAM(s) Respule 0.5 milliGRAM(s) Inhalation every 12 hours  epoetin terry Injectable 4000 Unit(s) IV Push <User Schedule>  heparin  Injectable 5000 Unit(s) SubCutaneous every 12 hours  lactobacillus acidophilus 1 Tablet(s) Oral daily  levothyroxine Injectable 12.5 MICROGram(s) IV Push daily  melatonin 3 milliGRAM(s) Oral at bedtime  midodrine 30 milliGRAM(s) Oral every 8 hours  Nephro-amy 1 Tablet(s) Oral daily  nystatin Powder 1 Application(s) Topical every 8 hours  vancomycin  IVPB 750 milliGRAM(s) IV Intermittent <User Schedule>    MEDICATIONS  (PRN):      LABS:                        9.4    10.9  )-----------( 192      ( 09 Oct 2017 12:52 )             29.0                     MICROBIOLOGY:     RADIOLOGY:  [ ] Reviewed and interpreted by me    Point of Care Ultrasound Findings:    PFT:    EKG: CHIEF COMPLAINT:in bed-NAD--"feet cold"    Interval Events:ID, CTS following    REVIEW OF SYSTEMS:  Constitutional: No fevers or chills. No weight loss. No fatigue or generalized malaise.  Eyes: No itching or discharge from the eyes  ENT: No ear pain. No ear discharge. No nasal congestion. No post nasal drip. No epistaxis. No throat pain. No sore throat. No difficulty swallowing.   CV: No chest pain. No palpitations. No lightheadedness or dizziness.   Resp: No dyspnea at rest. + dyspnea on exertion. No orthopnea. No wheezing. No cough. No stridor. No sputum production. No chest pain with respiration.  GI: No nausea. No vomiting. No diarrhea.  MSK: No joint pain or pain in any extremities  Integumentary: No skin lesions. + pedal edema.  Neurological: No gross motor weakness. No sensory changes.  [+ ] All other systems negative  [ ] Unable to assess ROS because ________    OBJECTIVE:  ICU Vital Signs Last 24 Hrs  T(C): 36.4 (09 Oct 2017 21:27), Max: 37.1 (09 Oct 2017 08:23)  T(F): 97.5 (09 Oct 2017 21:27), Max: 98.7 (09 Oct 2017 08:23)  HR: 70 (09 Oct 2017 21:27) (68 - 85)  BP: 102/58 (09 Oct 2017 21:27) (94/55 - 102/58)  BP(mean): --  ABP: --  ABP(mean): --  RR: 18 (09 Oct 2017 21:27) (18 - 20)  SpO2: 100% (09 Oct 2017 21:27) (92% - 100%)        10-08 @ 07:01  -  10-09 @ 07:00  --------------------------------------------------------  IN: 820 mL / OUT: 0 mL / NET: 820 mL    10-09 @ 07:01  -  10-10 @ 05:03  --------------------------------------------------------  IN: 960 mL / OUT: 0 mL / NET: 960 mL      CAPILLARY BLOOD GLUCOSE          PHYSICAL EXAM:in bed-NAD on oxygen-NC  General: Awake, alert, oriented X 3.   HEENT: Atraumatic, normocephalic.                 Mallampatti Grade 2                No nasal congestion.                No tonsillar or pharyngeal exudates.  Lymph Nodes: No palpable lymphadenopathy  Neck: No JVD. No carotid bruit.   Respiratory: Normal chest expansion                         Normal percussion                         Normal and equal air entry                         No wheeze, rhonchi or rales--reduced BS bases bilaterally  Cardiovascular: S1 S2 normal. 2+murmurs, rubs or gallops.   Abdomen: Soft, non-tender, non-distended. No organomegaly.  Extremities: Warm to touch. Peripheral pulse palpable. 1+ pedal edema.   Skin: No rashes or skin lesions  Neurological: Motor and sensory examination equal and normal in all four extremities.  Psychiatry: Appropriate mood and affect.    HOSPITAL MEDICATIONS:  MEDICATIONS  (STANDING):  aspirin enteric coated 81 milliGRAM(s) Oral daily  atorvastatin 40 milliGRAM(s) Oral at bedtime  buDESOnide   0.5 milliGRAM(s) Respule 0.5 milliGRAM(s) Inhalation every 12 hours  epoetin terry Injectable 4000 Unit(s) IV Push <User Schedule>  heparin  Injectable 5000 Unit(s) SubCutaneous every 12 hours  lactobacillus acidophilus 1 Tablet(s) Oral daily  levothyroxine Injectable 12.5 MICROGram(s) IV Push daily  melatonin 3 milliGRAM(s) Oral at bedtime  midodrine 30 milliGRAM(s) Oral every 8 hours  Nephro-amy 1 Tablet(s) Oral daily  nystatin Powder 1 Application(s) Topical every 8 hours  vancomycin  IVPB 750 milliGRAM(s) IV Intermittent <User Schedule>    MEDICATIONS  (PRN):      LABS:                        9.4    10.9  )-----------( 192      ( 09 Oct 2017 12:52 )             29.0                     MICROBIOLOGY:     RADIOLOGY:  [ ] Reviewed and interpreted by me    Point of Care Ultrasound Findings:    PFT:    EKG:

## 2017-10-10 NOTE — PROGRESS NOTE ADULT - SUBJECTIVE AND OBJECTIVE BOX
Patient is a 87y old  Male who presents with a chief complaint of fall 2/2 hypotension (25 Sep 2017 15:50)      SUBJECTIVE / OVERNIGHT EVENTS: Overnight patient lost IV access. Meds switched to PO. He is more irritated this morning. Keeps grabbing at NC and mumbling inaudibly. Does not respond appropriately to questions.     MEDICATIONS  (STANDING):  aspirin enteric coated 81 milliGRAM(s) Oral daily  atorvastatin 40 milliGRAM(s) Oral at bedtime  buDESOnide   0.5 milliGRAM(s) Respule 0.5 milliGRAM(s) Inhalation every 12 hours  epoetin terry Injectable 4000 Unit(s) IV Push <User Schedule>  heparin  Injectable 5000 Unit(s) SubCutaneous every 12 hours  lactobacillus acidophilus 1 Tablet(s) Oral daily  levothyroxine 25 MICROGram(s) Oral daily  melatonin 3 milliGRAM(s) Oral at bedtime  midodrine 30 milliGRAM(s) Oral every 8 hours  Nephro-amy 1 Tablet(s) Oral daily  nystatin Powder 1 Application(s) Topical every 8 hours  vancomycin  IVPB 750 milliGRAM(s) IV Intermittent <User Schedule>        I&O's Summary    09 Oct 2017 07:01  -  10 Oct 2017 07:00  --------------------------------------------------------  IN: 1060 mL / OUT: 0 mL / NET: 1060 mL        PHYSICAL EXAM:  GENERAL: laying in bed, eyes closed, fidgeting and mumbling  HEAD:  Atraumatic, Normocephalic  EYES: sclera clear  NECK: Supple, No JVD  CHEST/LUNG: decreased breath sounds at bases, no wheezes, diffuse fine rales  HEART: Regular rate and rhythm; No murmurs, rubs, or gallops  ABDOMEN: Soft, Nontender, Nondistended; Bowel sounds present  EXTREMITIES:  peripheral pulses not palpable, No clubbing, cyanosis, or edema  PSYCH: AOx0  NEUROLOGY: moving all 4 extremities  SKIN: no rashes or erythema; multiple old ecchymoses and healing eschars on arms and legs    LABS:  (10-09 @ 12:52)                        9.4  10.9 )-----------( 192                 29.0    Neutrophils = -- (--%)  Lymphocytes = -- (--%)  Eosinophils = -- (--%)  Basophils = -- (--%)  Monocytes = -- (--%)  Bands = --%    WBC Trend: 10.9<--, 12.40<--, 12.5<--  Hb Trend: 9.4<--, 8.9<--, 9.3<--, 8.8<--, 9.1<--  Plt Trend: 192<--, 168<--, 237<--, 279<--, 239<--        Creatinine Trend: 3.78<--, 4.89<--, 4.06<--, 5.48<--, 4.59<--, 3.67<--            Microbiology:  Urine Cx:  Blood Cx:    RADIOLOGY & ADDITIONAL TESTS:  X- Ray:  CT:  Ultrasound:  [ ] imaging personally reviewed and interpreted by me    Consultant(s) Notes Reviewed:      Care Discussed with Consultants/Other Providers: Patient is a 87y old  Male who presents with a chief complaint of fall 2/2 hypotension (25 Sep 2017 15:50)    SUBJECTIVE / OVERNIGHT EVENTS: Overnight patient lost IV access. Meds switched to PO. He is more irritated this morning. Keeps grabbing at NC and mumbling inaudibly. Does not respond appropriately to questions.     MEDICATIONS  (STANDING):  aspirin enteric coated 81 milliGRAM(s) Oral daily  atorvastatin 40 milliGRAM(s) Oral at bedtime  buDESOnide   0.5 milliGRAM(s) Respule 0.5 milliGRAM(s) Inhalation every 12 hours  epoetin terry Injectable 4000 Unit(s) IV Push <User Schedule>  heparin  Injectable 5000 Unit(s) SubCutaneous every 12 hours  lactobacillus acidophilus 1 Tablet(s) Oral daily  levothyroxine 25 MICROGram(s) Oral daily  melatonin 3 milliGRAM(s) Oral at bedtime  midodrine 30 milliGRAM(s) Oral every 8 hours  Nephro-amy 1 Tablet(s) Oral daily  nystatin Powder 1 Application(s) Topical every 8 hours  vancomycin  IVPB 750 milliGRAM(s) IV Intermittent <User Schedule>        I&O's Summary    09 Oct 2017 07:01  -  10 Oct 2017 07:00  --------------------------------------------------------  IN: 1060 mL / OUT: 0 mL / NET: 1060 mL        PHYSICAL EXAM:  GENERAL: laying in bed, eyes closed, fidgeting and mumbling  HEAD:  Atraumatic, Normocephalic  EYES: sclera clear  NECK: Supple, No JVD  CHEST/LUNG: decreased breath sounds at bases, no wheezes, diffuse fine rales  HEART: Regular rate and rhythm; No murmurs, rubs, or gallops  ABDOMEN: Soft, Nontender, Nondistended; Bowel sounds present  EXTREMITIES:  peripheral pulses not palpable, No clubbing, cyanosis, or edema  PSYCH: AOx0  NEUROLOGY: moving all 4 extremities  SKIN: no rashes or erythema; multiple old ecchymoses and healing eschars on arms and legs    LABS:  (10-09 @ 12:52)                        9.4  10.9 )-----------( 192                 29.0    Neutrophils = -- (--%)  Lymphocytes = -- (--%)  Eosinophils = -- (--%)  Basophils = -- (--%)  Monocytes = -- (--%)  Bands = --%    WBC Trend: 10.9<--, 12.40<--, 12.5<--  Hb Trend: 9.4<--, 8.9<--, 9.3<--, 8.8<--, 9.1<--  Plt Trend: 192<--, 168<--, 237<--, 279<--, 239<--        Creatinine Trend: 3.78<--, 4.89<--, 4.06<--, 5.48<--, 4.59<--, 3.67<--            Microbiology:  Urine Cx:  Blood Cx:    RADIOLOGY & ADDITIONAL TESTS:  X- Ray:  CT:  Ultrasound:  [ ] imaging personally reviewed and interpreted by me    Consultant(s) Notes Reviewed:      Care Discussed with Consultants/Other Providers:

## 2017-10-10 NOTE — PROGRESS NOTE ADULT - PROBLEM SELECTOR PLAN 4
- chronic diastolic heart failure, last TTE 9/28  - CXR showing b/l pulmonary edema, HD TTS, patient does not make urine  - continue to monitor O2 requirements and CXR

## 2017-10-10 NOTE — PROGRESS NOTE ADULT - PROBLEM SELECTOR PLAN 8
- DLBCL, on rituximab outpatient  - no longer a candidate for chemotherapy due to worsening clinical status  - follows with Dr. Sinha

## 2017-10-10 NOTE — PROGRESS NOTE ADULT - SUBJECTIVE AND OBJECTIVE BOX
Subjective: Pt states "I'm alright" denies any CP, SOB, N/V and palpitations. No acute events overnight.     Vital Signs Last 24 Hrs  T(F): 96.8, Max: 97.5   HR: 70   BP: 91/48  RR: 19   SpO2: 96% 3L NC     10-09-17 @ 07:01  -  10-10-17 @ 07:00  --------------------------------------------------------  IN: 1060 mL / OUT: 0 mL / NET: 1060 mL      Relevant labs, radiology and Medications reviewed                        9.2    10.26 )-----------( 204      ( 10 Oct 2017 08:37 )             29.2     140  |  97  |  63<H>  ----------------------------<  90  5.1   |  24  |  4.90<H>    PHYSICAL EXAM  Neurology: A&Ox1, NAD, forgetful  CV : +S1S2  Lungs: Respirations non-labored, B/L BS clear, diminished on left  Abdomen: Soft, NT/ND, +BSx4Q, +BM  Extremities: B/L LE +1 edema, negative calf tenderness, +PP       MEDICATIONS  aspirin enteric coated 81 milliGRAM(s) Oral daily  atorvastatin 40 milliGRAM(s) Oral at bedtime  buDESOnide   0.5 milliGRAM(s) Respule 0.5 milliGRAM(s) Inhalation every 12 hours  epoetin terry Injectable 4000 Unit(s) IV Push <User Schedule>  heparin  Injectable 5000 Unit(s) SubCutaneous every 12 hours  lactobacillus acidophilus 1 Tablet(s) Oral daily  levothyroxine 25 MICROGram(s) Oral daily  melatonin 3 milliGRAM(s) Oral at bedtime  midodrine 30 milliGRAM(s) Oral every 8 hours  Nephro-amy 1 Tablet(s) Oral daily  nystatin Powder 1 Application(s) Topical every 8 hours  vancomycin  IVPB 750 milliGRAM(s) IV Intermittent <User Schedule>      Discussed with thoracic surgery attending, Dr. Worley.

## 2017-10-10 NOTE — PROGRESS NOTE ADULT - PROBLEM SELECTOR PLAN 3
MRSA bacteremia  zosyn D11; vanco D10  CTS evaluation and ?decortication--+sono guided pig tail catheter-- 10-4-7 MRSA bacteremia   vanco D14  CTS evaluation and ?decortication--+sono guided pig tail catheter-- 10-4-7

## 2017-10-10 NOTE — PROGRESS NOTE ADULT - ASSESSMENT
88 y/o multiple medical problems ESRD on HD admitted with lethargy fund slumped over table fround to have MRSA bacteremia, left sided empyema. + pigtail

## 2017-10-11 PROCEDURE — 99233 SBSQ HOSP IP/OBS HIGH 50: CPT

## 2017-10-11 PROCEDURE — 71010: CPT | Mod: 26

## 2017-10-11 PROCEDURE — 99233 SBSQ HOSP IP/OBS HIGH 50: CPT | Mod: GC

## 2017-10-11 RX ADMIN — MIDODRINE HYDROCHLORIDE 30 MILLIGRAM(S): 2.5 TABLET ORAL at 05:59

## 2017-10-11 RX ADMIN — MIDODRINE HYDROCHLORIDE 30 MILLIGRAM(S): 2.5 TABLET ORAL at 23:30

## 2017-10-11 RX ADMIN — Medication 0.5 MILLIGRAM(S): at 05:59

## 2017-10-11 RX ADMIN — Medication 3 MILLIGRAM(S): at 23:28

## 2017-10-11 RX ADMIN — NYSTATIN CREAM 1 APPLICATION(S): 100000 CREAM TOPICAL at 13:43

## 2017-10-11 RX ADMIN — Medication 25 MICROGRAM(S): at 05:58

## 2017-10-11 RX ADMIN — NYSTATIN CREAM 1 APPLICATION(S): 100000 CREAM TOPICAL at 05:59

## 2017-10-11 RX ADMIN — HEPARIN SODIUM 5000 UNIT(S): 5000 INJECTION INTRAVENOUS; SUBCUTANEOUS at 17:10

## 2017-10-11 RX ADMIN — Medication 1 TABLET(S): at 11:17

## 2017-10-11 RX ADMIN — Medication 81 MILLIGRAM(S): at 11:17

## 2017-10-11 RX ADMIN — ATORVASTATIN CALCIUM 40 MILLIGRAM(S): 80 TABLET, FILM COATED ORAL at 23:28

## 2017-10-11 RX ADMIN — Medication 0.5 MILLIGRAM(S): at 17:10

## 2017-10-11 RX ADMIN — NYSTATIN CREAM 1 APPLICATION(S): 100000 CREAM TOPICAL at 23:29

## 2017-10-11 RX ADMIN — HEPARIN SODIUM 5000 UNIT(S): 5000 INJECTION INTRAVENOUS; SUBCUTANEOUS at 05:59

## 2017-10-11 RX ADMIN — MIDODRINE HYDROCHLORIDE 30 MILLIGRAM(S): 2.5 TABLET ORAL at 13:43

## 2017-10-11 NOTE — PROGRESS NOTE ADULT - PROBLEM SELECTOR PLAN 2
- Severe sepsis on admission. resolved  - On midodrine 30 TID with baseline pressures 90s systolic  - Afebrile  - On IV vanc renally dosed, monitor Vanco level, zosyn d/c 9/30 due to cultures all growing MRSA

## 2017-10-11 NOTE — PROGRESS NOTE ADULT - SUBJECTIVE AND OBJECTIVE BOX
CHIEF COMPLAINT:    Interval Events:    REVIEW OF SYSTEMS:  Constitutional: No fevers or chills. No weight loss. No fatigue or generalized malaise.  Eyes: No itching or discharge from the eyes  ENT: No ear pain. No ear discharge. No nasal congestion. No post nasal drip. No epistaxis. No throat pain. No sore throat. No difficulty swallowing.   CV: No chest pain. No palpitations. No lightheadedness or dizziness.   Resp: No dyspnea at rest. No dyspnea on exertion. No orthopnea. No wheezing. No cough. No stridor. No sputum production. No chest pain with respiration.  GI: No nausea. No vomiting. No diarrhea.  MSK: No joint pain or pain in any extremities  Integumentary: No skin lesions. No pedal edema.  Neurological: No gross motor weakness. No sensory changes.  [ ] All other systems negative  [ ] Unable to assess ROS because ________    OBJECTIVE:  ICU Vital Signs Last 24 Hrs  T(C): 36.3 (10 Oct 2017 23:38), Max: 36.3 (10 Oct 2017 06:00)  T(F): 97.3 (10 Oct 2017 23:38), Max: 97.4 (10 Oct 2017 18:00)  HR: 72 (10 Oct 2017 23:38) (55 - 82)  BP: 109/66 (10 Oct 2017 23:38) (91/48 - 109/66)  BP(mean): --  ABP: --  ABP(mean): --  RR: 18 (10 Oct 2017 23:38) (18 - 20)  SpO2: 96% (10 Oct 2017 23:38) (95% - 100%)        10-09 @ 07:01  -  10-10 @ 07:00  --------------------------------------------------------  IN: 1060 mL / OUT: 0 mL / NET: 1060 mL    10-10 @ 07:01  -  10-11 @ 05:16  --------------------------------------------------------  IN: 300 mL / OUT: 0 mL / NET: 300 mL      CAPILLARY BLOOD GLUCOSE          PHYSICAL EXAM:  General: Awake, alert, oriented X 3.   HEENT: Atraumatic, normocephalic.                 Mallampatti Grade                 No nasal congestion.                No tonsillar or pharyngeal exudates.  Lymph Nodes: No palpable lymphadenopathy  Neck: No JVD. No carotid bruit.   Respiratory: Normal chest expansion                         Normal percussion                         Normal and equal air entry                         No wheeze, rhonchi or rales.  Cardiovascular: S1 S2 normal. No murmurs, rubs or gallops.   Abdomen: Soft, non-tender, non-distended. No organomegaly.  Extremities: Warm to touch. Peripheral pulse palpable. No pedal edema.   Skin: No rashes or skin lesions  Neurological: Motor and sensory examination equal and normal in all four extremities.  Psychiatry: Appropriate mood and affect.    HOSPITAL MEDICATIONS:  MEDICATIONS  (STANDING):  aspirin enteric coated 81 milliGRAM(s) Oral daily  atorvastatin 40 milliGRAM(s) Oral at bedtime  buDESOnide   0.5 milliGRAM(s) Respule 0.5 milliGRAM(s) Inhalation every 12 hours  epoetin terry Injectable 4000 Unit(s) IV Push <User Schedule>  heparin  Injectable 5000 Unit(s) SubCutaneous every 12 hours  lactobacillus acidophilus 1 Tablet(s) Oral daily  levothyroxine 25 MICROGram(s) Oral daily  melatonin 3 milliGRAM(s) Oral at bedtime  midodrine 30 milliGRAM(s) Oral every 8 hours  Nephro-amy 1 Tablet(s) Oral daily  nystatin Powder 1 Application(s) Topical every 8 hours  vancomycin  IVPB 750 milliGRAM(s) IV Intermittent <User Schedule>    MEDICATIONS  (PRN):      LABS:                        9.2    10.26 )-----------( 204      ( 10 Oct 2017 08:37 )             29.2     10-10    140  |  97  |  63<H>  ----------------------------<  90  5.1   |  24  |  4.90<H>    Ca    7.3<L>      10 Oct 2017 08:27                MICROBIOLOGY:     RADIOLOGY:  [ ] Reviewed and interpreted by me    Point of Care Ultrasound Findings:    PFT:    EKG: CHIEF COMPLAINT: better--no cough, OOB, sob-when supine    Interval Events:Palliative care, CTS etc.    REVIEW OF SYSTEMS:  Constitutional: No fevers or chills. No weight loss. No fatigue or generalized malaise.  Eyes: No itching or discharge from the eyes  ENT: No ear pain. No ear discharge. No nasal congestion. No post nasal drip. No epistaxis. No throat pain. No sore throat. No difficulty swallowing.   CV: No chest pain. No palpitations. No lightheadedness or dizziness.   Resp: No dyspnea at rest. ++ dyspnea on exertion. + orthopnea. No wheezing. No cough. No stridor. No sputum production. No chest pain with respiration.  GI: No nausea. No vomiting. No diarrhea.  MSK: No joint pain or pain in any extremities  Integumentary: No skin lesions. + pedal edema.  Neurological: No gross motor weakness. No sensory changes.  [+ ] All other systems negative  [ ] Unable to assess ROS because ________    OBJECTIVE:  ICU Vital Signs Last 24 Hrs  T(C): 36.3 (10 Oct 2017 23:38), Max: 36.3 (10 Oct 2017 06:00)  T(F): 97.3 (10 Oct 2017 23:38), Max: 97.4 (10 Oct 2017 18:00)  HR: 72 (10 Oct 2017 23:38) (55 - 82)  BP: 109/66 (10 Oct 2017 23:38) (91/48 - 109/66)  BP(mean): --  ABP: --  ABP(mean): --  RR: 18 (10 Oct 2017 23:38) (18 - 20)  SpO2: 96% (10 Oct 2017 23:38) (95% - 100%)        10-09 @ 07:01  -  10-10 @ 07:00  --------------------------------------------------------  IN: 1060 mL / OUT: 0 mL / NET: 1060 mL    10-10 @ 07:01  -  10-11 @ 05:16  --------------------------------------------------------  IN: 300 mL / OUT: 0 mL / NET: 300 mL      CAPILLARY BLOOD GLUCOSE          PHYSICAL EXAM:NAd in chair  General: Awake, alert, oriented X 1.   HEENT: Atraumatic, normocephalic.                 Mallampatti Grade 2                 No nasal congestion.                No tonsillar or pharyngeal exudates.  Lymph Nodes: No palpable lymphadenopathy  Neck: No JVD. No carotid bruit.   Respiratory: Normal chest expansion                         Normal percussion                         Normal and equal air entry                         No wheeze, rhonchi or rales-reduced BS bases  Cardiovascular: S1 S2 normal. 2+ murmurs, rubs or gallops.   Abdomen: Soft, non-tender, non-distended. No organomegaly.  Extremities: Warm to touch. Peripheral pulse palpable. No pedal edema.   Skin: No rashes or skin lesions  Neurological: Motor and sensory examination equal and normal in all four extremities.  Psychiatry: Appropriate mood and affect.    HOSPITAL MEDICATIONS:  MEDICATIONS  (STANDING):  aspirin enteric coated 81 milliGRAM(s) Oral daily  atorvastatin 40 milliGRAM(s) Oral at bedtime  buDESOnide   0.5 milliGRAM(s) Respule 0.5 milliGRAM(s) Inhalation every 12 hours  epoetin terry Injectable 4000 Unit(s) IV Push <User Schedule>  heparin  Injectable 5000 Unit(s) SubCutaneous every 12 hours  lactobacillus acidophilus 1 Tablet(s) Oral daily  levothyroxine 25 MICROGram(s) Oral daily  melatonin 3 milliGRAM(s) Oral at bedtime  midodrine 30 milliGRAM(s) Oral every 8 hours  Nephro-amy 1 Tablet(s) Oral daily  nystatin Powder 1 Application(s) Topical every 8 hours  vancomycin  IVPB 750 milliGRAM(s) IV Intermittent <User Schedule>    MEDICATIONS  (PRN):      LABS:                        9.2    10.26 )-----------( 204      ( 10 Oct 2017 08:37 )             29.2     10-10    140  |  97  |  63<H>  ----------------------------<  90  5.1   |  24  |  4.90<H>    Ca    7.3<L>      10 Oct 2017 08:27                MICROBIOLOGY:     RADIOLOGY:  [ ] Reviewed and interpreted by me    Point of Care Ultrasound Findings:    PFT:    EKG:

## 2017-10-11 NOTE — PROGRESS NOTE ADULT - ATTENDING COMMENTS
Agree with above note by R1 Dr. Mcdonald - edited where appropriate  40 minutes spent on phone with son Elliot by me about GOC and prognosis. He expresses full understanding in the very low chance of any meaningful recovery should a cardiac arrest occur in the setting of numerous comorbidities. He will get back to me about addressing the advance directives, he needs to discuss with his brothers. He states that his goal is to have his father transferred to LTC to continue HD and abx while emphasizing comfort and quality of life.   Prognosis remains quite poor.   Attending Physician Dr. Jalil Hernandez [phone]

## 2017-10-11 NOTE — PROGRESS NOTE ADULT - PROBLEM SELECTOR PLAN 3
MRSA bacteremia   vanco D14  CTS evaluation and ?decortication--+sono guided pig tail catheter-- 10-4-7 MRSA bacteremia   vanco D15  CTS evaluation and ?decortication--+sono guided pig tail catheter-- 10-4-7--no decortication planned

## 2017-10-11 NOTE — PROGRESS NOTE ADULT - ATTENDING COMMENTS
as above--CTS follow up-s/p removal of pleurx and ? decortication s/p pigtail placement and removal of catheter as per Tia et al.  ABX to be directed by pleural and Blood samples--vanco D14-MRSA bacteremia--as per ID,   ***AS per ID--will likely need ?GUADALUPE to optimally deal w/situation--this will come after goals of care addressed.  MS issues continue-haldol/zyprexa etc.      Morro Tran MD-Pulmonary   [phone] as above--CTS follow up-s/p removal of pleurx and ? decortication s/p pigtail placement and removal of catheter as per Tia et al.  NO Decortication planned by CTS  ABX to be directed by pleural and Blood samples--vanco D15-MRSA bacteremia--as per ID,   ***AS per ID--will likely need ?GUADALUPE to optimally deal w/situation--this will come after goals of care addressed.  MS issues continue-haldol/zyprexa etc.  AS per pall. care--family not ready to deal w/current situation and hospice care--opt for rehab.      Morro Tran MD-Pulmonary   [phone]

## 2017-10-11 NOTE — PROGRESS NOTE ADULT - SUBJECTIVE AND OBJECTIVE BOX
Patient is a 87y old  Male who presents with a chief complaint of fall 2/2 hypotension (25 Sep 2017 15:50)    Being followed by ID for mrsa bacteremia,empyema    Interval history:Sitting OOB in chair  Pleasantly confused  denies complaints  Family input regarding GOC noted-son does not want aggressive measures /procedures but wants to continue abx and full code  No acute events      ROS:  No cough,SOB,CP  No N/V/D./abd pain  No other complaints      Antimicrobials:    vancomycin  IVPB 750 milliGRAM(s) IV Intermittent <User Schedule>      Vital Signs Last 24 Hrs  T(C): 36.3 (10-11-17 @ 05:49), Max: 36.3 (10-10-17 @ 18:00)  T(F): 97.4 (10-11-17 @ 05:49), Max: 97.4 (10-10-17 @ 18:00)  HR: 93 (10-11-17 @ 05:49) (55 - 93)  BP: 101/56 (10-11-17 @ 05:49) (93/52 - 109/66)  BP(mean): --  RR: 18 (10-11-17 @ 05:49) (18 - 20)  SpO2: 96% (10-11-17 @ 05:49) (95% - 96%)    Physical Exam:    Constitutional well preserved,comfortable,pleasant    HEENT PERRLA EOMI,No pallor or icterus    No oral exudate or erythema    Neck supple no JVD or LN    Chest Good AE,bilateral basal crackles    CVS RRR S1 S2 WNl No murmur or rub or gallop    Abd soft BS normal No tenderness no masses    Ext left arm AVF no erythema or tenderness    IV site no erythema tenderness or discharge    Joints no swelling or LOM    CNS AAO X 3 no focal    Lab Data:                          9.2    10.26 )-----------( 204      ( 10 Oct 2017 08:37 )             29.2       10-10    140  |  97  |  63<H>  ----------------------------<  90  5.1   |  24  |  4.90<H>    Ca    7.3<L>      10 Oct 2017 08:27

## 2017-10-11 NOTE — PROGRESS NOTE ADULT - PROBLEM SELECTOR PLAN 3
- Metabolic encephalopathy superimposed on baseline dementia  - patient intermittently hyperactive and more confused, now experiencing intermittent depressive symptoms, pleasant this morning  - on 1:1 due to intermittent agitation, pulling at lines  - Psych consult: melatonin at bedtime, ativan .25 IV only if severely agitated, c/w supportive care and reorientation  -avoid antipsychotics (QTc 590), anticholinergics, opiates

## 2017-10-11 NOTE — PROGRESS NOTE ADULT - ASSESSMENT
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     #MRSA bacteremia 2/2 empyema: pt admitted with septic shock 2/2 MRSA bacteremia 2/2 empyema following PleurX placement 9/5 and exchange in the setting of immunosuppression for DLBCL (Rituximab). PleurX was removed on 9/27, and pt was transferred from MICU to floors 9/29.  CT with loculated effusion  s/p pigtail catheter-which was removed  Thoracic surgery input noted- decortication if worsens though will tailor per GOC  Also has PPM and aortic graft  ? GUADALUPE and CT with IV contrast only if c/w GOC-if no aggressive measures then will not chnage management  Continue Vanco post HD  Will likely need long course(6 weeks) +/_ suppression  Tailor plan per course,results.  prognosis guarded  Will Follow.  Beeper [phone]-vbfc/afterhours/No response-[id number]

## 2017-10-11 NOTE — PROGRESS NOTE ADULT - ASSESSMENT
88 yo M with multiple medical problems including DLBCL on Rituxan, malignant pleural effusion s/p left pleurx, ESRD on HD, chronic hypotension on Midodrine presents with lethargy, hypotension and cloudy pleural fluid drainage. History as per chart and family.   Received broad spectrum antibiotics and 2L NS for presumed sepsis 2/2 empyema. Evaluated by MICU and admitted for severe sepsis.     A/P: Septic shock - likely from empyema in the setting of DLBCL on Rituxan, indwelling pleural catheter and cloudy, malodorous pleural fluid. CT chest with moderate sized pleural effusion and small loculated left pleural effusion.  s/p broad spectrum antibiotics--now on vanco     Thoracic surgery service following - + pleural space  infected- removed pleurx-pigtail placed 10/4  DVT prophylaxis.

## 2017-10-11 NOTE — PROGRESS NOTE ADULT - PROBLEM SELECTOR PLAN 1
- Continues to have b/l decreased breath sounds at bases, crackles mildly improved  - s/p pigtail placement by IR on 10/4 - 10/8, total output 130cc  - IV vanc renally dosed, will continue to check level and dose with HD  - increased WOB when lying down, saturating well on RA this AM sitting up

## 2017-10-12 LAB
ANION GAP SERPL CALC-SCNC: 22 MMOL/L — HIGH (ref 5–17)
BUN SERPL-MCNC: 63 MG/DL — HIGH (ref 7–23)
CALCIUM SERPL-MCNC: 7.3 MG/DL — LOW (ref 8.4–10.5)
CHLORIDE SERPL-SCNC: 95 MMOL/L — LOW (ref 96–108)
CO2 SERPL-SCNC: 23 MMOL/L — SIGNIFICANT CHANGE UP (ref 22–31)
CREAT SERPL-MCNC: 4.95 MG/DL — HIGH (ref 0.5–1.3)
GLUCOSE SERPL-MCNC: 117 MG/DL — HIGH (ref 70–99)
HCT VFR BLD CALC: 27 % — LOW (ref 39–50)
HGB BLD-MCNC: 8.7 G/DL — LOW (ref 13–17)
MCHC RBC-ENTMCNC: 32.1 PG — SIGNIFICANT CHANGE UP (ref 27–34)
MCHC RBC-ENTMCNC: 32.2 GM/DL — SIGNIFICANT CHANGE UP (ref 32–36)
MCV RBC AUTO: 99.6 FL — SIGNIFICANT CHANGE UP (ref 80–100)
PLATELET # BLD AUTO: 191 K/UL — SIGNIFICANT CHANGE UP (ref 150–400)
POTASSIUM SERPL-MCNC: 4.7 MMOL/L — SIGNIFICANT CHANGE UP (ref 3.5–5.3)
POTASSIUM SERPL-SCNC: 4.7 MMOL/L — SIGNIFICANT CHANGE UP (ref 3.5–5.3)
RBC # BLD: 2.71 M/UL — LOW (ref 4.2–5.8)
RBC # FLD: 20 % — HIGH (ref 10.3–14.5)
SODIUM SERPL-SCNC: 140 MMOL/L — SIGNIFICANT CHANGE UP (ref 135–145)
VANCOMYCIN FLD-MCNC: 19.7 UG/ML — SIGNIFICANT CHANGE UP
WBC # BLD: 8.84 K/UL — SIGNIFICANT CHANGE UP (ref 3.8–10.5)
WBC # FLD AUTO: 8.84 K/UL — SIGNIFICANT CHANGE UP (ref 3.8–10.5)

## 2017-10-12 PROCEDURE — 90935 HEMODIALYSIS ONE EVALUATION: CPT | Mod: GC

## 2017-10-12 PROCEDURE — 99233 SBSQ HOSP IP/OBS HIGH 50: CPT

## 2017-10-12 PROCEDURE — 99232 SBSQ HOSP IP/OBS MODERATE 35: CPT

## 2017-10-12 PROCEDURE — 99233 SBSQ HOSP IP/OBS HIGH 50: CPT | Mod: GC

## 2017-10-12 RX ADMIN — Medication 3 MILLIGRAM(S): at 21:43

## 2017-10-12 RX ADMIN — HEPARIN SODIUM 5000 UNIT(S): 5000 INJECTION INTRAVENOUS; SUBCUTANEOUS at 06:26

## 2017-10-12 RX ADMIN — NYSTATIN CREAM 1 APPLICATION(S): 100000 CREAM TOPICAL at 11:43

## 2017-10-12 RX ADMIN — MIDODRINE HYDROCHLORIDE 30 MILLIGRAM(S): 2.5 TABLET ORAL at 11:43

## 2017-10-12 RX ADMIN — NYSTATIN CREAM 1 APPLICATION(S): 100000 CREAM TOPICAL at 21:44

## 2017-10-12 RX ADMIN — NYSTATIN CREAM 1 APPLICATION(S): 100000 CREAM TOPICAL at 06:27

## 2017-10-12 RX ADMIN — MIDODRINE HYDROCHLORIDE 30 MILLIGRAM(S): 2.5 TABLET ORAL at 06:26

## 2017-10-12 RX ADMIN — Medication 25 MICROGRAM(S): at 06:26

## 2017-10-12 RX ADMIN — Medication 150 MILLIGRAM(S): at 17:32

## 2017-10-12 RX ADMIN — Medication 0.5 MILLIGRAM(S): at 17:32

## 2017-10-12 RX ADMIN — Medication 81 MILLIGRAM(S): at 16:13

## 2017-10-12 RX ADMIN — ERYTHROPOIETIN 4000 UNIT(S): 10000 INJECTION, SOLUTION INTRAVENOUS; SUBCUTANEOUS at 15:24

## 2017-10-12 RX ADMIN — Medication 1 TABLET(S): at 16:13

## 2017-10-12 RX ADMIN — HEPARIN SODIUM 5000 UNIT(S): 5000 INJECTION INTRAVENOUS; SUBCUTANEOUS at 17:31

## 2017-10-12 RX ADMIN — Medication 0.5 MILLIGRAM(S): at 06:27

## 2017-10-12 RX ADMIN — Medication 1 TABLET(S): at 16:12

## 2017-10-12 RX ADMIN — MIDODRINE HYDROCHLORIDE 30 MILLIGRAM(S): 2.5 TABLET ORAL at 21:44

## 2017-10-12 RX ADMIN — ATORVASTATIN CALCIUM 40 MILLIGRAM(S): 80 TABLET, FILM COATED ORAL at 21:44

## 2017-10-12 NOTE — PROGRESS NOTE ADULT - PROBLEM SELECTOR PLAN 3
MRSA bacteremia   vanco D15  CTS evaluation and ?decortication--+sono guided pig tail catheter-- 10-4-7--no decortication planned

## 2017-10-12 NOTE — PROGRESS NOTE ADULT - SUBJECTIVE AND OBJECTIVE BOX
[facility] DIVISION OF KIDNEY DISEASES AND HYPERTENSION -- FOLLOW UP NOTE  --------------------------------------------------------------------------------  Chief Complaint:    24 hour events/subjective:  seen on HD        PAST HISTORY  --------------------------------------------------------------------------------  No significant changes to PMH, PSH, FHx, SHx, unless otherwise noted    ALLERGIES & MEDICATIONS  --------------------------------------------------------------------------------  Allergies    amlodipine (Rash)  Benadryl (Other)  Brilinta (Unknown)  Effient (Unknown)  Multaq (Other)    Intolerances      Standing Inpatient Medications  aspirin enteric coated 81 milliGRAM(s) Oral daily  atorvastatin 40 milliGRAM(s) Oral at bedtime  buDESOnide   0.5 milliGRAM(s) Respule 0.5 milliGRAM(s) Inhalation every 12 hours  epoetin terry Injectable 4000 Unit(s) IV Push <User Schedule>  heparin  Injectable 5000 Unit(s) SubCutaneous every 12 hours  lactobacillus acidophilus 1 Tablet(s) Oral daily  levothyroxine 25 MICROGram(s) Oral daily  melatonin 3 milliGRAM(s) Oral at bedtime  midodrine 30 milliGRAM(s) Oral every 8 hours  Nephro-amy 1 Tablet(s) Oral daily  nystatin Powder 1 Application(s) Topical every 8 hours  vancomycin  IVPB 750 milliGRAM(s) IV Intermittent <User Schedule>    PRN Inpatient Medications      REVIEW OF SYSTEMS  --------------------------------------------------------------------------------  unable to obtain    VITALS/PHYSICAL EXAM  --------------------------------------------------------------------------------  T(C): 36.4 (10-12-17 @ 16:13), Max: 36.9 (10-12-17 @ 06:23)  HR: 77 (10-12-17 @ 16:13) (70 - 84)  BP: 90/48 (10-12-17 @ 16:13) (90/48 - 142/114)  RR: 20 (10-12-17 @ 16:13) (17 - 20)  SpO2: 99% (10-12-17 @ 16:13) (90% - 99%)  Wt(kg): --        10-11-17 @ 07:01  -  10-12-17 @ 07:00  --------------------------------------------------------  IN: 1130 mL / OUT: 0 mL / NET: 1130 mL    10-12-17 @ 07:01  -  10-12-17 @ 16:47  --------------------------------------------------------  IN: 480 mL / OUT: 1000 mL / NET: -520 mL      Physical Exam:  	Gen: mild distress  	HEENT: PERRL, supple neck, clear oropharynx  	Pulm: CTA B/L  	CV: RRR, S1S2; no rub  	Abd: +BS, soft, nontender/nondistended  	: No suprapubic tenderness  	LE: Warm, FROM, no clubbing, intact strength; no edema      LABS/STUDIES  --------------------------------------------------------------------------------              8.7    8.84  >-----------<  191      [10-12-17 @ 15:50]              27.0                 Creatinine Trend:  SCr 4.90 [10-10 @ 08:27]  SCr 3.78 [10-06 @ 10:20]  SCr 4.89 [10-05 @ 13:25]  SCr 4.06 [10-04 @ 10:48]  SCr 5.48 [10-03 @ 12:27]        Iron 56, TIBC 208, %sat --      [09-17-17 @ 05:40]  Ferritin 1179      [09-17-17 @ 05:40]  HbA1c 5.7      [11-10-15 @ 07:48]  TSH 4.73      [09-16-17 @ 16:20]    HBsAb <3.0      [10-05-17 @ 13:25]  HBsAb Nonreact      [10-05-17 @ 13:25]  HBsAg Nonreact      [10-05-17 @ 13:25]  HCV 0.09, Nonreact      [10-05-17 @ 13:25]

## 2017-10-12 NOTE — PROGRESS NOTE ADULT - ATTENDING COMMENTS
Agree with above note by R1 Dr. Mcdonald- edited where appropriate  Son is coming in today to discuss advance directives.   Discussed case w/ID - complete 6 weeks of vanco and indefinite PO suppression thereafter  Attending Physician Dr. Jalil Hernandez [phone]

## 2017-10-12 NOTE — PROGRESS NOTE ADULT - ASSESSMENT
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     #MRSA bacteremia 2/2 empyema: pt admitted with septic shock 2/2 MRSA bacteremia 2/2 empyema following PleurX placement 9/5 and exchange in the setting of immunosuppression for DLBCL (Rituximab). PleurX was removed on 9/27, and pt was transferred from MICU to floors 9/29.  CT with loculated effusion  s/p pigtail catheter-which was removed  Thoracic surgery input noted- decortication if worsens though will tailor per GOC  Also has PPM and aortic graft  ? GUADALUPE and CT with IV contrast only if c/w GOC-since family does not want  aggressive measures they will not   Continue Vanco post HD  Will likely need long course(6 weeks) +/_ suppression(bactrim or doxy)  Tailor plan per course,results.  prognosis guarded  case d/w Dr doty-family no leaning towards comfort care  Will Follow.  Beeper [phone]-lnco/afterhours/No response-[phone]

## 2017-10-12 NOTE — PROGRESS NOTE ADULT - SUBJECTIVE AND OBJECTIVE BOX
Patient is a 87y old  Male who presents with a chief complaint of fall 2/2 hypotension (25 Sep 2017 15:50)      SUBJECTIVE / OVERNIGHT EVENTS: No acute events overnight. Patient was seen this morning sleeping in his chair. PCA in room reported he did not sleep overnight.    MEDICATIONS  (STANDING):  aspirin enteric coated 81 milliGRAM(s) Oral daily  atorvastatin 40 milliGRAM(s) Oral at bedtime  buDESOnide   0.5 milliGRAM(s) Respule 0.5 milliGRAM(s) Inhalation every 12 hours  epoetin terry Injectable 4000 Unit(s) IV Push <User Schedule>  heparin  Injectable 5000 Unit(s) SubCutaneous every 12 hours  lactobacillus acidophilus 1 Tablet(s) Oral daily  levothyroxine 25 MICROGram(s) Oral daily  melatonin 3 milliGRAM(s) Oral at bedtime  midodrine 30 milliGRAM(s) Oral every 8 hours  Nephro-amy 1 Tablet(s) Oral daily  nystatin Powder 1 Application(s) Topical every 8 hours  vancomycin  IVPB 750 milliGRAM(s) IV Intermittent <User Schedule>        I&O's Summary    11 Oct 2017 07:01  -  12 Oct 2017 07:00  --------------------------------------------------------  IN: 1130 mL / OUT: 0 mL / NET: 1130 mL    12 Oct 2017 07:01  -  12 Oct 2017 14:27  --------------------------------------------------------  IN: 480 mL / OUT: 0 mL / NET: 480 mL        PHYSICAL EXAM:  GENERAL: sitting in chair sleeping  HEAD:  Atraumatic, Normocephalic  EYES: sclera clear  NECK: Supple, No JVD  CHEST/LUNG: decreased breath sounds at bases, no wheezes, diffuse fine rales  HEART: Regular rate and rhythm; No murmurs, rubs, or gallops  ABDOMEN: Soft, Nontender, Nondistended; Bowel sounds present  EXTREMITIES:  peripheral pulses not palpable, No clubbing, cyanosis, or edema  PSYCH: AOx1  NEUROLOGY: sleeping in chair  SKIN: no rashes or erythema; multiple old ecchymoses and healing eschars on arms and legs    LABS:    WBC Trend: 10.26<--, 10.9<--, 12.40<--  Hb Trend: 9.2<--, 9.4<--, 8.9<--, 9.3<--  Plt Trend: 204<--, 192<--, 168<--, 237<--        Creatinine Trend: 4.90<--, 3.78<--, 4.89<--, 4.06<--, 5.48<--, 4.59<--            Microbiology:  Urine Cx:  Blood Cx:    RADIOLOGY & ADDITIONAL TESTS:  X- Ray:  CT:  Ultrasound:  [ ] imaging personally reviewed and interpreted by me    Consultant(s) Notes Reviewed:      Care Discussed with Consultants/Other Providers:

## 2017-10-12 NOTE — PROGRESS NOTE ADULT - PROBLEM SELECTOR PLAN 1
ESRD on HD. Patient seen at HD today. HD was complicated by intradialytic hypotension. Pt on midodrine. try UF of 1kg today

## 2017-10-12 NOTE — PROGRESS NOTE ADULT - ATTENDING COMMENTS
as above--CTS follow up-s/p removal of pleurx and ? decortication s/p pigtail placement and removal of catheter as per Tia et al.  NO Decortication planned by CTS  ABX to be directed by pleural and Blood samples--vanco D15-MRSA bacteremia--as per ID,   ***AS per ID--will likely need ?GUADALUPE to optimally deal w/situation--this will come after goals of care addressed.  MS issues continue-haldol/zyprexa etc.  AS per pall. care--family not ready to deal w/current situation and hospice care--opt for rehab.      Morro Tran MD-Pulmonary   [phone] as above--CTS follow up-s/p removal of pleurx and ? decortication s/p pigtail placement and removal of catheter as per Tia et al.  NO Decortication planned by CTS  ABX to be directed by pleural and Blood samples--vanco D15-MRSA bacteremia--as per ID--likely 6+ weeks needed  ***AS per ID--will likely need ?GUADALUPE to optimally deal w/situation--this will come after goals of care addressed.  MS issues continue-haldol/zyprexa etc.  AS per pall. care--family not ready to deal w/current situation and hospice care--opt for rehab.-long course of abx.      Morro Tran MD-Pulmonary   [phone] Abdomen soft, nontender, nondistended, bowel sounds present in all 4 quadrants.

## 2017-10-12 NOTE — CHART NOTE - NSCHARTNOTEFT_GEN_A_CORE
Had discussion with patient's son, Elliot Sr. Had had discussion earlier in the week with Dr. Hernandez and Dr. Mcdonald, understand that patient has guarded prognosis. Signed DNR/DNI form, placed in chart. Understood that patient would have high morbidity from aggressive resuscitation attempts. At this time, would like to talk about options to discharge to [facility]; understands that transfer to hospice not an option given need to stop dialysis. Would like to see if he is able to regain some strength, though understands that chance of potentially going home after rehab is low. Reports that patient still has a lot of family in the area that would like to see him. Interested in having Pallitive service call him tomorrow to discuss options for discharge. Will reconsult in AM to discuss GOC, including high likelihood of rehospitalization and use of long term antibiotics for potentially infected hardware.

## 2017-10-12 NOTE — PROGRESS NOTE ADULT - SUBJECTIVE AND OBJECTIVE BOX
CHIEF COMPLAINT:    Interval Events:    REVIEW OF SYSTEMS:  Constitutional: No fevers or chills. No weight loss. No fatigue or generalized malaise.  Eyes: No itching or discharge from the eyes  ENT: No ear pain. No ear discharge. No nasal congestion. No post nasal drip. No epistaxis. No throat pain. No sore throat. No difficulty swallowing.   CV: No chest pain. No palpitations. No lightheadedness or dizziness.   Resp: No dyspnea at rest. No dyspnea on exertion. No orthopnea. No wheezing. No cough. No stridor. No sputum production. No chest pain with respiration.  GI: No nausea. No vomiting. No diarrhea.  MSK: No joint pain or pain in any extremities  Integumentary: No skin lesions. No pedal edema.  Neurological: No gross motor weakness. No sensory changes.  [ ] All other systems negative  [ ] Unable to assess ROS because ________    OBJECTIVE:  ICU Vital Signs Last 24 Hrs  T(C): 35 (11 Oct 2017 20:46), Max: 36.6 (11 Oct 2017 11:23)  T(F): 95 (11 Oct 2017 20:46), Max: 97.8 (11 Oct 2017 11:23)  HR: 82 (11 Oct 2017 20:15) (69 - 93)  BP: 108/65 (11 Oct 2017 20:15) (82/48 - 108/65)  BP(mean): --  ABP: --  ABP(mean): --  RR: 17 (11 Oct 2017 20:15) (17 - 20)  SpO2: 90% (11 Oct 2017 20:15) (90% - 97%)        10-10 @ 07:01  -  10-11 @ 07:00  --------------------------------------------------------  IN: 360 mL / OUT: 0 mL / NET: 360 mL    10-11 @ 07:01  -  10-12 @ 05:15  --------------------------------------------------------  IN: 1130 mL / OUT: 0 mL / NET: 1130 mL      CAPILLARY BLOOD GLUCOSE          PHYSICAL EXAM:  General: Awake, alert, oriented X 3.   HEENT: Atraumatic, normocephalic.                 Mallampatti Grade                 No nasal congestion.                No tonsillar or pharyngeal exudates.  Lymph Nodes: No palpable lymphadenopathy  Neck: No JVD. No carotid bruit.   Respiratory: Normal chest expansion                         Normal percussion                         Normal and equal air entry                         No wheeze, rhonchi or rales.  Cardiovascular: S1 S2 normal. No murmurs, rubs or gallops.   Abdomen: Soft, non-tender, non-distended. No organomegaly.  Extremities: Warm to touch. Peripheral pulse palpable. No pedal edema.   Skin: No rashes or skin lesions  Neurological: Motor and sensory examination equal and normal in all four extremities.  Psychiatry: Appropriate mood and affect.    HOSPITAL MEDICATIONS:  MEDICATIONS  (STANDING):  aspirin enteric coated 81 milliGRAM(s) Oral daily  atorvastatin 40 milliGRAM(s) Oral at bedtime  buDESOnide   0.5 milliGRAM(s) Respule 0.5 milliGRAM(s) Inhalation every 12 hours  epoetin terry Injectable 4000 Unit(s) IV Push <User Schedule>  heparin  Injectable 5000 Unit(s) SubCutaneous every 12 hours  lactobacillus acidophilus 1 Tablet(s) Oral daily  levothyroxine 25 MICROGram(s) Oral daily  melatonin 3 milliGRAM(s) Oral at bedtime  midodrine 30 milliGRAM(s) Oral every 8 hours  Nephro-amy 1 Tablet(s) Oral daily  nystatin Powder 1 Application(s) Topical every 8 hours  vancomycin  IVPB 750 milliGRAM(s) IV Intermittent <User Schedule>    MEDICATIONS  (PRN):      LABS:                        9.2    10.26 )-----------( 204      ( 10 Oct 2017 08:37 )             29.2     10-10    140  |  97  |  63<H>  ----------------------------<  90  5.1   |  24  |  4.90<H>    Ca    7.3<L>      10 Oct 2017 08:27                MICROBIOLOGY:     RADIOLOGY:  [ ] Reviewed and interpreted by me    Point of Care Ultrasound Findings:    PFT:    EKG: CHIEF COMPLAINT:no significant complaint--leg swelling    Interval Events:    REVIEW OF SYSTEMS:  Constitutional: No fevers or chills. No weight loss. No fatigue or generalized malaise.  Eyes: No itching or discharge from the eyes  ENT: No ear pain. No ear discharge. No nasal congestion. No post nasal drip. No epistaxis. No throat pain. No sore throat. No difficulty swallowing.   CV: No chest pain. No palpitations. No lightheadedness or dizziness.   Resp: No dyspnea at rest. + dyspnea on exertion. + orthopnea. No wheezing. No cough. No stridor. No sputum production. No chest pain with respiration.  GI: No nausea. No vomiting. No diarrhea.  MSK: No joint pain or pain in any extremities  Integumentary: No skin lesions. + pedal edema.  Neurological: No gross motor weakness. No sensory changes.  [+ ] All other systems negative  [ ] Unable to assess ROS because ________    OBJECTIVE:  ICU Vital Signs Last 24 Hrs  T(C): 35 (11 Oct 2017 20:46), Max: 36.6 (11 Oct 2017 11:23)  T(F): 95 (11 Oct 2017 20:46), Max: 97.8 (11 Oct 2017 11:23)  HR: 82 (11 Oct 2017 20:15) (69 - 93)  BP: 108/65 (11 Oct 2017 20:15) (82/48 - 108/65)  BP(mean): --  ABP: --  ABP(mean): --  RR: 17 (11 Oct 2017 20:15) (17 - 20)  SpO2: 90% (11 Oct 2017 20:15) (90% - 97%)        10-10 @ 07:01  -  10-11 @ 07:00  --------------------------------------------------------  IN: 360 mL / OUT: 0 mL / NET: 360 mL    10-11 @ 07:01  -  10-12 @ 05:15  --------------------------------------------------------  IN: 1130 mL / OUT: 0 mL / NET: 1130 mL      CAPILLARY BLOOD GLUCOSE          PHYSICAL EXAM:NAD on edge of bed-sitting up  General: Awake, alert, oriented X 3.   HEENT: Atraumatic, normocephalic.                 Mallampatti Grade 3                No nasal congestion.                No tonsillar or pharyngeal exudates.  Lymph Nodes: No palpable lymphadenopathy  Neck: No JVD. No carotid bruit.   Respiratory: Normal chest expansion                         Normal percussion                         Normal and equal air entry                         No wheeze, rhonchi or rales-but reduced bs bases  Cardiovascular: S1 S2 normal. 2+ murmurs, rubs or gallops.   Abdomen: Soft, non-tender, non-distended. No organomegaly.  Extremities: Warm to touch. Peripheral pulse palpable. 2-3+ edema.   Skin: No rashes or skin lesions  Neurological: Motor and sensory examination equal and normal in all four extremities.  Psychiatry: Appropriate mood and affect.    HOSPITAL MEDICATIONS:  MEDICATIONS  (STANDING):  aspirin enteric coated 81 milliGRAM(s) Oral daily  atorvastatin 40 milliGRAM(s) Oral at bedtime  buDESOnide   0.5 milliGRAM(s) Respule 0.5 milliGRAM(s) Inhalation every 12 hours  epoetin terry Injectable 4000 Unit(s) IV Push <User Schedule>  heparin  Injectable 5000 Unit(s) SubCutaneous every 12 hours  lactobacillus acidophilus 1 Tablet(s) Oral daily  levothyroxine 25 MICROGram(s) Oral daily  melatonin 3 milliGRAM(s) Oral at bedtime  midodrine 30 milliGRAM(s) Oral every 8 hours  Nephro-amy 1 Tablet(s) Oral daily  nystatin Powder 1 Application(s) Topical every 8 hours  vancomycin  IVPB 750 milliGRAM(s) IV Intermittent <User Schedule>    MEDICATIONS  (PRN):      LABS:                        9.2    10.26 )-----------( 204      ( 10 Oct 2017 08:37 )             29.2     10-10    140  |  97  |  63<H>  ----------------------------<  90  5.1   |  24  |  4.90<H>    Ca    7.3<L>      10 Oct 2017 08:27                MICROBIOLOGY:     RADIOLOGY:  [ ] Reviewed and interpreted by me    Point of Care Ultrasound Findings:    PFT:    EKG:

## 2017-10-12 NOTE — PROGRESS NOTE ADULT - SUBJECTIVE AND OBJECTIVE BOX
Patient is a 87y old  Male who presents with a chief complaint of fall 2/2 hypotension (25 Sep 2017 15:50)    Being followed by ID for MRSA bacteremia,empyema    Interval history:Lethargic  No acute events      ROS:  No observed  cough,SOB,CP  No N/V/D./abd pain  No other complaints      Antimicrobials:    vancomycin  IVPB 750 milliGRAM(s) IV Intermittent <User Schedule>      Vital Signs Last 24 Hrs  T(C): 36.9 (10-12-17 @ 06:23), Max: 36.9 (10-12-17 @ 06:23)  T(F): 98.4 (10-12-17 @ 06:23), Max: 98.4 (10-12-17 @ 06:23)  HR: 70 (10-12-17 @ 13:21) (70 - 82)  BP: 101/59 (10-12-17 @ 13:21) (101/59 - 128/68)  BP(mean): --  RR: 18 (10-12-17 @ 06:23) (17 - 18)  SpO2: 96% (10-12-17 @ 06:23) (90% - 96%)    Physical Exam:    Constitutional well preserved,comfortable,pleasant    HEENT PERRLA EOMI,No pallor or icterus    No oral exudate or erythema    Neck supple no JVD or LN    Chest Good AE,bibasilar crackles    CVS RRR S1 S2 WNl No murmur or rub or gallop    Abd soft BS normal No tenderness no masses    Ext AVF no tenderness    IV site no erythema tenderness or discharge    Joints no swelling or LOM    CNS AAO X 3 no focal    Lab Data:

## 2017-10-13 PROCEDURE — 99233 SBSQ HOSP IP/OBS HIGH 50: CPT | Mod: GC

## 2017-10-13 PROCEDURE — 99232 SBSQ HOSP IP/OBS MODERATE 35: CPT

## 2017-10-13 PROCEDURE — 99233 SBSQ HOSP IP/OBS HIGH 50: CPT

## 2017-10-13 RX ADMIN — Medication 3 MILLIGRAM(S): at 21:28

## 2017-10-13 RX ADMIN — MIDODRINE HYDROCHLORIDE 30 MILLIGRAM(S): 2.5 TABLET ORAL at 13:22

## 2017-10-13 RX ADMIN — Medication 0.5 MILLIGRAM(S): at 05:00

## 2017-10-13 RX ADMIN — Medication 1 TABLET(S): at 12:22

## 2017-10-13 RX ADMIN — MIDODRINE HYDROCHLORIDE 30 MILLIGRAM(S): 2.5 TABLET ORAL at 05:00

## 2017-10-13 RX ADMIN — HEPARIN SODIUM 5000 UNIT(S): 5000 INJECTION INTRAVENOUS; SUBCUTANEOUS at 05:00

## 2017-10-13 RX ADMIN — Medication 25 MICROGRAM(S): at 05:00

## 2017-10-13 RX ADMIN — MIDODRINE HYDROCHLORIDE 30 MILLIGRAM(S): 2.5 TABLET ORAL at 21:28

## 2017-10-13 RX ADMIN — Medication 81 MILLIGRAM(S): at 12:22

## 2017-10-13 RX ADMIN — NYSTATIN CREAM 1 APPLICATION(S): 100000 CREAM TOPICAL at 21:29

## 2017-10-13 RX ADMIN — HEPARIN SODIUM 5000 UNIT(S): 5000 INJECTION INTRAVENOUS; SUBCUTANEOUS at 17:24

## 2017-10-13 RX ADMIN — ATORVASTATIN CALCIUM 40 MILLIGRAM(S): 80 TABLET, FILM COATED ORAL at 21:28

## 2017-10-13 RX ADMIN — NYSTATIN CREAM 1 APPLICATION(S): 100000 CREAM TOPICAL at 13:22

## 2017-10-13 RX ADMIN — NYSTATIN CREAM 1 APPLICATION(S): 100000 CREAM TOPICAL at 05:00

## 2017-10-13 RX ADMIN — Medication 0.5 MILLIGRAM(S): at 17:23

## 2017-10-13 NOTE — PROGRESS NOTE ADULT - PROBLEM SELECTOR PLAN 1
- Continues to have b/l decreased breath sounds at bases, saturating well on NC  - s/p pigtail placement by IR on 10/4 - 10/8, total output 130cc  - IV vanc renally dosed, will continue to check level and dose with HD

## 2017-10-13 NOTE — PROGRESS NOTE ADULT - ASSESSMENT
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     #MRSA bacteremia 2/2 empyema: pt admitted with septic shock 2/2 MRSA bacteremia 2/2 empyema following PleurX placement 9/5 and exchange in the setting of immunosuppression for DLBCL (Rituximab). PleurX was removed on 9/27, and pt was transferred from MICU to floors 9/29.  CT with loculated effusion  s/p pigtail catheter-decortication if worsens though with current GOC palliation may be optimal  Also has PPM and aortic graft, GUADALUPE and CT with IV contrast only if c/w GOC-since family does not want  aggressive measures they will not   Continue Vanco post HD  Will  need long course(6 weeks) +/_ suppression(bactrim or doxy)  Tailor plan per course,results.  prognosis guarded  Infectious Diseases Service will cover over weekend.  Please call [phone] if issues

## 2017-10-13 NOTE — PROGRESS NOTE ADULT - ATTENDING COMMENTS
Agree with above note by R1 Dr. Mcdonald - edited where appropriate  d/w wife at bedside including prognosis and GOC. She agrees with present plan, advance directives, DNR status.   c/w IV vanco for 6 weeks post HD  Suppressive po abx thereafter  Prognosis poor.   Attending Physician Dr. Jalil Hernandez [phone]

## 2017-10-13 NOTE — CHART NOTE - NSCHARTNOTEFT_GEN_A_CORE
87M with ESRD on HD, CAD S/P multiple stents (last in 3/2014), AAA S/P repair, A fib with PPMand ablation in 2016, HFpEF (last TTE 9/28), severe AS and MR, depression, dementia (AOx1 at baseline), hypothyroidism, HTN, DLBCL currently on rituximab, ronn. pleural effusions with recent pleurx removed on 9/27 from left chest admitted for left sided empyema and MRSA bacteremia. S/P pigtail placement on 10/4 - 10/7.    Source: Patient [X]    Family [ ]     other [X]: RN, Medical Chart, PCA    Diet : renal + 2 Nepro per day      Patient reports [ ] nausea  [ ] vomiting [ ] diarrhea [ ] constipation  [ ]chewing problems [ ] swallowing issues  [ ] other: Pt denies GI distress at thist libby. Last BM 10/11.     PO intake:  < 50% [ ] 50-75% [ ]   % [ ]  other : Pt reports great appetite. Per PCA, pt with 100% consumption of breakast and 2 Nepro this morning for breakfast.     Source for PO intake [X] Patient [ ] family [ ] chart [X] staff [ ] other     Enteral /Parenteral Nutrition: N/A      Current Weight: (9/28) 75.2kg, (10/12) 78kg; 1+ edema ronn. legs noted; monitor wt trends  % Weight Change    Pertinent Medications: MEDICATIONS  (STANDING):  aspirin enteric coated 81 milliGRAM(s) Oral daily  atorvastatin 40 milliGRAM(s) Oral at bedtime  buDESOnide   0.5 milliGRAM(s) Respule 0.5 milliGRAM(s) Inhalation every 12 hours  epoetin terry Injectable 4000 Unit(s) IV Push <User Schedule>  heparin  Injectable 5000 Unit(s) SubCutaneous every 12 hours  lactobacillus acidophilus 1 Tablet(s) Oral daily  levothyroxine 25 MICROGram(s) Oral daily  melatonin 3 milliGRAM(s) Oral at bedtime  midodrine 30 milliGRAM(s) Oral every 8 hours  Nephro-amy 1 Tablet(s) Oral daily  nystatin Powder 1 Application(s) Topical every 8 hours  vancomycin  IVPB 750 milliGRAM(s) IV Intermittent <User Schedule>    MEDICATIONS  (PRN):    Pertinent Labs:  10-12 Na140 mmol/L Glu 117 mg/dL<H> K+ 4.7 mmol/L Cr  4.95 mg/dL<H> BUN 63 mg/dL<H> Phos n/a   Alb n/a   PAB n/a         Skin: no pressure injuries noted    Estimated Needs:   [X] no change since previous assessment  [ ] recalculated:       Previous Nutrition Diagnosis:     [ ] Inadequate Energy Intake [ ]Inadequate Oral Intake [ ] Excessive Energy Intake     [ ] Underweight [ ] Increased Nutrient Needs [ ] Overweight/Obesity     [ ] Altered GI Function [ ] Unintended Weight Loss [ ] Food & Nutrition Related Knowledge Deficit [X] Malnutrition          Nutrition Diagnosis is [X] ongoing- being addressed with nutrition supplements         New Nutrition Diagnosis: [X] not applicable    [ ] Inadequate Protein Energy Intake [ ]Inadequate Oral Intake [ ] Excessive Energy Intake     [ ] Underweight [ ] Increased Nutrient Needs [ ] Overweight/Obesity     [ ] Altered GI Function [ ] Unintended Weight Loss [ ] Food & Nutrition Related Knowledge Deficit[ ] Limited Adherence to nutrition related recommendations [ ] Malnutrition  [ ] other: Free text       Related to:      As evidenced by:      Interventions: Continue current diet with supplement as medically feasible.  Recommend    [ ] Change Diet To:    [ ] Nutrition Supplement    [ ] Nutrition Support    [ ] Other:        Monitoring and Evaluation:     [X] PO intake [X] Tolerance to diet prescription [X] weights [X] follow up per protocol    [X] other: RD remains available as needed and per follow-up protocol. Amy Griffin, MS, RDN, CDN Pager # [phone]

## 2017-10-13 NOTE — PROGRESS NOTE ADULT - SUBJECTIVE AND OBJECTIVE BOX
Patient is a 87y old  Male who presents with a chief complaint of fall 2/2 hypotension (25 Sep 2017 15:50)      SUBJECTIVE / OVERNIGHT EVENTS: No acute events overnight. Patient seen this morning eating breakfast sitting in the chair. Very pleasant this morning. Has no complaints. States he feels good.    MEDICATIONS  (STANDING):  aspirin enteric coated 81 milliGRAM(s) Oral daily  atorvastatin 40 milliGRAM(s) Oral at bedtime  buDESOnide   0.5 milliGRAM(s) Respule 0.5 milliGRAM(s) Inhalation every 12 hours  epoetin terry Injectable 4000 Unit(s) IV Push <User Schedule>  heparin  Injectable 5000 Unit(s) SubCutaneous every 12 hours  lactobacillus acidophilus 1 Tablet(s) Oral daily  levothyroxine 25 MICROGram(s) Oral daily  melatonin 3 milliGRAM(s) Oral at bedtime  midodrine 30 milliGRAM(s) Oral every 8 hours  Nephro-amy 1 Tablet(s) Oral daily  nystatin Powder 1 Application(s) Topical every 8 hours  vancomycin  IVPB 750 milliGRAM(s) IV Intermittent <User Schedule>          I&O's Summary    12 Oct 2017 07:01  -  13 Oct 2017 07:00  --------------------------------------------------------  IN: 960 mL / OUT: 1000 mL / NET: -40 mL    13 Oct 2017 07:01  -  13 Oct 2017 10:06  --------------------------------------------------------  IN: 240 mL / OUT: 0 mL / NET: 240 mL        PHYSICAL EXAM:  GENERAL: sitting in chair, eating breakfast  HEAD:  Atraumatic, Normocephalic  EYES: sclera clear  NECK: Supple, No JVD  CHEST/LUNG: decreased breath sounds at bases, no wheezes, breathing comfortably on NC  HEART: Regular rate and rhythm; No murmurs, rubs, or gallops  ABDOMEN: Soft, Nontender, Nondistended; Bowel sounds present  EXTREMITIES:  peripheral pulses not palpable, No clubbing, cyanosis, or edema  PSYCH: AOx1  NEUROLOGY: sleeping in chair  SKIN: no rashes or erythema; multiple old ecchymoses and healing eschars on arms and legs    LABS:  (10-12 @ 15:50)                        8.7  8.84 )-----------( 191                 27.0    Neutrophils = -- (--%)  Lymphocytes = -- (--%)  Eosinophils = -- (--%)  Basophils = -- (--%)  Monocytes = -- (--%)  Bands = --%    WBC Trend: 8.84<--, 10.26<--, 10.9<--  Hb Trend: 8.7<--, 9.2<--, 9.4<--, 8.9<--, 9.3<--  Plt Trend: 191<--, 204<--, 192<--, 168<--, 237<--  10-12    140  |  95<L>  |  63<H>  ----------------------------<  117<H>  4.7   |  23  |  4.95<H>    Ca    7.3<L>      12 Oct 2017 15:50      Creatinine Trend: 4.95<--, 4.90<--, 3.78<--, 4.89<--, 4.06<--, 5.48<--            Microbiology:  Urine Cx:  Blood Cx:    RADIOLOGY & ADDITIONAL TESTS:  X- Ray:  CT:  Ultrasound:  [ ] imaging personally reviewed and interpreted by me    Consultant(s) Notes Reviewed:      Care Discussed with Consultants/Other Providers: Patient is a 87y old  Male who presents with a chief complaint of fall 2/2 hypotension (25 Sep 2017 15:50)      SUBJECTIVE / OVERNIGHT EVENTS: No acute events overnight. Patient seen this morning eating breakfast sitting in the chair. Very pleasant this morning. Has no complaints. States he feels good.    MEDICATIONS  (STANDING):  aspirin enteric coated 81 milliGRAM(s) Oral daily  atorvastatin 40 milliGRAM(s) Oral at bedtime  buDESOnide   0.5 milliGRAM(s) Respule 0.5 milliGRAM(s) Inhalation every 12 hours  epoetin terry Injectable 4000 Unit(s) IV Push <User Schedule>  heparin  Injectable 5000 Unit(s) SubCutaneous every 12 hours  lactobacillus acidophilus 1 Tablet(s) Oral daily  levothyroxine 25 MICROGram(s) Oral daily  melatonin 3 milliGRAM(s) Oral at bedtime  midodrine 30 milliGRAM(s) Oral every 8 hours  Nephro-amy 1 Tablet(s) Oral daily  nystatin Powder 1 Application(s) Topical every 8 hours  vancomycin  IVPB 750 milliGRAM(s) IV Intermittent <User Schedule>          I&O's Summary    12 Oct 2017 07:01  -  13 Oct 2017 07:00  --------------------------------------------------------  IN: 960 mL / OUT: 1000 mL / NET: -40 mL    13 Oct 2017 07:01  -  13 Oct 2017 10:06  --------------------------------------------------------  IN: 240 mL / OUT: 0 mL / NET: 240 mL        PHYSICAL EXAM:  GENERAL: sitting in chair, eating breakfast  HEAD:  Atraumatic, Normocephalic  EYES: sclera clear  NECK: Supple, No JVD  CHEST/LUNG: decreased breath sounds at bases, no wheezes, breathing comfortably on NC  HEART: Regular rate and rhythm; No murmurs, rubs, or gallops  ABDOMEN: Soft, Nontender, Nondistended; Bowel sounds present  EXTREMITIES:  peripheral pulses not palpable, No clubbing, cyanosis, or edema  PSYCH: AOx1  NEUROLOGY: sleeping in chair  SKIN: no rashes or erythema; multiple old ecchymoses and healing eschars on arms and legs    LABS:  (10-12 @ 15:50)                        8.7  8.84 )-----------( 191                 27.0    Neutrophils = -- (--%)  Lymphocytes = -- (--%)  Eosinophils = -- (--%)  Basophils = -- (--%)  Monocytes = -- (--%)  Bands = --%    WBC Trend: 8.84<--, 10.26<--, 10.9<--  Hb Trend: 8.7<--, 9.2<--, 9.4<--, 8.9<--, 9.3<--  Plt Trend: 191<--, 204<--, 192<--, 168<--, 237<--  10-12    140  |  95<L>  |  63<H>  ----------------------------<  117<H>  4.7   |  23  |  4.95<H>    Ca    7.3<L>      12 Oct 2017 15:50      Creatinine Trend: 4.95<--, 4.90<--, 3.78<--, 4.89<--, 4.06<--, 5.48<--    Microbiology:  Urine Cx:  Blood Cx:    RADIOLOGY & ADDITIONAL TESTS:  X- Ray:  CT:  Ultrasound:  [ ] imaging personally reviewed and interpreted by me    Consultant(s) Notes Reviewed:      Care Discussed with Consultants/Other Providers:

## 2017-10-13 NOTE — PROGRESS NOTE ADULT - SUBJECTIVE AND OBJECTIVE BOX
Patient is a 87y old  Male who presents with a chief complaint of fall 2/2 hypotension (25 Sep 2017 15:50)    Being followed by ID for MRSA bacteremia,empyema    Interval history:siiting OOB in chair  Awake-answers queries  denies complaints  No acute events      ROS:  No cough,SOB,CP  No N/V/D./abd pain  No other complaints      Antimicrobials:    vancomycin  IVPB 750 milliGRAM(s) IV Intermittent <User Schedule>      Vital Signs Last 24 Hrs  T(C): 36.3 (10-13-17 @ 04:51), Max: 36.7 (10-12-17 @ 15:45)  T(F): 97.4 (10-13-17 @ 04:51), Max: 98 (10-12-17 @ 15:45)  HR: 72 (10-13-17 @ 04:51) (70 - 84)  BP: 95/49 (10-13-17 @ 04:51) (90/48 - 142/114)  BP(mean): --  RR: 18 (10-13-17 @ 04:51) (17 - 20)  SpO2: 95% (10-13-17 @ 04:51) (95% - 100%)    Physical Exam:    Constitutional well preserved,comfortable,pleasant    HEENT PERRLA EOMI,No pallor or icterus    No oral exudate or erythema    Neck supple no JVD or LN    Chest Good AE,CTA    CVS RRR S1 S2 WNl No murmur or rub or gallop    Abd soft BS normal No tenderness no masses    Ext Left arm AVF no erythema or tenderness    IV site no erythema tenderness or discharge    Joints no swelling or LOM    CNS AAO X 3 no focal    Lab Data:                          8.7    8.84  )-----------( 191      ( 12 Oct 2017 15:50 )             27.0       10-12    140  |  95<L>  |  63<H>  ----------------------------<  117<H>  4.7   |  23  |  4.95<H>    Ca    7.3<L>      12 Oct 2017 15:50                Vancomycin Level, Random: 19.7 ug/mL (10-12-17 @ 14:01)

## 2017-10-13 NOTE — PROGRESS NOTE ADULT - PROBLEM SELECTOR PLAN 3
- Metabolic encephalopathy superimposed on baseline dementia  - patient intermittently hyperactive and more confused, now experiencing intermittent depressive symptoms, very pleasant this morning  - on 1:1 due to intermittent agitation, pulling at lines  - Psych consult: melatonin at bedtime, ativan .25 IV only if severely agitated, c/w supportive care and reorientation  -avoid antipsychotics (QTc 590), anticholinergics, opiates

## 2017-10-13 NOTE — PROGRESS NOTE ADULT - ATTENDING COMMENTS
as above--CTS follow up-s/p removal of pleurx and ? decortication s/p pigtail placement and removal of catheter as per Tia et al.  NO Decortication planned by CTS  ABX to be directed by pleural and Blood samples--vanco D15-MRSA bacteremia--as per ID--likely 6+ weeks needed  ***AS per ID--will likely need ?GUADALUPE to optimally deal w/situation--this will come after goals of care addressed.  MS issues continue-haldol/zyprexa etc.  AS per pall. care--family not ready to deal w/current situation and hospice care--opt for rehab.-long course of abx.      Morro Tran MD-Pulmonary   [phone] as above--CTS follow up-s/p removal of pleurx and ? decortication s/p pigtail placement and removal of catheter as per Tia et al.  NO Decortication planned by CTS  ABX to be directed by pleural and Blood samples--vanco D16-MRSA bacteremia--as per ID--likely 6+ weeks needed  ***AS per ID--will likely need ?GUADALUPE to optimally deal w/situation--this will come after goals of care addressed.  MS issues continue-haldol/zyprexa etc.  AS per pall. care--family now ready to deal w/current situation and hospice care--opt for rehab. vs. comfort care-long course of abx. likely      Morro Tran MD-Pulmonary   [phone]

## 2017-10-13 NOTE — PROGRESS NOTE ADULT - PROBLEM SELECTOR PLAN 3
MRSA bacteremia   vanco D15  CTS evaluation and ?decortication--+sono guided pig tail catheter-- 10-4-7--no decortication planned MRSA bacteremia   vanco D16  CTS evaluation and ?decortication--+sono guided pig tail catheter-- 10-4-7--no decortication planned

## 2017-10-14 PROCEDURE — 99233 SBSQ HOSP IP/OBS HIGH 50: CPT | Mod: GC

## 2017-10-14 PROCEDURE — 90935 HEMODIALYSIS ONE EVALUATION: CPT | Mod: GC

## 2017-10-14 RX ADMIN — MIDODRINE HYDROCHLORIDE 30 MILLIGRAM(S): 2.5 TABLET ORAL at 22:39

## 2017-10-14 RX ADMIN — MIDODRINE HYDROCHLORIDE 30 MILLIGRAM(S): 2.5 TABLET ORAL at 14:50

## 2017-10-14 RX ADMIN — Medication 0.5 MILLIGRAM(S): at 18:27

## 2017-10-14 RX ADMIN — NYSTATIN CREAM 1 APPLICATION(S): 100000 CREAM TOPICAL at 14:50

## 2017-10-14 RX ADMIN — Medication 0.5 MILLIGRAM(S): at 05:58

## 2017-10-14 RX ADMIN — NYSTATIN CREAM 1 APPLICATION(S): 100000 CREAM TOPICAL at 22:41

## 2017-10-14 RX ADMIN — Medication 1 TABLET(S): at 14:49

## 2017-10-14 RX ADMIN — MIDODRINE HYDROCHLORIDE 30 MILLIGRAM(S): 2.5 TABLET ORAL at 05:57

## 2017-10-14 RX ADMIN — Medication 25 MICROGRAM(S): at 05:58

## 2017-10-14 RX ADMIN — NYSTATIN CREAM 1 APPLICATION(S): 100000 CREAM TOPICAL at 05:58

## 2017-10-14 RX ADMIN — Medication 150 MILLIGRAM(S): at 18:28

## 2017-10-14 RX ADMIN — HEPARIN SODIUM 5000 UNIT(S): 5000 INJECTION INTRAVENOUS; SUBCUTANEOUS at 18:27

## 2017-10-14 RX ADMIN — ATORVASTATIN CALCIUM 40 MILLIGRAM(S): 80 TABLET, FILM COATED ORAL at 22:39

## 2017-10-14 RX ADMIN — Medication 3 MILLIGRAM(S): at 22:39

## 2017-10-14 RX ADMIN — HEPARIN SODIUM 5000 UNIT(S): 5000 INJECTION INTRAVENOUS; SUBCUTANEOUS at 05:57

## 2017-10-14 RX ADMIN — Medication 81 MILLIGRAM(S): at 14:49

## 2017-10-14 RX ADMIN — ERYTHROPOIETIN 4000 UNIT(S): 10000 INJECTION, SOLUTION INTRAVENOUS; SUBCUTANEOUS at 10:26

## 2017-10-14 NOTE — PROGRESS NOTE ADULT - PROBLEM SELECTOR PLAN 1
ESRD on HD. Patient seen at HD today. HD was complicated by intradialytic hypotension. Pt on midodrine. To keep SBP >80 mm Hg

## 2017-10-14 NOTE — PROGRESS NOTE ADULT - SUBJECTIVE AND OBJECTIVE BOX
--------------------------------------------------------------------------------  Chief Complaint: hemodialysis    24 hour events/subjective:  reviewed      PAST HISTORY  --------------------------------------------------------------------------------  No significant changes to PMH, PSH, FHx, SHx, unless otherwise noted    ALLERGIES & MEDICATIONS  --------------------------------------------------------------------------------  Allergies    amlodipine (Rash)  Benadryl (Other)  Brilinta (Unknown)  Effient (Unknown)  Multaq (Other)    Intolerances      Standing Inpatient Medications  aspirin enteric coated 81 milliGRAM(s) Oral daily  atorvastatin 40 milliGRAM(s) Oral at bedtime  buDESOnide   0.5 milliGRAM(s) Respule 0.5 milliGRAM(s) Inhalation every 12 hours  epoetin terry Injectable 4000 Unit(s) IV Push <User Schedule>  heparin  Injectable 5000 Unit(s) SubCutaneous every 12 hours  lactobacillus acidophilus 1 Tablet(s) Oral daily  levothyroxine 25 MICROGram(s) Oral daily  melatonin 3 milliGRAM(s) Oral at bedtime  midodrine 30 milliGRAM(s) Oral every 8 hours  Nephro-amy 1 Tablet(s) Oral daily  nystatin Powder 1 Application(s) Topical every 8 hours  vancomycin  IVPB 750 milliGRAM(s) IV Intermittent <User Schedule>    PRN Inpatient Medications      REVIEW OF SYSTEMS  --------------------------------------------------------------------------------  No acute distress/pain/SOB    All other systems were reviewed and are negative, except as noted.    VITALS/PHYSICAL EXAM  --------------------------------------------------------------------------------  T(C): 36.4 (10-14-17 @ 13:21), Max: 36.6 (10-14-17 @ 07:43)  HR: 122 (10-14-17 @ 13:21) (69 - 122)  BP: 146/109 (10-14-17 @ 13:21) (90/36 - 146/109)  RR: 18 (10-14-17 @ 13:21) (18 - 22)  SpO2: 95% (10-14-17 @ 13:21) (94% - 98%)          10-13-17 @ 07:01  -  10-14-17 @ 07:00  --------------------------------------------------------  IN: 540 mL / OUT: 0 mL / NET: 540 mL    10-14-17 @ 07:01  -  10-14-17 @ 16:55  --------------------------------------------------------  IN: 1200 mL / OUT: 800 mL / NET: 400 mL      Physical Exam:  General: No distress/not dyspneic. Appears comfortable at rest.  HENT: No acute signs noted  NECK: No JVD/No goiter/Neck supple  Chest: No acute signs  Resp: Bilateral breath sounds equal with no crackles or wheeze  CV: Heart sounds well heard. no rub/No gallop.  Abd: Soft, non tender. No palpable mass/liver/spleen/kidneys. Bladder not distended on exam.  Extremities: No acute signs.  Neuro;Arousable  Skin: No rash/ no acute lesions noted.  Dialysis access functioning well.        LABS/STUDIES  --------------------------------------------------------------------------------                Creatinine Trend:  SCr 4.95 [10-12 @ 15:50]  SCr 4.90 [10-10 @ 08:27]  SCr 3.78 [10-06 @ 10:20]  SCr 4.89 [10-05 @ 13:25]  SCr 4.06 [10-04 @ 10:48]                  Iron 56, TIBC 208, %sat --      [09-17-17 @ 05:40]  Ferritin 1179      [09-17-17 @ 05:40]  HbA1c 5.7      [11-10-15 @ 07:48]  TSH 4.73      [09-16-17 @ 16:20]    HBsAb <3.0      [10-05-17 @ 13:25]  HBsAb Nonreact      [10-05-17 @ 13:25]  HBsAg Nonreact      [10-05-17 @ 13:25]  HCV 0.09, Nonreact      [10-05-17 @ 13:25]

## 2017-10-14 NOTE — PROGRESS NOTE ADULT - SUBJECTIVE AND OBJECTIVE BOX
Medicine Progress Note- PGY2    =========================================  Oscar Calderón M.D., PGY-2      Chief Complaint: Patient is a 87y old  Male who presents with a chief complaint of fall 2/2 hypotension (25 Sep 2017 15:50)      INTERVAL HPI/OVERNIGHT EVENTS: no events ON    ROS: unable to obtain 2/2 dementia     MEDICATIONS  (STANDING):  aspirin enteric coated 81 milliGRAM(s) Oral daily  atorvastatin 40 milliGRAM(s) Oral at bedtime  buDESOnide   0.5 milliGRAM(s) Respule 0.5 milliGRAM(s) Inhalation every 12 hours  epoetin terry Injectable 4000 Unit(s) IV Push <User Schedule>  heparin  Injectable 5000 Unit(s) SubCutaneous every 12 hours  lactobacillus acidophilus 1 Tablet(s) Oral daily  levothyroxine 25 MICROGram(s) Oral daily  melatonin 3 milliGRAM(s) Oral at bedtime  midodrine 30 milliGRAM(s) Oral every 8 hours  Nephro-amy 1 Tablet(s) Oral daily  nystatin Powder 1 Application(s) Topical every 8 hours  vancomycin  IVPB 750 milliGRAM(s) IV Intermittent <User Schedule>    MEDICATIONS  (PRN):      Vital Signs Last 24 Hrs  T(C): 36.3 (14 Oct 2017 09:10), Max: 36.6 (14 Oct 2017 07:43)  T(F): 97.4 (14 Oct 2017 09:10), Max: 97.9 (14 Oct 2017 07:43)  HR: 70 (14 Oct 2017 09:10) (70 - 71)  BP: 90/36 (14 Oct 2017 09:10) (90/36 - 124/82)  BP(mean): --  RR: 18 (14 Oct 2017 09:10) (18 - 22)  SpO2: 94% (14 Oct 2017 09:10) (94% - 98%)  Supplemental O2: [ ] No, on Room Air [ ] Yes,     I&O's Detail    13 Oct 2017 07:01  -  14 Oct 2017 07:00  --------------------------------------------------------  IN:    Oral Fluid: 540 mL  Total IN: 540 mL    OUT:  Total OUT: 0 mL    Total NET: 540 mL      14 Oct 2017 07:01  -  14 Oct 2017 12:06  --------------------------------------------------------  IN:    Oral Fluid: 200 mL  Total IN: 200 mL    OUT:  Total OUT: 0 mL    Total NET: 200 mL    PHYSICAL EXAM:  Daily     Daily Weight in k.8 (14 Oct 2017 09:10)   GENERAL: NAD, at dialysis, laying comfortable in bed   HEENT: EOMI, MMM  Pulm: decreased lung sounds at bases  CV: irregularly irregular   ABDOMEN: soft, nt, nd,  EXTREMITIES:  no appreciable edema in b/l LE  Neuro: A&Ox0, would no respond to questions  SKIN: warm and dry, no visible rash    LABS:                        8.7    8.84  )-----------( 191      ( 12 Oct 2017 15:50 )             27.0     10-12    140  |  95<L>  |  63<H>  ----------------------------<  117<H>  4.7   |  23  |  4.95<H>    Ca    7.3<L>      12 Oct 2017 15:50                    Microbiology:    RADIOLOGY & ADDITIONAL TESTS:

## 2017-10-14 NOTE — PROGRESS NOTE ADULT - ATTENDING COMMENTS
Agree with resident note, assessment, and plan above - edited where appropriate. 87M h/o TBI (30 yrs ago), dementia (AAOx1 at baseline), CAD s/p multiple stents, HFpEF, severe AS and MR, AAA s/p repair, afib s/p ablation, PPM (no a/c 2/2 falls), ESRD on HD, hypothyroid, DLBCL on rituximab treatment, s/p pleurex placement on 9/5 for loculated effusion, admitted to MICU (9/25-9/29) for septic shock 2/2 empyema, MRSA bacteremia, s/p pleurex removal and pigtail placement on 10/4-10/8, blood cx from 10/5 NGTD, c/w vanc w/ HD and remains HD stable, now DNR/DNI and c/w GOC discussion w/ family. Need to follow up with palliative care and establish how aggressive family would like to be in terms of care - will need further imaging including GUADALUPE due to PPM/aortic graft if family wishes to pursue that path. Patient had HD today, LE edema much improved per aide s/p HD. Will continue with vancomycin post-HD now and await family decisions about further testing.

## 2017-10-14 NOTE — PROGRESS NOTE ADULT - ASSESSMENT
87M h/o TBI (30 yrs ago), dementia (AAOx1 at baseline), CAD s/p multiple stents, HFpEF, severe AS and MR, AAA s/p repair, afib s/p ablation, PPM (no a/c 2/2 falls), ESRD on HD, hypothyroid, DLBCL on rituximab treatment, s/p pleurex placement on 9/5 for loculated effusion, admitted to MICU (9/25-9/29) for septic shock 2/2 empyema, MRSA bacteremia, s/p pleurex removal and pigtail placement on 10/4-10/8, blood cx from 10/5 NGTD, c/w vanc  w/ HD and remains HD stable, now DNR/DNI and c/w GOC discussion w/ family  #empyema w/ MRSA bacteremia- s/p pigtail placement, no decortication per CTS, pulm on board  -D17 vanco of 6 week course, c/w vanc w/ HD TTS  -Also has PPM and aortic graft, GUADALUPE and CT with IV contrast needed pending continued GOC-since family does not want  aggressive measures , holding off on further imaging   -c/w midodrine 30 TID    #acute delirium superimposed on baseline dementia  - patient intermittently hyperactive and confused, w/ intermittent depressive symptoms  - on 1:1 due to intermittent agitation, pulling at lines  - Psych consult: melatonin at bedtime, ativan .25 IV only if severely agitated, c/w supportive care and reorientation  -avoid antipsychotics (QTc 590), anticholinergics, opiates.     #HFpEF- appears euvolemic on exam   - c/w HD TTS, patient does not make urine    #Chronic atrial fibrillation-rate controlled  - No AC due to recurrent falls  - HR stable  - c/w ASA      #ESRD: HD TTS  - C/w sevelamer, nephrovite and epo.     #normcytic anemia- 2/2 ACD and ESRD  -c/w epo w/ HD    #DLBCL- previously on rituximab   -outpt f/u w/ Dr. Sinha pending goc    #hypothyroid- c/w Synthroid     #asthma- c/w Symbicort, chest PT/acapella, supplemental O2    #diet- renal, aspiration precautions     #ppx-HSQ, protonix   #dispo- -s/p palliative consult, now DNR/DNI. Pending rehab/tera and comfort care d/w family   Obed Walker

## 2017-10-14 NOTE — PROGRESS NOTE ADULT - NSHPATTENDINGPLANDISCUSS_GEN_ALL_CORE
patient, son LUCIEN Mejia and Dr. Cardoza
Dr Barber
Dr Worley
will d/w Dr Worley
Dr. Gonzáles
micu team

## 2017-10-14 NOTE — PROGRESS NOTE ADULT - ASSESSMENT
88 yo M with multiple medical problems including DLBCL on Rituxan, malignant pleural effusion s/p left pleurx, ESRD on HD, chronic hypotension on Midodrine presents with lethargy, hypotension and cloudy pleural fluid drainage and admitted for severe sepsis due to MRSA empyema - pleurx removed

## 2017-10-14 NOTE — PROGRESS NOTE ADULT - SUBJECTIVE AND OBJECTIVE BOX
[facility] DIVISION OF PULMONARY, CRITICAL CARE and SLEEP MEDICINE  PULMONARY PROGRESS NOTE  FOR ANY QUESTIONS PLEASE CALL [phone] MONDAY - FRIDAY 8a-5p or [phone] on NIGHTS/WEEKENDS/HOLIDAYS    PATIENT INFORMATION:  NAME: RICARDO LOBO:  MRN: [id number]    CHIEF COMPLAINT: Patient is a 87y old  Male who presents with a chief complaint of fall 2/2 hypotension (25 Sep 2017 15:50)      [x] INITIAL CONSULT, H&P, FAMILY HISTORY and PAST MEDICAL AND SURGICAL HISTORY REVIEWED    OVERNIGHT EVENTS or CHANGES TO HPI: SItting in chair awaiting breakfast. No complaints    ========================REVIEW OF SYSTEMS========================  CONSTITUTIONAL:  CARDIOVASCULAR:  PULMONARY:  [x] REMAINING REVIEW OF SYSTEMS NEGATIVE  [] UNABLE TO OBTAIN REVIEW OF SYSTEMS DUE TO _______________    ========================MEDICATIONS=============================  MEDICATIONS  (STANDING):  aspirin enteric coated 81 milliGRAM(s) Oral daily  atorvastatin 40 milliGRAM(s) Oral at bedtime  buDESOnide   0.5 milliGRAM(s) Respule 0.5 milliGRAM(s) Inhalation every 12 hours  epoetin terry Injectable 4000 Unit(s) IV Push <User Schedule>  heparin  Injectable 5000 Unit(s) SubCutaneous every 12 hours  lactobacillus acidophilus 1 Tablet(s) Oral daily  levothyroxine 25 MICROGram(s) Oral daily  melatonin 3 milliGRAM(s) Oral at bedtime  midodrine 30 milliGRAM(s) Oral every 8 hours  Nephro-amy 1 Tablet(s) Oral daily  nystatin Powder 1 Application(s) Topical every 8 hours  vancomycin  IVPB 750 milliGRAM(s) IV Intermittent <User Schedule>      MEDICATIONS  (PRN):      ========================PHYSICAL EXAM============================    VITALS: ICU Vital Signs Last 24 Hrs  T(C): 36.6 (14 Oct 2017 07:43), Max: 36.6 (14 Oct 2017 07:43)  T(F): 97.9 (14 Oct 2017 07:43), Max: 97.9 (14 Oct 2017 07:43)  HR: 71 (14 Oct 2017 07:43) (70 - 71)  BP: 124/82 (14 Oct 2017 05:53) (90/57 - 124/82)  BP(mean): --  ABP: --  ABP(mean): --  RR: 18 (14 Oct 2017 07:43) (18 - 22)  SpO2: 96% (14 Oct 2017 07:43) (94% - 98%)      INTAKE and OUTPUT: I&O's Summary    13 Oct 2017 07:01  -  14 Oct 2017 07:00  --------------------------------------------------------  IN: 540 mL / OUT: 0 mL / NET: 540 mL    14 Oct 2017 07:01  -  14 Oct 2017 08:09  --------------------------------------------------------  IN: 200 mL / OUT: 0 mL / NET: 200 mL      VENTILATOR SETTINGS:  N/A    GENERAL: awake, alert, comfortable, NAD  EYES: anicteric, EOMI  EAR/NOSE/MOUTH/THROAT: normocephalic, atraumatic, MMM, trachea midline  NECK: supple, no JVD  LYMPH NODES: no palpable supraclavicular LAD  CARDIOVASCULAR: RRR, S1S2, systolic murmur  RESPIRATORY: good air entry, decreased BS bases, no wheezes  ABDOMEN: soft, NT, ND, +BS  EXTREMITIES: warm and dry, radial pulses palpable, bilateral LE edema  SKIN: warm and dry  MUSCULOSKELETAL: mild muscle atrophy  NEUROLOGIC: moves all extremities  PSYCHIATRIC: calm    ========================LABORATORY RESULTS AND IMAGING=============                        8.7    8.84  )-----------( 191      ( 12 Oct 2017 15:50 )             27.0                                                    10-12    140  |  95<L>  |  63<H>  ----------------------------<  117<H>  4.7   |  23  |  4.95<H>    Ca    7.3<L>      12 Oct 2017 15:50        Creatinine Trend: 4.95<--, 4.90<--, 3.78<--, 4.89<--, 4.06<--, 5.48<--  CXR: < from: Xray Chest 1 View AP -PORTABLE-Routine (10.11.17 @ 09:03) >  IMPRESSION:   Bilateral pleural effusions without change compared to prior exam.    < end of copied text >    CT CHEST: < from: CT Chest No Cont (10.02.17 @ 19:11) >  IMPRESSION:   Pulmonary edema with unchanged loculated left pleural effusion and   moderate right pleural effusion increased since 9/25/2017.     < end of copied text >      [x] RADIOLOGY REVIEWED AND INTERPRETED BY ME      THANK YOU FOR ALLOWING US TO PARTICIPATE IN THE CARE OF THIS PATIENT

## 2017-10-14 NOTE — PROGRESS NOTE ADULT - ATTENDING COMMENTS
I have seen the patient and reviewed dialysis prescription and flow sheet. Dialysis access is functioning well. Patient is tolerating dialysis well with no acute symptoms or distress. Dialysis prescription has been adjusted for optimized control of volume status, uremia and electrolytes. Management of additional metabolic abnormalities/anemia will continue to be addressed on follow up.  Fuid removal limited by low BP

## 2017-10-14 NOTE — PROGRESS NOTE ADULT - PROBLEM SELECTOR PLAN 2
s/p pleurx removal--infected pleural space--pig tail placed 10/4-7  HD to continue  stable effusions on CXR

## 2017-10-14 NOTE — PROGRESS NOTE ADULT - PROBLEM SELECTOR PLAN 3
MRSA bacteremia  continue vancomycin  CTS evaluation and sono guided pig tail catheter placed previously - 10-4-7 --> no plans for decortication

## 2017-10-15 PROCEDURE — 73620 X-RAY EXAM OF FOOT: CPT | Mod: 26,RT

## 2017-10-15 PROCEDURE — 99233 SBSQ HOSP IP/OBS HIGH 50: CPT | Mod: GC

## 2017-10-15 PROCEDURE — 71010: CPT | Mod: 26

## 2017-10-15 RX ORDER — HYDROMORPHONE HYDROCHLORIDE 2 MG/ML
2 INJECTION INTRAMUSCULAR; INTRAVENOUS; SUBCUTANEOUS ONCE
Qty: 0 | Refills: 0 | Status: DISCONTINUED | OUTPATIENT
Start: 2017-10-15 | End: 2017-10-15

## 2017-10-15 RX ORDER — HYDROMORPHONE HYDROCHLORIDE 2 MG/ML
1 INJECTION INTRAMUSCULAR; INTRAVENOUS; SUBCUTANEOUS ONCE
Qty: 0 | Refills: 0 | Status: DISCONTINUED | OUTPATIENT
Start: 2017-10-15 | End: 2017-10-15

## 2017-10-15 RX ORDER — ACETAMINOPHEN 500 MG
650 TABLET ORAL EVERY 6 HOURS
Qty: 0 | Refills: 0 | Status: DISCONTINUED | OUTPATIENT
Start: 2017-10-15 | End: 2017-10-17

## 2017-10-15 RX ADMIN — MIDODRINE HYDROCHLORIDE 30 MILLIGRAM(S): 2.5 TABLET ORAL at 09:23

## 2017-10-15 RX ADMIN — Medication 3 MILLIGRAM(S): at 22:25

## 2017-10-15 RX ADMIN — ATORVASTATIN CALCIUM 40 MILLIGRAM(S): 80 TABLET, FILM COATED ORAL at 22:25

## 2017-10-15 RX ADMIN — Medication 650 MILLIGRAM(S): at 10:16

## 2017-10-15 RX ADMIN — Medication 650 MILLIGRAM(S): at 23:26

## 2017-10-15 RX ADMIN — NYSTATIN CREAM 1 APPLICATION(S): 100000 CREAM TOPICAL at 07:02

## 2017-10-15 RX ADMIN — NYSTATIN CREAM 1 APPLICATION(S): 100000 CREAM TOPICAL at 22:25

## 2017-10-15 RX ADMIN — Medication 650 MILLIGRAM(S): at 22:26

## 2017-10-15 RX ADMIN — Medication 1 TABLET(S): at 12:36

## 2017-10-15 RX ADMIN — Medication 0.5 MILLIGRAM(S): at 09:23

## 2017-10-15 RX ADMIN — HEPARIN SODIUM 5000 UNIT(S): 5000 INJECTION INTRAVENOUS; SUBCUTANEOUS at 09:23

## 2017-10-15 RX ADMIN — MIDODRINE HYDROCHLORIDE 30 MILLIGRAM(S): 2.5 TABLET ORAL at 22:25

## 2017-10-15 RX ADMIN — Medication 650 MILLIGRAM(S): at 09:22

## 2017-10-15 RX ADMIN — HEPARIN SODIUM 5000 UNIT(S): 5000 INJECTION INTRAVENOUS; SUBCUTANEOUS at 22:25

## 2017-10-15 RX ADMIN — Medication 81 MILLIGRAM(S): at 12:36

## 2017-10-15 RX ADMIN — MIDODRINE HYDROCHLORIDE 30 MILLIGRAM(S): 2.5 TABLET ORAL at 15:01

## 2017-10-15 RX ADMIN — HYDROMORPHONE HYDROCHLORIDE 2 MILLIGRAM(S): 2 INJECTION INTRAMUSCULAR; INTRAVENOUS; SUBCUTANEOUS at 12:34

## 2017-10-15 RX ADMIN — Medication 0.5 MILLIGRAM(S): at 22:25

## 2017-10-15 RX ADMIN — HYDROMORPHONE HYDROCHLORIDE 2 MILLIGRAM(S): 2 INJECTION INTRAMUSCULAR; INTRAVENOUS; SUBCUTANEOUS at 13:34

## 2017-10-15 RX ADMIN — Medication 25 MICROGRAM(S): at 09:23

## 2017-10-15 RX ADMIN — NYSTATIN CREAM 1 APPLICATION(S): 100000 CREAM TOPICAL at 15:01

## 2017-10-15 NOTE — PROGRESS NOTE ADULT - PROBLEM SELECTOR PLAN 4
- chronic diastolic heart failure, last TTE 9/28  - inc WOB on exam this AM, f/u CXR, due for next HD Tuesday

## 2017-10-15 NOTE — PROGRESS NOTE ADULT - PROBLEM SELECTOR PLAN 1
- Continues to have b/l decreased breath sounds at bases, increased WOB today, will get CXR, saturating well when on NC  - s/p pigtail placement by IR on 10/4 - 10/8 for drainage of empyema, no decortication per CT surg  - IV vanc 750mg on HD days, will continue for 6 weeks from negative cultures (9/27)  - c/w midodrine 30 TID

## 2017-10-15 NOTE — PROGRESS NOTE ADULT - SUBJECTIVE AND OBJECTIVE BOX
Patient is a 87y old  Male who presents with a chief complaint of fall 2/2 hypotension (25 Sep 2017 15:50)      SUBJECTIVE / OVERNIGHT EVENTS: This AM patient was very agitated. He was pulling off the NC, scratching at his legs, throwing water at the nurse. He also refused AM vitals and meds. When I saw the patient he was more calm sitting in the chair. He was on NC 3L and was SOB and tachypneic. He was also complaining of b/l leg and foot pain. He was slightly agitated and fidgeting.     MEDICATIONS  (STANDING):  aspirin enteric coated 81 milliGRAM(s) Oral daily  atorvastatin 40 milliGRAM(s) Oral at bedtime  buDESOnide   0.5 milliGRAM(s) Respule 0.5 milliGRAM(s) Inhalation every 12 hours  epoetin terry Injectable 4000 Unit(s) IV Push <User Schedule>  heparin  Injectable 5000 Unit(s) SubCutaneous every 12 hours  lactobacillus acidophilus 1 Tablet(s) Oral daily  levothyroxine 25 MICROGram(s) Oral daily  melatonin 3 milliGRAM(s) Oral at bedtime  midodrine 30 milliGRAM(s) Oral every 8 hours  Nephro-amy 1 Tablet(s) Oral daily  nystatin Powder 1 Application(s) Topical every 8 hours  vancomycin  IVPB 750 milliGRAM(s) IV Intermittent <User Schedule>    MEDICATIONS  (PRN):  acetaminophen   Tablet. 650 milliGRAM(s) Oral every 6 hours PRN Mild Pain (1 - 3)        CAPILLARY BLOOD GLUCOSE        I&O's Summary    14 Oct 2017 07:01  -  15 Oct 2017 07:00  --------------------------------------------------------  IN: 1440 mL / OUT: 800 mL / NET: 640 mL        PHYSICAL EXAM:  GENERAL: increased WOB, distress due to pain  HEAD:  Atraumatic, Normocephalic  EYES: EOMI, conjunctiva and sclera clear  NECK: Supple, No JVD  CHEST/LUNG: decreased breath sounds at bases, rales throughout  HEART: Regular rate and rhythm; systolic murmur  ABDOMEN: Soft, Nontender, Nondistended; Bowel sounds present  EXTREMITIES: b/l LE swelling with 2+ pitting edems, b/l feet cold to palpation, increased erythema on right foot, no pulses palpable, weeping from small open wounds on b/l legs  PSYCH: AAOx1  NEUROLOGY: non-focal  SKIN: diffuse scabs and weeping small wounds on arms and legs, large ecchymosis on RUE    LABS:    WBC Trend: 8.84<--, 10.26<--, 10.9<--  Hb Trend: 8.7<--, 9.2<--, 9.4<--  Plt Trend: 191<--, 204<--, 192<--        Creatinine Trend: 4.95<--, 4.90<--, 3.78<--, 4.89<--, 4.06<--, 5.48<--            Microbiology:  Urine Cx:  Blood Cx:    RADIOLOGY & ADDITIONAL TESTS:  X- Ray:  CT:  Ultrasound:  [ ] imaging personally reviewed and interpreted by me    Consultant(s) Notes Reviewed:      Care Discussed with Consultants/Other Providers:

## 2017-10-15 NOTE — PROGRESS NOTE ADULT - ATTENDING COMMENTS
Agree with resident note, assessment, and plan - edited where appropriate. Patient is an 87yoM with multiple medical comorbidities including dementia, CAD, AAA s/p repair, AS, DLBCL admitted with septic shock 2/2 empyema, found to have MRSA bacteremia in the setting of multiple foreign devices (aortic graft, PPM). Goals of care discussions to continue tomorrow with palliative care and family - patient would likely not be able to tolerate GUADALUPE to evaluate aortic graft and PPM further.

## 2017-10-15 NOTE — PROGRESS NOTE ADULT - ASSESSMENT
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

## 2017-10-15 NOTE — PROGRESS NOTE ADULT - PROBLEM SELECTOR PLAN 7
- Nephrology following, HD TTS  - Electrolytes stable  - C/w sevelamer, nephrovite and epo - Nephrology following, HD TTS  - increased leg swelling today, complaining of severe pain, dilaudid 2mg PO x1 this AM

## 2017-10-15 NOTE — PROGRESS NOTE ADULT - PROBLEM SELECTOR PLAN 2
- Severe sepsis on admission. resolved  - On midodrine 30 TID with baseline pressures 90s systolic  - c/w vanc on HD days until 11/8

## 2017-10-15 NOTE — PROGRESS NOTE ADULT - PROBLEM SELECTOR PLAN 3
- Metabolic encephalopathy superimposed on baseline dementia  - patient intermittently hyperactive and more confused, increased this AM, able to be reoriented  - c/w 1:1  - Psych consult: melatonin at bedtime, ativan .25 IV only if severely agitated, c/w supportive care and reorientation  -avoid antipsychotics (QTc 590), anticholinergics, opiates

## 2017-10-16 ENCOUNTER — APPOINTMENT (OUTPATIENT)
Dept: THORACIC SURGERY | Facility: CLINIC | Age: 82
End: 2017-10-16

## 2017-10-16 VITALS
HEART RATE: 76 BPM | RESPIRATION RATE: 20 BRPM | DIASTOLIC BLOOD PRESSURE: 91 MMHG | TEMPERATURE: 97 F | SYSTOLIC BLOOD PRESSURE: 126 MMHG | OXYGEN SATURATION: 95 %

## 2017-10-16 LAB
ANION GAP SERPL CALC-SCNC: 24 MMOL/L — HIGH (ref 5–17)
BUN SERPL-MCNC: 64 MG/DL — HIGH (ref 7–23)
CALCIUM SERPL-MCNC: 7.7 MG/DL — LOW (ref 8.4–10.5)
CHLORIDE SERPL-SCNC: 94 MMOL/L — LOW (ref 96–108)
CO2 SERPL-SCNC: 23 MMOL/L — SIGNIFICANT CHANGE UP (ref 22–31)
CREAT SERPL-MCNC: 4.64 MG/DL — HIGH (ref 0.5–1.3)
GLUCOSE SERPL-MCNC: 76 MG/DL — SIGNIFICANT CHANGE UP (ref 70–99)
HCT VFR BLD CALC: 31.1 % — LOW (ref 39–50)
HGB BLD-MCNC: 10.2 G/DL — LOW (ref 13–17)
MAGNESIUM SERPL-MCNC: 1.9 MG/DL — SIGNIFICANT CHANGE UP (ref 1.6–2.6)
MCHC RBC-ENTMCNC: 32.9 GM/DL — SIGNIFICANT CHANGE UP (ref 32–36)
MCHC RBC-ENTMCNC: 35.1 PG — HIGH (ref 27–34)
MCV RBC AUTO: 107 FL — HIGH (ref 80–100)
PLATELET # BLD AUTO: 190 K/UL — SIGNIFICANT CHANGE UP (ref 150–400)
POTASSIUM SERPL-MCNC: 4.6 MMOL/L — SIGNIFICANT CHANGE UP (ref 3.5–5.3)
POTASSIUM SERPL-SCNC: 4.6 MMOL/L — SIGNIFICANT CHANGE UP (ref 3.5–5.3)
RBC # BLD: 2.92 M/UL — LOW (ref 4.2–5.8)
RBC # FLD: 17.6 % — HIGH (ref 10.3–14.5)
SODIUM SERPL-SCNC: 141 MMOL/L — SIGNIFICANT CHANGE UP (ref 135–145)
WBC # BLD: 8.2 K/UL — SIGNIFICANT CHANGE UP (ref 3.8–10.5)
WBC # FLD AUTO: 8.2 K/UL — SIGNIFICANT CHANGE UP (ref 3.8–10.5)

## 2017-10-16 PROCEDURE — 93970 EXTREMITY STUDY: CPT | Mod: 26

## 2017-10-16 PROCEDURE — 99232 SBSQ HOSP IP/OBS MODERATE 35: CPT

## 2017-10-16 PROCEDURE — 99233 SBSQ HOSP IP/OBS HIGH 50: CPT | Mod: GC

## 2017-10-16 PROCEDURE — 99233 SBSQ HOSP IP/OBS HIGH 50: CPT

## 2017-10-16 RX ADMIN — Medication 25 MICROGRAM(S): at 06:16

## 2017-10-16 RX ADMIN — Medication 81 MILLIGRAM(S): at 14:08

## 2017-10-16 RX ADMIN — HEPARIN SODIUM 5000 UNIT(S): 5000 INJECTION INTRAVENOUS; SUBCUTANEOUS at 21:18

## 2017-10-16 RX ADMIN — NYSTATIN CREAM 1 APPLICATION(S): 100000 CREAM TOPICAL at 06:17

## 2017-10-16 RX ADMIN — NYSTATIN CREAM 1 APPLICATION(S): 100000 CREAM TOPICAL at 14:09

## 2017-10-16 RX ADMIN — Medication 0.5 MILLIGRAM(S): at 10:16

## 2017-10-16 RX ADMIN — MIDODRINE HYDROCHLORIDE 30 MILLIGRAM(S): 2.5 TABLET ORAL at 14:08

## 2017-10-16 RX ADMIN — Medication 1 TABLET(S): at 14:08

## 2017-10-16 RX ADMIN — ATORVASTATIN CALCIUM 40 MILLIGRAM(S): 80 TABLET, FILM COATED ORAL at 21:18

## 2017-10-16 RX ADMIN — NYSTATIN CREAM 1 APPLICATION(S): 100000 CREAM TOPICAL at 21:20

## 2017-10-16 RX ADMIN — HEPARIN SODIUM 5000 UNIT(S): 5000 INJECTION INTRAVENOUS; SUBCUTANEOUS at 10:15

## 2017-10-16 RX ADMIN — Medication 0.5 MILLIGRAM(S): at 21:17

## 2017-10-16 RX ADMIN — MIDODRINE HYDROCHLORIDE 30 MILLIGRAM(S): 2.5 TABLET ORAL at 21:19

## 2017-10-16 RX ADMIN — MIDODRINE HYDROCHLORIDE 30 MILLIGRAM(S): 2.5 TABLET ORAL at 06:17

## 2017-10-16 RX ADMIN — Medication 3 MILLIGRAM(S): at 21:19

## 2017-10-16 NOTE — PROGRESS NOTE ADULT - SUBJECTIVE AND OBJECTIVE BOX
CHIEF COMPLAINT:    Interval Events:    REVIEW OF SYSTEMS:  Constitutional: No fevers or chills. No weight loss. No fatigue or generalized malaise.  Eyes: No itching or discharge from the eyes  ENT: No ear pain. No ear discharge. No nasal congestion. No post nasal drip. No epistaxis. No throat pain. No sore throat. No difficulty swallowing.   CV: No chest pain. No palpitations. No lightheadedness or dizziness.   Resp: No dyspnea at rest. No dyspnea on exertion. No orthopnea. No wheezing. No cough. No stridor. No sputum production. No chest pain with respiration.  GI: No nausea. No vomiting. No diarrhea.  MSK: No joint pain or pain in any extremities  Integumentary: No skin lesions. No pedal edema.  Neurological: No gross motor weakness. No sensory changes.  [ ] All other systems negative  [ ] Unable to assess ROS because ________    OBJECTIVE:  ICU Vital Signs Last 24 Hrs  T(C): 36.3 (16 Oct 2017 00:30), Max: 36.8 (15 Oct 2017 19:40)  T(F): 97.4 (16 Oct 2017 00:30), Max: 98.2 (15 Oct 2017 19:40)  HR: 80 (16 Oct 2017 00:30) (71 - 80)  BP: 90/60 (16 Oct 2017 00:30) (88/38 - 139/52)  BP(mean): --  ABP: --  ABP(mean): --  RR: 20 (16 Oct 2017 00:30) (18 - 32)  SpO2: 100% (16 Oct 2017 00:30) (91% - 100%)        10-14 @ 07:01  -  10-15 @ 07:00  --------------------------------------------------------  IN: 1440 mL / OUT: 800 mL / NET: 640 mL    10-15 @ 07:01  -  10-16 @ 05:07  --------------------------------------------------------  IN: 880 mL / OUT: 0 mL / NET: 880 mL      CAPILLARY BLOOD GLUCOSE          PHYSICAL EXAM:  General: Awake, alert, oriented X 3.   HEENT: Atraumatic, normocephalic.                 Mallampatti Grade                 No nasal congestion.                No tonsillar or pharyngeal exudates.  Lymph Nodes: No palpable lymphadenopathy  Neck: No JVD. No carotid bruit.   Respiratory: Normal chest expansion                         Normal percussion                         Normal and equal air entry                         No wheeze, rhonchi or rales.  Cardiovascular: S1 S2 normal. No murmurs, rubs or gallops.   Abdomen: Soft, non-tender, non-distended. No organomegaly.  Extremities: Warm to touch. Peripheral pulse palpable. No pedal edema.   Skin: No rashes or skin lesions  Neurological: Motor and sensory examination equal and normal in all four extremities.  Psychiatry: Appropriate mood and affect.    HOSPITAL MEDICATIONS:  MEDICATIONS  (STANDING):  aspirin enteric coated 81 milliGRAM(s) Oral daily  atorvastatin 40 milliGRAM(s) Oral at bedtime  buDESOnide   0.5 milliGRAM(s) Respule 0.5 milliGRAM(s) Inhalation every 12 hours  epoetin terry Injectable 4000 Unit(s) IV Push <User Schedule>  heparin  Injectable 5000 Unit(s) SubCutaneous every 12 hours  lactobacillus acidophilus 1 Tablet(s) Oral daily  levothyroxine 25 MICROGram(s) Oral daily  melatonin 3 milliGRAM(s) Oral at bedtime  midodrine 30 milliGRAM(s) Oral every 8 hours  Nephro-amy 1 Tablet(s) Oral daily  nystatin Powder 1 Application(s) Topical every 8 hours  vancomycin  IVPB 750 milliGRAM(s) IV Intermittent <User Schedule>    MEDICATIONS  (PRN):  acetaminophen   Tablet. 650 milliGRAM(s) Oral every 6 hours PRN Mild Pain (1 - 3)      LABS:                    MICROBIOLOGY:     RADIOLOGY:  [ ] Reviewed and interpreted by me    Point of Care Ultrasound Findings:    PFT:    EKG: CHIEF COMPLAINT:leg pain and swelling    Interval Events:no ambulation    REVIEW OF SYSTEMS:  Constitutional: No fevers or chills. No weight loss. No fatigue or generalized malaise.  Eyes: No itching or discharge from the eyes  ENT: No ear pain. No ear discharge. No nasal congestion. No post nasal drip. No epistaxis. No throat pain. No sore throat. No difficulty swallowing.   CV: No chest pain. No palpitations. No lightheadedness or dizziness.   Resp: No dyspnea at rest. ++ dyspnea on exertion. + orthopnea. No wheezing. No cough. No stridor. No sputum production. No chest pain with respiration.  GI: No nausea. No vomiting. No diarrhea.  MSK: No joint pain or pain in any extremities  Integumentary: No skin lesions. ++ edema.  Neurological: No gross motor weakness. No sensory changes.  [+ ] All other systems negative  [ ] Unable to assess ROS because ________    OBJECTIVE:  ICU Vital Signs Last 24 Hrs  T(C): 36.3 (16 Oct 2017 00:30), Max: 36.8 (15 Oct 2017 19:40)  T(F): 97.4 (16 Oct 2017 00:30), Max: 98.2 (15 Oct 2017 19:40)  HR: 80 (16 Oct 2017 00:30) (71 - 80)  BP: 90/60 (16 Oct 2017 00:30) (88/38 - 139/52)  BP(mean): --  ABP: --  ABP(mean): --  RR: 20 (16 Oct 2017 00:30) (18 - 32)  SpO2: 100% (16 Oct 2017 00:30) (91% - 100%)        10-14 @ 07:01  -  10-15 @ 07:00  --------------------------------------------------------  IN: 1440 mL / OUT: 800 mL / NET: 640 mL    10-15 @ 07:01  -  10-16 @ 05:07  --------------------------------------------------------  IN: 880 mL / OUT: 0 mL / NET: 880 mL      CAPILLARY BLOOD GLUCOSE          PHYSICAL EXAM:in chair NAD  General: Awake, alert, oriented X 1.   HEENT: Atraumatic, normocephalic.                 Mallampatti Grade 2                No nasal congestion.                No tonsillar or pharyngeal exudates.  Lymph Nodes: No palpable lymphadenopathy  Neck: No JVD. No carotid bruit.   Respiratory: Normal chest expansion                         Normal percussion                         Normal and equal air entry                         No wheeze, rhonchi or rales--reduced BS bases  Cardiovascular: S1 S2 normal. 3+ murmurs, rubs or gallops.   Abdomen: Soft, non-tender, non-distended. No organomegaly.  Extremities: Warm to touch. Peripheral pulse palpable. 3++ edema.   Skin: No rashes or skin lesions  Neurological: Motor and sensory examination equal and normal in all four extremities.  Psychiatry: Appropriate mood and affect.    HOSPITAL MEDICATIONS:  MEDICATIONS  (STANDING):  aspirin enteric coated 81 milliGRAM(s) Oral daily  atorvastatin 40 milliGRAM(s) Oral at bedtime  buDESOnide   0.5 milliGRAM(s) Respule 0.5 milliGRAM(s) Inhalation every 12 hours  epoetin terry Injectable 4000 Unit(s) IV Push <User Schedule>  heparin  Injectable 5000 Unit(s) SubCutaneous every 12 hours  lactobacillus acidophilus 1 Tablet(s) Oral daily  levothyroxine 25 MICROGram(s) Oral daily  melatonin 3 milliGRAM(s) Oral at bedtime  midodrine 30 milliGRAM(s) Oral every 8 hours  Nephro-amy 1 Tablet(s) Oral daily  nystatin Powder 1 Application(s) Topical every 8 hours  vancomycin  IVPB 750 milliGRAM(s) IV Intermittent <User Schedule>    MEDICATIONS  (PRN):  acetaminophen   Tablet. 650 milliGRAM(s) Oral every 6 hours PRN Mild Pain (1 - 3)      LABS:                    MICROBIOLOGY:     RADIOLOGY:  [ ] Reviewed and interpreted by me    Point of Care Ultrasound Findings:    PFT:    EKG:

## 2017-10-16 NOTE — PROGRESS NOTE ADULT - PROBLEM SELECTOR PLAN 3
MRSA bacteremia   vanco D16  CTS evaluation and ?decortication--+sono guided pig tail catheter-- 10-4-7--no decortication planned

## 2017-10-16 NOTE — PROVIDER CONTACT NOTE (OTHER) - BACKGROUND
Pt admitted for sepsis. PMH MI, GERD, abdominal aortic aneurysm, lymphoma, stroe
MRSA in aerobic and anaerobic blood culture bottles from 9/25/2017.
Pt admitted for sepsis, MRSA in pleural field and blood. PMH GERD, CAD, afib, HTN, 3L O2NC
Pt admitted for sepsis, s/p fall. PMH ESRD on HD, CAD, afib, CAD, hepatitis, HTN
Pt. s/p fall at home; possible syncopal episode.
SEPSIS. ALT. MENTAL STATUS.
Pt admitted for sepsis. PMH GERD, ESRD, CAD, pleural effusion, SOB
SEPSIS. DELIRIUM.
Pt admitted for sepsis. PMH MI, CAD, ESRD, pleural effusion, afib, SOB

## 2017-10-16 NOTE — CHART NOTE - NSCHARTNOTEFT_GEN_A_CORE
Malnutrtion f/u: pt eating well, offers no complaints; no issue per RN. ESRD on HD  Source: Patient [x ]    Family [ ]     other [ ]    Diet : Renal with nepro supplement  2x/day      Patient reports [  [x ] other: no complaints   PO intake:  < 50% [ ] 50-75% [x ]   % [ ]  other :         Enteral /Parenteral Nutrition:  NA      Current Weight: 80.8kg  Dosing weight  75.2kg    Pertinent Medications: MEDICATIONS  (STANDING):  aspirin enteric coated 81 milliGRAM(s) Oral daily  atorvastatin 40 milliGRAM(s) Oral at bedtime  buDESOnide   0.5 milliGRAM(s) Respule 0.5 milliGRAM(s) Inhalation every 12 hours  epoetin terry Injectable 4000 Unit(s) IV Push <User Schedule>  heparin  Injectable 5000 Unit(s) SubCutaneous every 12 hours  lactobacillus acidophilus 1 Tablet(s) Oral daily  levothyroxine 25 MICROGram(s) Oral daily  melatonin 3 milliGRAM(s) Oral at bedtime  midodrine 30 milliGRAM(s) Oral every 8 hours  Nephro-amy 1 Tablet(s) Oral daily  nystatin Powder 1 Application(s) Topical every 8 hours  vancomycin  IVPB 750 milliGRAM(s) IV Intermittent <User Schedule>    MEDICATIONS  (PRN):  acetaminophen   Tablet. 650 milliGRAM(s) Oral every 6 hours PRN Mild Pain (1 - 3)    Pertinent Labs:  10-16 Na141 mmol/L Glu 76 mg/dL K+ 4.6 mmol/L Cr  4.64 mg/dL<H> BUN 64 mg/dL<H> Phos n/a   Alb n/a   PAB n/a     L    Skin: multiple tears: arms, leg,  lateral chest wound; no pressure ulcers    Estimated Needs:   [x ] no change since previous assessment  [ ] recalculated:       Previous Nutrition Diagnosis:    [x ] Malnutrition          Nutrition Diagnosis is [x ] ongoing, improving               Recommend    [ ] Change Diet To:    [x ] Nutrition Supplement  Continue Nepro 2x/day; Monitor/encourage PO intake.     [ ] Nutrition Support    [ ] Other:        Monitoring and Evaluation:     [x] PO intake [x ] Tolerance to diet prescription [x ] weights [x ] follow up per protocol    [ ] other:

## 2017-10-16 NOTE — PROGRESS NOTE ADULT - PROBLEM SELECTOR PLAN 3
- Metabolic encephalopathy superimposed on baseline dementia  - patient intermittently hyperactive and more confused  - c/w 1:1  - Psych consult: melatonin at bedtime, ativan .25 IV only if severely agitated, c/w supportive care and reorientation (QTc 590)

## 2017-10-16 NOTE — PROVIDER CONTACT NOTE (OTHER) - ACTION/TREATMENT ORDERED:
Team 3 [place] made aware. Will order activity and O2 NC. Continue to monitor.
X-ray of b/l hips ordered.
DR. KEATING WILL COME AND SPEAK WITH FAMILY.
Team 1 Ben Fields MD made aware. Came to assess pt. Advised to try again for the medication and vitals until 0730. Continue to monitor.
[facility], will adjust synthroid to PO. Continue to monitor.
continue current medications
DR. HERNÁNDEZ WAS WITH PATIENT,SAID HE IS STABLE. CONTINUE TO MONITOR.
Team 3 Ken Woo MD made aware. Advised to administer midodrine now. Reassess in 1 hour. Continue to monitor.
[facility] Advised to recheck vitals, RR improved. Will notify day team to order doppler for AM. Performed US heart at bedside to r/o PE. Continue to monitor.

## 2017-10-16 NOTE — PROGRESS NOTE ADULT - ATTENDING COMMENTS
87yoM with multiple medical comorbidities including dementia, ESRD on HD, CAD, AAA s/p repair, AS, DLBCL admitted with septic shock 2/2 empyema, found to have MRSA bacteremia in the setting of multiple foreign devices (aortic graft, PPM). With disffuse edema, currently anuric. overall prognosis poor- will need further Goals of care discussions  palliative care and family - patient would likely not be able to tolerate GUADALUPE to evaluate aortic graft and PPM further.

## 2017-10-16 NOTE — PROGRESS NOTE ADULT - SUBJECTIVE AND OBJECTIVE BOX
Patient is a 87y old  Male who presents with a chief complaint of fall 2/2 hypotension (25 Sep 2017 15:50)      SUBJECTIVE / OVERNIGHT EVENTS: Continues to be agitated overnight and this morning. Complaining of pain in his legs. Also had an episode of hypotension overnight to 73/44. Was given morning midodrine with improvement.     MEDICATIONS  (STANDING):  aspirin enteric coated 81 milliGRAM(s) Oral daily  atorvastatin 40 milliGRAM(s) Oral at bedtime  buDESOnide   0.5 milliGRAM(s) Respule 0.5 milliGRAM(s) Inhalation every 12 hours  epoetin terry Injectable 4000 Unit(s) IV Push <User Schedule>  heparin  Injectable 5000 Unit(s) SubCutaneous every 12 hours  lactobacillus acidophilus 1 Tablet(s) Oral daily  levothyroxine 25 MICROGram(s) Oral daily  melatonin 3 milliGRAM(s) Oral at bedtime  midodrine 30 milliGRAM(s) Oral every 8 hours  Nephro-amy 1 Tablet(s) Oral daily  nystatin Powder 1 Application(s) Topical every 8 hours  vancomycin  IVPB 750 milliGRAM(s) IV Intermittent <User Schedule>    MEDICATIONS  (PRN):  acetaminophen   Tablet. 650 milliGRAM(s) Oral every 6 hours PRN Mild Pain (1 - 3)        I&O's Summary    15 Oct 2017 07:01  -  16 Oct 2017 07:00  --------------------------------------------------------  IN: 880 mL / OUT: 0 mL / NET: 880 mL        PHYSICAL EXAM:  GENERAL: tachypneic, sitting in chair fidgeting, intermittently rocking back and forth  HEAD:  Atraumatic, Normocephalic  EYES: EOMI, conjunctiva and sclera clear  NECK: Supple, No JVD  CHEST/LUNG: b/l diffuse rales, no wheezes, tachypneic, on NC  HEART: Regular rate and rhythm; systolic murmur  ABDOMEN: firm, Nontender, Nondistended; Bowel sounds present  EXTREMITIES: b/l LE swelling with 2+ pitting edema, b/l feet and hands cold, increased erythema on right foot, no pulses palpable, weeping from small open wounds on b/l legs  PSYCH: AAOx1  NEUROLOGY: non-focal  SKIN: diffuse scabs and weeping small wounds on arms and legs, large ecchymosis on RUE    LABS:    WBC Trend: 8.84<--, 10.26<--, 10.9<--  Hb Trend: 8.7<--, 9.2<--, 9.4<--  Plt Trend: 191<--, 204<--, 192<--        Creatinine Trend: 4.95<--, 4.90<--, 3.78<--, 4.89<--, 4.06<--, 5.48<--            Microbiology:  Urine Cx:  Blood Cx:    RADIOLOGY & ADDITIONAL TESTS:  X- Ray:  CT:  Ultrasound:  [ ] imaging personally reviewed and interpreted by me    Consultant(s) Notes Reviewed:      Care Discussed with Consultants/Other Providers:

## 2017-10-16 NOTE — PROVIDER CONTACT NOTE (OTHER) - DATE AND TIME:
03-Oct-2017 17:30
05-Oct-2017 14:08
10-Oct-2017 01:14
10-Oct-2017 05:00
15-Oct-2017 05:50
15-Oct-2017 22:52
16-Oct-2017 00:40
28-Sep-2017 10:34
30-Sep-2017 19:30

## 2017-10-16 NOTE — PROVIDER CONTACT NOTE (OTHER) - SITUATION
see above note
AO x1. Pt refusing AM VS, medication, agitated. Violently grabbed heparin syringe from RN hand. Snapped pulse oximetry device in half. Threw water at RN. Throws NC tubing off, should be on 3L NC
MRSA in aerobic and anaerobic blood culture bottles from 9/25/2017.
Pt currently w/o PIV, synthroid medication is IVP, please change to PO since pt tolerating other PO medications
Pt. has large ecchymotic area on right side of thigh buttock hip area.
Pt manual bp 88/38, no SOB noted, SPO2 98% 3L NC.  Receives midodrine 30mg PO q8hrs
SEE ABOVE NOTE.
Pt RR 32, SPO2 98% on 3L NC. B/L upper and lower extremity edema (R leg +3, L leg +2, R hand +1, L hand +2, tender to touch, request doppler performed to r/o DVT. Receives heparin SQ q12.

## 2017-10-16 NOTE — PROGRESS NOTE ADULT - ATTENDING COMMENTS
as above--CTS follow up-s/p removal of pleurx and ? decortication s/p pigtail placement and removal of catheter as per Tia et al.  NO Decortication planned by CTS  ABX to be directed by pleural and Blood samples--vanco D16-MRSA bacteremia--as per ID--likely 6+ weeks needed  ***AS per ID--will likely need ?GUADALUPE to optimally deal w/situation--this will come after goals of care addressed.  MS issues continue-haldol/zyprexa etc.  AS per pall. care--family now ready to deal w/current situation and hospice care--opt for rehab. vs. comfort care-long course of abx. likely      Morro Tran MD-Pulmonary   [phone] as above--CTS follow up-s/p removal of pleurx and ? decortication s/p pigtail placement and removal of catheter as per Tia et al.  NO Decortication planned by CTS  ABX to be directed by pleural and Blood samples--vanco D16-MRSA bacteremia--as per ID--likely 6+ weeks needed  ***AS per ID--will likely need ?GUADALUPE to optimally deal w/situation--this will come after goals of care addressed.  MS issues continue-haldol/zyprexa etc.  Fluid situation is poor--tremendous edema noted-Cards/renal follow up  AS per pall. care--family now ready to deal w/current situation and hospice care--opt for rehab. vs. comfort care-long course of abx. likely      Morro Tran MD-Pulmonary   [phone]

## 2017-10-16 NOTE — PROVIDER CONTACT NOTE (OTHER) - RECOMMENDATIONS
Notify Team 3 Ken Woo MD
Notify MD
MRSA in aerobic and anaerobic blood culture bottles from 9/25/2017 noted.
Notify Team 1 Ben Fields MD
Notify Team 3 Ken Woo MD
SEE BELOW.
Notify Team 3 Ken Woo MD
SEE BELOW.
Notify Team 3 Ken Woo MD

## 2017-10-16 NOTE — PROVIDER CONTACT NOTE (OTHER) - ASSESSMENT
VSS
MRSA in aerobic and anaerobic blood culture bottles from 9/25/2017.
N.P.O
Unable to assess, pt refusing and combative.
VSS
Manual bp 88/38, RR 20 at 98% 3L NC
RESTLESS.
VSS except RR 32

## 2017-10-16 NOTE — PROGRESS NOTE ADULT - SUBJECTIVE AND OBJECTIVE BOX
Patient is a 87y old  Male who presents with a chief complaint of fall 2/2 hypotension (25 Sep 2017 15:50)    Being followed by ID for MRSA bacteremia,empyema    Interval history:Confused but alert  Responds to queries  No acute events      ROS:  No cough,SOB,CP  No N/V/D./abd pain  No other complaints      Antimicrobials:    vancomycin  IVPB 750 milliGRAM(s) IV Intermittent <User Schedule>      Vital Signs Last 24 Hrs  T(C): 36.2 (10-16-17 @ 08:15), Max: 36.8 (10-15-17 @ 19:40)  T(F): 97.1 (10-16-17 @ 08:15), Max: 98.2 (10-15-17 @ 19:40)  HR: 108 (10-16-17 @ 08:15) (70 - 108)  BP: 91/53 (10-16-17 @ 08:15) (73/44 - 139/52)  BP(mean): --  RR: 20 (10-16-17 @ 08:15) (18 - 32)  SpO2: 100% (10-16-17 @ 08:15) (91% - 100%)    Physical Exam:    Constitutional well preserved,comfortable,pleasant    HEENT PERRLA EOMI,No pallor or icterus    No oral exudate or erythema    Neck supple no JVD or LN    Chest Good AE,bibasilar crackles    CVS RRR S1 S2 WNl No murmur or rub or gallop    Abd soft BS normal No tenderness no masses    Ext Left arm AVF no tenderness.Ecchymosis    IV site no erythema tenderness or discharge    Joints no swelling or LOM    CNS as above     Lab Data:    No new labs

## 2017-10-16 NOTE — PROGRESS NOTE ADULT - PROBLEM SELECTOR PLAN 7
- Nephrology following, HD TTS  - increased leg swelling since yesterday, complaining of severe pain, dilaudid 2mg PO x1 yesterday, will continue to reassess pain and treat as needed, b/l duplex LE ordered - Nephrology following, HD TTS  - increased leg swelling since yesterday, complaining of severe pain, dilaudid 2mg PO x1 yesterday, will continue to reassess pain and treat as needed, b/l duplex LE negative for DVT

## 2017-10-16 NOTE — PROGRESS NOTE ADULT - PROBLEM SELECTOR PLAN 1
- Continues to have b/l crackles, CXR yesterday continues to show b/l plef and pulmonary edema unchanged  - s/p pigtail placement by IR on 10/4 - 10/8 for drainage of empyema, no decortication per CT surg  - IV vanc 750mg on HD days, will continue for 6 weeks from negative cultures (9/27)  - c/w midodrine 30 TID

## 2017-10-16 NOTE — PROGRESS NOTE ADULT - ASSESSMENT
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     #MRSA bacteremia 2/2 empyema: pt admitted with septic shock 2/2 MRSA bacteremia 2/2 empyema following PleurX placement 9/5 and exchange in the setting of immunosuppression for DLBCL (Rituximab). PleurX was removed on 9/27, and pt was transferred from MICU to floors 9/29.  CT with loculated effusion  s/p pigtail catheter-decortication if worsens though with current GOC palliation may be optimal  Also has PPM and aortic graft, GUADALUPE and CT with IV contrast only if c/w GOC-since family does not want  aggressive measures they will not   Continue Vanco post HD  Will  need long course(6 weeks) + suppression(bactrim or doxy)  Tailor plan per course,results.  prognosis guarded-palliative options being explored    Will Follow.  Beeper [id number]-asau/afterhours/No response-[id number]

## 2017-10-16 NOTE — PROGRESS NOTE ADULT - PROBLEM SELECTOR PLAN 4
- chronic diastolic heart failure, last TTE 9/28  - CXR yesterday continues to show b/l plef and pulmonary edema, due for HD tomorow

## 2017-10-17 LAB — GLUCOSE BLDC GLUCOMTR-MCNC: 35 MG/DL — CRITICAL LOW (ref 70–99)

## 2017-10-17 PROCEDURE — 99233 SBSQ HOSP IP/OBS HIGH 50: CPT

## 2017-10-17 RX ORDER — MORPHINE SULFATE 50 MG/1
2 CAPSULE, EXTENDED RELEASE ORAL EVERY 4 HOURS
Qty: 0 | Refills: 0 | Status: DISCONTINUED | OUTPATIENT
Start: 2017-10-17 | End: 2017-10-17

## 2017-10-17 NOTE — PROGRESS NOTE ADULT - SUBJECTIVE AND OBJECTIVE BOX
CHIEF COMPLAINT:    Interval Events:    REVIEW OF SYSTEMS:  Constitutional: No fevers or chills. No weight loss. No fatigue or generalized malaise.  Eyes: No itching or discharge from the eyes  ENT: No ear pain. No ear discharge. No nasal congestion. No post nasal drip. No epistaxis. No throat pain. No sore throat. No difficulty swallowing.   CV: No chest pain. No palpitations. No lightheadedness or dizziness.   Resp: No dyspnea at rest. No dyspnea on exertion. No orthopnea. No wheezing. No cough. No stridor. No sputum production. No chest pain with respiration.  GI: No nausea. No vomiting. No diarrhea.  MSK: No joint pain or pain in any extremities  Integumentary: No skin lesions. No pedal edema.  Neurological: No gross motor weakness. No sensory changes.  [ ] All other systems negative  [ ] Unable to assess ROS because ________    OBJECTIVE:  ICU Vital Signs Last 24 Hrs  T(C): 36.3 (16 Oct 2017 19:57), Max: 36.3 (16 Oct 2017 05:57)  T(F): 97.4 (16 Oct 2017 19:57), Max: 97.4 (16 Oct 2017 05:57)  HR: 76 (16 Oct 2017 19:57) (70 - 108)  BP: 126/91 (16 Oct 2017 19:57) (73/44 - 126/91)  BP(mean): --  ABP: --  ABP(mean): --  RR: 20 (16 Oct 2017 19:57) (20 - 20)  SpO2: 95% (16 Oct 2017 19:57) (95% - 100%)        10-15 @ 07:01  -  10-16 @ 07:00  --------------------------------------------------------  IN: 880 mL / OUT: 0 mL / NET: 880 mL    10-16 @ 07:01  -  10-17 @ 05:06  --------------------------------------------------------  IN: 1180 mL / OUT: 0 mL / NET: 1180 mL      CAPILLARY BLOOD GLUCOSE          PHYSICAL EXAM:  General: Awake, alert, oriented X 3.   HEENT: Atraumatic, normocephalic.                 Mallampatti Grade                 No nasal congestion.                No tonsillar or pharyngeal exudates.  Lymph Nodes: No palpable lymphadenopathy  Neck: No JVD. No carotid bruit.   Respiratory: Normal chest expansion                         Normal percussion                         Normal and equal air entry                         No wheeze, rhonchi or rales.  Cardiovascular: S1 S2 normal. No murmurs, rubs or gallops.   Abdomen: Soft, non-tender, non-distended. No organomegaly.  Extremities: Warm to touch. Peripheral pulse palpable. No pedal edema.   Skin: No rashes or skin lesions  Neurological: Motor and sensory examination equal and normal in all four extremities.  Psychiatry: Appropriate mood and affect.    HOSPITAL MEDICATIONS:  MEDICATIONS  (STANDING):  aspirin enteric coated 81 milliGRAM(s) Oral daily  atorvastatin 40 milliGRAM(s) Oral at bedtime  buDESOnide   0.5 milliGRAM(s) Respule 0.5 milliGRAM(s) Inhalation every 12 hours  epoetin terry Injectable 4000 Unit(s) IV Push <User Schedule>  heparin  Injectable 5000 Unit(s) SubCutaneous every 12 hours  lactobacillus acidophilus 1 Tablet(s) Oral daily  levothyroxine 25 MICROGram(s) Oral daily  melatonin 3 milliGRAM(s) Oral at bedtime  midodrine 30 milliGRAM(s) Oral every 8 hours  Nephro-amy 1 Tablet(s) Oral daily  nystatin Powder 1 Application(s) Topical every 8 hours  vancomycin  IVPB 750 milliGRAM(s) IV Intermittent <User Schedule>    MEDICATIONS  (PRN):  acetaminophen   Tablet. 650 milliGRAM(s) Oral every 6 hours PRN Mild Pain (1 - 3)      LABS:                        10.2   8.2   )-----------( 190      ( 16 Oct 2017 10:55 )             31.1     10-16    141  |  94<L>  |  64<H>  ----------------------------<  76  4.6   |  23  |  4.64<H>    Ca    7.7<L>      16 Oct 2017 10:55  Mg     1.9     10-16                MICROBIOLOGY:     RADIOLOGY:  [ ] Reviewed and interpreted by me    Point of Care Ultrasound Findings:    PFT:    EKG: CHIEF COMPLAINT: good spirits-some sob    Interval Events: DDB/HD    REVIEW OF SYSTEMS:  Constitutional: No fevers or chills. No weight loss. No fatigue or generalized malaise.  Eyes: No itching or discharge from the eyes  ENT: No ear pain. No ear discharge. No nasal congestion. No post nasal drip. No epistaxis. No throat pain. No sore throat. No difficulty swallowing.   CV: No chest pain. No palpitations. No lightheadedness or dizziness.   Resp: No dyspnea at rest. ++ dyspnea on exertion. No orthopnea. No wheezing. No cough. No stridor. No sputum production. No chest pain with respiration.  GI: No nausea. No vomiting. No diarrhea.  MSK: No joint pain or pain in any extremities  Integumentary: No skin lesions. ++ edema.  Neurological: No gross motor weakness. No sensory changes.  [+ ] All other systems negative  [ ] Unable to assess ROS because ________    OBJECTIVE:  ICU Vital Signs Last 24 Hrs  T(C): 36.3 (16 Oct 2017 19:57), Max: 36.3 (16 Oct 2017 05:57)  T(F): 97.4 (16 Oct 2017 19:57), Max: 97.4 (16 Oct 2017 05:57)  HR: 76 (16 Oct 2017 19:57) (70 - 108)  BP: 126/91 (16 Oct 2017 19:57) (73/44 - 126/91)  BP(mean): --  ABP: --  ABP(mean): --  RR: 20 (16 Oct 2017 19:57) (20 - 20)  SpO2: 95% (16 Oct 2017 19:57) (95% - 100%)        10-15 @ 07:01  -  10-16 @ 07:00  --------------------------------------------------------  IN: 880 mL / OUT: 0 mL / NET: 880 mL    10-16 @ 07:01  -  10-17 @ 05:06  --------------------------------------------------------  IN: 1180 mL / OUT: 0 mL / NET: 1180 mL      CAPILLARY BLOOD GLUCOSE          PHYSICAL EXAM: in bed-NAD  General: Awake, alert, oriented X 1.   HEENT: Atraumatic, normocephalic.                 Mallampatti Grade 3                No nasal congestion.                No tonsillar or pharyngeal exudates.  Lymph Nodes: No palpable lymphadenopathy  Neck: No JVD. No carotid bruit.   Respiratory: Normal chest expansion                         Normal percussion                         Normal and equal air entry                         No wheeze, rhonchi or rales--reduced BS at bases  Cardiovascular: S1 S2 normal. No murmurs, rubs or gallops.   Abdomen: Soft, non-tender, non-distended. No organomegaly.  Extremities: Warm to touch. Peripheral pulse palpable. +++ edema.   Skin: No rashes or skin lesions  Neurological: Motor and sensory examination equal and normal in all four extremities.  Psychiatry: Appropriate mood and affect.    HOSPITAL MEDICATIONS:  MEDICATIONS  (STANDING):  aspirin enteric coated 81 milliGRAM(s) Oral daily  atorvastatin 40 milliGRAM(s) Oral at bedtime  buDESOnide   0.5 milliGRAM(s) Respule 0.5 milliGRAM(s) Inhalation every 12 hours  epoetin terry Injectable 4000 Unit(s) IV Push <User Schedule>  heparin  Injectable 5000 Unit(s) SubCutaneous every 12 hours  lactobacillus acidophilus 1 Tablet(s) Oral daily  levothyroxine 25 MICROGram(s) Oral daily  melatonin 3 milliGRAM(s) Oral at bedtime  midodrine 30 milliGRAM(s) Oral every 8 hours  Nephro-amy 1 Tablet(s) Oral daily  nystatin Powder 1 Application(s) Topical every 8 hours  vancomycin  IVPB 750 milliGRAM(s) IV Intermittent <User Schedule>    MEDICATIONS  (PRN):  acetaminophen   Tablet. 650 milliGRAM(s) Oral every 6 hours PRN Mild Pain (1 - 3)      LABS:                        10.2   8.2   )-----------( 190      ( 16 Oct 2017 10:55 )             31.1     10-16    141  |  94<L>  |  64<H>  ----------------------------<  76  4.6   |  23  |  4.64<H>    Ca    7.7<L>      16 Oct 2017 10:55  Mg     1.9     10-16                MICROBIOLOGY:     RADIOLOGY:  [ ] Reviewed and interpreted by me    Point of Care Ultrasound Findings:    PFT:    EKG:

## 2017-10-17 NOTE — PROGRESS NOTE ADULT - PROBLEM SELECTOR PROBLEM 1
Empyema, left
ESRD on hemodialysis
ESRD on hemodialysis
Empyema, left
SOB (shortness of breath)
SOB (shortness of breath)
ESRD on hemodialysis
Empyema, left
MRSA bacteremia
Pleural effusion
Pleural effusion
SOB (shortness of breath)
Empyema, left
SOB (shortness of breath)
Pleural effusion
SOB (shortness of breath)
ESRD on hemodialysis
SOB (shortness of breath)
Empyema, left
Empyema, left
SOB (shortness of breath)
Empyema, left
SOB (shortness of breath)

## 2017-10-17 NOTE — PROGRESS NOTE ADULT - I WAS PHYSICALLY PRESENT FOR THE KEY PORTIONS OF THE EVALUATION AND MANAGEMENT (E/M) SERVICE PROVIDED.  I AGREE WITH THE ABOVE HISTORY, PHYSICAL, AND PLAN WHICH I HAVE REVIEWED AND EDITED WHERE APPROPRIATE

## 2017-10-17 NOTE — CHART NOTE - NSCHARTNOTEFT_GEN_A_CORE
RRT called for unresponsiveness. Briefly, pt is a 87M h/o TBI (30 yrs ago), dementia (AAOx1 at baseline), CAD s/p multiple stents, HFpEF, severe AS and MR, AAA s/p repair, afib s/p ablation, PPM (no a/c 2/2 falls), ESRD on HD, hypothyroid, DLBCL on rituximab treatment, s/p pleurex placement on 9/5 for loculated effusion, admitted to MICU (9/25-9/29) for septic shock 2/2 empyema, MRSA bacteremia and transferred to medicine floors afterwards. Pt unarousable to sternal rub and pupils fixed and dilated. RR < 10, hypoglycemia to 30s, and BP of 40/30s. Family called, informed that pt is actively passing and they are en route to hospital. Pt does not appear to be in significant distress at time of encounter, morphine PRN ordered for dyspnea.      Yovanny Bledsoe, PGY2  [phone]

## 2017-10-17 NOTE — PROGRESS NOTE ADULT - PROBLEM SELECTOR PLAN 3
MRSA bacteremia   vanco D16  CTS evaluation and ?decortication--+sono guided pig tail catheter-- 10-4-7--no decortication planned MRSA bacteremia   vanco D17  CTS evaluation and ?decortication--+sono guided pig tail catheter-- 10-4-7--no decortication planned

## 2017-10-17 NOTE — PROGRESS NOTE ADULT - ATTENDING COMMENTS
as above--CTS follow up-s/p removal of pleurx and ? decortication s/p pigtail placement and removal of catheter as per Tia et al.  NO Decortication planned by CTS  ABX to be directed by pleural and Blood samples--vanco D16-MRSA bacteremia--as per ID--likely 6+ weeks needed  ***AS per ID--will likely need ?GUADALUPE to optimally deal w/situation--this will come after goals of care addressed.  MS issues continue-haldol/zyprexa etc.  Fluid situation is poor--tremendous edema noted-Cards/renal follow up  AS per pall. care--family now ready to deal w/current situation and hospice care--opt for rehab. vs. comfort care-long course of abx. likely      Morro Tran MD-Pulmonary   [phone] as above--CTS follow up-s/p removal of pleurx and ? decortication s/p pigtail placement and removal of catheter as per Tia et al.  NO Decortication planned by CTS  ABX to be directed by pleural and Blood samples--vanco D17-MRSA bacteremia--as per ID--likely 6+ weeks needed  ***AS per ID--will likely need ?GUADALUPE to optimally deal w/situation--this will come after goals of care addressed.  MS issues continue-haldol/zyprexa etc.  Fluid situation is poor--tremendous edema noted-Cards/renal follow up  AS per pall. care--family now ready to deal w/current situation and hospice care--opt for rehab. vs. comfort care-long course of abx. likely      Morro Tran MD-Pulmonary   [phone]

## 2017-10-17 NOTE — CHART NOTE - NSCHARTNOTEFT_GEN_A_CORE
Spoke with patient's wife, Jade, when she arrived. Requested his wedding ring be removed. Ring was removed by me and given to wife.    JESUS Mcdonald PGY1  [phone]

## 2017-10-17 NOTE — PROGRESS NOTE ADULT - PROBLEM SELECTOR PLAN 9
DVT prophlaxis:  subcutaneous lovenox or heparin as per primary team  sequential teds stockings  early ambulation  PT evaluation  early ambulation  incentive spirometry
- Palliative consult: reached out to family who are giving mixed answers as to who is making decisions, discussed with son (Elliot) on 10/6 who wishes to do no further invasive testing/procedures however wishes to keep him full code and give him any chance he can, Wife of patient is legal HCP and does not want to make final decisions without speaking with all of the sons, not a candidate for hospice if family wishes to continue HD
DVT prophlaxis:  subcutaneous lovenox or heparin as per primary team  sequential teds stockings  early ambulation  PT evaluation  early ambulation  incentive spirometry
- discussed with 1 son and wife yesterday about where to go with care, will proceed with catheter placement and tpa, will schedule full family meeting with all 3 sons and wife to discuss plan of care
DVT prophlaxis:  subcutaneous lovenox or heparin as per primary team  sequential teds stockings  early ambulation  PT evaluation  early ambulation  incentive spirometry
DVT proph - HSQ  sequential teds stockings  early ambulation  PT evaluation  early ambulation  incentive spirometry
- Palliative consult: discussed with son GOVARGAS and does not wish to discuss hospice or advanced directives at this time. He would like to see if his father gets stronger on the current regimen.
- Palliative consult: discussed with son HUMA extensively yesterday, needs to discuss with his brothers but understands prognosis and would like the main focus on comfort.
- Palliative consult: discussed with son HUMA extensively yesterday, patient is now DNR/DNI. Will reconsult palliative today to discuss d/c options, son interested in rehab/tera.
- Palliative consult: patient is now DNR/DNI. Palliative to see Monday to f/u [facility] and d/c options for symptom management
- Palliative consult: patient is now DNR/DNI. Palliative to see today to f/u [facility] and d/c options for symptom management
- Palliative following: will reach out to family for meeting with all sons and wife as there are mixed communications, discussed with son (Elliot) on 10/6 who wishes to do no further invasive testing/procedures with comfort as the goal however wife is HCP and does not want to make final decisions without speaking with all of the sons, not a candidate for hospice if family wishes to continue HD
- Palliative following: will reach out to family for meeting with all sons and wife as there are mixed communications, discussed with son (Elliot) on 10/6 who wishes to do no further invasive testing/procedures with comfort as the goal however wife is HCP and does not want to make final decisions without speaking with all of the sons, not a candidate for hospice if family wishes to continue HD
- discussed with 1 son and wife yesterday about where to go with care, s/p pigtail catheter placement and tpa, will schedule full family meeting with all 3 sons and wife to discuss plan of care
- discussed with 1 son and wife yesterday about where to go with care, s/p pigtail catheter placement and tpa, will schedule full family meeting with all 3 sons and wife to discuss plan of care
- discussed with son (Elliot) yesterday, no further invasive testing/procedures, palliative consult placed, family is interested in moving patient to long term care facility with goal of comfort
- will discuss with wife goals of care today, patient is clinically worsening despite appropriate antibiotic treatment
DVT prophlaxis:  subcutaneous lovenox or heparin as per primary team  sequential teds stockings  early ambulation  PT evaluation  early ambulation  incentive spirometry

## 2017-10-17 NOTE — DISCHARGE NOTE FOR THE EXPIRED PATIENT - SECONDARY DIAGNOSIS.
MRSA bacteremia Acute delirium Chronic heart failure, unspecified heart failure type ESRD on hemodialysis Lymphoma, non-Hodgkin's

## 2017-10-17 NOTE — PROGRESS NOTE ADULT - PROBLEM SELECTOR PROBLEM 9
Prophylactic measure
Goals of care, counseling/discussion
Prophylactic measure
Prophylactic measure
Goals of care, counseling/discussion
Prophylactic measure
Goals of care, counseling/discussion
Prophylactic measure

## 2017-10-17 NOTE — DISCHARGE NOTE FOR THE EXPIRED PATIENT - HOSPITAL COURSE
87M with ESRD on HD, CAD s/p multiple stents (last in 3/2014), AAA s/p repair, A fib with PPM (no AC due to falls) and ablation in 2016, HFpEF (last TTE 9/28), severe AS and MR, depression, dementia (AOx1 at baseline), hypothyroidism, HTN, DLBCL currently on rituximab, pleural effusions with pleurx catheter placement admitted on 9/25 for increased, foul smelling drainage from pleurx, hypotension and witnessed fall. He was admitted to MICU for septic shock management requiring levophed. He was started on vanc and zosyn. Blood cultures grew MRSA. CT chest showed loculated left sided pleural effusion and moderate right pleural effusion. Thoracic surgery was consulted and chest tubes were placed x2 to drain the empyema. Patient was transferred to the floor on 9/29 on midodrine 30TID and vancomycin. Patient's mental status continued to wax and wane with aggression and depressive symptoms requiring frequent reorientation. He continued to get HD TTS complicated by intradialytic hypotension. CT surgery continued to follow and patient was not a candidate for decortication procedure. ID was also following and in order to completely treat the MRSA infection patient would have required a GUADALUPE and CTA of the abdomen to evaluate PPM and aortic graft for seeding.     Patient's health status at that time was discussed with the family and it was decided to not pursue any more aggressive measures. Further [facility] discussions resulted in change in code status to DNR/DNI and family was interested in a Palliative meeting for symptom management and comfort measures. Patient started to complain of severe leg pain and swelling with increased work of breathing on 10/15. He was given dilaudid to ease the pain, duplex studies r/o DVTs. On 10/17 the overnight team was called because the patient became very hypotensive to systolic of 40's and unresponsive to sternal rub, hypoglycemia to 30s, RR < 10 with fixed and dilated pupils. Patient was given morphine to make him more comfortable. At 7AM patient started to have infrequent agonal breaths. On examination, there was no pulse, no heartbeat on auscultation. At 7:08AM patient stopped breathing and was pronounced dead.

## 2017-10-17 NOTE — PROGRESS NOTE ADULT - PROBLEM SELECTOR PROBLEM 2
Pleural effusion
Anemia due to chronic kidney disease
Empyema, left
Sepsis due to methicillin resistant Staphylococcus aureus (MRSA)
Sepsis due to methicillin resistant Staphylococcus aureus (MRSA)
Anemia due to chronic kidney disease
Anemia due to chronic kidney disease
MRSA bacteremia
Pleural effusion
Pleural effusion
Anemia due to chronic kidney disease
Empyema
Empyema, left
Empyema, left
MRSA bacteremia
Pleural effusion
Sepsis due to methicillin resistant Staphylococcus aureus (MRSA)
Pleural effusion
Sepsis due to methicillin resistant Staphylococcus aureus (MRSA)
Pleural effusion
Sepsis due to methicillin resistant Staphylococcus aureus (MRSA)

## 2017-10-17 NOTE — PROGRESS NOTE ADULT - PROBLEM SELECTOR PROBLEM 7
GERD (gastroesophageal reflux disease)
ESRD on hemodialysis
GERD (gastroesophageal reflux disease)
GERD (gastroesophageal reflux disease)
ESRD on hemodialysis
GERD (gastroesophageal reflux disease)
ESRD on hemodialysis
GERD (gastroesophageal reflux disease)
GERD (gastroesophageal reflux disease)
ESRD on hemodialysis

## 2017-10-17 NOTE — CHART NOTE - NSCHARTNOTEFT_GEN_A_CORE
Called to evaluate the patient for unresponsiveness and pulselessness.    On physical exam patient did not respond to verbal or physical stimuli. No spontaneous respirations.  Absent heart and breath sounds. Absent radial, femoral, and carotid pulses.   Pupils are fixed and dilated absent AARON, no corneal reflex.    Patient pronounced dead at 7:08 AM. Attending notified.  Patient's wife and HCP notified. Son, Elliot, notified earlier of worsening status, on his way to hospital at time of death. Autopsy declined.

## 2017-10-17 NOTE — CHART NOTE - NSCHARTNOTESELECT_GEN_ALL_CORE
Transfer Note
Accept Note/Transfer Note
Event Note
Event Note/Death Note
Interventional Radiology/Event Note
Nutrition Services
RRT/Event Note
Thoracic surgery/Event Note

## 2017-11-04 LAB
CULTURE RESULTS: SIGNIFICANT CHANGE UP
SPECIMEN SOURCE: SIGNIFICANT CHANGE UP

## 2017-11-25 LAB
CULTURE RESULTS: SIGNIFICANT CHANGE UP
SPECIMEN SOURCE: SIGNIFICANT CHANGE UP

## 2017-12-19 PROCEDURE — 73620 X-RAY EXAM OF FOOT: CPT

## 2017-12-19 PROCEDURE — 84484 ASSAY OF TROPONIN QUANT: CPT

## 2017-12-19 PROCEDURE — 87186 SC STD MICRODIL/AGAR DIL: CPT

## 2017-12-19 PROCEDURE — 85014 HEMATOCRIT: CPT

## 2017-12-19 PROCEDURE — 99285 EMERGENCY DEPT VISIT HI MDM: CPT | Mod: 25

## 2017-12-19 PROCEDURE — 84100 ASSAY OF PHOSPHORUS: CPT

## 2017-12-19 PROCEDURE — 82977 ASSAY OF GGT: CPT

## 2017-12-19 PROCEDURE — 76700 US EXAM ABDOM COMPLETE: CPT

## 2017-12-19 PROCEDURE — 84132 ASSAY OF SERUM POTASSIUM: CPT

## 2017-12-19 PROCEDURE — 96376 TX/PRO/DX INJ SAME DRUG ADON: CPT

## 2017-12-19 PROCEDURE — 85610 PROTHROMBIN TIME: CPT

## 2017-12-19 PROCEDURE — 87150 DNA/RNA AMPLIFIED PROBE: CPT

## 2017-12-19 PROCEDURE — 70450 CT HEAD/BRAIN W/O DYE: CPT

## 2017-12-19 PROCEDURE — 82962 GLUCOSE BLOOD TEST: CPT

## 2017-12-19 PROCEDURE — 86803 HEPATITIS C AB TEST: CPT

## 2017-12-19 PROCEDURE — 82435 ASSAY OF BLOOD CHLORIDE: CPT

## 2017-12-19 PROCEDURE — 84157 ASSAY OF PROTEIN OTHER: CPT

## 2017-12-19 PROCEDURE — 87340 HEPATITIS B SURFACE AG IA: CPT

## 2017-12-19 PROCEDURE — 93970 EXTREMITY STUDY: CPT

## 2017-12-19 PROCEDURE — 73070 X-RAY EXAM OF ELBOW: CPT

## 2017-12-19 PROCEDURE — 83735 ASSAY OF MAGNESIUM: CPT

## 2017-12-19 PROCEDURE — 82330 ASSAY OF CALCIUM: CPT

## 2017-12-19 PROCEDURE — 83986 ASSAY PH BODY FLUID NOS: CPT

## 2017-12-19 PROCEDURE — 99261: CPT

## 2017-12-19 PROCEDURE — 87205 SMEAR GRAM STAIN: CPT

## 2017-12-19 PROCEDURE — 87015 SPECIMEN INFECT AGNT CONCNTJ: CPT

## 2017-12-19 PROCEDURE — 87798 DETECT AGENT NOS DNA AMP: CPT

## 2017-12-19 PROCEDURE — 87206 SMEAR FLUORESCENT/ACID STAI: CPT

## 2017-12-19 PROCEDURE — 97110 THERAPEUTIC EXERCISES: CPT

## 2017-12-19 PROCEDURE — 73502 X-RAY EXAM HIP UNI 2-3 VIEWS: CPT

## 2017-12-19 PROCEDURE — 80202 ASSAY OF VANCOMYCIN: CPT

## 2017-12-19 PROCEDURE — 72125 CT NECK SPINE W/O DYE: CPT

## 2017-12-19 PROCEDURE — 82565 ASSAY OF CREATININE: CPT

## 2017-12-19 PROCEDURE — 87075 CULTR BACTERIA EXCEPT BLOOD: CPT

## 2017-12-19 PROCEDURE — C1729: CPT

## 2017-12-19 PROCEDURE — 86900 BLOOD TYPING SEROLOGIC ABO: CPT

## 2017-12-19 PROCEDURE — 82553 CREATINE MB FRACTION: CPT

## 2017-12-19 PROCEDURE — 82945 GLUCOSE OTHER FLUID: CPT

## 2017-12-19 PROCEDURE — 71045 X-RAY EXAM CHEST 1 VIEW: CPT

## 2017-12-19 PROCEDURE — 87633 RESP VIRUS 12-25 TARGETS: CPT

## 2017-12-19 PROCEDURE — 93308 TTE F-UP OR LMTD: CPT

## 2017-12-19 PROCEDURE — 83615 LACTATE (LD) (LDH) ENZYME: CPT

## 2017-12-19 PROCEDURE — 82042 OTHER SOURCE ALBUMIN QUAN EA: CPT

## 2017-12-19 PROCEDURE — 90715 TDAP VACCINE 7 YRS/> IM: CPT

## 2017-12-19 PROCEDURE — 87116 MYCOBACTERIA CULTURE: CPT

## 2017-12-19 PROCEDURE — 80053 COMPREHEN METABOLIC PANEL: CPT

## 2017-12-19 PROCEDURE — 97112 NEUROMUSCULAR REEDUCATION: CPT

## 2017-12-19 PROCEDURE — 87102 FUNGUS ISOLATION CULTURE: CPT

## 2017-12-19 PROCEDURE — 87070 CULTURE OTHR SPECIMN AEROBIC: CPT

## 2017-12-19 PROCEDURE — 85730 THROMBOPLASTIN TIME PARTIAL: CPT

## 2017-12-19 PROCEDURE — 82803 BLOOD GASES ANY COMBINATION: CPT

## 2017-12-19 PROCEDURE — 84295 ASSAY OF SERUM SODIUM: CPT

## 2017-12-19 PROCEDURE — 71250 CT THORAX DX C-: CPT

## 2017-12-19 PROCEDURE — 87581 M.PNEUMON DNA AMP PROBE: CPT

## 2017-12-19 PROCEDURE — 32557 INSERT CATH PLEURA W/ IMAGE: CPT

## 2017-12-19 PROCEDURE — 87040 BLOOD CULTURE FOR BACTERIA: CPT

## 2017-12-19 PROCEDURE — 97530 THERAPEUTIC ACTIVITIES: CPT

## 2017-12-19 PROCEDURE — 97116 GAIT TRAINING THERAPY: CPT

## 2017-12-19 PROCEDURE — 97162 PT EVAL MOD COMPLEX 30 MIN: CPT

## 2017-12-19 PROCEDURE — 80048 BASIC METABOLIC PNL TOTAL CA: CPT

## 2017-12-19 PROCEDURE — 94640 AIRWAY INHALATION TREATMENT: CPT

## 2017-12-19 PROCEDURE — 83605 ASSAY OF LACTIC ACID: CPT

## 2017-12-19 PROCEDURE — 93306 TTE W/DOPPLER COMPLETE: CPT

## 2017-12-19 PROCEDURE — 90471 IMMUNIZATION ADMIN: CPT

## 2017-12-19 PROCEDURE — 93005 ELECTROCARDIOGRAM TRACING: CPT

## 2017-12-19 PROCEDURE — C1769: CPT

## 2017-12-19 PROCEDURE — 89051 BODY FLUID CELL COUNT: CPT

## 2017-12-19 PROCEDURE — 82550 ASSAY OF CK (CPK): CPT

## 2017-12-19 PROCEDURE — 96375 TX/PRO/DX INJ NEW DRUG ADDON: CPT

## 2017-12-19 PROCEDURE — 96374 THER/PROPH/DIAG INJ IV PUSH: CPT

## 2017-12-19 PROCEDURE — 87486 CHLMYD PNEUM DNA AMP PROBE: CPT

## 2017-12-19 PROCEDURE — 86901 BLOOD TYPING SEROLOGIC RH(D): CPT

## 2017-12-19 PROCEDURE — 86706 HEP B SURFACE ANTIBODY: CPT

## 2017-12-19 PROCEDURE — 86850 RBC ANTIBODY SCREEN: CPT

## 2017-12-19 PROCEDURE — 85027 COMPLETE CBC AUTOMATED: CPT

## 2017-12-19 PROCEDURE — 82947 ASSAY GLUCOSE BLOOD QUANT: CPT

## 2018-03-19 NOTE — PROGRESS NOTE ADULT - ASSESSMENT
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     #MRSA bacteremia 2/2 empyema: pt admitted with septic shock 2/2 MRSA bacteremia 2/2 empyema following PleurX placement 9/5 and exchange in the setting of immunosuppression for DLBCL (Rituximab). PleurX was removed on 9/27, and pt was transferred from MICU to floors 9/29.  Ct with loculated effusion  Likely needs further drainage  For pigtail per [facility]VS today  Also has PPM and aortic graft  Will need to r/o any seeding-may need GUADALUPE and CT with IV contrast to image aortic graft,particularly if repeat Cx positive,this however should be decided after goals of care are discussed with family as given his lymphoma status and comorbidities,prognosis is grave.  continue Vanco post HD  follow repeat Cx  Tailor plan per course,results.  Will Follow.  Beeper [id number]-[id number]/afterhours/No response-[id number] Anesthesia Volume In Cc (Will Not Render If 0): 0.5

## 2018-06-28 NOTE — PROGRESS NOTE BEHAVIORAL HEALTH - THOUGHT CONTENT
Fatigue symptoms likely secondary to combination of congestive heart failure as well as aortic stenosis  Discussed with the patient the meaning of both of these diagnosis on the prognosis with both  Delusions

## 2018-12-04 NOTE — ED PROVIDER NOTE - CROS ED ENMT ALL NEG
Nursing Note by Vianey Mo RN at 03/05/18 03:24 PM     Author:  Vianey Mo RN Service:  (none) Author Type:  Registered Nurse     Filed:  03/05/18 03:25 PM Encounter Date:  3/5/2018 Status:  Signed     :  Vianey Mo RN (Registered Nurse)            Pt. escorted  to[RC1.1T] exam[RC1.1M] room in stable condition.[RC1.1T]   Chief Complaint     Patient presents with     • Diarrhea      Pt c/o diarrhea, chills and fatigue x 1 week   • Fatigue[RC1.2T]       Name, allergies and birthdate verified with[RC1.1T] patient[RC1.1M].  Jeronimo Acuna was offered treatment for pain control:[RC1.1T] No.   Pain rated as 0 (zero)[RC1.1M]   Pt. notified of wait time. Last visit with WEILER, KEITH E. was on 10/09/2017 at 11:15 AM in IMMEDIATE CARE SEQ  Last visit with [facility] was on 11/06/2017 at  2:15 PM in IMMEDIATE CARE SEQ  Match done based on reference date of today 3/5/18[RC1.1T]          Revision History        User Key Date/Time User Provider Type Action    > RC1.2 03/05/18 03:25 PM Vianey Mo RN Registered Nurse Sign     RC1.1 03/05/18 03:24 PM Vianey Mo RN Registered Nurse     M - Manual, T - Template            
negative...

## 2019-01-10 NOTE — PHYSICAL THERAPY INITIAL EVALUATION ADULT - GAIT DEVIATIONS NOTED, PT EVAL
We would like you to have labs drawn today. Dr. Peña would like to see you back in 3 months for a follow up appointment with labs prior.      When you are in need of a refill, please call your pharmacy and they will send us a request.      Copy of appointments, and after visit summary (AVS) given to patient.      If you have any questions during business hours (M-F 8 AM- 4PM), please call Awilda Loyd RN Oncology Hematology  at [facility] [phone].       For questions after business hours, or on holidays/weekends, please call our after hours Nurse Triage line [phone]. Thank you.    
decreased step length/decreased danny

## 2019-08-15 NOTE — H&P PST ADULT - REASON FOR ADMISSION
Anticoagulation Summary  As of 8/15/2019    INR goal:   2.0-3.0   TTR:   78.2 % (4.2 y)   INR used for dosin.43 (2019)   Warfarin maintenance plan:   2.5 mg (5 mg x 0.5) every Mon, Wed, Fri; 5 mg (5 mg x 1) all other days   Weekly warfarin total:   27.5 mg   No change documented:   Jeremías Lopez, Pharmacy Intern   Plan last modified:   Verito Zafar, PharmD (2018)   Next INR check:   2019   Target end date:   Indefinite    Indications    A-fib (HCC) (Resolved) [I48.91]             Anticoagulation Episode Summary     INR check location:       Preferred lab:   [facility]    Send INR reminders to:       Comments:   [phone]        Anticoagulation Care Providers     Provider Role Specialty Phone number    Anastacio Sunshine M.D. Referring Cardiology [phone]    Mahin Valdez Responsible          Anticoagulation Patient Findings    Spoke with patient sister, Carmen, on phone. Current INR  2.43 which is within goal (2.0 to 3.0). No changes in diet or medications. No S/S of bleeding or bruising. Continue current warfarin regimen. Follow up INR in 4 week(s).     Jeremías Lopez, Pharmacy Intern      
"pleural effusion"

## 2019-11-06 NOTE — CHART NOTE - NSCHARTNOTEFT_GEN_A_CORE
Drained patient of 300 cc of cloudy fluid from left Plx catheter and pleural fluid cx sent.  Lt Plx catheter insertion site with slight errythema. Drained patient of 300 cc of cloudy fluid from left Plx catheter and pleural fluid cx sent.  Lt Plx catheter insertion site with slight errythema. Please obtain Chest CT no contrast.  Thoracic surgery to follow. Discharged

## 2020-01-03 NOTE — CONSULT NOTE ADULT - CONSULT REQUESTED BY NAME
Last OV 11/18/19.  F/U 2/17/20.    Glipizide refilled for 6 months.  
Dr. Lara
Dr. Vang
ED
Ricardo Garrett MD
ER

## 2021-01-07 NOTE — PATIENT PROFILE ADULT. - ASSIST WITH
Financial information faxed to Dr. Cheema's office.   
Patient requesting that insurance paperwork be sent to Dr Cheema's office for treatment.        
walking/toileting/standing

## 2021-02-15 NOTE — PATIENT PROFILE ADULT. - TOBACCO USE
Refill on lisdexamfetamine 50 mg send to [facility] pharmacy
The prescription was sent to the pharmacy.
Never smoker

## 2021-06-08 NOTE — H&P ADULT - NSHPSOCIALHISTORY_GEN_ALL_CORE
Lives at home with wife and home aid 24/7. Needs assistance in performing ADLs. No smoking or drinking history.
5

## 2022-07-28 NOTE — H&P PST ADULT - MUSCULOSKELETAL
[FreeTextEntry1] : Mr. Henson is a pleasant 75-year-old white male with a past medical history significant for hypertension, hyperlipidemia, coronary artery disease status-post PCI in 2002 followed by CABG x3 in March of 2020 as well as COPD, chronic back pain status-post spinal simulator insertion, and paroxysmal atrial fibrillation status-post ILR implantation who presents for follow up evaluation.  \par \par \par  details… detailed exam ROM intact/no joint swelling/no joint erythema

## 2022-09-23 NOTE — PROGRESS NOTE ADULT - PROBLEM SELECTOR PLAN 3
face tent Patient has intradialytic hypotension.  Would consider midodrine 10mg prior as patient takes per baseline, but will defer hemodynamic management to MICU at this time.  No ultrafiltration with HD today due to sepsis w/ hypotension.

## 2024-08-23 NOTE — PROGRESS NOTE ADULT - PROBLEM SELECTOR PLAN 6
Tian Hugo informed of dose adjustment for zolpidem. He will call back when he needs refills or if the dose does not help.    Heme onc follow up